# Patient Record
Sex: FEMALE | Race: WHITE | NOT HISPANIC OR LATINO | Employment: OTHER | URBAN - METROPOLITAN AREA
[De-identification: names, ages, dates, MRNs, and addresses within clinical notes are randomized per-mention and may not be internally consistent; named-entity substitution may affect disease eponyms.]

---

## 2021-05-28 ENCOUNTER — IMMUNIZATION (OUTPATIENT)
Dept: VACCINE CLINIC | Facility: HOSPITAL | Age: 66
End: 2021-05-28

## 2021-05-28 PROCEDURE — 0001A: CPT | Performed by: INTERNAL MEDICINE

## 2021-05-28 PROCEDURE — 91300 HC SARSCOV02 VAC 30MCG/0.3ML IM: CPT | Performed by: INTERNAL MEDICINE

## 2021-06-18 ENCOUNTER — IMMUNIZATION (OUTPATIENT)
Dept: VACCINE CLINIC | Facility: HOSPITAL | Age: 66
End: 2021-06-18

## 2021-06-18 PROCEDURE — 91300 HC SARSCOV02 VAC 30MCG/0.3ML IM: CPT | Performed by: INTERNAL MEDICINE

## 2021-06-18 PROCEDURE — 0002A: CPT | Performed by: INTERNAL MEDICINE

## 2021-12-28 ENCOUNTER — IMMUNIZATION (OUTPATIENT)
Dept: VACCINE CLINIC | Facility: HOSPITAL | Age: 66
End: 2021-12-28

## 2021-12-28 PROCEDURE — 91300 HC SARSCOV02 VAC 30MCG/0.3ML IM: CPT | Performed by: INTERNAL MEDICINE

## 2021-12-28 PROCEDURE — 0004A HC ADM SARSCOV2 30MCG/0.3ML BOOSTER: CPT | Performed by: INTERNAL MEDICINE

## 2023-03-22 ENCOUNTER — HOSPITAL ENCOUNTER (OUTPATIENT)
Dept: GENERAL RADIOLOGY | Facility: HOSPITAL | Age: 68
Discharge: HOME | End: 2023-03-22
Payer: MEDICARE

## 2023-03-22 ENCOUNTER — OFFICE VISIT (OUTPATIENT)
Dept: INTERNAL MEDICINE | Facility: CLINIC | Age: 68
End: 2023-03-22
Payer: MEDICARE

## 2023-03-22 ENCOUNTER — LAB (OUTPATIENT)
Dept: LAB | Facility: HOSPITAL | Age: 68
End: 2023-03-22
Payer: MEDICARE

## 2023-03-22 VITALS
TEMPERATURE: 97.2 F | DIASTOLIC BLOOD PRESSURE: 84 MMHG | BODY MASS INDEX: 26.28 KG/M2 | WEIGHT: 173.4 LBS | RESPIRATION RATE: 16 BRPM | HEART RATE: 85 BPM | HEIGHT: 68 IN | OXYGEN SATURATION: 95 % | SYSTOLIC BLOOD PRESSURE: 132 MMHG

## 2023-03-22 DIAGNOSIS — J20.9 ACUTE BRONCHITIS, UNSPECIFIED ORGANISM: Primary | ICD-10-CM

## 2023-03-22 DIAGNOSIS — R06.02 SHORTNESS OF BREATH: ICD-10-CM

## 2023-03-22 LAB
ALBUMIN SERPL-MCNC: 5.2 G/DL (ref 3.5–5.2)
ALBUMIN/GLOB SERPL: 2 G/DL
ALP SERPL-CCNC: 59 U/L (ref 39–117)
ALT SERPL W P-5'-P-CCNC: 14 U/L (ref 1–33)
ANION GAP SERPL CALCULATED.3IONS-SCNC: 7.5 MMOL/L (ref 5–15)
AST SERPL-CCNC: 16 U/L (ref 1–32)
BILIRUB SERPL-MCNC: 0.5 MG/DL (ref 0–1.2)
BUN SERPL-MCNC: 11 MG/DL (ref 8–23)
BUN/CREAT SERPL: 12.2 (ref 7–25)
CALCIUM SPEC-SCNC: 10.1 MG/DL (ref 8.6–10.5)
CHLORIDE SERPL-SCNC: 101 MMOL/L (ref 98–107)
CO2 SERPL-SCNC: 27.5 MMOL/L (ref 22–29)
CREAT SERPL-MCNC: 0.9 MG/DL (ref 0.57–1)
DEPRECATED RDW RBC AUTO: 43.4 FL (ref 37–54)
EGFRCR SERPLBLD CKD-EPI 2021: 70.2 ML/MIN/1.73
ERYTHROCYTE [DISTWIDTH] IN BLOOD BY AUTOMATED COUNT: 12.1 % (ref 12.3–15.4)
GLOBULIN UR ELPH-MCNC: 2.6 GM/DL
GLUCOSE SERPL-MCNC: 115 MG/DL (ref 65–99)
HCT VFR BLD AUTO: 40.8 % (ref 34–46.6)
HGB BLD-MCNC: 14.5 G/DL (ref 12–15.9)
LYMPHOCYTES # BLD MANUAL: 2.9 10*3/MM3 (ref 0.7–3.1)
LYMPHOCYTES NFR BLD MANUAL: 5.2 % (ref 5–12)
MCH RBC QN AUTO: 34.9 PG (ref 26.6–33)
MCHC RBC AUTO-ENTMCNC: 35.5 G/DL (ref 31.5–35.7)
MCV RBC AUTO: 98.3 FL (ref 79–97)
MONOCYTES # BLD: 0.27 10*3/MM3 (ref 0.1–0.9)
NEUTROPHILS # BLD AUTO: 1.95 10*3/MM3 (ref 1.7–7)
NEUTROPHILS NFR BLD MANUAL: 38.1 % (ref 42.7–76)
NRBC BLD AUTO-RTO: 0.2 /100 WBC (ref 0–0.2)
PLAT MORPH BLD: NORMAL
PLATELET # BLD AUTO: 268 10*3/MM3 (ref 140–450)
PMV BLD AUTO: 9.6 FL (ref 6–12)
POTASSIUM SERPL-SCNC: 3.8 MMOL/L (ref 3.5–5.2)
PROT SERPL-MCNC: 7.8 G/DL (ref 6–8.5)
RBC # BLD AUTO: 4.15 10*6/MM3 (ref 3.77–5.28)
RBC MORPH BLD: NORMAL
SODIUM SERPL-SCNC: 136 MMOL/L (ref 136–145)
VARIANT LYMPHS NFR BLD MANUAL: 56.7 % (ref 19.6–45.3)
WBC MORPH BLD: NORMAL
WBC NRBC COR # BLD: 5.12 10*3/MM3 (ref 3.4–10.8)

## 2023-03-22 PROCEDURE — 85025 COMPLETE CBC W/AUTO DIFF WBC: CPT | Performed by: FAMILY MEDICINE

## 2023-03-22 PROCEDURE — 99204 OFFICE O/P NEW MOD 45 MIN: CPT | Performed by: FAMILY MEDICINE

## 2023-03-22 PROCEDURE — 80053 COMPREHEN METABOLIC PANEL: CPT | Performed by: FAMILY MEDICINE

## 2023-03-22 PROCEDURE — 1159F MED LIST DOCD IN RCRD: CPT | Performed by: FAMILY MEDICINE

## 2023-03-22 PROCEDURE — 36415 COLL VENOUS BLD VENIPUNCTURE: CPT | Performed by: FAMILY MEDICINE

## 2023-03-22 PROCEDURE — 1160F RVW MEDS BY RX/DR IN RCRD: CPT | Performed by: FAMILY MEDICINE

## 2023-03-22 PROCEDURE — 85007 BL SMEAR W/DIFF WBC COUNT: CPT | Performed by: FAMILY MEDICINE

## 2023-03-22 PROCEDURE — 71046 X-RAY EXAM CHEST 2 VIEWS: CPT

## 2023-03-22 RX ORDER — AZITHROMYCIN 250 MG/1
TABLET, FILM COATED ORAL
Qty: 6 TABLET | Refills: 0 | Status: SHIPPED | OUTPATIENT
Start: 2023-03-22

## 2023-03-22 RX ORDER — PREDNISONE 10 MG/1
40 TABLET ORAL DAILY
Qty: 20 TABLET | Refills: 0 | Status: SHIPPED | OUTPATIENT
Start: 2023-03-22 | End: 2023-03-27

## 2023-03-22 RX ORDER — ALBUTEROL SULFATE 90 UG/1
2 AEROSOL, METERED RESPIRATORY (INHALATION) EVERY 4 HOURS PRN
Qty: 18 G | Refills: 0 | Status: SHIPPED | OUTPATIENT
Start: 2023-03-22

## 2023-03-22 RX ORDER — FLUTICASONE FUROATE, UMECLIDINIUM BROMIDE AND VILANTEROL TRIFENATATE 100; 62.5; 25 UG/1; UG/1; UG/1
1 POWDER RESPIRATORY (INHALATION)
Qty: 1 EACH | Refills: 6 | Status: SHIPPED | OUTPATIENT
Start: 2023-03-22

## 2023-03-22 NOTE — PROGRESS NOTES
"Chief Complaint  Establish Care (cough)    Subjective        Yoanna Reed presents to [facility] PRIMARY [facility]  History of Present Illness    Patient presents at today's office visit stating that she does have some shortness of breath.  She states that she is cleaning out her mother's home and has been around a lot of dust has been going on, and this is progressively made it worse for her over the last month.  She has a history of smoking as well.  However she quit about 12 years ago.  She has had numerous episodes of bronchitis in the past.    Objective   Vital Signs:  /84   Pulse 85   Temp 97.2 °F (36.2 °C)   Resp 16   Ht 172.7 cm (68\")   Wt 78.7 kg (173 lb 6.4 oz)   SpO2 95%   BMI 26.37 kg/m²   Estimated body mass index is 26.37 kg/m² as calculated from the following:    Height as of this encounter: 172.7 cm (68\").    Weight as of this encounter: 78.7 kg (173 lb 6.4 oz).             Physical Exam  Vitals and nursing note reviewed.   Constitutional:       Appearance: She is well-developed.   HENT:      Head: Normocephalic and atraumatic.      Right Ear: External ear normal.      Left Ear: External ear normal.   Cardiovascular:      Rate and Rhythm: Normal rate and regular rhythm.      Heart sounds: Normal heart sounds.   Pulmonary:      Effort: Pulmonary effort is normal. No respiratory distress.      Breath sounds: Wheezing present.   Musculoskeletal:         General: Normal range of motion.      Cervical back: Normal range of motion and neck supple.   Lymphadenopathy:      Cervical: No cervical adenopathy.   Skin:     General: Skin is warm.   Neurological:      Mental Status: She is alert and oriented to person, place, and time.   Psychiatric:         Behavior: Behavior normal.        Result Review :                   Assessment and Plan   Diagnoses and all orders for this visit:    1. Acute bronchitis, unspecified organism (Primary)  -     predniSONE (DELTASONE) 10 MG " tablet; Take 4 tablets by mouth Daily for 5 days.  Dispense: 20 tablet; Refill: 0  -     azithromycin (Zithromax) 250 MG tablet; Take 2 tablets the first day, then 1 tablet daily for 4 days.  Dispense: 6 tablet; Refill: 0  -     albuterol sulfate  (90 Base) MCG/ACT inhaler; Inhale 2 puffs Every 4 (Four) Hours As Needed for Wheezing.  Dispense: 18 g; Refill: 0    2. Shortness of breath  -     Ambulatory Referral to Pulmonology  -     albuterol sulfate  (90 Base) MCG/ACT inhaler; Inhale 2 puffs Every 4 (Four) Hours As Needed for Wheezing.  Dispense: 18 g; Refill: 0  -     XR Chest 2 View  -     Fluticasone-Umeclidin-Vilant (Trelegy Ellipta) 100-62.5-25 MCG/ACT inhaler; Inhale 1 puff Daily.  Dispense: 1 each; Refill: 6  -     Comprehensive Metabolic Panel  -     CBC & Differential    Today's office visit her O2 sat was initially 92%, however on recheck it is up to 95%.  She seems to be fairly stable and does not appear to be ill looking.  She seems to be fairly comfortable and not in any distress.  I did think that she could benefit from trials of inhalers.  She would also benefit from getting a chest x-ray as well as some labs done at today's visit.  Seeing a pulmonologist would also benefit her.  On exam she does have some wheezing, I do think she would benefit from a steroid as well.  Perhaps even get her started on a Z-Hong.         Follow Up   No follow-ups on file.  Patient was given instructions and counseling regarding her condition or for health maintenance advice. Please see specific information pulled into the AVS if appropriate.

## 2023-03-24 ENCOUNTER — PATIENT ROUNDING (BHMG ONLY) (OUTPATIENT)
Dept: INTERNAL MEDICINE | Facility: CLINIC | Age: 68
End: 2023-03-24
Payer: MEDICARE

## 2023-03-24 NOTE — PROGRESS NOTES
March 24, 2023    Hello, may I speak with Yoanna Reed?    My name is OZ      I am  with [facility]  [address]  [place] [address] [phone].    Before we get started may I verify your date of birth? 1955    I am calling to officially welcome you to our practice and ask about your recent visit. Is this a good time to talk? YES  Tell me about your visit with us. What things went well?  YES       We're always looking for ways to make our patients' experiences even better. Do you have recommendations on ways we may improve?  NO    Overall were you satisfied with your first visit to our practice?  YES     I appreciate you taking the time to speak with me today. Is there anything else I can do for you? NO      Thank you, and have a great day.

## 2023-03-30 DIAGNOSIS — J20.9 ACUTE BRONCHITIS, UNSPECIFIED ORGANISM: ICD-10-CM

## 2023-03-30 DIAGNOSIS — R06.02 SHORTNESS OF BREATH: ICD-10-CM

## 2023-03-30 RX ORDER — ALBUTEROL SULFATE 90 UG/1
AEROSOL, METERED RESPIRATORY (INHALATION)
OUTPATIENT
Start: 2023-03-30

## 2023-04-24 ENCOUNTER — TELEPHONE (OUTPATIENT)
Dept: INTERNAL MEDICINE | Facility: CLINIC | Age: 68
End: 2023-04-24

## 2023-04-24 NOTE — TELEPHONE ENCOUNTER
Caller: Yoanna Reed    Relationship: Self    Best call back number: [phone]    What is the best time to reach you: ANYTIME     PATIENT STATES SHE IS NEEDING A 6 WEEK FOLLOW UP APPOINTMENT FOR HER SHORTNESS OF BREATH AN MEDICATION AND DOES NOT WANT TO WAIT UNTIL THE END OF MAY 2023 TO BE SEEN.     PATIENT STATES SHE IS REQUESTING TO BE WORKED IN DUE TO HER APPOINTMENT THIS Friday, 04/28/2023 BEING CANCELLED.

## 2023-05-12 ENCOUNTER — OFFICE VISIT (OUTPATIENT)
Dept: INTERNAL MEDICINE | Facility: CLINIC | Age: 68
End: 2023-05-12
Payer: MEDICARE

## 2023-05-12 VITALS
RESPIRATION RATE: 16 BRPM | SYSTOLIC BLOOD PRESSURE: 138 MMHG | WEIGHT: 177 LBS | BODY MASS INDEX: 26.83 KG/M2 | OXYGEN SATURATION: 94 % | HEIGHT: 68 IN | DIASTOLIC BLOOD PRESSURE: 80 MMHG

## 2023-05-12 DIAGNOSIS — F32.A DEPRESSION, UNSPECIFIED DEPRESSION TYPE: Primary | ICD-10-CM

## 2023-05-12 DIAGNOSIS — F41.9 ANXIETY: ICD-10-CM

## 2023-05-12 RX ORDER — ESCITALOPRAM OXALATE 10 MG/1
10 TABLET ORAL DAILY
Qty: 90 TABLET | Refills: 2 | Status: SHIPPED | OUTPATIENT
Start: 2023-05-12

## 2023-05-12 NOTE — PROGRESS NOTES
"Chief Complaint  Stress    Subjective        Ainsley Reed presents to [facility]  History of Present Illness     AINSLEY REED is present in the clinic today for concerns of stress. She consents to the use of SHASHI.    She has been under a lot of stress. Her mother passed away 1 year ago. She has 2 sisters who sued her before she was in the ground. She gets lost papers. She has lost some weight. She takes melatonin to help her sleep. She has a friend that is in her life, but he is not a boyfriend. He helped her when her mother would fall. She is going through the roof with stress. She wants to make sure her heart is not going \"bizarre\". She had to clean her parents' house. She had 8 dumpsters in a basement last summer. She is kimber if she eats once a day, and she urinates today. She is not worried about eating. She does eat, but she needs to get something under control.    She went to the pulmonologist yesterday. He said he was going to give her a prescription for asthma. She has not gotten that. She can not afford the Trelegy. Her insurance does not cover it.    She takes Tylenol and vitamins.    Objective   Vital Signs:  /80 (BP Location: Left arm, Patient Position: Sitting, Cuff Size: Adult)   Resp 16   Ht 172.7 cm (68\")   Wt 80.3 kg (177 lb)   SpO2 94%   BMI 26.91 kg/m²   Estimated body mass index is 26.91 kg/m² as calculated from the following:    Height as of this encounter: 172.7 cm (68\").    Weight as of this encounter: 80.3 kg (177 lb).        A review of systems was performed, and the pertinent positives are noted in the HPI.            Physical Exam  Vitals and nursing note reviewed.   Constitutional:       Appearance: She is well-developed.   HENT:      Head: Normocephalic and atraumatic.   Musculoskeletal:      Cervical back: Normal range of motion and neck supple.   Neurological:      Mental Status: She is alert and oriented to person, place, and " time.   Psychiatric:         Behavior: Behavior normal.        Result Review :                   Assessment and Plan   Diagnoses and all orders for this visit:    1. Depression, unspecified depression type (Primary)  -     escitalopram (Lexapro) 10 MG tablet; Take 1 tablet by mouth Daily.  Dispense: 90 tablet; Refill: 2    2. Anxiety  -     escitalopram (Lexapro) 10 MG tablet; Take 1 tablet by mouth Daily.  Dispense: 90 tablet; Refill: 2        1. Anxiety  - I will prescribe Lexapro 10 mg daily.    2. Asthma  - I will prescribe Trelegy.    She will follow up in 4 to 6 weeks.         Follow Up   No follow-ups on file.  Patient was given instructions and counseling regarding her condition or for health maintenance advice. Please see specific information pulled into the AVS if appropriate.             Transcribed from ambient dictation for Tom Muller MD by Ijeoma Parra.  05/12/23   15:39 EDT    Patient or patient representative verbalized consent to the visit recording.  I have personally performed the services described in this document as transcribed by the above individual, and it is both accurate and complete.

## 2023-07-12 ENCOUNTER — OFFICE VISIT (OUTPATIENT)
Dept: INTERNAL MEDICINE | Facility: CLINIC | Age: 68
End: 2023-07-12
Payer: MEDICARE

## 2023-07-12 VITALS
WEIGHT: 177 LBS | HEIGHT: 68 IN | OXYGEN SATURATION: 98 % | HEART RATE: 110 BPM | RESPIRATION RATE: 16 BRPM | DIASTOLIC BLOOD PRESSURE: 74 MMHG | SYSTOLIC BLOOD PRESSURE: 118 MMHG | BODY MASS INDEX: 26.83 KG/M2

## 2023-07-12 DIAGNOSIS — F41.9 ANXIETY: Primary | ICD-10-CM

## 2023-07-12 DIAGNOSIS — F32.A DEPRESSION, UNSPECIFIED DEPRESSION TYPE: ICD-10-CM

## 2023-07-12 PROCEDURE — 1160F RVW MEDS BY RX/DR IN RCRD: CPT | Performed by: FAMILY MEDICINE

## 2023-07-12 PROCEDURE — 99214 OFFICE O/P EST MOD 30 MIN: CPT | Performed by: FAMILY MEDICINE

## 2023-07-12 PROCEDURE — 1159F MED LIST DOCD IN RCRD: CPT | Performed by: FAMILY MEDICINE

## 2023-08-03 ENCOUNTER — OFFICE VISIT (OUTPATIENT)
Dept: INTERNAL MEDICINE | Facility: CLINIC | Age: 68
End: 2023-08-03
Payer: MEDICARE

## 2023-08-03 VITALS
HEIGHT: 68 IN | DIASTOLIC BLOOD PRESSURE: 80 MMHG | WEIGHT: 184 LBS | HEART RATE: 69 BPM | BODY MASS INDEX: 27.89 KG/M2 | OXYGEN SATURATION: 99 % | RESPIRATION RATE: 16 BRPM | SYSTOLIC BLOOD PRESSURE: 144 MMHG

## 2023-08-03 DIAGNOSIS — F32.A DEPRESSION, UNSPECIFIED DEPRESSION TYPE: ICD-10-CM

## 2023-08-03 DIAGNOSIS — Z78.0 POST-MENOPAUSAL: ICD-10-CM

## 2023-08-03 DIAGNOSIS — F41.9 ANXIETY: ICD-10-CM

## 2023-08-03 DIAGNOSIS — Z12.31 ENCOUNTER FOR SCREENING MAMMOGRAM FOR MALIGNANT NEOPLASM OF BREAST: ICD-10-CM

## 2023-08-03 DIAGNOSIS — F51.01 PRIMARY INSOMNIA: Primary | ICD-10-CM

## 2023-08-03 RX ORDER — ZOLPIDEM TARTRATE 10 MG/1
10 TABLET ORAL NIGHTLY PRN
Qty: 90 TABLET | Refills: 0 | Status: SHIPPED | OUTPATIENT
Start: 2023-08-03

## 2023-08-10 ENCOUNTER — HOSPITAL ENCOUNTER (OUTPATIENT)
Dept: BONE DENSITY | Facility: HOSPITAL | Age: 68
Discharge: HOME | End: 2023-08-10
Admitting: FAMILY MEDICINE
Payer: MEDICARE

## 2023-08-10 ENCOUNTER — TRANSCRIBE ORDERS (OUTPATIENT)
Dept: ADMINISTRATIVE | Facility: HOSPITAL | Age: 68
End: 2023-08-10
Payer: MEDICARE

## 2023-08-10 DIAGNOSIS — Z12.31 SCREENING MAMMOGRAM FOR BREAST CANCER: Primary | ICD-10-CM

## 2023-08-10 DIAGNOSIS — Z78.0 POST-MENOPAUSAL: ICD-10-CM

## 2023-08-10 PROCEDURE — 77080 DXA BONE DENSITY AXIAL: CPT

## 2023-08-12 NOTE — PROGRESS NOTES
"Chief Complaint  Anxiety    Subjective        Yoanna Reed presents to [facility]  Anxiety          Patient continues to have some anxiety as well as some depression.  Patient is currently taking Lexapro 20 mg daily.  Patient is also taking Ativan 0.5 mg as needed.  However depression and anxiety continues to get worse.  Patient states that she is under significant amount of stress, she is currently going through a lot of personal things.    Objective   Vital Signs:  /74 (BP Location: Left arm, Patient Position: Sitting, Cuff Size: Adult)   Pulse 110   Resp 16   Ht 172.7 cm (67.99\")   Wt 80.3 kg (177 lb)   SpO2 98%   BMI 26.92 kg/mý   Estimated body mass index is 26.92 kg/mý as calculated from the following:    Height as of this encounter: 172.7 cm (67.99\").    Weight as of this encounter: 80.3 kg (177 lb).             Physical Exam  Vitals and nursing note reviewed.   Constitutional:       Appearance: She is well-developed.   HENT:      Head: Normocephalic and atraumatic.   Musculoskeletal:      Cervical back: Normal range of motion and neck supple.   Neurological:      Mental Status: She is alert and oriented to person, place, and time.   Psychiatric:         Behavior: Behavior normal.      Result Review :                   Assessment and Plan   Diagnoses and all orders for this visit:    1. Anxiety (Primary)  -     Cariprazine HCl (Vraylar) 1.5 MG capsule capsule; Take 1 capsule by mouth Daily.  Dispense: 30 capsule; Refill: 11    2. Depression, unspecified depression type  -     Cariprazine HCl (Vraylar) 1.5 MG capsule capsule; Take 1 capsule by mouth Daily.  Dispense: 30 capsule; Refill: 11    At today's office visit I discussed we will continue taking Lexapro as well as Ativan.  Like to start patient on Vraylar 1.5 mg daily.         Follow Up   No follow-ups on file.  Patient was given instructions and counseling regarding her condition or for health " maintenance advice. Please see specific information pulled into the AVS if appropriate.

## 2023-08-25 ENCOUNTER — OFFICE VISIT (OUTPATIENT)
Dept: INTERNAL MEDICINE | Facility: CLINIC | Age: 68
End: 2023-08-25
Payer: MEDICARE

## 2023-08-25 VITALS
DIASTOLIC BLOOD PRESSURE: 78 MMHG | BODY MASS INDEX: 27.58 KG/M2 | OXYGEN SATURATION: 95 % | HEART RATE: 92 BPM | WEIGHT: 182 LBS | SYSTOLIC BLOOD PRESSURE: 126 MMHG | HEIGHT: 68 IN | RESPIRATION RATE: 16 BRPM | TEMPERATURE: 98.2 F

## 2023-08-25 DIAGNOSIS — N60.01 BENIGN BREAST CYST IN FEMALE, RIGHT: ICD-10-CM

## 2023-08-25 DIAGNOSIS — Z12.11 ENCOUNTER FOR SCREENING COLONOSCOPY: ICD-10-CM

## 2023-08-25 DIAGNOSIS — F51.01 PRIMARY INSOMNIA: ICD-10-CM

## 2023-08-25 DIAGNOSIS — F41.9 ANXIETY: ICD-10-CM

## 2023-08-25 DIAGNOSIS — F32.A DEPRESSION, UNSPECIFIED DEPRESSION TYPE: Primary | ICD-10-CM

## 2023-08-25 DIAGNOSIS — B00.1 COLD SORE: ICD-10-CM

## 2023-08-25 PROCEDURE — 1159F MED LIST DOCD IN RCRD: CPT | Performed by: FAMILY MEDICINE

## 2023-08-25 PROCEDURE — 99214 OFFICE O/P EST MOD 30 MIN: CPT | Performed by: FAMILY MEDICINE

## 2023-08-25 PROCEDURE — 1160F RVW MEDS BY RX/DR IN RCRD: CPT | Performed by: FAMILY MEDICINE

## 2023-08-25 RX ORDER — VALACYCLOVIR HYDROCHLORIDE 500 MG/1
500 TABLET, FILM COATED ORAL 2 TIMES DAILY
Qty: 14 TABLET | Refills: 1 | Status: SHIPPED | OUTPATIENT
Start: 2023-08-25 | End: 2023-09-01

## 2023-08-31 ENCOUNTER — TELEPHONE (OUTPATIENT)
Dept: INTERNAL MEDICINE | Facility: CLINIC | Age: 68
End: 2023-08-31
Payer: MEDICARE

## 2023-08-31 DIAGNOSIS — K12.0 APHTHOUS ULCER OF MOUTH: Primary | ICD-10-CM

## 2023-08-31 NOTE — TELEPHONE ENCOUNTER
Caller: Derek Yoanna M    Relationship: Self    Best call back number: [phone]    What are your current symptoms: SORES IN HER MOUTH     Have you had these symptoms before:    [x] Yes  [] No    Have you been treated for these symptoms before:   [x] Yes  [] No    If a prescription is needed, what is your preferred pharmacy and phone number: [facility]/PHARMACY #[id number] [place], [place] - [address] [address]. AT [facility] [phone] [facility] [phone]      Additional notes: PATIENT STATES THE MEDICATION SHE WAS PRESCRIBE LAST FRIDAY, 08/25/2023 BY DR. CHAMBERLAIN IS NOT HELPING WITH THE SORES IN HER MOUTH .    PATIENT STATES SHE IS WANTING TO KNOW IF SHE CAN BE PRESCRIBED A DIFFERENT MEDICATION.    PATIENT IS REQUESTING A CALL BACK.

## 2023-09-03 NOTE — TELEPHONE ENCOUNTER
Lets send in balwinder magic mouth wash.  1 part lidocaine, 1 part benedryl, 1 part nystatin.  Dispense 180ml.  Swish and spit 5ml tid.

## 2023-09-04 NOTE — PROGRESS NOTES
"Chief Complaint  Follow-up (1 month follow up/) and Mouth Lesions (For about  a week or so)    Subjective        Yoanna Reed presents to [facility]  Mouth Lesions   Associated symptoms include mouth sores.     Patient presents at today's visit with a history having depression, anxiety as well as insomnia.  She states that the medicines that she is currently taking does seem to be helping her.  She states that the Vraylar 1.5 mg daily has started to kick in.  She is seems to be doing okay with the Lexapro 20 mg daily along with Ativan 0.5 mg daily.  She also states that for the insomnia taking Ambien 10 mg nightly has also help.    She states that she was having a benign breast and the right side.  Patient states that she would like to have a diagnostic mammogram and ultrasound done.  I did at today's visit put the referrals in for this.    She is suffering from cold sores.  We did discuss at today's office about perhaps trying Valtrex to see if it would help her with the cold sores.  She may need Nkechi's Magic mouthwash.    Objective   Vital Signs:  /78   Pulse 92   Temp 98.2 °F (36.8 °C) (Temporal)   Resp 16   Ht 172.7 cm (67.99\")   Wt 82.6 kg (182 lb)   SpO2 95%   BMI 27.68 kg/m²   Estimated body mass index is 27.68 kg/m² as calculated from the following:    Height as of this encounter: 172.7 cm (67.99\").    Weight as of this encounter: 82.6 kg (182 lb).             Physical Exam  Vitals and nursing note reviewed.   Constitutional:       Appearance: She is well-developed.   HENT:      Head: Normocephalic and atraumatic.   Musculoskeletal:      Cervical back: Normal range of motion and neck supple.   Neurological:      Mental Status: She is alert and oriented to person, place, and time.   Psychiatric:         Behavior: Behavior normal.      Result Review :                   Assessment and Plan   Diagnoses and all orders for this visit:    1. Depression, " unspecified depression type (Primary)    2. Anxiety    3. Primary insomnia    4. Benign breast cyst in female, right  -     Mammo Diagnostic Bilateral With CAD; Future  -     US breast bilateral complete; Future    5. Encounter for screening colonoscopy  -     Ambulatory Referral For Screening Colonoscopy    6. Cold sore  -     valACYclovir (Valtrex) 500 MG tablet; Take 1 tablet by mouth 2 (Two) Times a Day for 7 days.  Dispense: 14 tablet; Refill: 1    Other orders  -     magic mouthwash oral suspension; Swish and spit 5 mL Every 6 (Six) Hours As Needed for Stomatitis.  Dispense: 150 each; Refill: 0    For the cold sores, will start on Valtrex.  She may benefit from Nkechi's Magic mouthwash.  For the benign breast cyst on the right side, will get ultrasound and diagnostic mammogram.  We will refer her to get a colonoscopy.  For the insomnia, continue Ambien.  For anxiety and depression, continue Vraylar, Lexapro, and Ativan.         Follow Up   No follow-ups on file.  Patient was given instructions and counseling regarding her condition or for health maintenance advice. Please see specific information pulled into the AVS if appropriate.

## 2023-10-04 ENCOUNTER — HOSPITAL ENCOUNTER (OUTPATIENT)
Dept: ULTRASOUND IMAGING | Facility: HOSPITAL | Age: 68
Discharge: HOME | End: 2023-10-04
Payer: MEDICARE

## 2023-10-04 ENCOUNTER — HOSPITAL ENCOUNTER (OUTPATIENT)
Dept: MAMMOGRAPHY | Facility: HOSPITAL | Age: 68
Discharge: HOME | End: 2023-10-04
Payer: MEDICARE

## 2023-10-04 DIAGNOSIS — N60.01 BENIGN BREAST CYST IN FEMALE, RIGHT: ICD-10-CM

## 2023-10-04 PROCEDURE — G0279 TOMOSYNTHESIS, MAMMO: HCPCS

## 2023-10-04 PROCEDURE — 76642 ULTRASOUND BREAST LIMITED: CPT

## 2023-10-04 PROCEDURE — 77066 DX MAMMO INCL CAD BI: CPT

## 2023-10-26 DIAGNOSIS — F51.01 PRIMARY INSOMNIA: ICD-10-CM

## 2023-11-01 DIAGNOSIS — F51.01 PRIMARY INSOMNIA: ICD-10-CM

## 2023-11-01 DIAGNOSIS — F41.9 ANXIETY: ICD-10-CM

## 2023-11-01 DIAGNOSIS — F32.A DEPRESSION, UNSPECIFIED DEPRESSION TYPE: ICD-10-CM

## 2023-11-01 RX ORDER — ESCITALOPRAM OXALATE 20 MG/1
20 TABLET ORAL DAILY
Qty: 90 TABLET | Refills: 1 | Status: SHIPPED | OUTPATIENT
Start: 2023-11-01

## 2023-11-01 RX ORDER — ZOLPIDEM TARTRATE 10 MG/1
10 TABLET ORAL NIGHTLY PRN
Qty: 90 TABLET | Refills: 0 | Status: SHIPPED | OUTPATIENT
Start: 2023-11-01

## 2023-11-01 RX ORDER — LORAZEPAM 0.5 MG/1
0.5 TABLET ORAL 2 TIMES DAILY PRN
Qty: 60 TABLET | Refills: 3 | Status: SHIPPED | OUTPATIENT
Start: 2023-11-01

## 2023-11-01 NOTE — TELEPHONE ENCOUNTER
Caller: Yoanna Reed    Relationship: Self    Requested Prescriptions:   Requested Prescriptions     Pending Prescriptions Disp Refills    zolpidem (AMBIEN) 10 MG tablet 90 tablet 0     Sig: Take 1 tablet by mouth At Night As Needed for Sleep.        Pharmacy where request should be sent: [facility]/PHARMACY #[id number] [place], [place] - [address] AT [facility] [phone] [facility] [phone]      Last office visit with prescribing clinician: 8/25/2023   Last telemedicine visit with prescribing clinician: Visit date not found   Next office visit with prescribing clinician: 11/14/2023     Additional details provided by patient: PATIENT HAS ONE PILL LEFT.      Does the patient have less than a 3 day supply:  [x] Yes  [] No    Would you like a call back once the refill request has been completed: [] Yes [x] No    If the office needs to give you a call back, can they leave a voicemail: [] Yes [x] No    Edgard Flores Rep   11/01/23 08:58 EDT

## 2023-11-01 NOTE — TELEPHONE ENCOUNTER
Rx Refill Note  Requested Prescriptions     Pending Prescriptions Disp Refills    escitalopram (LEXAPRO) 20 MG tablet [Pharmacy Med Name: ESCITALOPRAM 20 MG TABLET] 90 tablet 1     Sig: TAKE 1 TABLET BY MOUTH EVERY DAY    LORazepam (ATIVAN) 0.5 MG tablet [Pharmacy Med Name: LORAZEPAM 0.5 MG TABLET] 60 tablet 3     Sig: TAKE 1 TABLET BY MOUTH 2 TIMES A DAY AS NEEDED FOR ANXIETY.      Last office visit with prescribing clinician: 8/25/2023   Last telemedicine visit with prescribing clinician: Visit date not found   Next office visit with prescribing clinician: 11/1/2023   JONATHAN ON FILE    Donavon Sheppard MA  11/01/23, 10:47 EDT

## 2023-11-02 RX ORDER — ZOLPIDEM TARTRATE 10 MG/1
10 TABLET ORAL NIGHTLY PRN
Qty: 90 TABLET | Refills: 0 | OUTPATIENT
Start: 2023-11-02

## 2023-11-14 ENCOUNTER — OFFICE VISIT (OUTPATIENT)
Dept: INTERNAL MEDICINE | Facility: CLINIC | Age: 68
End: 2023-11-14
Payer: MEDICARE

## 2023-11-14 VITALS
SYSTOLIC BLOOD PRESSURE: 122 MMHG | DIASTOLIC BLOOD PRESSURE: 74 MMHG | WEIGHT: 179.6 LBS | BODY MASS INDEX: 27.22 KG/M2 | HEART RATE: 81 BPM | TEMPERATURE: 98 F | RESPIRATION RATE: 16 BRPM | HEIGHT: 68 IN | OXYGEN SATURATION: 96 %

## 2023-11-14 DIAGNOSIS — F32.A DEPRESSION, UNSPECIFIED DEPRESSION TYPE: ICD-10-CM

## 2023-11-14 DIAGNOSIS — F51.01 PRIMARY INSOMNIA: ICD-10-CM

## 2023-11-14 DIAGNOSIS — F41.9 ANXIETY: Primary | ICD-10-CM

## 2023-11-14 DIAGNOSIS — K58.0 IRRITABLE BOWEL SYNDROME WITH DIARRHEA: ICD-10-CM

## 2023-11-14 DIAGNOSIS — K12.0 APHTHOUS ULCER OF MOUTH: ICD-10-CM

## 2023-11-14 PROCEDURE — 99214 OFFICE O/P EST MOD 30 MIN: CPT | Performed by: FAMILY MEDICINE

## 2023-11-14 PROCEDURE — 1159F MED LIST DOCD IN RCRD: CPT | Performed by: FAMILY MEDICINE

## 2023-11-14 PROCEDURE — 1160F RVW MEDS BY RX/DR IN RCRD: CPT | Performed by: FAMILY MEDICINE

## 2023-11-14 RX ORDER — LORAZEPAM 0.5 MG/1
0.5 TABLET ORAL 2 TIMES DAILY PRN
Qty: 60 TABLET | Refills: 3 | Status: SHIPPED | OUTPATIENT
Start: 2023-11-14 | End: 2023-11-14 | Stop reason: SDUPTHER

## 2023-11-14 RX ORDER — MONTELUKAST SODIUM 4 MG/1
1 TABLET, CHEWABLE ORAL 2 TIMES DAILY
Qty: 60 TABLET | Refills: 3 | Status: SHIPPED | OUTPATIENT
Start: 2023-11-14

## 2023-11-14 RX ORDER — ZOLPIDEM TARTRATE 10 MG/1
10 TABLET ORAL NIGHTLY PRN
Qty: 90 TABLET | Refills: 0 | Status: SHIPPED | OUTPATIENT
Start: 2023-11-14 | End: 2023-11-14 | Stop reason: SDUPTHER

## 2023-11-14 RX ORDER — LORAZEPAM 0.5 MG/1
0.5 TABLET ORAL 2 TIMES DAILY PRN
Qty: 60 TABLET | Refills: 3 | Status: SHIPPED | OUTPATIENT
Start: 2023-11-14

## 2023-11-14 RX ORDER — ZOLPIDEM TARTRATE 10 MG/1
10 TABLET ORAL NIGHTLY PRN
Qty: 90 TABLET | Refills: 0 | Status: SHIPPED | OUTPATIENT
Start: 2023-11-14

## 2023-11-14 NOTE — PROGRESS NOTES
"Chief Complaint  Diarrhea (X 1 month Pt states its stress related ), Follow-up, and Rash (On face x 1 week OTC not working )    Subjective        Yoanna Reed presents to [facility]  History of Present Illness      The patient presents today to follow up on her sleep and anxiety.    The patient reports that she has the same problems. She mentions that she moved out of her house into a condo. Her stress level has increased. Her sister called her last night, but she did not answer them because her  told her not to because they are all wanting to do is \"mannie her.\" She is currently taking Lexapro 20 mg daily. She is also on Vraylar 1.5 mg daily. She believes the Vraylar was helping her, however, she has run out of the samples that were given to her. The medications are too costly. She also takes Ativan 0.5 mg twice a day. The stress that she is currently going through is related to some lawsuits that is going on between her and her family members and that has caused her significant amount of stress, but these medicines do seem to be helping her. She is able to get a little bit of sleep and be able to relax a bit, but the stress is also causing her to have some diarrhea.    She is taking Ambien 10 mg at night to help with sleep.    She has lost weight. She has been experiencing diarrhea for approximately 1 month.      She has received her shingles and influenza vaccines.    Objective   Vital Signs:  /74 (BP Location: Left arm, Patient Position: Sitting)   Pulse 81   Temp 98 °F (36.7 °C)   Resp 16   Ht 172.7 cm (67.99\")   Wt 81.5 kg (179 lb 9.6 oz)   SpO2 96%   BMI 27.31 kg/m²   Estimated body mass index is 27.31 kg/m² as calculated from the following:    Height as of this encounter: 172.7 cm (67.99\").    Weight as of this encounter: 81.5 kg (179 lb 9.6 oz).             Physical Exam  Vitals and nursing note reviewed.   Constitutional:       Appearance: She is " well-developed.   HENT:      Head: Normocephalic and atraumatic.   Musculoskeletal:      Cervical back: Normal range of motion and neck supple.   Neurological:      Mental Status: She is alert and oriented to person, place, and time.   Psychiatric:         Behavior: Behavior normal.        Result Review :                   Assessment and Plan   Diagnoses and all orders for this visit:    1. Anxiety (Primary)  -     Cariprazine HCl (Vraylar) 1.5 MG capsule capsule; Take 1 capsule by mouth Daily.  Dispense: 30 capsule; Refill: 11  -     We will continue patient on Ativan as well as Vraylar.  We will try to get some samples of the Vraylar.  I do believe the Vraylar is helping her.  -     LORazepam (ATIVAN) 0.5 MG tablet; Take 1 tablet by mouth 2 (Two) Times a Day As Needed for Anxiety.  Dispense: 60 tablet; Refill: 3    2. Depression, unspecified depression type  -     Cariprazine HCl (Vraylar) 1.5 MG capsule capsule; Take 1 capsule by mouth Daily.  Dispense: 30 capsule; Refill: 11  -     Continue Lexapro, would like to restart her back on the Vraylar.    3. Irritable bowel syndrome with diarrhea  -     colestipol (COLESTID) 1 g tablet; Take 1 tablet by mouth 2 (Two) Times a Day.  Dispense: 60 tablet; Refill: 3  -     Colestipol to help with diarrhea.    4. Primary insomnia  -     We will refill patient's Ambien 10 mg nightly.  -     zolpidem (AMBIEN) 10 MG tablet; Take 1 tablet by mouth At Night As Needed for Sleep.  Dispense: 90 tablet; Refill: 0    5. Aphthous ulcer of mouth  -     Nystatin-Diphenhydramine-Hydrocortisone-Lidocaine; Swish and spit 5 mL 3 (Three) Times a Day.  Dispense: 180 mL; Refill: 0  -     Started on Nkechi's Magic mouthwash.             Follow Up   No follow-ups on file.  Patient was given instructions and counseling regarding her condition or for health maintenance advice. Please see specific information pulled into the AVS if appropriate.       Transcribed from ambient dictation for Tom ALCALA  MD Yohana by Savi Fulton.  11/14/23   09:58 EST    Patient or patient representative verbalized consent to the visit recording.  I have personally performed the services described in this document as transcribed by the above individual, and it is both accurate and complete.

## 2024-01-16 ENCOUNTER — OFFICE VISIT (OUTPATIENT)
Dept: INTERNAL MEDICINE | Facility: CLINIC | Age: 69
End: 2024-01-16
Payer: MEDICARE

## 2024-01-16 VITALS
BODY MASS INDEX: 27.74 KG/M2 | SYSTOLIC BLOOD PRESSURE: 118 MMHG | OXYGEN SATURATION: 98 % | WEIGHT: 183 LBS | RESPIRATION RATE: 14 BRPM | HEIGHT: 68 IN | HEART RATE: 87 BPM | DIASTOLIC BLOOD PRESSURE: 72 MMHG

## 2024-01-16 DIAGNOSIS — N60.01 BENIGN BREAST CYST IN FEMALE, RIGHT: ICD-10-CM

## 2024-01-16 DIAGNOSIS — E78.5 DYSLIPIDEMIA: ICD-10-CM

## 2024-01-16 DIAGNOSIS — F51.01 PRIMARY INSOMNIA: ICD-10-CM

## 2024-01-16 DIAGNOSIS — R73.03 PREDIABETES: ICD-10-CM

## 2024-01-16 DIAGNOSIS — Z12.11 ENCOUNTER FOR SCREENING COLONOSCOPY: ICD-10-CM

## 2024-01-16 DIAGNOSIS — F32.A DEPRESSION, UNSPECIFIED DEPRESSION TYPE: ICD-10-CM

## 2024-01-16 DIAGNOSIS — Z00.00 WELL WOMAN EXAM (NO GYNECOLOGICAL EXAM): Primary | ICD-10-CM

## 2024-01-16 DIAGNOSIS — F41.9 ANXIETY: ICD-10-CM

## 2024-01-16 DIAGNOSIS — Z00.00 MEDICARE ANNUAL WELLNESS VISIT, INITIAL: ICD-10-CM

## 2024-01-16 DIAGNOSIS — Z00.00 HEALTHCARE MAINTENANCE: ICD-10-CM

## 2024-01-16 RX ORDER — LORAZEPAM 0.5 MG/1
0.5 TABLET ORAL 2 TIMES DAILY PRN
Qty: 60 TABLET | Refills: 3 | Status: SHIPPED | OUTPATIENT
Start: 2024-01-16

## 2024-01-16 RX ORDER — ZOLPIDEM TARTRATE 10 MG/1
10 TABLET ORAL NIGHTLY PRN
Qty: 90 TABLET | Refills: 1 | Status: SHIPPED | OUTPATIENT
Start: 2024-01-16

## 2024-01-16 NOTE — PROGRESS NOTES
The ABCs of the Annual Wellness Visit  Initial Medicare Wellness Visit    Subjective     Yoanna Reed is a 68 y.o. female who presents for an Initial Medicare Wellness Visit.    The following portions of the patient's history were reviewed and   updated as appropriate: allergies, current medications, past family history, past medical history, past social history, past surgical history, and problem list.     Compared to one year ago, the patient feels her physical   health is better.    Compared to one year ago, the patient feels her mental   health is better.    Recent Hospitalizations:  She was not admitted to the hospital during the last year.       Current Medical Providers:  Patient Care Team:  Tom Muller MD as PCP - General (Family Medicine)    Outpatient Medications Prior to Visit   Medication Sig Dispense Refill    albuterol sulfate  (90 Base) MCG/ACT inhaler TAKE 2 PUFFS BY MOUTH EVERY 4 HOURS AS NEEDED FOR WHEEZE 18 g 1    Cariprazine HCl (Vraylar) 1.5 MG capsule capsule Take 1 capsule by mouth Daily. 30 capsule 11    colestipol (COLESTID) 1 g tablet Take 1 tablet by mouth 2 (Two) Times a Day. 60 tablet 3    escitalopram (LEXAPRO) 20 MG tablet TAKE 1 TABLET BY MOUTH EVERY DAY 90 tablet 1    LORazepam (ATIVAN) 0.5 MG tablet Take 1 tablet by mouth 2 (Two) Times a Day As Needed for Anxiety. 60 tablet 3    magic mouthwash oral suspension Swish and spit 5 mL Every 6 (Six) Hours As Needed for Stomatitis. 150 each 0    Nystatin-Diphenhydramine-Hydrocortisone-Lidocaine Swish and spit 5 mL 3 (Three) Times a Day. 180 mL 0    zolpidem (AMBIEN) 10 MG tablet Take 1 tablet by mouth At Night As Needed for Sleep. 90 tablet 0     No facility-administered medications prior to visit.       No opioid medication identified on active medication list. I have reviewed chart for other potential  high risk medication/s and harmful drug interactions in the elderly.        Aspirin is not on active medication  "list.  Aspirin use is not indicated based on review of current medical condition/s. Risk of harm outweighs potential benefits.  .    There is no problem list on file for this patient.    Advance Care Planning   Advance Care Planning     Advance Directive is not on file.  ACP discussion was held with the patient during this visit. Patient does not have an advance directive, information provided.       Objective    Vitals:    24 0806   BP: 118/72   BP Location: Left arm   Patient Position: Sitting   Cuff Size: Adult   Pulse: 87   Resp: 14   SpO2: 98%   Weight: 83 kg (183 lb)   Height: 172.7 cm (67.99\")     Estimated body mass index is 27.83 kg/m² as calculated from the following:    Height as of this encounter: 172.7 cm (67.99\").    Weight as of this encounter: 83 kg (183 lb).    BMI is >= 25 and <30. (Overweight) The following options were offered after discussion;: exercise counseling/recommendations and nutrition counseling/recommendations      Does the patient have evidence of cognitive impairment?   No          HEALTH RISK ASSESSMENT    Smoking Status:  Social History     Tobacco Use   Smoking Status Heavy Smoker   Smokeless Tobacco Never     Alcohol Consumption:  Social History     Substance and Sexual Activity   Alcohol Use No     Fall Risk Screen:    [facility].    Depression Screen:       2023     9:06 AM   PHQ-2/PHQ-9 Depression Screening   Little Interest or Pleasure in Doing Things 0-->not at all   Feeling Down, Depressed or Hopeless 0-->not at all   PHQ-9: Brief Depression Severity Measure Score 0       Health Habits and Functional and Cognitive Screenin/16/2024     8:00 AM   Functional & Cognitive Status   Do you have difficulty preparing food and eating? No   Do you have difficulty bathing yourself, getting dressed or grooming yourself? No   Do you have difficulty using the toilet? No   Do you have difficulty moving around from place to place? " No   Do you have trouble with steps or getting out of a bed or a chair? No   Current Diet Well Balanced Diet   Dental Exam Up to date   Eye Exam Up to date   Exercise (times per week) 0 times per week   Current Exercises Include No Regular Exercise   Do you need help using the phone?  No   Are you deaf or do you have serious difficulty hearing?  No   Do you need help to go to places out of walking distance? No   Do you need help shopping? No   Do you need help preparing meals?  No   Do you need help with housework?  No   Do you need help with laundry? No   Do you need help taking your medications? No   Do you need help managing money? No   Do you ever drive or ride in a car without wearing a seat belt? No   Have you felt unusual stress, anger or loneliness in the last month? Yes   Who do you live with? Alone   If you need help, do you have trouble finding someone available to you? No   Have you been bothered in the last four weeks by sexual problems? No   Do you have difficulty concentrating, remembering or making decisions? No       Age-appropriate Screening Schedule:  Refer to the list below for future screening recommendations based on patient's age, sex and/or medical conditions. Orders for these recommended tests are listed in the plan section. The patient has been provided with a written plan.    Health Maintenance   Topic Date Due    BMI FOLLOWUP  Never done    COLORECTAL CANCER SCREENING  Never done    TDAP/TD VACCINES (1 - Tdap) Never done    Pneumococcal Vaccine 65+ (2 of 2 - PCV) 10/20/1999    HEPATITIS C SCREENING  Never done    ANNUAL WELLNESS VISIT  Never done    COVID-19 Vaccine (6 - 2023-24 season) 09/01/2023    DXA SCAN  08/10/2025    MAMMOGRAM  10/04/2025    INFLUENZA VACCINE  Completed    ZOSTER VACCINE  Completed          CMS Preventative Services Quick Reference  Risk Factors Identified During Encounter    None Identified    The above risks/problems have been discussed with the  patient.  Pertinent information has been shared with the patient in the After Visit Summary.  An After Visit Summary and PPPS were made available to the patient.  Diagnoses and all orders for this visit:    1. Well woman exam (no gynecological exam) (Primary)    2. Medicare annual wellness visit, initial    3. Healthcare maintenance    4. Depression, unspecified depression type    5. Anxiety    6. Prediabetes  -     Hemoglobin A1c  -     Thyroid Panel With TSH    7. Dyslipidemia  -     CBC & Differential  -     Comprehensive Metabolic Panel  -     Lipid Panel With LDL / HDL Ratio      Follow Up:  Next Medicare Wellness visit to be scheduled in 1 year.

## 2024-01-17 LAB
ALBUMIN SERPL-MCNC: 4.7 G/DL (ref 3.5–5.2)
ALBUMIN/GLOB SERPL: 1.6 G/DL
ALP SERPL-CCNC: 48 U/L (ref 39–117)
ALT SERPL-CCNC: 9 U/L (ref 1–33)
AST SERPL-CCNC: 14 U/L (ref 1–32)
BASOPHILS # BLD AUTO: 0.03 10*3/MM3 (ref 0–0.2)
BASOPHILS NFR BLD AUTO: 0.6 % (ref 0–1.5)
BILIRUB SERPL-MCNC: 0.4 MG/DL (ref 0–1.2)
BUN SERPL-MCNC: 10 MG/DL (ref 8–23)
BUN/CREAT SERPL: 11.8 (ref 7–25)
CALCIUM SERPL-MCNC: 9.9 MG/DL (ref 8.6–10.5)
CHLORIDE SERPL-SCNC: 98 MMOL/L (ref 98–107)
CHOLEST SERPL-MCNC: 211 MG/DL (ref 0–200)
CO2 SERPL-SCNC: 25.6 MMOL/L (ref 22–29)
CREAT SERPL-MCNC: 0.85 MG/DL (ref 0.57–1)
EGFRCR SERPLBLD CKD-EPI 2021: 74.7 ML/MIN/1.73
EOSINOPHIL # BLD AUTO: 0.01 10*3/MM3 (ref 0–0.4)
EOSINOPHIL NFR BLD AUTO: 0.2 % (ref 0.3–6.2)
ERYTHROCYTE [DISTWIDTH] IN BLOOD BY AUTOMATED COUNT: 12.3 % (ref 12.3–15.4)
FT4I SERPL CALC-MCNC: 2.1 (ref 1.2–4.9)
GLOBULIN SER CALC-MCNC: 3 GM/DL
GLUCOSE SERPL-MCNC: 85 MG/DL (ref 65–99)
HBA1C MFR BLD: 5.5 % (ref 4.8–5.6)
HCT VFR BLD AUTO: 39.9 % (ref 34–46.6)
HCV IGG SERPL QL IA: NON REACTIVE
HDLC SERPL-MCNC: 58 MG/DL (ref 40–60)
HGB BLD-MCNC: 13.6 G/DL (ref 12–15.9)
IMM GRANULOCYTES # BLD AUTO: 0.01 10*3/MM3 (ref 0–0.05)
IMM GRANULOCYTES NFR BLD AUTO: 0.2 % (ref 0–0.5)
LDLC SERPL CALC-MCNC: 125 MG/DL (ref 0–100)
LDLC/HDLC SERPL: 2.08 {RATIO}
LYMPHOCYTES # BLD AUTO: 1.95 10*3/MM3 (ref 0.7–3.1)
LYMPHOCYTES NFR BLD AUTO: 40.1 % (ref 19.6–45.3)
MCH RBC QN AUTO: 34 PG (ref 26.6–33)
MCHC RBC AUTO-ENTMCNC: 34.1 G/DL (ref 31.5–35.7)
MCV RBC AUTO: 99.8 FL (ref 79–97)
MONOCYTES # BLD AUTO: 0.32 10*3/MM3 (ref 0.1–0.9)
MONOCYTES NFR BLD AUTO: 6.6 % (ref 5–12)
NEUTROPHILS # BLD AUTO: 2.54 10*3/MM3 (ref 1.7–7)
NEUTROPHILS NFR BLD AUTO: 52.3 % (ref 42.7–76)
NRBC BLD AUTO-RTO: 0 /100 WBC (ref 0–0.2)
PLATELET # BLD AUTO: 297 10*3/MM3 (ref 140–450)
POTASSIUM SERPL-SCNC: 4.2 MMOL/L (ref 3.5–5.2)
PROT SERPL-MCNC: 7.7 G/DL (ref 6–8.5)
RBC # BLD AUTO: 4 10*6/MM3 (ref 3.77–5.28)
SODIUM SERPL-SCNC: 138 MMOL/L (ref 136–145)
T3RU NFR SERPL: 28 % (ref 24–39)
T4 SERPL-MCNC: 7.6 UG/DL (ref 4.5–12)
TRIGL SERPL-MCNC: 162 MG/DL (ref 0–150)
TSH SERPL DL<=0.005 MIU/L-ACNC: 0.98 UIU/ML (ref 0.45–4.5)
VLDLC SERPL CALC-MCNC: 28 MG/DL (ref 5–40)
WBC # BLD AUTO: 4.86 10*3/MM3 (ref 3.4–10.8)

## 2024-01-19 RX ORDER — PRAVASTATIN SODIUM 20 MG
20 TABLET ORAL DAILY
Qty: 90 TABLET | Refills: 3 | Status: SHIPPED | OUTPATIENT
Start: 2024-01-19

## 2024-01-19 NOTE — PROGRESS NOTES
Please inform the patient of the following abnormal results. Slightly elevated cholesterol, would benefit from statin like pravastatin.

## 2024-01-22 ENCOUNTER — TELEPHONE (OUTPATIENT)
Dept: INTERNAL MEDICINE | Facility: CLINIC | Age: 69
End: 2024-01-22
Payer: MEDICARE

## 2024-01-22 NOTE — TELEPHONE ENCOUNTER
Called and informed pt of lab results. Pt verbally understood and stated that she had started the medication.

## 2024-01-22 NOTE — TELEPHONE ENCOUNTER
UNABLE TO WARM TRANSFER        Caller: Yoanna Reed    Relationship: Self    Best call back number:     [phone]        Caller requesting test results: BLOOD WORK TEST RESULTS    What test was performed: LABS/BLOOD WORK    When was the test performed: 1/16/2024        Additional notes:     PATIENT IS WANTING A CALL BACK TO DISCUSS HER RESULTS.  SHE STATED SOMEONE CALLED HER EARLIER TODAY AND LEFT A MESSAGE FOR HER TO CALL BACK.

## 2024-01-25 DIAGNOSIS — K58.0 IRRITABLE BOWEL SYNDROME WITH DIARRHEA: ICD-10-CM

## 2024-01-25 RX ORDER — MONTELUKAST SODIUM 4 MG/1
1 TABLET, CHEWABLE ORAL 2 TIMES DAILY
Qty: 120 TABLET | Refills: 1 | Status: SHIPPED | OUTPATIENT
Start: 2024-01-25

## 2024-01-25 NOTE — TELEPHONE ENCOUNTER
Rx Refill Note  Requested Prescriptions     Pending Prescriptions Disp Refills    colestipol (COLESTID) 1 g tablet [Pharmacy Med Name: COLESTIPOL HCL 1 GM TABLET] 120 tablet 1     Sig: TAKE 1 TABLET BY MOUTH TWICE A DAY      Last office visit with prescribing clinician: 1/16/2024   Last telemedicine visit with prescribing clinician: Visit date not found   Next office visit with prescribing clinician: 3/15/2024

## 2024-01-27 NOTE — PROGRESS NOTES
"Chief Complaint  Anxiety and Medicare Wellness-subsequent    Subjective        Yoanna Reed presents to [facility]  Anxiety            Patient presents at today's office visit concerning for about a cyst that she has in her right breast.  We did discuss at today's office about perhaps being referred to breast surgery regarding this.    She has a history having depression as well as anxiety.  She states that it is improved, and she is currently taking Lexapro 20 mg daily and lorazepam 0.5 mg twice daily.  She denies any side effects of these medications.    She has history having prediabetes.  She is monitoring with diet and exercise.    She has a history having dyslipidemia.  She is current taking pravastatin 20 mg daily.  She denies any side effects of the medicine.    She also has a history of primary insomnia.  She is currently on Ambien 10 mg nightly.  She denies any side effects of the medicine.    Objective   Vital Signs:  /72 (BP Location: Left arm, Patient Position: Sitting, Cuff Size: Adult)   Pulse 87   Resp 14   Ht 172.7 cm (67.99\")   Wt 83 kg (183 lb)   SpO2 98%   BMI 27.83 kg/m²   Estimated body mass index is 27.83 kg/m² as calculated from the following:    Height as of this encounter: 172.7 cm (67.99\").    Weight as of this encounter: 83 kg (183 lb).             Physical Exam  Vitals and nursing note reviewed.   Constitutional:       Appearance: She is well-developed.   HENT:      Head: Normocephalic and atraumatic.   Musculoskeletal:      Cervical back: Normal range of motion and neck supple.   Neurological:      Mental Status: She is alert and oriented to person, place, and time.   Psychiatric:         Behavior: Behavior normal.        Result Review :                     Assessment and Plan     Diagnoses and all orders for this visit:        Depression, unspecified depression type    Anxiety  -     LORazepam (ATIVAN) 0.5 MG tablet; Take 1 tablet by " mouth 2 (Two) Times a Day As Needed for Anxiety.  Dispense: 60 tablet; Refill: 3    Prediabetes  -     Hemoglobin A1c  -     Thyroid Panel With TSH    Dyslipidemia  -     CBC & Differential  -     Comprehensive Metabolic Panel  -     Lipid Panel With LDL / HDL Ratio    Benign breast cyst in female, right  -     Ambulatory Referral to Breast Surgery    Primary insomnia  -     zolpidem (AMBIEN) 10 MG tablet; Take 1 tablet by mouth At Night As Needed for Sleep.  Dispense: 90 tablet; Refill: 1        For the primary insomnia, we will continue on Ambien 10 mg nightly.  For the right breast wrist, will refer to breast surgery.  For dyslipidemia, continue pravastatin.  For the anxiety, continue taking Ativan.  And for the depression, continue taking Lexapro.         Follow Up     No follow-ups on file.  Patient was given instructions and counseling regarding her condition or for health maintenance advice. Please see specific information pulled into the AVS if appropriate.

## 2024-01-27 NOTE — PROGRESS NOTES
Subjective   Yoanna Reed is a 68 y.o. female and is here for a comprehensive physical exam. The patient reports no problems.    Pt is UTD with annual gyn exam and mammo           Social History:   Social History     Socioeconomic History   • Marital status: Single   Tobacco Use   • Smoking status: Heavy Smoker   • Smokeless tobacco: Never   Vaping Use   • Vaping Use: Never used   Substance and Sexual Activity   • Alcohol use: No   • Drug use: Defer   • Sexual activity: Defer       Family History: History reviewed. No pertinent family history.    Past Medical History: History reviewed. No pertinent past medical history.    The following portions of the patient's history were reviewed and updated as appropriate: allergies, current medications, past family history, past medical history, past social history, past surgical history and problem list.    Review of Systems    Review of Systems   Constitutional:  Negative for chills and fever.   HENT:  Negative for congestion, rhinorrhea, sinus pain and sore throat.    Eyes:  Negative for photophobia and visual disturbance.   Respiratory:  Negative for cough, chest tightness and shortness of breath.    Cardiovascular:  Negative for chest pain and palpitations.   Gastrointestinal:  Negative for diarrhea, nausea and vomiting.   Genitourinary:  Negative for dysuria, frequency and urgency.   Skin:  Negative for rash and wound.   Neurological:  Negative for dizziness and syncope.   Psychiatric/Behavioral:  Negative for behavioral problems and confusion.      Objective   Physical Exam  Vitals and nursing note reviewed.   Constitutional:       Appearance: She is well-developed.   HENT:      Head: Normocephalic and atraumatic.      Right Ear: External ear normal.      Left Ear: External ear normal.   Cardiovascular:      Rate and Rhythm: Normal rate and regular rhythm.      Heart sounds: Normal heart sounds.   Pulmonary:      Effort: Pulmonary effort is normal. No  respiratory distress.      Breath sounds: Normal breath sounds.   Abdominal:      Palpations: Abdomen is soft.      Tenderness: There is no abdominal tenderness. There is no guarding.   Musculoskeletal:         General: Normal range of motion.      Cervical back: Normal range of motion and neck supple.   Lymphadenopathy:      Cervical: No cervical adenopathy.   Skin:     General: Skin is warm.   Neurological:      Mental Status: She is alert and oriented to person, place, and time.   Psychiatric:         Behavior: Behavior normal.     Vitals:    01/16/24 0806   BP: 118/72   Pulse: 87   Resp: 14   SpO2: 98%     Body mass index is 27.83 kg/m².      Medications:   Current Outpatient Medications:   •  albuterol sulfate  (90 Base) MCG/ACT inhaler, TAKE 2 PUFFS BY MOUTH EVERY 4 HOURS AS NEEDED FOR WHEEZE, Disp: 18 g, Rfl: 1  •  escitalopram (LEXAPRO) 20 MG tablet, TAKE 1 TABLET BY MOUTH EVERY DAY, Disp: 90 tablet, Rfl: 1  •  LORazepam (ATIVAN) 0.5 MG tablet, Take 1 tablet by mouth 2 (Two) Times a Day As Needed for Anxiety., Disp: 60 tablet, Rfl: 3  •  magic mouthwash oral suspension, Swish and spit 5 mL Every 6 (Six) Hours As Needed for Stomatitis., Disp: 150 each, Rfl: 0  •  Nystatin-Diphenhydramine-Hydrocortisone-Lidocaine, Swish and spit 5 mL 3 (Three) Times a Day., Disp: 180 mL, Rfl: 0  •  zolpidem (AMBIEN) 10 MG tablet, Take 1 tablet by mouth At Night As Needed for Sleep., Disp: 90 tablet, Rfl: 1  •  colestipol (COLESTID) 1 g tablet, TAKE 1 TABLET BY MOUTH TWICE A DAY, Disp: 120 tablet, Rfl: 1  •  pravastatin (Pravachol) 20 MG tablet, Take 1 tablet by mouth Daily., Disp: 90 tablet, Rfl: 3       Assessment & Plan   Healthy female exam.      1. Healthcare Maintenance:  2. Patient Counseling:  --Nutrition: Stressed importance of moderation in sodium/caffeine intake, saturated fat and cholesterol, caloric balance, sufficient intake of fresh fruits, vegetables, fiber, calcium and vit D  --Exercise: Recommended 30  minutes of exercise daily.  --Immunizations reviewed.  --Discussed benefits of screening colonoscopy.    Diagnoses and all orders for this visit:    Well woman exam (no gynecological exam)    Medicare annual wellness visit, initial    Healthcare maintenance  -     Hepatitis C Antibody    Depression, unspecified depression type    Anxiety  -     LORazepam (ATIVAN) 0.5 MG tablet; Take 1 tablet by mouth 2 (Two) Times a Day As Needed for Anxiety.    Prediabetes  -     Hemoglobin A1c  -     Thyroid Panel With TSH    Dyslipidemia  -     CBC & Differential  -     Comprehensive Metabolic Panel  -     Lipid Panel With LDL / HDL Ratio    Encounter for screening colonoscopy  -     Ambulatory Referral For Screening Colonoscopy    Benign breast cyst in female, right  -     Ambulatory Referral to Breast Surgery    Primary insomnia  -     zolpidem (AMBIEN) 10 MG tablet; Take 1 tablet by mouth At Night As Needed for Sleep.    Other orders  -     Hepatitis C Antibody        No follow-ups on file.             Dictated utilizing Dragon Voice Recognition Software

## 2024-01-29 ENCOUNTER — OFFICE VISIT (OUTPATIENT)
Dept: SURGERY | Facility: CLINIC | Age: 69
End: 2024-01-29
Payer: MEDICARE

## 2024-01-29 VITALS
BODY MASS INDEX: 28.49 KG/M2 | DIASTOLIC BLOOD PRESSURE: 68 MMHG | SYSTOLIC BLOOD PRESSURE: 120 MMHG | WEIGHT: 188 LBS | HEIGHT: 68 IN

## 2024-01-29 DIAGNOSIS — N60.01 BREAST CYST, RIGHT: Primary | ICD-10-CM

## 2024-01-29 DIAGNOSIS — N64.52 NIPPLE DISCHARGE: ICD-10-CM

## 2024-01-29 DIAGNOSIS — N63.21 MASS OF UPPER OUTER QUADRANT OF LEFT BREAST: ICD-10-CM

## 2024-01-29 PROCEDURE — 88304 TISSUE EXAM BY PATHOLOGIST: CPT

## 2024-01-29 NOTE — PROGRESS NOTES
Assessment/Plan:     68 y.o. female with:    (1) Right breast skin cyst:  - Bilateral Diagnostic Mammogram and Right Breast US 10/2023 BIRADS 2.   - Right breast 3:00 12cmfn 0.9 x 0.5 x 0.8cm oval hypoechoic mass with tract to the skin surface, consistent with sebaceous cyst or epidermal inclusion cyst  - On exam, at 3:00 12cmfn, there is an approximately 1cm mass with a punctum. No surrounding erythema, warmth, or drainage. This is consistent with a cyst.   - Discussed the options of continued observation versus excision. She elected to proceed with excision of right breast skin cyst. Benefits and risks reviewed.   - Right breast skin cyst excision performed today in the office. Will call her with the final pathology results.     (2) Left breast mass:  - On exam, in the left breast from 1:00-2:00 5.5cmfn, there is a palpable abnormality. She states this feels new since her prior mammogram.   - Recommend further evaluation with a left breast diagnostic mammogram. Order placed. I will call her with the results and make further recommendations at that time.     (3) Nipple discharge:  - She complains of intermittent right nipple discharge.   - Will evaluate further with breast MRI. Order for prolactin placed. I will call her with the results and make further recommendations at that time.     - Benjamín Cisneros lifetime breast cancer risk: 7.0%. This patient does not qualify for high risk screening.  - Screening Mammogram due 10/2024.     Chief complaint:  right breast cyst    HPI: Ms. Yoanna Reed is a 58 year old female here today for evaluation of a right breast cyst.  She states she first noticed this several months ago. The area has drained, swelled, and caused discomfort in the past. The area hasn't drained in over 6 months. She denies any current breast skin changes.  She also complains of intermittent right nipple discharge that she describes as white. She is unsure of when this started.   Her work-up is  detailed in the breast history section below.   She has not had regular annual mammograms. She does have a history of abnormal mammograms. She had a right breast needle biopsy over 20 years ago.  She denies any family history of breast cancer. She reports a family history of ovarian cancer in her mother (age of diagnosis in her 50s).     TIMELINE OF WORKUP:  10/4/2023 Bilateral Diagnostic Mammogram, Right Breast US:  FINDINGS:  MAMMOGRAM:  The breasts are heterogeneously dense, which may obscure small masses.  There is a marker overlying the inner central posterior right breast marking the area of concern indicated by the patient. Deep to the marker, there is a superficial 0.9 cm oval mass. This area was further assessed with ultrasound.  There is questioned architectural distortion in the upper posterior left breast on the MLO view, which localizes to the outer breast on tomosynthesis. This area effaces with spot compression and has no correlate on additional views, consistent with superimposition of normal breast parenchyma.  There are no suspicious calcifications in either breast.  ULTRASOUND:  Targeted sonographic evaluation of the right breast was performed in the area of concern indicated by the patient and the region of the mammographic abnormality.  At 3:00, 12 cm from the nipple, there is a 0.9 x 0.5 x 0.8 cm superficial oval hypoechoic mass with no corresponding vascularity. A linear tract is visible to the overlying skin surface on cine images.This is consistent with a benign sebaceous or epidermal inclusion cyst.  IMPRESSION:  1.  Palpable benign-appearing 0.9 cm sebaceous or epidermal inclusion cyst at 3:00 in the right breast. Further management should be based on clinical assessment. Dermatologic or surgery consultation could be considered for excision for therapeutic relief, as clinically directed.  2.  No evidence of malignancy in either breast. Recommend annual screening mammogram in 1  year.  BI-RADS Category 2: Benign    MEDICAL HISTORY:     Gynecologic History:   . P:1 AB:0  Age at first childbirth: 24  Lactation/How long: yes, less than 2 months (had to stop due to mastitis)   Age at menarche: unsure, maybe 14   Age at menopause: hysterectomy age 24?  Total years of oral contraceptive use: briefly, 1 year  Total years of hormone replacement therapy: 0    Past Medical History:   Past Medical History:   Diagnosis Date    Asthma       Past Surgical History:    Past Surgical History:   Procedure Laterality Date    CHOLECYSTECTOMY      HYSTERECTOMY      Partial    NECK SURGERY       Family History:    Family History   Problem Relation Age of Onset    Hypertension Mother     Ovarian cancer Mother      Social History:   Social History     Socioeconomic History    Marital status: Single   Tobacco Use    Smoking status: Some Days     Types: Cigarettes    Smokeless tobacco: Never   Vaping Use    Vaping Use: Never used   Substance and Sexual Activity    Alcohol use: Yes     Comment: social    Drug use: Defer    Sexual activity: Defer     ALLERGIES:   No Known Allergies    MEDICATIONS:    Current Outpatient Medications:     albuterol sulfate  (90 Base) MCG/ACT inhaler, TAKE 2 PUFFS BY MOUTH EVERY 4 HOURS AS NEEDED FOR WHEEZE, Disp: 18 g, Rfl: 1    colestipol (COLESTID) 1 g tablet, TAKE 1 TABLET BY MOUTH TWICE A DAY, Disp: 120 tablet, Rfl: 1    escitalopram (LEXAPRO) 20 MG tablet, TAKE 1 TABLET BY MOUTH EVERY DAY, Disp: 90 tablet, Rfl: 1    LORazepam (ATIVAN) 0.5 MG tablet, Take 1 tablet by mouth 2 (Two) Times a Day As Needed for Anxiety., Disp: 60 tablet, Rfl: 3    pravastatin (Pravachol) 20 MG tablet, Take 1 tablet by mouth Daily., Disp: 90 tablet, Rfl: 3    zolpidem (AMBIEN) 10 MG tablet, Take 1 tablet by mouth At Night As Needed for Sleep., Disp: 90 tablet, Rfl: 1    LABS:    2024 CMP within normal limits.     ROS:   Constitutional: Negative for fevers or chills  HENT: Negative for  "hearing loss or runny nose  Eyes: Negative for vision changes or scleral icterus  Respiratory: Negative for cough or shortness of breath  Cardiovascular: Negative for chest pain or heart palpitations  Gastrointestinal: Negative for abdominal pain, nausea, vomiting, constipation, melena, or hematochezia  Genitourinary: Negative for hematuria or dysuria  Musculoskeletal: Negative for joint swelling or gait instability  Neurologic: Negative for tremors or seizures  Psychiatric: Negative for suicidal ideations or depression  All other systems reviewed and negative    PHYSICAL EXAM:  Constitutional: Well-developed, well-nourished, no acute distress  Ht: 172.7cm (67.99\")  Wt: 85.3kg (188lb)  BMI: 28.59  Eyes: Conjunctiva normal, sclera nonicteric  ENMT: Hearing grossly normal, oral mucosa moist  Neck: Supple, no palpable mass, trachea midline  Respiratory: Clear to auscultation, normal inspiratory effort  Cardiovascular: Regular rate, no murmur, no peripheral edema, no jugular venous distention  Breast:  Right: No nipple abnormalities. At 3:00 12cmfn, there is an approximately 1cm lesion with a punctum, consistent with a skin cyst. No surrounding erythema, warmth, or drainage. No other masses appreciated.   Left: No visible abnormalities on inspection while seated or with arms raised. No skin changes or nipple abnormalities. At 1:00-2:00 there is a palpable abnormality, dense band of tissue.   No clinical chest wall involvement.  Lymphatics (palpable nodes): No cervical, supraclavicular, or axillary lymphadenopathy  Skin: Warm, dry, no rash on visualized skin surfaces  Musculoskeletal: Symmetric strength, normal gait  Psychiatric: Alert and oriented ×3, normal affect     PROCEDURE NOTE 1/29/2024  Procedure: Excision of skin lesion in right breast 3:00 12cmfn  The patient was brought back to the minor procedure room and placed in the chair. The patient's right breast was identified as the correct side. Hibiclens was used " to cleanse the area.  Blue sterile towels were draped. 5 cc of 1% lidocaine was injected. An elliptical incision was made along the lesion. With a scalpel and forceps, the lesion was excised. This measured approximately 2cm x 4cm. Tissue sample was placed in a specimen cup and sent for analysis. Interrupted 3-0 Vicryl following by running 4-0 Monocryl used to close the incision.  Good hemostasis was noted. Glue was placed over the incision. Patient tolerated the procedure well. Wound care instructions given to patient.     Nandini Chirinos PA-C    [facility] - General Surgery   [address], [address]  [place] [address]    [address], [address]  [place] [address]    Office: [phone]  Fax: [phone]

## 2024-01-31 ENCOUNTER — TELEPHONE (OUTPATIENT)
Dept: GASTROENTEROLOGY | Facility: CLINIC | Age: 69
End: 2024-01-31
Payer: MEDICARE

## 2024-01-31 LAB
LAB AP CASE REPORT: NORMAL
PATH REPORT.FINAL DX SPEC: NORMAL
PATH REPORT.GROSS SPEC: NORMAL

## 2024-01-31 NOTE — TELEPHONE ENCOUNTER
NO PERSONAL HX OF POLYPS    NO FAMILY HX OF POLYPS    NO FAMILY HX OF COLON CA    NO ASA OR BLOOD THINNERS        LIST OF  MEDICATIONS      ZOLPIDEM   LORAZEPAM   PRAVASTATIN  ES CITALOPRAM   COLESTIPOL          OA QUESTIONNAIRE SCANNED IN MEDIA

## 2024-02-01 ENCOUNTER — TELEPHONE (OUTPATIENT)
Dept: SURGERY | Facility: CLINIC | Age: 69
End: 2024-02-01
Payer: MEDICARE

## 2024-02-01 NOTE — TELEPHONE ENCOUNTER
----- Message from Nandini Chirinos PA-C sent at 1/31/2024  5:51 PM EST -----  Regarding: results  Hi,     Can you please call her and let her know her final pathology from the cyst excision was consistent with an epidermal inclusion cyst which is benign.     Thanks,  Nandini     I have reviewed the history, physical exam, assessment and management plans.  I concur with or have edited all elements of her note.

## 2024-02-04 DIAGNOSIS — Z12.11 ENCOUNTER FOR SCREENING FOR MALIGNANT NEOPLASM OF COLON: Primary | ICD-10-CM

## 2024-02-04 RX ORDER — SODIUM CHLORIDE, SODIUM LACTATE, POTASSIUM CHLORIDE, CALCIUM CHLORIDE 600; 310; 30; 20 MG/100ML; MG/100ML; MG/100ML; MG/100ML
30 INJECTION, SOLUTION INTRAVENOUS CONTINUOUS
OUTPATIENT
Start: 2024-02-04

## 2024-02-05 ENCOUNTER — TELEPHONE (OUTPATIENT)
Dept: GASTROENTEROLOGY | Facility: CLINIC | Age: 69
End: 2024-02-05
Payer: MEDICARE

## 2024-02-06 ENCOUNTER — TELEPHONE (OUTPATIENT)
Dept: GASTROENTEROLOGY | Facility: CLINIC | Age: 69
End: 2024-02-06
Payer: MEDICARE

## 2024-02-06 PROBLEM — Z12.11 ENCOUNTER FOR SCREENING FOR MALIGNANT NEOPLASM OF COLON: Status: ACTIVE | Noted: 2024-02-04

## 2024-02-06 NOTE — TELEPHONE ENCOUNTER
COLONOSCOPY on 5/23/2024  arrive at 9;00  . Sent prep instructions to pt my mail address ....[email or web address]

## 2024-02-08 DIAGNOSIS — N63.21 MASS OF UPPER OUTER QUADRANT OF LEFT BREAST: Primary | ICD-10-CM

## 2024-02-21 ENCOUNTER — TELEPHONE (OUTPATIENT)
Dept: SURGERY | Facility: CLINIC | Age: 69
End: 2024-02-21
Payer: MEDICARE

## 2024-02-21 DIAGNOSIS — N63.21 MASS OF UPPER OUTER QUADRANT OF LEFT BREAST: ICD-10-CM

## 2024-02-21 DIAGNOSIS — N64.52 NIPPLE DISCHARGE: Primary | ICD-10-CM

## 2024-02-21 NOTE — TELEPHONE ENCOUNTER
----- Message from Nandini Chirinos PA-C sent at 2/21/2024  3:19 PM EST -----  Regarding: RE: P2P  The MRI was for right breast nipple discharge.   I read your other message as well.     I have switched the orders to bilateral diagnostic mammogram and bilateral breast US. We will start with this. We can cancel the MRI and if re-schedule if mammo/US don't show anything.     Can you please let the patient know MRI was denies so we will do diagnostic mammo and US first?     If she has any questions, I can call her.     Thanks.   ----- Message -----  From: Yelena Mccall RegSched Rep  Sent: 2/19/2024  11:31 AM EST  To: Nandini Chirinos PA-C  Subject: P2P                                              I just had Marilia from [facility] call stating that the Mri on 2/25 is needing a P2P. The pending case #[id number] & the number to call is [phone]. I tried getting you out of it by providing any clinical that I could but she said that it's denying because there needs to be additional imaging recommending the Mri. Sounds like to me that the dx mammo & u/s breast needs to be done first.      ThanksYelena

## 2024-02-21 NOTE — TELEPHONE ENCOUNTER
CALLED PT TO INFORM THAT HER INS HAS DENIED THE MRI BREAST AND [facility] HAS PLACED ORDERS FOR A DIAGNOSTIC MAMMO & U/S BREAST TO START WITH AND IF NEEDED WILL ATTEMPT TO RESCHEDULE MRI IF THE OTHER TESTING DOESN'T SHOW ANYTHING. THE REFERRALS FOR THE MAMMO & U/S HAVE BEEN SENT TO SCHEDULING TO CONTACT THE PT FOR AN APPT. I LEFT A MESSAGE ASKING THE PT TO CALL BACK.

## 2024-02-29 DIAGNOSIS — F32.A DEPRESSION, UNSPECIFIED DEPRESSION TYPE: ICD-10-CM

## 2024-02-29 DIAGNOSIS — F41.9 ANXIETY: ICD-10-CM

## 2024-02-29 RX ORDER — ESCITALOPRAM OXALATE 20 MG/1
20 TABLET ORAL DAILY
Qty: 90 TABLET | Refills: 1 | Status: SHIPPED | OUTPATIENT
Start: 2024-02-29

## 2024-03-11 ENCOUNTER — HOSPITAL ENCOUNTER (OUTPATIENT)
Dept: ULTRASOUND IMAGING | Facility: HOSPITAL | Age: 69
Discharge: HOME | End: 2024-03-11
Payer: MEDICARE

## 2024-03-11 ENCOUNTER — HOSPITAL ENCOUNTER (OUTPATIENT)
Dept: MAMMOGRAPHY | Facility: HOSPITAL | Age: 69
Discharge: HOME | End: 2024-03-11
Payer: MEDICARE

## 2024-03-11 DIAGNOSIS — N63.21 MASS OF UPPER OUTER QUADRANT OF LEFT BREAST: ICD-10-CM

## 2024-03-11 DIAGNOSIS — N64.52 NIPPLE DISCHARGE: ICD-10-CM

## 2024-03-11 PROCEDURE — 76642 ULTRASOUND BREAST LIMITED: CPT

## 2024-03-11 PROCEDURE — G0279 TOMOSYNTHESIS, MAMMO: HCPCS

## 2024-03-11 PROCEDURE — 77066 DX MAMMO INCL CAD BI: CPT

## 2024-03-14 ENCOUNTER — TELEPHONE (OUTPATIENT)
Dept: SURGERY | Facility: CLINIC | Age: 69
End: 2024-03-14
Payer: MEDICARE

## 2024-03-14 DIAGNOSIS — N60.01 CYST OF NIPPLE, RIGHT: Primary | ICD-10-CM

## 2024-03-14 DIAGNOSIS — R92.8 ABNORMAL FINDING ON BREAST IMAGING: ICD-10-CM

## 2024-03-14 NOTE — TELEPHONE ENCOUNTER
Called patient to review recent bilateral diagnostic mammogram and bilateral breast ultrasound results.  BI-RADS 4.  No concerning findings in the left breast.  There is a complex cyst in the right nipple.  Recommend further evaluation with a breast MRI.  Order placed.  Discussed ultimately this may need surgical excision.  I will follow-up with her once the MRI has been completed.  She verbalized understanding and had no further questions.    3/11/2024 Bilateral Diagnostic Mammogram and Bilateral Breast US:  IMPRESSION:  1. There is a 0.4 cm complex cyst with a thick rim seen in the right nipple. Surgical excision should be considered. Further evaluation with a breast MRI without and with contrast should also be considered.   2. There are no findings suspicious for malignancy in the left breast. No abnormality in the left breast in the area of clinical concern is appreciated. Clinical follow-up is recommended.  BI-RADS Category 4: Suspicious finding.    Nandini Chirinos PA-C

## 2024-03-15 ENCOUNTER — OFFICE VISIT (OUTPATIENT)
Dept: INTERNAL MEDICINE | Facility: CLINIC | Age: 69
End: 2024-03-15
Payer: MEDICARE

## 2024-03-15 VITALS
SYSTOLIC BLOOD PRESSURE: 114 MMHG | RESPIRATION RATE: 18 BRPM | DIASTOLIC BLOOD PRESSURE: 76 MMHG | HEIGHT: 68 IN | BODY MASS INDEX: 28.49 KG/M2 | HEART RATE: 85 BPM | WEIGHT: 188 LBS | OXYGEN SATURATION: 94 %

## 2024-03-15 DIAGNOSIS — F41.9 ANXIETY: ICD-10-CM

## 2024-03-15 DIAGNOSIS — F51.01 PRIMARY INSOMNIA: ICD-10-CM

## 2024-03-15 DIAGNOSIS — F32.A DEPRESSION, UNSPECIFIED DEPRESSION TYPE: ICD-10-CM

## 2024-03-15 RX ORDER — ZOLPIDEM TARTRATE 10 MG/1
10 TABLET ORAL NIGHTLY PRN
Qty: 90 TABLET | Refills: 1 | Status: SHIPPED | OUTPATIENT
Start: 2024-03-15

## 2024-03-15 RX ORDER — ESCITALOPRAM OXALATE 20 MG/1
20 TABLET ORAL DAILY
Qty: 90 TABLET | Refills: 1 | Status: SHIPPED | OUTPATIENT
Start: 2024-03-15

## 2024-03-15 RX ORDER — LORAZEPAM 0.5 MG/1
0.5 TABLET ORAL 2 TIMES DAILY PRN
Qty: 60 TABLET | Refills: 3 | Status: SHIPPED | OUTPATIENT
Start: 2024-03-15

## 2024-03-17 NOTE — PROGRESS NOTES
"Chief Complaint  Anxiety    Subjective          Yoanna Reed presents to [facility]  History of Present Illness  The patient is a 68-year-old female who presents for evaluation of anxiety.    Her anxiety is better but not real good. She thinks the medications are working. She is tolerating all her medications well. She is on Lexapro 20 mg daily, Ativan, and Ambien 10 mg nightly for sleep.    She had a cyst removed from her right breast. She had a mammogram. They are going to do an MRI because they found a cyst on her nipple. She is waiting on her insurance to approve the MRI.    She notes that her depression has improved significantly.  She is happy with the medicines the Lexapro as well as Ativan.    She does note that the Ambien does help her sleep very well.  Objective   Vital Signs:   /76 (BP Location: Left arm, Patient Position: Sitting, Cuff Size: Adult)   Pulse 85   Resp 18   Ht 172.7 cm (67.99\")   Wt 85.3 kg (188 lb)   SpO2 94%   BMI 28.59 kg/m²     Physical Exam  Vitals and nursing note reviewed.   Constitutional:       Appearance: She is well-developed.   HENT:      Head: Normocephalic and atraumatic.   Musculoskeletal:      Cervical back: Normal range of motion and neck supple.   Neurological:      Mental Status: She is alert and oriented to person, place, and time.   Psychiatric:         Behavior: Behavior normal.         Physical Exam       Result Review :                 Assessment and Plan    Diagnoses and all orders for this visit:    1. Anxiety  -     escitalopram (LEXAPRO) 20 MG tablet; Take 1 tablet by mouth Daily.  Dispense: 90 tablet; Refill: 1  -     LORazepam (ATIVAN) 0.5 MG tablet; Take 1 tablet by mouth 2 (Two) Times a Day As Needed for Anxiety.  Dispense: 60 tablet; Refill: 3    2. Depression, unspecified depression type  -     escitalopram (LEXAPRO) 20 MG tablet; Take 1 tablet by mouth Daily.  Dispense: 90 tablet; Refill: 1    3. Primary " insomnia  -     zolpidem (AMBIEN) 10 MG tablet; Take 1 tablet by mouth At Night As Needed for Sleep.  Dispense: 90 tablet; Refill: 1      Assessment & Plan  1. Anxiety.  She will continue Lexapro 20 mg daily and Ativan.    2 primary insomnia  She will continue Ambien 10 mg nightly for sleep.    3 depression  Continue Lexapro 20 mg daily.    Follow Up   No follow-ups on file.  Patient was given instructions and counseling regarding her condition or for health maintenance advice. Please see specific information pulled into the AVS if appropriate.       Patient or patient representative verbalized consent for the use of Ambient Listening during the visit with  Tom Muller MD for chart documentation. 3/17/2024  09:40 EDT

## 2024-04-28 DIAGNOSIS — K58.0 IRRITABLE BOWEL SYNDROME WITH DIARRHEA: ICD-10-CM

## 2024-04-29 RX ORDER — MONTELUKAST SODIUM 4 MG/1
1 TABLET, CHEWABLE ORAL 2 TIMES DAILY
Qty: 180 TABLET | Refills: 1 | Status: SHIPPED | OUTPATIENT
Start: 2024-04-29

## 2024-05-01 DIAGNOSIS — F51.01 PRIMARY INSOMNIA: ICD-10-CM

## 2024-05-01 NOTE — TELEPHONE ENCOUNTER
Caller: Derek Yoanna MILAN    Relationship: Self    Best call back number: [phone] (Mobile)     Requested Prescriptions:   Requested Prescriptions     Pending Prescriptions Disp Refills    zolpidem (AMBIEN) 10 MG tablet 90 tablet 1     Sig: Take 1 tablet by mouth At Night As Needed for Sleep.        Pharmacy where request should be sent: [id number] [place], [place] - [address] [address]. AT [address] [phone] [facility] [phone]      Last office visit with prescribing clinician: 3/15/2024   Last telemedicine visit with prescribing clinician: Visit date not found   Next office visit with prescribing clinician: 6/14/2024     Additional details provided by patient: PATIENT STATES SHE IS COMPLETELY OUT OF MEDICATION AND HER PHARMACY TOLD HER A WEEK AGO THEY WERE CONTACTING THE OFFICE FOR REFILLS BUT NEVER DID    PLEASE ADVISE PATIENT REGARDING THIS REQUEST ASAP    Does the patient have less than a 3 day supply:  [x] Yes  [] No    Would you like a call back once the refill request has been completed: [x] Yes [] No    If the office needs to give you a call back, can they leave a voicemail: [x] Yes [] No    Edgard Barclay   05/01/24 12:12 EDT

## 2024-05-01 NOTE — TELEPHONE ENCOUNTER
Rx Refill Note  Requested Prescriptions     Pending Prescriptions Disp Refills    zolpidem (AMBIEN) 10 MG tablet 90 tablet 1     Sig: Take 1 tablet by mouth At Night As Needed for Sleep.      Last office visit with prescribing clinician: 3/15/2024   Last telemedicine visit with prescribing clinician: Visit date not found   Next office visit with prescribing clinician: 6/14/2024       Donavon Sheppard MA  05/01/24, 13:28 EDT

## 2024-05-02 RX ORDER — ZOLPIDEM TARTRATE 10 MG/1
10 TABLET ORAL NIGHTLY PRN
Qty: 90 TABLET | Refills: 1 | Status: SHIPPED | OUTPATIENT
Start: 2024-05-02

## 2024-05-16 ENCOUNTER — OFFICE VISIT (OUTPATIENT)
Dept: INTERNAL MEDICINE | Facility: CLINIC | Age: 69
End: 2024-05-16
Payer: MEDICARE

## 2024-05-16 VITALS
DIASTOLIC BLOOD PRESSURE: 74 MMHG | HEIGHT: 68 IN | HEART RATE: 90 BPM | BODY MASS INDEX: 28.95 KG/M2 | RESPIRATION RATE: 14 BRPM | OXYGEN SATURATION: 92 % | WEIGHT: 191 LBS | SYSTOLIC BLOOD PRESSURE: 118 MMHG

## 2024-05-16 DIAGNOSIS — F32.A DEPRESSION, UNSPECIFIED DEPRESSION TYPE: ICD-10-CM

## 2024-05-16 DIAGNOSIS — F51.01 PRIMARY INSOMNIA: ICD-10-CM

## 2024-05-16 DIAGNOSIS — F41.9 ANXIETY: Primary | ICD-10-CM

## 2024-05-16 PROCEDURE — 1160F RVW MEDS BY RX/DR IN RCRD: CPT | Performed by: FAMILY MEDICINE

## 2024-05-16 PROCEDURE — 99214 OFFICE O/P EST MOD 30 MIN: CPT | Performed by: FAMILY MEDICINE

## 2024-05-16 PROCEDURE — 1159F MED LIST DOCD IN RCRD: CPT | Performed by: FAMILY MEDICINE

## 2024-05-16 PROCEDURE — G2211 COMPLEX E/M VISIT ADD ON: HCPCS | Performed by: FAMILY MEDICINE

## 2024-05-16 RX ORDER — ESCITALOPRAM OXALATE 20 MG/1
20 TABLET ORAL DAILY
Qty: 90 TABLET | Refills: 1 | Status: SHIPPED | OUTPATIENT
Start: 2024-05-16

## 2024-05-16 RX ORDER — LORAZEPAM 1 MG/1
1 TABLET ORAL 2 TIMES DAILY PRN
Qty: 180 TABLET | Refills: 1 | Status: SHIPPED | OUTPATIENT
Start: 2024-05-16

## 2024-05-16 RX ORDER — BUPROPION HYDROCHLORIDE 150 MG/1
150 TABLET ORAL EVERY MORNING
Qty: 90 TABLET | Refills: 1 | Status: SHIPPED | OUTPATIENT
Start: 2024-05-16

## 2024-05-21 NOTE — PROGRESS NOTES
"Chief Complaint  Depression    Subjective          Yoanna Reed presents to [facility]  History of Present Illness  The patient is a 68-year-old female who is following up on depression. She has had a long history of depression, but she has done exceptionally well here lately; however, today seems to be the opposite.    The patient's depressive symptoms initiated approximately one month ago. Despite her ongoing legal issues, there has been no significant alteration in her condition. She has been without her Ambien medication for the past four days, despite taking it at 9:00 PM, she remains awake until 1:00 AM, which she attributes to stress. Prior to the depletion of her Ambien, she was already experiencing tremors. Her current medication regimen includes Lexapro, taken in the morning. She expresses a desire to increase the strength of her Ativan dosage. She describes her depressive symptoms as characterized by periods of sitting, staring at the window, and engaging in minimal leisure activities. She is contemplating resuming Vraylar, but is uncertain of its efficacy.    The patient reports experiencing dental soreness, for which she does not use a mouthguard. She has scheduled a dental appointment for further evaluation.    Objective   Vital Signs:   /74 (BP Location: Left arm, Patient Position: Sitting, Cuff Size: Adult)   Pulse 90   Resp 14   Ht 172.7 cm (67.99\")   Wt 86.6 kg (191 lb)   SpO2 92%   BMI 29.05 kg/m²     Physical Exam  Vitals and nursing note reviewed.   Constitutional:       Appearance: She is well-developed.   HENT:      Head: Normocephalic and atraumatic.   Musculoskeletal:      Cervical back: Normal range of motion and neck supple.   Neurological:      Mental Status: She is alert and oriented to person, place, and time.   Psychiatric:         Behavior: Behavior normal.         Physical Exam       Result Review :                 Assessment and " Plan    Diagnoses and all orders for this visit:    1. Anxiety (Primary)  -     escitalopram (LEXAPRO) 20 MG tablet; Take 1 tablet by mouth Daily.  Dispense: 90 tablet; Refill: 1  -     LORazepam (Ativan) 1 MG tablet; Take 1 tablet by mouth 2 (Two) Times a Day As Needed for Anxiety.  Dispense: 180 tablet; Refill: 1    2. Depression, unspecified depression type  -     escitalopram (LEXAPRO) 20 MG tablet; Take 1 tablet by mouth Daily.  Dispense: 90 tablet; Refill: 1  -     buPROPion XL (Wellbutrin XL) 150 MG 24 hr tablet; Take 1 tablet by mouth Every Morning.  Dispense: 90 tablet; Refill: 1    3. Primary insomnia      Assessment & Plan  1. Depression and anxiety  The patient's lorazepam prescription will be refilled, with an escalation in dosage from 0.5 mg to 1 mg. She will maintain her Ambien dosage, but is advised against combining Ativan and Ambien. If necessary, she may take another Ativan dose around 5:00 PM to calm her down, followed by a few hours of Ambien administration. She will maintain her Lexapro regimen. Additionally, a low dose of Wellbutrin will be prescribed, to be taken in the morning.    2. Dental discomfort.  The patient is advised to consult a dentist for a proper mouthguard.    Follow-up  The patient is scheduled for a follow-up visit in 1 month.    Follow Up   No follow-ups on file.  Patient was given instructions and counseling regarding her condition or for health maintenance advice. Please see specific information pulled into the AVS if appropriate.           Patient or patient representative verbalized consent for the use of Ambient Listening during the visit with  Tom Muller MD for chart documentation. 5/21/2024  16:22 EDT

## 2024-05-23 ENCOUNTER — ANESTHESIA (OUTPATIENT)
Dept: GASTROENTEROLOGY | Facility: HOSPITAL | Age: 69
End: 2024-05-23
Payer: MEDICARE

## 2024-05-23 ENCOUNTER — ANESTHESIA EVENT (OUTPATIENT)
Dept: GASTROENTEROLOGY | Facility: HOSPITAL | Age: 69
End: 2024-05-23
Payer: MEDICARE

## 2024-05-23 ENCOUNTER — HOSPITAL ENCOUNTER (OUTPATIENT)
Facility: HOSPITAL | Age: 69
Setting detail: HOSPITAL OUTPATIENT SURGERY
Discharge: HOME | End: 2024-05-23
Attending: INTERNAL MEDICINE | Admitting: INTERNAL MEDICINE
Payer: MEDICARE

## 2024-05-23 VITALS
RESPIRATION RATE: 16 BRPM | HEIGHT: 67 IN | WEIGHT: 186.7 LBS | TEMPERATURE: 97.7 F | HEART RATE: 69 BPM | BODY MASS INDEX: 29.3 KG/M2 | SYSTOLIC BLOOD PRESSURE: 122 MMHG | OXYGEN SATURATION: 98 % | DIASTOLIC BLOOD PRESSURE: 70 MMHG

## 2024-05-23 DIAGNOSIS — Z12.11 ENCOUNTER FOR SCREENING FOR MALIGNANT NEOPLASM OF COLON: ICD-10-CM

## 2024-05-23 PROCEDURE — 45385 COLONOSCOPY W/LESION REMOVAL: CPT | Performed by: INTERNAL MEDICINE

## 2024-05-23 PROCEDURE — 88305 TISSUE EXAM BY PATHOLOGIST: CPT | Performed by: INTERNAL MEDICINE

## 2024-05-23 PROCEDURE — 25010000002 PROPOFOL 10 MG/ML EMULSION: Performed by: NURSE ANESTHETIST, CERTIFIED REGISTERED

## 2024-05-23 PROCEDURE — 25810000003 LACTATED RINGERS PER 1000 ML: Performed by: INTERNAL MEDICINE

## 2024-05-23 PROCEDURE — 45380 COLONOSCOPY AND BIOPSY: CPT | Performed by: INTERNAL MEDICINE

## 2024-05-23 RX ORDER — LIDOCAINE HYDROCHLORIDE 20 MG/ML
INJECTION, SOLUTION INFILTRATION; PERINEURAL AS NEEDED
Status: DISCONTINUED | OUTPATIENT
Start: 2024-05-23 | End: 2024-05-23 | Stop reason: SURG

## 2024-05-23 RX ORDER — PROPOFOL 10 MG/ML
VIAL (ML) INTRAVENOUS AS NEEDED
Status: DISCONTINUED | OUTPATIENT
Start: 2024-05-23 | End: 2024-05-23 | Stop reason: SURG

## 2024-05-23 RX ORDER — SODIUM CHLORIDE, SODIUM LACTATE, POTASSIUM CHLORIDE, CALCIUM CHLORIDE 600; 310; 30; 20 MG/100ML; MG/100ML; MG/100ML; MG/100ML
30 INJECTION, SOLUTION INTRAVENOUS CONTINUOUS
Status: DISCONTINUED | OUTPATIENT
Start: 2024-05-23 | End: 2024-05-23 | Stop reason: HOSPADM

## 2024-05-23 RX ADMIN — PROPOFOL 30 MG: 10 INJECTION, EMULSION INTRAVENOUS at 09:57

## 2024-05-23 RX ADMIN — PROPOFOL 40 MG: 10 INJECTION, EMULSION INTRAVENOUS at 09:48

## 2024-05-23 RX ADMIN — PROPOFOL 80 MG: 10 INJECTION, EMULSION INTRAVENOUS at 09:41

## 2024-05-23 RX ADMIN — PROPOFOL 40 MG: 10 INJECTION, EMULSION INTRAVENOUS at 09:42

## 2024-05-23 RX ADMIN — PROPOFOL 30 MG: 10 INJECTION, EMULSION INTRAVENOUS at 09:54

## 2024-05-23 RX ADMIN — PROPOFOL 40 MG: 10 INJECTION, EMULSION INTRAVENOUS at 09:45

## 2024-05-23 RX ADMIN — SODIUM CHLORIDE, POTASSIUM CHLORIDE, SODIUM LACTATE AND CALCIUM CHLORIDE 30 ML/HR: 600; 310; 30; 20 INJECTION, SOLUTION INTRAVENOUS at 09:23

## 2024-05-23 RX ADMIN — LIDOCAINE HYDROCHLORIDE 60 MG: 20 INJECTION, SOLUTION INFILTRATION; PERINEURAL at 09:41

## 2024-05-23 RX ADMIN — PROPOFOL 30 MG: 10 INJECTION, EMULSION INTRAVENOUS at 09:51

## 2024-05-23 NOTE — DISCHARGE INSTRUCTIONS

## 2024-05-23 NOTE — ANESTHESIA PREPROCEDURE EVALUATION
Anesthesia Evaluation     Patient summary reviewed and Nursing notes reviewed                Airway   Mallampati: II  TM distance: >3 FB  Dental    (+) poor dentition, upper dentures and partials    Pulmonary    (+) a smoker Current, COPD, asthma,  Cardiovascular - negative cardio ROS    Rhythm: regular  Rate: normal        Neuro/Psych- negative ROS  GI/Hepatic/Renal/Endo - negative ROS     Musculoskeletal (-) negative ROS    Abdominal    Substance History - negative use     OB/GYN negative ob/gyn ROS         Other                    Anesthesia Plan    ASA 3     MAC     intravenous induction     Anesthetic plan, risks, benefits, and alternatives have been provided, discussed and informed consent has been obtained with: patient.    CODE STATUS:

## 2024-05-23 NOTE — ANESTHESIA POSTPROCEDURE EVALUATION
Patient: Yoanna Reed    Procedure Summary       Date: 05/23/24 Room / Location: [facility] ENDOSCOPY 10 / [facility] ENDOSCOPY    Anesthesia Start: 0937 Anesthesia Stop: 1003    Procedure: COLONOSCOPY to cecum with cold polypectomies Diagnosis:       Encounter for screening for malignant neoplasm of colon      (Encounter for screening for malignant neoplasm of colon [Z12.11])    Surgeons: Ankur Beebe MD Provider: Fortunato Bullard MD    Anesthesia Type: MAC ASA Status: 3            Anesthesia Type: MAC    Vitals  Vitals Value Taken Time   /70 05/23/24 1019   Temp     Pulse 69 05/23/24 1019   Resp 16 05/23/24 1019   SpO2 98 % 05/23/24 1019           Post Anesthesia Care and Evaluation    Patient location during evaluation: PACU  Patient participation: complete - patient participated  Level of consciousness: awake and alert  Pain management: adequate    Airway patency: patent  Anesthetic complications: No anesthetic complications    Cardiovascular status: acceptable  Respiratory status: acceptable  Hydration status: acceptable    Comments: --------------------            05/23/24               1019     --------------------   BP:       122/70     Pulse:      69       Resp:       16       Temp:                SpO2:      98%      --------------------

## 2024-05-23 NOTE — H&P
"[facility]  Pre Procedure History & Physical    Chief Complaint:   Time for my colonoscopy    Subjective     HPI:   68 y.o. female presenting to endoscopy unit today for screening colonoscopy.    Past Medical History:   Past Medical History:   Diagnosis Date    Asthma        Family History:  Family History   Problem Relation Age of Onset    Hypertension Mother     Ovarian cancer Mother     [place] Hyperthermia Neg Hx        Social History:   reports that she has been smoking cigarettes. She has never used smokeless tobacco. She reports current alcohol use. Drug use questions deferred to the physician.    Medications:   Medications Prior to Admission   Medication Sig Dispense Refill Last Dose    albuterol sulfate  (90 Base) MCG/ACT inhaler TAKE 2 PUFFS BY MOUTH EVERY 4 HOURS AS NEEDED FOR WHEEZE 18 g 1 5/22/2024    buPROPion XL (Wellbutrin XL) 150 MG 24 hr tablet Take 1 tablet by mouth Every Morning. 90 tablet 1 5/22/2024    colestipol (COLESTID) 1 g tablet TAKE 1 TABLET BY MOUTH TWICE A  tablet 1 5/22/2024    escitalopram (LEXAPRO) 20 MG tablet Take 1 tablet by mouth Daily. 90 tablet 1 5/22/2024    LORazepam (Ativan) 1 MG tablet Take 1 tablet by mouth 2 (Two) Times a Day As Needed for Anxiety. 180 tablet 1 5/22/2024    pravastatin (Pravachol) 20 MG tablet Take 1 tablet by mouth Daily. 90 tablet 3 5/22/2024    zolpidem (AMBIEN) 10 MG tablet Take 1 tablet by mouth At Night As Needed for Sleep. 90 tablet 1 5/22/2024       Allergies:  Patient has no known allergies.      Objective     Blood pressure 138/83, pulse 92, temperature 97.7 °F (36.5 °C), temperature source Oral, resp. rate 16, height 170.2 cm (67\"), weight 84.7 kg (186 lb 11.2 oz), SpO2 98%.  Physical Exam:   General: patient awake, alert and cooperative    Assessment & Plan     Diagnosis:  Encounter for screening for colon cancer    Anticipated Surgical Procedure:  Colonoscopy    The risks, benefits, and alternatives of " this procedure have been discussed with the patient or the responsible party- the patient understands and agrees to proceed.

## 2024-05-24 LAB
LAB AP CASE REPORT: NORMAL
PATH REPORT.FINAL DX SPEC: NORMAL
PATH REPORT.GROSS SPEC: NORMAL

## 2024-06-14 ENCOUNTER — OFFICE VISIT (OUTPATIENT)
Dept: INTERNAL MEDICINE | Facility: CLINIC | Age: 69
End: 2024-06-14
Payer: MEDICARE

## 2024-06-14 VITALS
WEIGHT: 191.6 LBS | BODY MASS INDEX: 30.07 KG/M2 | OXYGEN SATURATION: 98 % | DIASTOLIC BLOOD PRESSURE: 84 MMHG | RESPIRATION RATE: 14 BRPM | HEART RATE: 81 BPM | HEIGHT: 67 IN | TEMPERATURE: 98.2 F | SYSTOLIC BLOOD PRESSURE: 118 MMHG

## 2024-06-14 DIAGNOSIS — F32.A DEPRESSION, UNSPECIFIED DEPRESSION TYPE: Primary | ICD-10-CM

## 2024-06-14 DIAGNOSIS — F41.9 ANXIETY: ICD-10-CM

## 2024-06-14 DIAGNOSIS — F51.01 PRIMARY INSOMNIA: ICD-10-CM

## 2024-06-14 DIAGNOSIS — E78.5 DYSLIPIDEMIA: ICD-10-CM

## 2024-06-14 PROCEDURE — G2211 COMPLEX E/M VISIT ADD ON: HCPCS | Performed by: FAMILY MEDICINE

## 2024-06-14 PROCEDURE — 1159F MED LIST DOCD IN RCRD: CPT | Performed by: FAMILY MEDICINE

## 2024-06-14 PROCEDURE — 1160F RVW MEDS BY RX/DR IN RCRD: CPT | Performed by: FAMILY MEDICINE

## 2024-06-14 PROCEDURE — 99214 OFFICE O/P EST MOD 30 MIN: CPT | Performed by: FAMILY MEDICINE

## 2024-06-14 RX ORDER — ZOLPIDEM TARTRATE 10 MG/1
10 TABLET ORAL NIGHTLY PRN
Qty: 90 TABLET | Refills: 1 | Status: SHIPPED | OUTPATIENT
Start: 2024-06-14

## 2024-06-14 RX ORDER — ESCITALOPRAM OXALATE 20 MG/1
20 TABLET ORAL DAILY
Qty: 90 TABLET | Refills: 1 | Status: SHIPPED | OUTPATIENT
Start: 2024-06-14

## 2024-06-14 RX ORDER — BUPROPION HYDROCHLORIDE 150 MG/1
150 TABLET ORAL EVERY MORNING
Qty: 90 TABLET | Refills: 1 | Status: SHIPPED | OUTPATIENT
Start: 2024-06-14

## 2024-06-14 RX ORDER — LORAZEPAM 1 MG/1
1 TABLET ORAL 2 TIMES DAILY PRN
Qty: 180 TABLET | Refills: 1 | Status: SHIPPED | OUTPATIENT
Start: 2024-06-14

## 2024-06-14 NOTE — PROGRESS NOTES
"Chief Complaint  Anxiety    Subjective          Yoanna Reed presents to [facility]  History of Present Illness  The patient is a 68-year-old female who is here for a follow-up.    The patient has been grappling with depression and anxiety, for which she is on a regimen of Wellbutrin 150 mg daily, Lexapro 20 mg daily, and Ativan 1 mg twice daily as needed. She has discontinued the use of Vraylar. Additionally, she is on a nightly regimen of Ambien to aid sleep, which she reports has provided some relief.    The patient has been prescribed pravastatin 20 mg for her cholesterol management.    Objective   Vital Signs:   /84 (BP Location: Left arm, Patient Position: Sitting)   Pulse 81   Temp 98.2 °F (36.8 °C)   Resp 14   Ht 170.2 cm (67.01\")   Wt 86.9 kg (191 lb 9.6 oz)   SpO2 98%   BMI 30.00 kg/m²     Physical Exam  Vitals and nursing note reviewed.   Constitutional:       Appearance: She is well-developed.   HENT:      Head: Normocephalic and atraumatic.   Musculoskeletal:      Cervical back: Normal range of motion and neck supple.   Neurological:      Mental Status: She is alert and oriented to person, place, and time.   Psychiatric:         Behavior: Behavior normal.         Physical Exam       Result Review :                 Assessment and Plan    Diagnoses and all orders for this visit:    1. Depression, unspecified depression type (Primary)  -     buPROPion XL (Wellbutrin XL) 150 MG 24 hr tablet; Take 1 tablet by mouth Every Morning.  Dispense: 90 tablet; Refill: 1  -     escitalopram (LEXAPRO) 20 MG tablet; Take 1 tablet by mouth Daily.  Dispense: 90 tablet; Refill: 1    2. Anxiety  -     escitalopram (LEXAPRO) 20 MG tablet; Take 1 tablet by mouth Daily.  Dispense: 90 tablet; Refill: 1  -     LORazepam (Ativan) 1 MG tablet; Take 1 tablet by mouth 2 (Two) Times a Day As Needed for Anxiety.  Dispense: 180 tablet; Refill: 1    3. Primary insomnia  -     zolpidem " (AMBIEN) 10 MG tablet; Take 1 tablet by mouth At Night As Needed for Sleep.  Dispense: 90 tablet; Refill: 1    4. Dyslipidemia  -     Comprehensive Metabolic Panel  -     Lipid Panel With LDL / HDL Ratio      Assessment & Plan  1. Depression and anxiety.  The patient's psychiatric condition appears to be stable. The current medication regimen will be maintained.    2. Hyperlipidemia.  A reevaluation of the patient's cholesterol levels will be conducted.    3. Insomnia.  The patient's insomnia is well-managed with Ambien.    Follow Up   No follow-ups on file.  Patient was given instructions and counseling regarding her condition or for health maintenance advice. Please see specific information pulled into the AVS if appropriate.           Patient or patient representative verbalized consent for the use of Ambient Listening during the visit with  Tom Muller MD for chart documentation. 6/14/2024  15:35 EDT

## 2024-06-15 LAB
ALBUMIN SERPL-MCNC: 4.4 G/DL (ref 3.5–5.2)
ALBUMIN/GLOB SERPL: 1.8 G/DL
ALP SERPL-CCNC: 57 U/L (ref 39–117)
ALT SERPL-CCNC: 7 U/L (ref 1–33)
AST SERPL-CCNC: 11 U/L (ref 1–32)
BILIRUB SERPL-MCNC: 0.4 MG/DL (ref 0–1.2)
BUN SERPL-MCNC: 9 MG/DL (ref 8–23)
BUN/CREAT SERPL: 11.4 (ref 7–25)
CALCIUM SERPL-MCNC: 9.7 MG/DL (ref 8.6–10.5)
CHLORIDE SERPL-SCNC: 98 MMOL/L (ref 98–107)
CHOLEST SERPL-MCNC: 188 MG/DL (ref 0–200)
CO2 SERPL-SCNC: 24.9 MMOL/L (ref 22–29)
CREAT SERPL-MCNC: 0.79 MG/DL (ref 0.57–1)
EGFRCR SERPLBLD CKD-EPI 2021: 81.6 ML/MIN/1.73
GLOBULIN SER CALC-MCNC: 2.5 GM/DL
GLUCOSE SERPL-MCNC: 80 MG/DL (ref 65–99)
HDLC SERPL-MCNC: 55 MG/DL (ref 40–60)
LDLC SERPL CALC-MCNC: 112 MG/DL (ref 0–100)
LDLC/HDLC SERPL: 1.99 {RATIO}
POTASSIUM SERPL-SCNC: 4.7 MMOL/L (ref 3.5–5.2)
PROT SERPL-MCNC: 6.9 G/DL (ref 6–8.5)
SODIUM SERPL-SCNC: 136 MMOL/L (ref 136–145)
TRIGL SERPL-MCNC: 119 MG/DL (ref 0–150)
VLDLC SERPL CALC-MCNC: 21 MG/DL (ref 5–40)

## 2024-06-21 ENCOUNTER — TELEPHONE (OUTPATIENT)
Dept: GASTROENTEROLOGY | Facility: CLINIC | Age: 69
End: 2024-06-21
Payer: MEDICARE

## 2024-06-21 NOTE — TELEPHONE ENCOUNTER
Call pt. Pt informed of providers result message below. Pt verified understanding.     Hm and recall updated.

## 2024-06-21 NOTE — TELEPHONE ENCOUNTER
----- Message from Ankur Beebe sent at 6/20/2024  1:38 PM EDT -----  Adenoma and benign hyperplastic polyp  Recall 3 yrs

## 2024-07-11 ENCOUNTER — HOSPITAL ENCOUNTER (OUTPATIENT)
Dept: GENERAL RADIOLOGY | Facility: HOSPITAL | Age: 69
Discharge: HOME | End: 2024-07-11
Payer: MEDICARE

## 2024-07-11 ENCOUNTER — OFFICE VISIT (OUTPATIENT)
Dept: INTERNAL MEDICINE | Facility: CLINIC | Age: 69
End: 2024-07-11
Payer: MEDICARE

## 2024-07-11 VITALS
HEART RATE: 82 BPM | BODY MASS INDEX: 29.34 KG/M2 | DIASTOLIC BLOOD PRESSURE: 80 MMHG | HEIGHT: 67 IN | OXYGEN SATURATION: 97 % | WEIGHT: 186.9 LBS | SYSTOLIC BLOOD PRESSURE: 126 MMHG

## 2024-07-11 DIAGNOSIS — M25.511 ACUTE PAIN OF RIGHT SHOULDER: Primary | ICD-10-CM

## 2024-07-11 DIAGNOSIS — M89.8X2 PAIN OF RIGHT HUMERUS: ICD-10-CM

## 2024-07-11 PROCEDURE — 1159F MED LIST DOCD IN RCRD: CPT | Performed by: FAMILY MEDICINE

## 2024-07-11 PROCEDURE — 73060 X-RAY EXAM OF HUMERUS: CPT

## 2024-07-11 PROCEDURE — 1160F RVW MEDS BY RX/DR IN RCRD: CPT | Performed by: FAMILY MEDICINE

## 2024-07-11 PROCEDURE — 99214 OFFICE O/P EST MOD 30 MIN: CPT | Performed by: FAMILY MEDICINE

## 2024-07-11 PROCEDURE — G2211 COMPLEX E/M VISIT ADD ON: HCPCS | Performed by: FAMILY MEDICINE

## 2024-07-11 PROCEDURE — 73030 X-RAY EXAM OF SHOULDER: CPT

## 2024-07-11 NOTE — PROGRESS NOTES
"Chief Complaint  Shoulder Pain (Right; down to elbow; about 3 weeks )    Subjective        Yoanna Reed presents to [facility]  History of Present Illness  Patient appointment to discuss pain in her right shoulder and right arm says she fell a long time ago few months and then had some improvement in recurrence and now is getting worse again she has trouble with abduction and extension as well as internal rotation but she points to tenderness in the mid humerus on the right  Objective   Vital Signs:  /80   Pulse 82   Ht 170.2 cm (67.01\")   Wt 84.8 kg (186 lb 14.4 oz)   SpO2 97%   BMI 29.27 kg/m²   Estimated body mass index is 29.27 kg/m² as calculated from the following:    Height as of this encounter: 170.2 cm (67.01\").    Weight as of this encounter: 84.8 kg (186 lb 14.4 oz).               Physical Exam  Vitals and nursing note reviewed.   Constitutional:       Appearance: Normal appearance.   Cardiovascular:      Rate and Rhythm: Normal rate and regular rhythm.      Pulses: Normal pulses.      Heart sounds: Normal heart sounds.   Musculoskeletal:         General: Swelling present.        Arms:       Comments: Tenderness and swelling  No neurovascular supply is grossly intact   Neurological:      Mental Status: She is alert.        Result Review :      Common labs          1/16/2024    08:48 6/14/2024    09:18   Common Labs   Glucose 85  80    BUN 10  9    Creatinine 0.85  0.79    Sodium 138  136    Potassium 4.2  4.7    Chloride 98  98    Calcium 9.9  9.7    Total Protein 7.7  6.9    Albumin 4.7  4.4    Total Bilirubin 0.4  0.4    Alkaline Phosphatase 48  57    AST (SGOT) 14  11    ALT (SGPT) 9  7    WBC 4.86     Hemoglobin 13.6     Hematocrit 39.9     Platelets 297     Total Cholesterol 211  188    Triglycerides 162  119    HDL Cholesterol 58  55    LDL Cholesterol  125  112    Hemoglobin A1C 5.50                    Assessment and Plan     Diagnoses and all " "orders for this visit:    1. Acute pain of right shoulder (Primary)  -     XR Shoulder 2+ View Right    2. Pain of right humerus  -     XR Humerus Right         This patient has a PCP that is the continuing focal point for all health care services, and the patient sees this physician to be evaluated for shoulder pain. The inherent complexity that this code () captures is not in the clinical condition itself-- shoulder pain --but rather the cognitition of the continued responsibility of being the focal point for all needed services for this patient.\"     Follow Up     Return if symptoms worsen or fail to improve, for Recheck.  Patient was given instructions and counseling regarding her condition or for health maintenance advice. Please see specific information pulled into the AVS if appropriate.         "

## 2024-07-17 ENCOUNTER — OFFICE VISIT (OUTPATIENT)
Dept: INTERNAL MEDICINE | Facility: CLINIC | Age: 69
End: 2024-07-17
Payer: MEDICARE

## 2024-07-17 VITALS
BODY MASS INDEX: 29.19 KG/M2 | OXYGEN SATURATION: 99 % | SYSTOLIC BLOOD PRESSURE: 110 MMHG | TEMPERATURE: 97.8 F | DIASTOLIC BLOOD PRESSURE: 60 MMHG | RESPIRATION RATE: 16 BRPM | WEIGHT: 186 LBS | HEIGHT: 67 IN | HEART RATE: 83 BPM

## 2024-07-17 DIAGNOSIS — N30.01 ACUTE CYSTITIS WITH HEMATURIA: ICD-10-CM

## 2024-07-17 DIAGNOSIS — R35.0 URINARY FREQUENCY: Primary | ICD-10-CM

## 2024-07-17 LAB
BILIRUB BLD-MCNC: NEGATIVE MG/DL
CLARITY, POC: ABNORMAL
COLOR UR: YELLOW
EXPIRATION DATE: ABNORMAL
GLUCOSE UR STRIP-MCNC: NEGATIVE MG/DL
KETONES UR QL: NEGATIVE
LEUKOCYTE EST, POC: ABNORMAL
Lab: ABNORMAL
NITRITE UR-MCNC: NEGATIVE MG/ML
PH UR: 6 [PH] (ref 5–8)
PROT UR STRIP-MCNC: ABNORMAL MG/DL
RBC # UR STRIP: ABNORMAL /UL
SP GR UR: 1.02 (ref 1–1.03)
UROBILINOGEN UR QL: ABNORMAL

## 2024-07-17 PROCEDURE — 1160F RVW MEDS BY RX/DR IN RCRD: CPT | Performed by: NURSE PRACTITIONER

## 2024-07-17 PROCEDURE — 99213 OFFICE O/P EST LOW 20 MIN: CPT | Performed by: NURSE PRACTITIONER

## 2024-07-17 PROCEDURE — 1159F MED LIST DOCD IN RCRD: CPT | Performed by: NURSE PRACTITIONER

## 2024-07-17 PROCEDURE — 81003 URINALYSIS AUTO W/O SCOPE: CPT | Performed by: NURSE PRACTITIONER

## 2024-07-17 RX ORDER — SULFAMETHOXAZOLE AND TRIMETHOPRIM 800; 160 MG/1; MG/1
1 TABLET ORAL 2 TIMES DAILY
Qty: 10 TABLET | Refills: 0 | Status: SHIPPED | OUTPATIENT
Start: 2024-07-17 | End: 2024-07-22

## 2024-07-17 RX ORDER — PHENAZOPYRIDINE HYDROCHLORIDE 200 MG/1
200 TABLET, FILM COATED ORAL 3 TIMES DAILY PRN
Qty: 6 TABLET | Refills: 0 | Status: SHIPPED | OUTPATIENT
Start: 2024-07-17 | End: 2024-07-19

## 2024-07-17 NOTE — PROGRESS NOTES
"Chief Complaint  Abdominal Pain (When urinating 2 days)    Subjective        Yoanna Reed presents to [facility]  History of Present Illness  History of Present Illness  The patient presents for evaluation of urinary tract infection.    She has been experiencing a urinary tract infection for the past few days, despite not having had one in the past 20 to 30 years. She has observed a small amount of blood in her urine. She denies having a fever or chills. She has no known allergies to antibiotics and is able to swallow pills without difficulty. She experiences a burning sensation during urination and daily heartburn.   The patient has no known antibiotic allergies.       Objective   Vital Signs:  /60   Pulse 83   Temp 97.8 °F (36.6 °C)   Resp 16   Ht 170.2 cm (67.01\")   Wt 84.4 kg (186 lb)   SpO2 99%   BMI 29.12 kg/m²   Estimated body mass index is 29.12 kg/m² as calculated from the following:    Height as of this encounter: 170.2 cm (67.01\").    Weight as of this encounter: 84.4 kg (186 lb).               Physical Exam  Vitals and nursing note reviewed.   Constitutional:       Appearance: Normal appearance.   HENT:      Head: Normocephalic.      Nose: Nose normal.      Mouth/Throat:      Mouth: Mucous membranes are moist.   Eyes:      Pupils: Pupils are equal, round, and reactive to light.   Cardiovascular:      Rate and Rhythm: Normal rate and regular rhythm.      Pulses: Normal pulses.      Heart sounds: Normal heart sounds.      Comments: No peripheral edema noted.   Pulmonary:      Effort: Pulmonary effort is normal. No respiratory distress.      Breath sounds: Normal breath sounds. No stridor. No wheezing, rhonchi or rales.      Comments: Denies SOB  Chest:      Chest wall: No tenderness.   Genitourinary:     Comments: Dysuria, hematuria, urinary frequency times a few days.  Musculoskeletal:         General: Normal range of motion.   Skin:     General: Skin is " warm.      Capillary Refill: Capillary refill takes less than 2 seconds.   Neurological:      Mental Status: She is alert and oriented to person, place, and time.   Psychiatric:         Behavior: Behavior normal.        Physical Exam       Result Review :      Common labs          1/16/2024    08:48 6/14/2024    09:18   Common Labs   Glucose 85  80    BUN 10  9    Creatinine 0.85  0.79    Sodium 138  136    Potassium 4.2  4.7    Chloride 98  98    Calcium 9.9  9.7    Total Protein 7.7  6.9    Albumin 4.7  4.4    Total Bilirubin 0.4  0.4    Alkaline Phosphatase 48  57    AST (SGOT) 14  11    ALT (SGPT) 9  7    WBC 4.86     Hemoglobin 13.6     Hematocrit 39.9     Platelets 297     Total Cholesterol 211  188    Triglycerides 162  119    HDL Cholesterol 58  55    LDL Cholesterol  125  112    Hemoglobin A1C 5.50         Results  Laboratory Studies  Urine test shows white blood cells and blood.              Assessment and Plan     Diagnoses and all orders for this visit:    1. Urinary frequency (Primary)  -     POCT urinalysis dipstick, automated  -     Urine Culture - Urine, Urine, Clean Catch    2. Acute cystitis with hematuria    Other orders  -     sulfamethoxazole-trimethoprim (BACTRIM DS,SEPTRA DS) 800-160 MG per tablet; Take 1 tablet by mouth 2 (Two) Times a Day for 5 days.  Dispense: 10 tablet; Refill: 0  -     phenazopyridine (Pyridium) 200 MG tablet; Take 1 tablet by mouth 3 (Three) Times a Day As Needed for Bladder Spasms or Dysuria for up to 2 days.  Dispense: 6 tablet; Refill: 0      Assessment & Plan  1. Urinary tract infection.  Her recent blood work, conducted last month, showed normal kidney function. A broad-spectrum antibiotic, sulfa, was prescribed, with instructions to take 1 pill twice daily for 5 days. Pyridium was prescribed, to be taken up to 3 times daily as needed. She was advised to increase her water intake and consume food prior to medication to prevent stomach upset. A urine dip test was  ordered. She was instructed to inform me if her symptoms worsen, such as fever or pain.         I spent 20 minutes caring for Yoanna on this date of service. This time includes time spent by me in the following activities:preparing for the visit, reviewing tests, obtaining and/or reviewing a separately obtained history, performing a medically appropriate examination and/or evaluation , counseling and educating the patient/family/caregiver, ordering medications, tests, or procedures, referring and communicating with other health care professionals , documenting information in the medical record, independently interpreting results and communicating that information with the patient/family/caregiver, and care coordination  Follow Up     Return if symptoms worsen or fail to improve.  Patient was given instructions and counseling regarding her condition or for health maintenance advice. Please see specific information pulled into the AVS if appropriate.     Patient or patient representative verbalized consent for the use of Ambient Listening during the visit with  OSIEL Torres for chart documentation. 7/17/2024  10:31 EDT

## 2024-07-20 LAB
BACTERIA UR CULT: ABNORMAL
OTHER ANTIBIOTIC SUSC ISLT: ABNORMAL

## 2024-07-23 ENCOUNTER — OFFICE VISIT (OUTPATIENT)
Dept: INTERNAL MEDICINE | Facility: CLINIC | Age: 69
End: 2024-07-23
Payer: MEDICARE

## 2024-07-23 VITALS
DIASTOLIC BLOOD PRESSURE: 86 MMHG | HEART RATE: 88 BPM | HEIGHT: 67 IN | BODY MASS INDEX: 29.19 KG/M2 | WEIGHT: 186 LBS | OXYGEN SATURATION: 98 % | SYSTOLIC BLOOD PRESSURE: 124 MMHG | RESPIRATION RATE: 14 BRPM

## 2024-07-23 DIAGNOSIS — M54.50 CHRONIC RIGHT-SIDED LOW BACK PAIN WITHOUT SCIATICA: ICD-10-CM

## 2024-07-23 DIAGNOSIS — R30.0 DYSURIA: Primary | ICD-10-CM

## 2024-07-23 DIAGNOSIS — G89.29 CHRONIC RIGHT-SIDED LOW BACK PAIN WITHOUT SCIATICA: ICD-10-CM

## 2024-07-23 LAB
BILIRUB BLD-MCNC: NEGATIVE MG/DL
CLARITY, POC: CLEAR
COLOR UR: YELLOW
EXPIRATION DATE: ABNORMAL
GLUCOSE UR STRIP-MCNC: NEGATIVE MG/DL
KETONES UR QL: NEGATIVE
LEUKOCYTE EST, POC: NEGATIVE
Lab: ABNORMAL
NITRITE UR-MCNC: NEGATIVE MG/ML
PH UR: 6 [PH] (ref 5–8)
PROT UR STRIP-MCNC: NEGATIVE MG/DL
RBC # UR STRIP: ABNORMAL /UL
SP GR UR: 1.01 (ref 1–1.03)
UROBILINOGEN UR QL: NORMAL

## 2024-07-23 PROCEDURE — G2211 COMPLEX E/M VISIT ADD ON: HCPCS | Performed by: FAMILY MEDICINE

## 2024-07-23 PROCEDURE — 1159F MED LIST DOCD IN RCRD: CPT | Performed by: FAMILY MEDICINE

## 2024-07-23 PROCEDURE — 1160F RVW MEDS BY RX/DR IN RCRD: CPT | Performed by: FAMILY MEDICINE

## 2024-07-23 PROCEDURE — 99213 OFFICE O/P EST LOW 20 MIN: CPT | Performed by: FAMILY MEDICINE

## 2024-07-23 PROCEDURE — 81003 URINALYSIS AUTO W/O SCOPE: CPT | Performed by: FAMILY MEDICINE

## 2024-07-23 RX ORDER — TRAMADOL HYDROCHLORIDE 50 MG/1
50 TABLET ORAL EVERY 8 HOURS PRN
Qty: 21 TABLET | Refills: 0 | Status: SHIPPED | OUTPATIENT
Start: 2024-07-23

## 2024-07-25 RX ORDER — CIPROFLOXACIN 250 MG/1
250 TABLET, FILM COATED ORAL 2 TIMES DAILY
Qty: 6 TABLET | Refills: 0 | Status: SHIPPED | OUTPATIENT
Start: 2024-07-25

## 2024-08-01 ENCOUNTER — OFFICE VISIT (OUTPATIENT)
Dept: INTERNAL MEDICINE | Facility: CLINIC | Age: 69
End: 2024-08-01
Payer: MEDICARE

## 2024-08-01 VITALS
BODY MASS INDEX: 29.19 KG/M2 | SYSTOLIC BLOOD PRESSURE: 136 MMHG | HEIGHT: 67 IN | RESPIRATION RATE: 14 BRPM | OXYGEN SATURATION: 96 % | WEIGHT: 186 LBS | DIASTOLIC BLOOD PRESSURE: 88 MMHG | HEART RATE: 84 BPM

## 2024-08-01 DIAGNOSIS — R10.9 FLANK PAIN: Primary | ICD-10-CM

## 2024-08-01 DIAGNOSIS — N30.01 ACUTE CYSTITIS WITH HEMATURIA: ICD-10-CM

## 2024-08-01 DIAGNOSIS — M54.50 CHRONIC RIGHT-SIDED LOW BACK PAIN WITHOUT SCIATICA: ICD-10-CM

## 2024-08-01 DIAGNOSIS — G89.29 CHRONIC RIGHT-SIDED LOW BACK PAIN WITHOUT SCIATICA: ICD-10-CM

## 2024-08-01 LAB
BILIRUB BLD-MCNC: NEGATIVE MG/DL
CLARITY, POC: CLEAR
COLOR UR: YELLOW
EXPIRATION DATE: ABNORMAL
GLUCOSE UR STRIP-MCNC: NEGATIVE MG/DL
KETONES UR QL: NEGATIVE
LEUKOCYTE EST, POC: ABNORMAL
Lab: ABNORMAL
NITRITE UR-MCNC: NEGATIVE MG/ML
PH UR: 6 [PH] (ref 5–8)
PROT UR STRIP-MCNC: NEGATIVE MG/DL
RBC # UR STRIP: ABNORMAL /UL
SP GR UR: 1.01 (ref 1–1.03)
UROBILINOGEN UR QL: NORMAL

## 2024-08-01 PROCEDURE — G2211 COMPLEX E/M VISIT ADD ON: HCPCS | Performed by: FAMILY MEDICINE

## 2024-08-01 PROCEDURE — 99213 OFFICE O/P EST LOW 20 MIN: CPT | Performed by: FAMILY MEDICINE

## 2024-08-01 PROCEDURE — 81003 URINALYSIS AUTO W/O SCOPE: CPT | Performed by: FAMILY MEDICINE

## 2024-08-01 RX ORDER — CEFDINIR 300 MG/1
300 CAPSULE ORAL 2 TIMES DAILY
Qty: 14 CAPSULE | Refills: 0 | Status: SHIPPED | OUTPATIENT
Start: 2024-08-01

## 2024-08-01 NOTE — TELEPHONE ENCOUNTER
Caller: Yoanna Reed    Relationship: Self    Requested Prescriptions:   Requested Prescriptions     Pending Prescriptions Disp Refills    traMADol (ULTRAM) 50 MG tablet 21 tablet 0     Sig: Take 1 tablet by mouth Every 8 (Eight) Hours As Needed for Moderate Pain.      Pharmacy where request should be sent: [facility]/PHARMACY #[id number] [place], [place] - [address] [address] AT [facility] [phone] [facility] [phone]      Last office visit with prescribing clinician: 6/14/2024   Last telemedicine visit with prescribing clinician: Visit date not found   Next office visit with prescribing clinician: 9/13/2024     Additional details provided by patient: PATIENT STATES SHE WAS JUST IN THE OFFICE AND DR. PAGE DID NOT CALL IN HER TRAMADOL.          Edgard Flores Rep   08/01/24 14:39 EDT

## 2024-08-01 NOTE — PROGRESS NOTES
"Chief Complaint  Flank Pain    Subjective        Yoanna Reed presents to [facility]  History of Present Illness  Patient follows up for persistent flank pain dysuria frequency  She was treated with Cipro for E. coli infection which showed sensitivity some improvement in recurrence  Fever sweats or chills  Objective   Vital Signs:  /88 (BP Location: Left arm, Patient Position: Sitting, Cuff Size: Adult)   Pulse 84   Resp 14   Ht 170.2 cm (67.01\")   Wt 84.4 kg (186 lb)   SpO2 96%   BMI 29.12 kg/m²   Estimated body mass index is 29.12 kg/m² as calculated from the following:    Height as of this encounter: 170.2 cm (67.01\").    Weight as of this encounter: 84.4 kg (186 lb).               Physical Exam  Vitals and nursing note reviewed.   Constitutional:       Appearance: Normal appearance.   Cardiovascular:      Pulses: Normal pulses.   Pulmonary:      Effort: Pulmonary effort is normal.      Breath sounds: Normal breath sounds.   Abdominal:      Tenderness: There is no abdominal tenderness. There is no right CVA tenderness, left CVA tenderness, guarding or rebound.   Neurological:      Mental Status: She is alert.   Psychiatric:         Mood and Affect: Mood normal.         Behavior: Behavior normal.         Thought Content: Thought content normal.         Judgment: Judgment normal.        Result Review :      Urine Culture          7/17/2024    10:24   Urine Culture   Urine Culture Final report                   Assessment and Plan     Diagnoses and all orders for this visit:    1. Flank pain (Primary)  -     POCT urinalysis dipstick, automated    2. Acute cystitis with hematuria    Other orders  -     cefdinir (OMNICEF) 300 MG capsule; Take 1 capsule by mouth 2 (Two) Times a Day.  Dispense: 14 capsule; Refill: 0    If symptoms persist recommend consultation with urology     This patient has a PCP that is the continuing focal point for all health care services, and " "the patient sees this physician to be evaluated for UTI. The inherent complexity that this code () captures is not in the clinical condition itself-- UTI --but rather the cognitition of the continued responsibility of being the focal point for all needed services for this patient.\"     Follow Up     Return in about 1 month (around 9/1/2024), or if symptoms worsen or fail to improve, for Recheck.  Patient was given instructions and counseling regarding her condition or for health maintenance advice. Please see specific information pulled into the AVS if appropriate.         "

## 2024-08-02 RX ORDER — TRAMADOL HYDROCHLORIDE 50 MG/1
50 TABLET ORAL EVERY 8 HOURS PRN
Qty: 21 TABLET | Refills: 0 | Status: SHIPPED | OUTPATIENT
Start: 2024-08-02

## 2024-08-02 NOTE — TELEPHONE ENCOUNTER
Caller: Yoanna Reed    Relationship: Self    Best call back number: [phone]       Who are you requesting to speak with (clinical staff, provider,  specific staff member): SANTIAGO    What was the call regarding: PATIENT WANTS HER REFILL ON TRAMADOL ASAP  PLEASE CALL WHEN SENT

## 2024-08-15 ENCOUNTER — HOSPITAL ENCOUNTER (OUTPATIENT)
Dept: GENERAL RADIOLOGY | Facility: HOSPITAL | Age: 69
Discharge: HOME | End: 2024-08-15
Admitting: FAMILY MEDICINE
Payer: MEDICARE

## 2024-08-15 ENCOUNTER — OFFICE VISIT (OUTPATIENT)
Dept: INTERNAL MEDICINE | Facility: CLINIC | Age: 69
End: 2024-08-15
Payer: MEDICARE

## 2024-08-15 VITALS
OXYGEN SATURATION: 99 % | HEIGHT: 67 IN | TEMPERATURE: 98.2 F | SYSTOLIC BLOOD PRESSURE: 120 MMHG | HEART RATE: 90 BPM | DIASTOLIC BLOOD PRESSURE: 80 MMHG | WEIGHT: 186 LBS | BODY MASS INDEX: 29.19 KG/M2

## 2024-08-15 DIAGNOSIS — R30.0 DYSURIA: Primary | ICD-10-CM

## 2024-08-15 DIAGNOSIS — G89.29 CHRONIC RIGHT-SIDED LOW BACK PAIN WITHOUT SCIATICA: ICD-10-CM

## 2024-08-15 DIAGNOSIS — N76.0 ACUTE VAGINITIS: ICD-10-CM

## 2024-08-15 DIAGNOSIS — M54.50 CHRONIC RIGHT-SIDED LOW BACK PAIN WITHOUT SCIATICA: ICD-10-CM

## 2024-08-15 LAB
BILIRUB BLD-MCNC: NEGATIVE MG/DL
CLARITY, POC: CLEAR
COLOR UR: YELLOW
EXPIRATION DATE: ABNORMAL
GLUCOSE UR STRIP-MCNC: NEGATIVE MG/DL
KETONES UR QL: NEGATIVE
LEUKOCYTE EST, POC: NEGATIVE
Lab: ABNORMAL
NITRITE UR-MCNC: NEGATIVE MG/ML
PH UR: 6 [PH] (ref 5–8)
PROT UR STRIP-MCNC: ABNORMAL MG/DL
RBC # UR STRIP: NEGATIVE /UL
SP GR UR: 1.01 (ref 1–1.03)
UROBILINOGEN UR QL: ABNORMAL

## 2024-08-15 PROCEDURE — 1160F RVW MEDS BY RX/DR IN RCRD: CPT | Performed by: FAMILY MEDICINE

## 2024-08-15 PROCEDURE — G2211 COMPLEX E/M VISIT ADD ON: HCPCS | Performed by: FAMILY MEDICINE

## 2024-08-15 PROCEDURE — 99214 OFFICE O/P EST MOD 30 MIN: CPT | Performed by: FAMILY MEDICINE

## 2024-08-15 PROCEDURE — 1159F MED LIST DOCD IN RCRD: CPT | Performed by: FAMILY MEDICINE

## 2024-08-15 PROCEDURE — 72100 X-RAY EXAM L-S SPINE 2/3 VWS: CPT

## 2024-08-15 PROCEDURE — 81003 URINALYSIS AUTO W/O SCOPE: CPT | Performed by: FAMILY MEDICINE

## 2024-08-15 RX ORDER — FLUCONAZOLE 150 MG/1
TABLET ORAL
Qty: 2 TABLET | Refills: 0 | Status: SHIPPED | OUTPATIENT
Start: 2024-08-15

## 2024-08-15 RX ORDER — TIZANIDINE 2 MG/1
2 TABLET ORAL EVERY 8 HOURS PRN
Qty: 30 TABLET | Refills: 1 | Status: SHIPPED | OUTPATIENT
Start: 2024-08-15

## 2024-08-15 NOTE — PROGRESS NOTES
"Chief Complaint  follow up on flak pain    Subjective        Yoanna Reed presents to [facility]  History of Present Illness  Patient follows up for dysuria vaginal itching chronic low back pain no radiating quality no fever sweats or chills she has had courses of antibiotics for recent urinary tract infection now subsequently developed more vaginal itching consistent with yeast infection  Chronic degenerative changes l status post cervical spine surgery chronic upper extremity weakness  Objective   Vital Signs:  /80   Pulse 90   Temp 98.2 °F (36.8 °C) (Temporal)   Ht 170.2 cm (67.01\")   Wt 84.4 kg (186 lb)   SpO2 99%   BMI 29.12 kg/m²   Estimated body mass index is 29.12 kg/m² as calculated from the following:    Height as of this encounter: 170.2 cm (67.01\").    Weight as of this encounter: 84.4 kg (186 lb).            Physical Exam  Vitals reviewed.   Constitutional:       Appearance: Normal appearance.   Cardiovascular:      Rate and Rhythm: Normal rate.   Pulmonary:      Effort: Pulmonary effort is normal.   Abdominal:      Tenderness: There is no right CVA tenderness or left CVA tenderness.   Musculoskeletal:         General: Tenderness present.      Comments: Lumbar spinous tenderness right more than left   Neurological:      Mental Status: She is alert. Mental status is at baseline.   Psychiatric:         Mood and Affect: Mood normal.         Behavior: Behavior normal.         Thought Content: Thought content normal.         Judgment: Judgment normal.        Result Review :    Common labs          1/16/2024    08:48 6/14/2024    09:18   Common Labs   Glucose 85  80    BUN 10  9    Creatinine 0.85  0.79    Sodium 138  136    Potassium 4.2  4.7    Chloride 98  98    Calcium 9.9  9.7    Total Protein 7.7  6.9    Albumin 4.7  4.4    Total Bilirubin 0.4  0.4    Alkaline Phosphatase 48  57    AST (SGOT) 14  11    ALT (SGPT) 9  7    WBC 4.86     Hemoglobin 13.6   " "  Hematocrit 39.9     Platelets 297     Total Cholesterol 211  188    Triglycerides 162  119    HDL Cholesterol 58  55    LDL Cholesterol  125  112    Hemoglobin A1C 5.50                 Assessment and Plan   Diagnoses and all orders for this visit:    1. Dysuria (Primary)  -     POCT urinalysis dipstick, automated    2. Chronic right-sided low back pain without sciatica  -     XR Spine Lumbar 2 or 3 View  -     tiZANidine (ZANAFLEX) 2 MG tablet; Take 1 tablet by mouth Every 8 (Eight) Hours As Needed for Muscle Spasms.  Dispense: 30 tablet; Refill: 1    3. Acute vaginitis    Other orders  -     fluconazole (Diflucan) 150 MG tablet; One now and in 1 week  Dispense: 2 tablet; Refill: 0    No evidence of infection or inflammation  Diflucan for vaginal yeast infection  Tizanidine for low back pain since no improvement with tramadol and distant use of muscle actually seem to have given her some relief     This patient has a PCP that is the continuing focal point for all health care services, and the patient sees this physician to be evaluated for vaginitis. The inherent complexity that this code () captures is not in the clinical condition itself-- adenitis --but rather the cognitition of the continued responsibility of being the focal point for all needed services for this patient.\"     Follow Up   Return if symptoms worsen or fail to improve, for Recheck.  Patient was given instructions and counseling regarding her condition or for health maintenance advice. Please see specific information pulled into the AVS if appropriate.             "

## 2024-08-26 ENCOUNTER — TELEPHONE (OUTPATIENT)
Dept: INTERNAL MEDICINE | Facility: CLINIC | Age: 69
End: 2024-08-26
Payer: MEDICARE

## 2024-08-26 DIAGNOSIS — R93.7 ABNORMAL X-RAY OF LUMBAR SPINE: ICD-10-CM

## 2024-08-26 DIAGNOSIS — G89.29 CHRONIC RIGHT-SIDED LOW BACK PAIN WITHOUT SCIATICA: Primary | ICD-10-CM

## 2024-08-26 DIAGNOSIS — M54.50 CHRONIC RIGHT-SIDED LOW BACK PAIN WITHOUT SCIATICA: Primary | ICD-10-CM

## 2024-08-26 NOTE — TELEPHONE ENCOUNTER
Caller: Yoanna Reed    Relationship: Self    Best call back number: [phone]      Caller requesting test results: PATIENT    What test was performed: XRAY OF SPINE    When was the test performed: 8/15/24    Where was the test performed: [facility]

## 2024-08-29 ENCOUNTER — OFFICE VISIT (OUTPATIENT)
Dept: INTERNAL MEDICINE | Facility: CLINIC | Age: 69
End: 2024-08-29
Payer: MEDICARE

## 2024-08-29 ENCOUNTER — TELEPHONE (OUTPATIENT)
Dept: INTERNAL MEDICINE | Facility: CLINIC | Age: 69
End: 2024-08-29

## 2024-08-29 VITALS
WEIGHT: 177.8 LBS | SYSTOLIC BLOOD PRESSURE: 110 MMHG | HEIGHT: 67 IN | RESPIRATION RATE: 17 BRPM | TEMPERATURE: 98 F | BODY MASS INDEX: 27.91 KG/M2 | HEART RATE: 87 BPM | DIASTOLIC BLOOD PRESSURE: 80 MMHG | OXYGEN SATURATION: 98 %

## 2024-08-29 DIAGNOSIS — G89.29 CHRONIC RIGHT-SIDED LOW BACK PAIN WITHOUT SCIATICA: ICD-10-CM

## 2024-08-29 DIAGNOSIS — R30.0 DYSURIA: ICD-10-CM

## 2024-08-29 DIAGNOSIS — M54.50 CHRONIC RIGHT-SIDED LOW BACK PAIN WITHOUT SCIATICA: ICD-10-CM

## 2024-08-29 DIAGNOSIS — R31.9 HEMATURIA, UNSPECIFIED TYPE: ICD-10-CM

## 2024-08-29 DIAGNOSIS — R30.0 DYSURIA: Primary | ICD-10-CM

## 2024-08-29 LAB
BILIRUB BLD-MCNC: NEGATIVE MG/DL
CLARITY, POC: CLEAR
COLOR UR: YELLOW
GLUCOSE UR STRIP-MCNC: NEGATIVE MG/DL
KETONES UR QL: NEGATIVE
LEUKOCYTE EST, POC: NEGATIVE
NITRITE UR-MCNC: NEGATIVE MG/ML
PH UR: 6 [PH] (ref 5–8)
PROT UR STRIP-MCNC: NEGATIVE MG/DL
RBC # UR STRIP: ABNORMAL /UL
SP GR UR: 1.02 (ref 1–1.03)
UROBILINOGEN UR QL: NORMAL

## 2024-08-29 RX ORDER — HYDROCODONE BITARTRATE AND ACETAMINOPHEN 5; 325 MG/1; MG/1
1 TABLET ORAL EVERY 8 HOURS PRN
Qty: 30 TABLET | Refills: 0 | Status: SHIPPED | OUTPATIENT
Start: 2024-08-29 | End: 2024-08-30 | Stop reason: SDUPTHER

## 2024-08-29 NOTE — PROGRESS NOTES
"Chief Complaint  Urinary Tract Infection (Difficulty urinating with low back pain x 1 week )    Subjective        Yoanna Reed presents to [facility]  History of Present Illness  Patient appointment for follow-up of dysuria as well as low back pain she has MRI spine pending she is increasing stress and anxiety is taking bupropion but seems to be situationally related  Her chronic low back pain is flaring and she is not getting much relief with Tylenol or tramadol she has had success with hydrocodone in the past she is asking for refill    Objective   Vital Signs:  /80 (BP Location: Left arm, Patient Position: Sitting, Cuff Size: Adult)   Pulse 87   Temp 98 °F (36.7 °C) (Temporal)   Resp 17   Ht 170.2 cm (67.01\")   Wt 80.6 kg (177 lb 12.8 oz)   SpO2 98%   BMI 27.84 kg/m²   Estimated body mass index is 27.84 kg/m² as calculated from the following:    Height as of this encounter: 170.2 cm (67.01\").    Weight as of this encounter: 80.6 kg (177 lb 12.8 oz).            Physical Exam  Vitals and nursing note reviewed.   HENT:      Head: Normocephalic and atraumatic.   Cardiovascular:      Rate and Rhythm: Normal rate.      Pulses: Normal pulses.   Pulmonary:      Effort: Pulmonary effort is normal.   Abdominal:      Tenderness: There is no abdominal tenderness. There is no right CVA tenderness or left CVA tenderness.   Musculoskeletal:         General: Tenderness present.      Right lower leg: No edema.      Left lower leg: No edema.      Comments: Kyphosis   Neurological:      General: No focal deficit present.      Mental Status: She is alert. Mental status is at baseline.      Motor: No weakness.      Gait: Gait normal.      Deep Tendon Reflexes: Reflexes normal.        Result Review :    Common labs          1/16/2024    08:48 6/14/2024    09:18   Common Labs   Glucose 85  80    BUN 10  9    Creatinine 0.85  0.79    Sodium 138  136    Potassium 4.2  4.7    Chloride 98 " " 98    Calcium 9.9  9.7    Total Protein 7.7  6.9    Albumin 4.7  4.4    Total Bilirubin 0.4  0.4    Alkaline Phosphatase 48  57    AST (SGOT) 14  11    ALT (SGPT) 9  7    WBC 4.86     Hemoglobin 13.6     Hematocrit 39.9     Platelets 297     Total Cholesterol 211  188    Triglycerides 162  119    HDL Cholesterol 58  55    LDL Cholesterol  125  112    Hemoglobin A1C 5.50                 Assessment and Plan   Diagnoses and all orders for this visit:    1. Dysuria (Primary)  -     POCT urinalysis dipstick, manual  -     Ambulatory Referral to Urology  -     Urine Culture - Urine, Urine, Clean Catch; Future    2. Hematuria, unspecified type  -     Ambulatory Referral to Urology  -     Urine Culture - Urine, Urine, Clean Catch; Future    3. Chronic right-sided low back pain without sciatica  -     HYDROcodone-acetaminophen (NORCO) 5-325 MG per tablet; Take 1 tablet by mouth Every 8 (Eight) Hours As Needed for Moderate Pain.  Dispense: 30 tablet; Refill: 0         This patient has a PCP that is the continuing focal point for all health care services, and the patient sees this physician to be evaluated for dysuria. The inherent complexity that this code () captures is not in the clinical condition itself-- dysuria --but rather the cognitition of the continued responsibility of being the focal point for all needed services for this patient.\"     Follow Up   Return if symptoms worsen or fail to improve, for Next scheduled follow up.  Patient was given instructions and counseling regarding her condition or for health maintenance advice. Please see specific information pulled into the AVS if appropriate.             Answers submitted by the patient for this visit:  Other (Submitted on 8/27/2024)  Please describe your symptoms.: Kidney going to the bathroom  Have you had these symptoms before?: Yes  How long have you been having these symptoms?: 1-4 days  Primary Reason for Visit (Submitted on 8/27/2024)  What is the " primary reason for your visit?: Other

## 2024-08-30 DIAGNOSIS — G89.29 CHRONIC RIGHT-SIDED LOW BACK PAIN WITHOUT SCIATICA: ICD-10-CM

## 2024-08-30 DIAGNOSIS — M54.50 CHRONIC RIGHT-SIDED LOW BACK PAIN WITHOUT SCIATICA: ICD-10-CM

## 2024-08-30 RX ORDER — HYDROCODONE BITARTRATE AND ACETAMINOPHEN 5; 325 MG/1; MG/1
1 TABLET ORAL EVERY 8 HOURS PRN
Qty: 30 TABLET | Refills: 0 | Status: CANCELLED | OUTPATIENT
Start: 2024-08-30

## 2024-08-30 RX ORDER — HYDROCODONE BITARTRATE AND ACETAMINOPHEN 5; 325 MG/1; MG/1
1 TABLET ORAL EVERY 8 HOURS PRN
Qty: 30 TABLET | Refills: 0 | Status: SHIPPED | OUTPATIENT
Start: 2024-08-30

## 2024-08-30 NOTE — TELEPHONE ENCOUNTER
PATIENT STATES SHE IS CURRENLTY AT [facility] AND THEY ARE ON BACKORDER ON THE HYDROCODONE.  PATIENT WOULD LIKE A CALLBACK TO ADVISE HER WHAT SHE IS TO DO AS SHE STATES ALL THE PHARMACIES IN [place] THAT SHE HAS CHECKED WITH ARE ON BACKORDER.  PLEASE ADVISE PATIENT  [phone]

## 2024-08-30 NOTE — TELEPHONE ENCOUNTER
Pt has been notified that her medicine will be sent to [facility] ( [place] [address] )

## 2024-08-31 LAB
BACTERIA UR CULT: NORMAL
BACTERIA UR CULT: NORMAL

## 2024-09-13 ENCOUNTER — OFFICE VISIT (OUTPATIENT)
Dept: INTERNAL MEDICINE | Facility: CLINIC | Age: 69
End: 2024-09-13
Payer: MEDICARE

## 2024-09-13 VITALS
WEIGHT: 177.1 LBS | RESPIRATION RATE: 16 BRPM | HEART RATE: 80 BPM | HEIGHT: 67 IN | DIASTOLIC BLOOD PRESSURE: 84 MMHG | SYSTOLIC BLOOD PRESSURE: 116 MMHG | TEMPERATURE: 98.1 F | BODY MASS INDEX: 27.8 KG/M2

## 2024-09-13 DIAGNOSIS — F51.01 PRIMARY INSOMNIA: ICD-10-CM

## 2024-09-13 DIAGNOSIS — E78.5 HYPERLIPIDEMIA, UNSPECIFIED HYPERLIPIDEMIA TYPE: ICD-10-CM

## 2024-09-13 DIAGNOSIS — G89.29 CHRONIC RIGHT-SIDED LOW BACK PAIN WITHOUT SCIATICA: ICD-10-CM

## 2024-09-13 DIAGNOSIS — L50.9 URTICARIA: ICD-10-CM

## 2024-09-13 DIAGNOSIS — M25.511 ACUTE PAIN OF RIGHT SHOULDER: ICD-10-CM

## 2024-09-13 DIAGNOSIS — R30.0 DYSURIA: Primary | ICD-10-CM

## 2024-09-13 DIAGNOSIS — F41.9 ANXIETY: ICD-10-CM

## 2024-09-13 DIAGNOSIS — M54.50 CHRONIC RIGHT-SIDED LOW BACK PAIN WITHOUT SCIATICA: ICD-10-CM

## 2024-09-13 DIAGNOSIS — R31.9 HEMATURIA, UNSPECIFIED TYPE: ICD-10-CM

## 2024-09-13 PROCEDURE — 1159F MED LIST DOCD IN RCRD: CPT | Performed by: FAMILY MEDICINE

## 2024-09-13 PROCEDURE — 1125F AMNT PAIN NOTED PAIN PRSNT: CPT | Performed by: FAMILY MEDICINE

## 2024-09-13 PROCEDURE — 99214 OFFICE O/P EST MOD 30 MIN: CPT | Performed by: FAMILY MEDICINE

## 2024-09-13 PROCEDURE — G2211 COMPLEX E/M VISIT ADD ON: HCPCS | Performed by: FAMILY MEDICINE

## 2024-09-13 PROCEDURE — 1160F RVW MEDS BY RX/DR IN RCRD: CPT | Performed by: FAMILY MEDICINE

## 2024-09-13 RX ORDER — PRAVASTATIN SODIUM 20 MG
20 TABLET ORAL DAILY
Qty: 90 TABLET | Refills: 3 | Status: SHIPPED | OUTPATIENT
Start: 2024-09-13

## 2024-09-13 RX ORDER — HYDROCODONE BITARTRATE AND ACETAMINOPHEN 5; 325 MG/1; MG/1
1 TABLET ORAL EVERY 8 HOURS PRN
Qty: 30 TABLET | Refills: 0 | Status: SHIPPED | OUTPATIENT
Start: 2024-09-13

## 2024-09-13 RX ORDER — ZOLPIDEM TARTRATE 10 MG/1
10 TABLET ORAL NIGHTLY PRN
Qty: 90 TABLET | Refills: 1 | Status: SHIPPED | OUTPATIENT
Start: 2024-09-13

## 2024-09-13 RX ORDER — LORAZEPAM 1 MG/1
1 TABLET ORAL EVERY 6 HOURS PRN
Qty: 240 TABLET | Refills: 1 | Status: SHIPPED | OUTPATIENT
Start: 2024-09-13

## 2024-09-14 LAB
ALBUMIN SERPL-MCNC: 4.7 G/DL (ref 3.5–5.2)
ALBUMIN/GLOB SERPL: 1.7 G/DL
ALP SERPL-CCNC: 75 U/L (ref 39–117)
ALT SERPL-CCNC: 8 U/L (ref 1–33)
APPEARANCE UR: ABNORMAL
AST SERPL-CCNC: 13 U/L (ref 1–32)
BACTERIA #/AREA URNS HPF: ABNORMAL /HPF
BILIRUB SERPL-MCNC: 0.5 MG/DL (ref 0–1.2)
BILIRUB UR QL STRIP: NEGATIVE
BUN SERPL-MCNC: 7 MG/DL (ref 8–23)
BUN/CREAT SERPL: 7.3 (ref 7–25)
CALCIUM SERPL-MCNC: 10.2 MG/DL (ref 8.6–10.5)
CASTS URNS MICRO: ABNORMAL
CHLORIDE SERPL-SCNC: 100 MMOL/L (ref 98–107)
CHOLEST SERPL-MCNC: 172 MG/DL (ref 0–200)
CO2 SERPL-SCNC: 23 MMOL/L (ref 22–29)
COLOR UR: YELLOW
CREAT SERPL-MCNC: 0.96 MG/DL (ref 0.57–1)
CRYSTALS URNS MICRO: ABNORMAL
EGFRCR SERPLBLD CKD-EPI 2021: 64.6 ML/MIN/1.73
EPI CELLS #/AREA URNS HPF: ABNORMAL /HPF
GLOBULIN SER CALC-MCNC: 2.8 GM/DL
GLUCOSE SERPL-MCNC: 101 MG/DL (ref 65–99)
GLUCOSE UR QL STRIP: NEGATIVE
HDLC SERPL-MCNC: 53 MG/DL (ref 40–60)
HGB UR QL STRIP: NEGATIVE
KETONES UR QL STRIP: NEGATIVE
LDLC SERPL CALC-MCNC: 99 MG/DL (ref 0–100)
LDLC/HDLC SERPL: 1.82 {RATIO}
LEUKOCYTE ESTERASE UR QL STRIP: ABNORMAL
MUCOUS THREADS URNS QL MICRO: ABNORMAL /HPF
NITRITE UR QL STRIP: NEGATIVE
PH UR STRIP: 6 [PH] (ref 5–8)
POTASSIUM SERPL-SCNC: 4.2 MMOL/L (ref 3.5–5.2)
PROT SERPL-MCNC: 7.5 G/DL (ref 6–8.5)
PROT UR QL STRIP: ABNORMAL
RBC #/AREA URNS HPF: ABNORMAL /HPF
SODIUM SERPL-SCNC: 136 MMOL/L (ref 136–145)
SP GR UR STRIP: 1.02 (ref 1–1.03)
TRIGL SERPL-MCNC: 112 MG/DL (ref 0–150)
UROBILINOGEN UR STRIP-MCNC: ABNORMAL MG/DL
VLDLC SERPL CALC-MCNC: 20 MG/DL (ref 5–40)
WBC #/AREA URNS HPF: ABNORMAL /HPF

## 2024-09-15 ENCOUNTER — HOSPITAL ENCOUNTER (OUTPATIENT)
Dept: MRI IMAGING | Facility: HOSPITAL | Age: 69
Discharge: HOME | End: 2024-09-15
Admitting: FAMILY MEDICINE
Payer: MEDICARE

## 2024-09-15 DIAGNOSIS — M54.50 CHRONIC RIGHT-SIDED LOW BACK PAIN WITHOUT SCIATICA: ICD-10-CM

## 2024-09-15 DIAGNOSIS — G89.29 CHRONIC RIGHT-SIDED LOW BACK PAIN WITHOUT SCIATICA: ICD-10-CM

## 2024-09-15 DIAGNOSIS — R93.7 ABNORMAL X-RAY OF LUMBAR SPINE: ICD-10-CM

## 2024-09-15 PROCEDURE — 72148 MRI LUMBAR SPINE W/O DYE: CPT

## 2024-09-15 NOTE — PROGRESS NOTES
"Chief Complaint  Follow-up (Want to to labs and check kidneys. Has MRI scheduled on Sunday. Has multiple problems and is vexed up. ///( Pulse ox unable to detect O2 due to long nails and nail polish))    Subjective          Yoanna Reed presents to [facility]  History of Present Illness  The patient is a 68-year-old female who presents for evaluation of multiple medical concerns.    She is seeking a kidney check-up before her upcoming urologist appointment. An MRI of her lower back, ordered by Dr. Cline, is scheduled for Sunday. She was prescribed muscle relaxers to aid arm movement, but they have proven ineffective. She is requesting an increase in dosage from 2 mg to 3 mg. She has also been given pain medication, which she does not need at this time, and will inform the office when a refill is required. She requires approval for her sleeping medication with each refill. She continues to take nerve pills, but feels that the current dosage of 1 mg is insufficient. She is currently taking lorazepam three times a day and occasionally takes an additional dose due to stress. She has experienced issues with her pharmacy not carrying or refilling her medications.    She reports experiencing red, itchy, and peeling skin on her eyes, forehead, and hands. She uses Allegra-D to manage a runny nose. On some days, her eye condition worsens to the point where she cannot open them, and she tries to avoid scratching them.    She has been attending court daily for the past month and has another court date scheduled for 10/15/2024. She has hired a second  and needs to pay them $3000 today.    Objective   Vital Signs:   /84 (BP Location: Right arm, Patient Position: Sitting, Cuff Size: Adult)   Pulse 80   Temp 98.1 °F (36.7 °C) (Oral)   Resp 16   Ht 170.2 cm (67.01\")   Wt 80.3 kg (177 lb 1.6 oz)   BMI 27.73 kg/m²     Physical Exam  Vitals and nursing note reviewed. "   Constitutional:       Appearance: She is well-developed.   HENT:      Head: Normocephalic and atraumatic.   Musculoskeletal:      Cervical back: Normal range of motion and neck supple.   Neurological:      Mental Status: She is alert and oriented to person, place, and time.   Psychiatric:         Behavior: Behavior normal.         Physical Exam       Result Review :                 Assessment and Plan    Diagnoses and all orders for this visit:    1. Dysuria (Primary)  -     Urinalysis With Microscopic - Urine, Clean Catch  -     Comprehensive Metabolic Panel    2. Hematuria, unspecified type  -     Urinalysis With Microscopic - Urine, Clean Catch  -     Comprehensive Metabolic Panel    3. Acute pain of right shoulder  -     tiZANidine (Zanaflex) 4 MG tablet; Take 1 tablet by mouth Every 8 (Eight) Hours As Needed for Muscle Spasms.  Dispense: 42 tablet; Refill: 1    4. Primary insomnia  -     zolpidem (AMBIEN) 10 MG tablet; Take 1 tablet by mouth At Night As Needed for Sleep.  Dispense: 90 tablet; Refill: 1    5. Chronic right-sided low back pain without sciatica  -     HYDROcodone-acetaminophen (NORCO) 5-325 MG per tablet; Take 1 tablet by mouth Every 8 (Eight) Hours As Needed for Moderate Pain.  Dispense: 30 tablet; Refill: 0    6. Hyperlipidemia, unspecified hyperlipidemia type  -     pravastatin (Pravachol) 20 MG tablet; Take 1 tablet by mouth Daily.  Dispense: 90 tablet; Refill: 3  -     Lipid Panel With LDL / HDL Ratio    7. Anxiety  -     LORazepam (Ativan) 1 MG tablet; Take 1 tablet by mouth Every 6 (Six) Hours As Needed for Anxiety.  Dispense: 240 tablet; Refill: 1    8. Urticaria  -     Ambulatory Referral to Dermatology    Other orders  -     Microscopic Examination -      Assessment & Plan  1. Medication Management.  A urinalysis and blood work were ordered to assess kidney function. The dosage of the muscle relaxer was increased to 4 mg. Prescriptions for the sleep medication and hydrocodone were sent  to [facility].     2. Psoriasis.  The condition appears to be stress-related. A referral to a dermatologist was made. Allegra-D was recommended for use to manage symptoms.    3.  Generalized anxiety  Increase Ativan to every 6 hours for now.  She is under significant amount of stress.  Continue the Wellbutrin.  As well as continue the Lexapro.    Follow-up  Monthly follow-ups are scheduled for the remainder of the year.    Follow Up   No follow-ups on file.  Patient was given instructions and counseling regarding her condition or for health maintenance advice. Please see specific information pulled into the AVS if appropriate.           Patient or patient representative verbalized consent for the use of Ambient Listening during the visit with  Tom Muller MD for chart documentation. 9/15/2024  09:17 EDT

## 2024-09-25 ENCOUNTER — TELEPHONE (OUTPATIENT)
Dept: SURGERY | Facility: CLINIC | Age: 69
End: 2024-09-25
Payer: MEDICARE

## 2024-09-25 DIAGNOSIS — N64.52 NIPPLE DISCHARGE: ICD-10-CM

## 2024-09-25 DIAGNOSIS — R92.8 ABNORMAL FINDING ON BREAST IMAGING: Primary | ICD-10-CM

## 2024-10-01 DIAGNOSIS — M54.50 CHRONIC RIGHT-SIDED LOW BACK PAIN WITHOUT SCIATICA: ICD-10-CM

## 2024-10-01 DIAGNOSIS — G89.29 CHRONIC RIGHT-SIDED LOW BACK PAIN WITHOUT SCIATICA: ICD-10-CM

## 2024-10-01 NOTE — TELEPHONE ENCOUNTER
Caller: Derek Yoanna MILAN    Relationship: Self    Best call back number: [phone]     Requested Prescriptions:   Requested Prescriptions     Pending Prescriptions Disp Refills    HYDROcodone-acetaminophen (NORCO) 5-325 MG per tablet 30 tablet 0     Sig: Take 1 tablet by mouth Every 8 (Eight) Hours As Needed for Moderate Pain.        Pharmacy where request should be sent: [id number] [place], [place] - [id number] [place] [address] AT [facility] [phone] [facility] [phone]      Last office visit with prescribing clinician: 9/13/2024   Last telemedicine visit with prescribing clinician: Visit date not found   Next office visit with prescribing clinician: 10/14/2024     Additional details provided by patient: WILL NEED NEW PRESCRIPTION    Does the patient have less than a 3 day supply:  [x] Yes  [] No    Would you like a call back once the refill request has been completed: [] Yes [] No    If the office needs to give you a call back, can they leave a voicemail: [] Yes [] No    Edgard Flowers Rep   10/01/24 09:47 EDT

## 2024-10-04 NOTE — TELEPHONE ENCOUNTER
Caller: Yoanna Reed    Relationship to patient: Self    Best call back number: [phone]     Patient is needing: PATIENT WOULD LIKE TO SEE IF DR. PAGE CAN FILL THIS SINCE DR. CHAMBERLAIN IS OUT OF OFFICE. PATIENT WILL NOT HAVE ENOUGH TO GET HER THROUGH THE WEEKEND

## 2024-10-06 RX ORDER — HYDROCODONE BITARTRATE AND ACETAMINOPHEN 5; 325 MG/1; MG/1
1 TABLET ORAL EVERY 8 HOURS PRN
Qty: 30 TABLET | Refills: 0 | Status: SHIPPED | OUTPATIENT
Start: 2024-10-06

## 2024-10-14 ENCOUNTER — OFFICE VISIT (OUTPATIENT)
Dept: INTERNAL MEDICINE | Facility: CLINIC | Age: 69
End: 2024-10-14
Payer: MEDICARE

## 2024-10-14 VITALS
RESPIRATION RATE: 16 BRPM | HEIGHT: 67 IN | WEIGHT: 180.7 LBS | BODY MASS INDEX: 28.36 KG/M2 | OXYGEN SATURATION: 98 % | HEART RATE: 61 BPM | DIASTOLIC BLOOD PRESSURE: 84 MMHG | TEMPERATURE: 99 F | SYSTOLIC BLOOD PRESSURE: 124 MMHG

## 2024-10-14 DIAGNOSIS — M25.511 ACUTE PAIN OF RIGHT SHOULDER: Primary | ICD-10-CM

## 2024-10-14 DIAGNOSIS — F51.01 PRIMARY INSOMNIA: ICD-10-CM

## 2024-10-14 DIAGNOSIS — F32.A DEPRESSION, UNSPECIFIED DEPRESSION TYPE: ICD-10-CM

## 2024-10-14 DIAGNOSIS — F41.9 ANXIETY: ICD-10-CM

## 2024-10-14 DIAGNOSIS — G89.29 OTHER CHRONIC PAIN: ICD-10-CM

## 2024-10-14 DIAGNOSIS — M79.672 LEFT FOOT PAIN: ICD-10-CM

## 2024-10-14 PROCEDURE — 1125F AMNT PAIN NOTED PAIN PRSNT: CPT | Performed by: FAMILY MEDICINE

## 2024-10-14 PROCEDURE — G2211 COMPLEX E/M VISIT ADD ON: HCPCS | Performed by: FAMILY MEDICINE

## 2024-10-14 PROCEDURE — 99214 OFFICE O/P EST MOD 30 MIN: CPT | Performed by: FAMILY MEDICINE

## 2024-10-14 RX ORDER — ZOLPIDEM TARTRATE 10 MG/1
10 TABLET ORAL NIGHTLY PRN
Qty: 90 TABLET | Refills: 1 | Status: SHIPPED | OUTPATIENT
Start: 2024-10-14

## 2024-10-14 RX ORDER — LORAZEPAM 1 MG/1
1 TABLET ORAL EVERY 6 HOURS PRN
Qty: 240 TABLET | Refills: 1 | Status: SHIPPED | OUTPATIENT
Start: 2024-10-14

## 2024-10-14 RX ORDER — BUPROPION HYDROCHLORIDE 150 MG/1
150 TABLET ORAL EVERY MORNING
Qty: 90 TABLET | Refills: 1 | Status: SHIPPED | OUTPATIENT
Start: 2024-10-14

## 2024-10-14 RX ORDER — HYDROCODONE BITARTRATE AND ACETAMINOPHEN 7.5; 325 MG/1; MG/1
1 TABLET ORAL EVERY 6 HOURS PRN
Qty: 90 TABLET | Refills: 0 | Status: SHIPPED | OUTPATIENT
Start: 2024-10-14

## 2024-10-18 NOTE — PROGRESS NOTES
"Chief Complaint  Labs Only (Pt wants to get MRI done for back & neck. ), Cancer (Pt had a cancer diagnosis last week and want to discuss reg it. ), and Numbness (C/o numbness Rt hand and left leg )    Subjective          Yoanna Reed presents to [facility]  History of Present Illness  The patient is a 68-year-old female who presents for evaluation of multiple medical concerns.    She reports severe back pain, which is not localized to the lower back. Additionally, she experiences numbness in her arm, limiting her ability to lift objects, and has lost sensation in two toes on her left foot. Despite taking hydrocodone 5 mg for pain management, she continues to experience discomfort. She also mentions a persistent bruise that does not seem to heal. An x-ray of her right shoulder was taken in August 2024, but no MRI has been conducted. She has not consulted an orthopedist for her right shoulder pain.    She is currently taking lorazepam for anxiety and bupropion for depression. However, she has discontinued the use of Lexapro.    She has cancer on the right side of her face around her cheekbone and the right side of the cheek. She has been informed that she has a cancerous lesion, though the specific type of cancer is unclear. Further surgeries are planned, with the next surgery scheduled for 12/12/2024. Additionally, she has an MRI of her right nipple scheduled for 10/29/2024.    Objective   Vital Signs:   /84 (BP Location: Left arm, Patient Position: Sitting, Cuff Size: Adult)   Pulse 61   Temp 99 °F (37.2 °C) (Oral)   Resp 16   Ht 170.2 cm (67.01\")   Wt 82 kg (180 lb 11.2 oz)   SpO2 98%   BMI 28.29 kg/m²     Physical Exam  Vitals and nursing note reviewed.   Constitutional:       Appearance: She is well-developed.   HENT:      Head: Normocephalic and atraumatic.   Musculoskeletal:      Cervical back: Normal range of motion and neck supple.   Neurological:      " Mental Status: She is alert and oriented to person, place, and time.   Psychiatric:         Behavior: Behavior normal.         Physical Exam       Result Review :                 Assessment and Plan    Diagnoses and all orders for this visit:    1. Acute pain of right shoulder (Primary)  -     Ambulatory Referral to Orthopedic Surgery  -     MRI Shoulder Right Without Contrast; Future  -     tiZANidine (Zanaflex) 4 MG tablet; Take 1 tablet by mouth Every 8 (Eight) Hours As Needed for Muscle Spasms.  Dispense: 42 tablet; Refill: 1    2. Left foot pain  -     Ambulatory Referral to Podiatry    3. Other chronic pain  -     HYDROcodone-acetaminophen (Norco) 7.5-325 MG per tablet; Take 1 tablet by mouth Every 6 (Six) Hours As Needed for Moderate Pain.  Dispense: 90 tablet; Refill: 0    4. Primary insomnia  -     zolpidem (AMBIEN) 10 MG tablet; Take 1 tablet by mouth At Night As Needed for Sleep.  Dispense: 90 tablet; Refill: 1    5. Anxiety  -     LORazepam (Ativan) 1 MG tablet; Take 1 tablet by mouth Every 6 (Six) Hours As Needed for Anxiety.  Dispense: 240 tablet; Refill: 1    6. Depression, unspecified depression type  -     buPROPion XL (Wellbutrin XL) 150 MG 24 hr tablet; Take 1 tablet by mouth Every Morning.  Dispense: 90 tablet; Refill: 1      Assessment & Plan  1. Chronic pain.  A referral to an orthopedist has been made for her right shoulder pain. An MRI of the right shoulder has been ordered. The dosage of hydrocodone has been increased from 5 mg to 7.5 mg. A prescription for Zanaflex has been provided. The prescription has been sent to [facility] [address].    2. Anxiety.  Refills for Wellbutrin and lorazepam have been provided.    3. Insomnia.  A refill for Ambien 10 mg has been provided.    4. Depression.  Continuation of Wellbutrin has been advised.    5. Left foot pain.  A referral to a podiatrist has been made.    6. Right cheek lesion.  The patient has been informed about the cancerous lesion on the right  cheek. Surgery is scheduled for December 12, 2024.    7. Right nipple lesion.  An MRI of the right nipple is scheduled for October 29, 2024. Surgery will be planned based on MRI results.    Follow-up  Patient is scheduled for a follow-up visit on November 8, 2024.    Follow Up   No follow-ups on file.  Patient was given instructions and counseling regarding her condition or for health maintenance advice. Please see specific information pulled into the AVS if appropriate.           Patient or patient representative verbalized consent for the use of Ambient Listening during the visit with  Tom Muller MD for chart documentation. 10/18/2024  13:25 EDT

## 2024-10-21 ENCOUNTER — TELEPHONE (OUTPATIENT)
Dept: INTERNAL MEDICINE | Facility: CLINIC | Age: 69
End: 2024-10-21

## 2024-10-21 DIAGNOSIS — E78.5 HYPERLIPIDEMIA, UNSPECIFIED HYPERLIPIDEMIA TYPE: ICD-10-CM

## 2024-10-21 RX ORDER — PRAVASTATIN SODIUM 20 MG
20 TABLET ORAL DAILY
Qty: 90 TABLET | Refills: 3 | Status: SHIPPED | OUTPATIENT
Start: 2024-10-21

## 2024-10-21 NOTE — TELEPHONE ENCOUNTER
Caller: Yoanna Reed    Relationship: Self    Best call back number: [phone]    What form or medical record are you requesting: MRI COPY     Who is requesting this form or medical record from you: [facility] ON [address] - PATIENT HAS APPOINTMENT WITH THEM ON 10/24 AND THEY ARE REQUESTING COPY OF MRI    How would you like to receive the form or medical records (pick-up, mail, fax): PHONE NUMBER FOR [facility] (PATIENT DOES NOT HAVE FAX): [phone]    Timeframe paperwork needed: ASAP    Additional notes: PLEASE ADVISE PATIENT ASAP.

## 2024-10-22 ENCOUNTER — TELEPHONE (OUTPATIENT)
Dept: INTERNAL MEDICINE | Facility: CLINIC | Age: 69
End: 2024-10-22

## 2024-10-22 NOTE — TELEPHONE ENCOUNTER
Caller: Yoanna Reed    Relationship: Self    Best call back number: [phone]     What medication are you requesting: PRAVASTATIN 40 MG      If a prescription is needed, what is your preferred pharmacy and phone number: [facility]ARMACY #[id number] - [place], [place] - [address] [address]. AT [facility] [phone] [facility] [phone] FX     Additional notes: PATIENT HAD THE REGULAR PRESCRIPTION SENT IN YESTERDAY FOR THE 20 MG BUT HER INSURANCE WILL ONLY COVER IT NOW AT 40 MG. PLEASE ADVISE. SHE IS OUT OF MEDICATION

## 2024-10-29 ENCOUNTER — HOSPITAL ENCOUNTER (OUTPATIENT)
Dept: MRI IMAGING | Facility: HOSPITAL | Age: 69
Discharge: HOME | End: 2024-10-29
Admitting: PHYSICIAN ASSISTANT
Payer: MEDICARE

## 2024-10-29 DIAGNOSIS — E78.5 HYPERLIPIDEMIA, UNSPECIFIED HYPERLIPIDEMIA TYPE: Primary | ICD-10-CM

## 2024-10-29 PROCEDURE — C8908 MRI W/O FOL W/CONT, BREAST,: HCPCS

## 2024-10-29 PROCEDURE — C8937 CAD BREAST MRI: HCPCS

## 2024-10-29 PROCEDURE — 0 GADOBENATE DIMEGLUMINE 529 MG/ML SOLUTION: Performed by: PHYSICIAN ASSISTANT

## 2024-10-29 PROCEDURE — A9577 INJ MULTIHANCE: HCPCS | Performed by: PHYSICIAN ASSISTANT

## 2024-10-29 RX ORDER — PRAVASTATIN SODIUM 40 MG
40 TABLET ORAL DAILY
Qty: 90 TABLET | Refills: 3 | Status: SHIPPED | OUTPATIENT
Start: 2024-10-29

## 2024-10-29 RX ORDER — PRAVASTATIN SODIUM 40 MG
40 TABLET ORAL DAILY
Qty: 90 TABLET | Refills: 3 | Status: SHIPPED | OUTPATIENT
Start: 2024-10-29 | End: 2024-10-29

## 2024-10-29 RX ADMIN — GADOBENATE DIMEGLUMINE 17 ML: 529 INJECTION, SOLUTION INTRAVENOUS at 13:17

## 2024-10-30 DIAGNOSIS — F51.01 PRIMARY INSOMNIA: ICD-10-CM

## 2024-10-30 RX ORDER — ZOLPIDEM TARTRATE 10 MG/1
10 TABLET ORAL NIGHTLY PRN
Qty: 90 TABLET | Refills: 1 | OUTPATIENT
Start: 2024-10-30

## 2024-10-30 NOTE — TELEPHONE ENCOUNTER
PATIENT CALLED FOR MEDICATION REFILL OF     zolpidem (AMBIEN) 10 MG tablet   WILL BE OUT ON 11/2/24    [facility]/pharmacy #[id number] - [place], [place] - [address] AT [facility] - [phone]  - [phone]  [phone]     CALL BACK NUMBER [phone]

## 2024-11-04 ENCOUNTER — PREP FOR SURGERY (OUTPATIENT)
Dept: OTHER | Facility: HOSPITAL | Age: 69
End: 2024-11-04
Payer: MEDICARE

## 2024-11-04 DIAGNOSIS — N63.0 MASS OF NIPPLE: Primary | ICD-10-CM

## 2024-11-05 ENCOUNTER — TELEPHONE (OUTPATIENT)
Dept: INTERNAL MEDICINE | Facility: CLINIC | Age: 69
End: 2024-11-05

## 2024-11-05 DIAGNOSIS — G89.29 OTHER CHRONIC PAIN: ICD-10-CM

## 2024-11-05 NOTE — TELEPHONE ENCOUNTER
Caller: [place], Yoanna M    Relationship: Self    Best call back number: [phone]     Requested Prescriptions:   Requested Prescriptions     Pending Prescriptions Disp Refills    HYDROcodone-acetaminophen (Norco) 7.5-325 MG per tablet 90 tablet 0     Sig: Take 1 tablet by mouth Every 6 (Six) Hours As Needed for Moderate Pain.        Pharmacy where request should be sent: [facility]/PHARMACY #[id number] [place], [place] - [address] [facility]. AT [facility] [phone] [facility] [phone]      Last office visit with prescribing clinician: 10/14/2024   Last telemedicine visit with prescribing clinician: Visit date not found   Next office visit with prescribing clinician: 11/18/2024     Additional details provided by patient: NA    Does the patient have less than a 3 day supply:  [] Yes  [x] No    Would you like a call back once the refill request has been completed: [] Yes [x] No    If the office needs to give you a call back, can they leave a voicemail: [] Yes [x] No    Edgard Lewis Rep   11/05/24 13:50 EST

## 2024-11-05 NOTE — TELEPHONE ENCOUNTER
Caller: Yoanna Reed    Relationship: Self       [phone]       What was the call regarding: PATIENT WENT IN FOR HER REGULAR MAMMOGRAM (AND MRI) AND THEY FOUND SOMETHING IN HER RT BREAST. SURGERY IS SCHEDULED 11/22/24. SHE WANTED DR. CHAMBERLAIN TO BE AWARE.

## 2024-11-09 RX ORDER — HYDROCODONE BITARTRATE AND ACETAMINOPHEN 7.5; 325 MG/1; MG/1
1 TABLET ORAL EVERY 6 HOURS PRN
Qty: 90 TABLET | Refills: 0 | Status: SHIPPED | OUTPATIENT
Start: 2024-11-09

## 2024-11-12 ENCOUNTER — TELEPHONE (OUTPATIENT)
Dept: INTERNAL MEDICINE | Facility: CLINIC | Age: 69
End: 2024-11-12

## 2024-11-12 ENCOUNTER — PRE-ADMISSION TESTING (OUTPATIENT)
Dept: PREADMISSION TESTING | Facility: HOSPITAL | Age: 69
End: 2024-11-12
Payer: MEDICARE

## 2024-11-12 VITALS
OXYGEN SATURATION: 99 % | SYSTOLIC BLOOD PRESSURE: 110 MMHG | WEIGHT: 175 LBS | DIASTOLIC BLOOD PRESSURE: 74 MMHG | RESPIRATION RATE: 18 BRPM | HEIGHT: 67 IN | BODY MASS INDEX: 27.47 KG/M2 | TEMPERATURE: 97.8 F | HEART RATE: 87 BPM

## 2024-11-12 LAB
ANION GAP SERPL CALCULATED.3IONS-SCNC: 11 MMOL/L (ref 5–15)
BUN SERPL-MCNC: 17 MG/DL (ref 8–23)
BUN/CREAT SERPL: 16 (ref 7–25)
CALCIUM SPEC-SCNC: 9.4 MG/DL (ref 8.6–10.5)
CHLORIDE SERPL-SCNC: 103 MMOL/L (ref 98–107)
CO2 SERPL-SCNC: 23 MMOL/L (ref 22–29)
CREAT SERPL-MCNC: 1.06 MG/DL (ref 0.57–1)
DEPRECATED RDW RBC AUTO: 48 FL (ref 37–54)
EGFRCR SERPLBLD CKD-EPI 2021: 57 ML/MIN/1.73
ERYTHROCYTE [DISTWIDTH] IN BLOOD BY AUTOMATED COUNT: 13.1 % (ref 12.3–15.4)
GLUCOSE SERPL-MCNC: 87 MG/DL (ref 65–99)
HCT VFR BLD AUTO: 41.7 % (ref 34–46.6)
HGB BLD-MCNC: 13.4 G/DL (ref 12–15.9)
MCH RBC QN AUTO: 32 PG (ref 26.6–33)
MCHC RBC AUTO-ENTMCNC: 32.1 G/DL (ref 31.5–35.7)
MCV RBC AUTO: 99.5 FL (ref 79–97)
PLATELET # BLD AUTO: 317 10*3/MM3 (ref 140–450)
PMV BLD AUTO: 9.4 FL (ref 6–12)
POTASSIUM SERPL-SCNC: 4.2 MMOL/L (ref 3.5–5.2)
QT INTERVAL: 393 MS
QTC INTERVAL: 421 MS
RBC # BLD AUTO: 4.19 10*6/MM3 (ref 3.77–5.28)
SODIUM SERPL-SCNC: 137 MMOL/L (ref 136–145)
WBC NRBC COR # BLD AUTO: 4.34 10*3/MM3 (ref 3.4–10.8)

## 2024-11-12 PROCEDURE — 93005 ELECTROCARDIOGRAM TRACING: CPT

## 2024-11-12 PROCEDURE — 80048 BASIC METABOLIC PNL TOTAL CA: CPT

## 2024-11-12 PROCEDURE — 36415 COLL VENOUS BLD VENIPUNCTURE: CPT

## 2024-11-12 PROCEDURE — 85027 COMPLETE CBC AUTOMATED: CPT

## 2024-11-12 NOTE — DISCHARGE INSTRUCTIONS
Take the following medications the morning of surgery:    LORAZEPAM AND BUPROPION      If you are on prescription narcotic pain medication to control your pain you may also take that medication the morning of surgery.      General Instructions:     Do not eat solid food after midnight the night before surgery.  Clear liquids day of surgery are allowed but must be stopped at least two hours before your hospital arrival time.       Allowed clear liquids      Water, sodas, and tea or coffee with no cream or milk added.       12 to 20 ounces of a clear liquid that contains carbohydrates is recommended.  If non-diabetic, have Gatorade or Powerade.  If diabetic, have G2 or Powerade Zero.     Do not have liquids red in color.  Do not consume chicken, beef, pork or vegetable broth or bouillon cubes of any variety as they are not considered clear liquids and are not allowed.      Infants may have breast milk up to four hours before surgery.  Infants drinking formula may drink formula up to six hours before surgery.   Patients who avoid smoking, chewing tobacco and alcohol for 4 weeks prior to surgery have a reduced risk of post-operative complications.  Quit smoking as many days before surgery as you can.  Do not smoke, use chewing tobacco or drink alcohol the day of surgery.   If applicable bring your C-PAP/ BI-PAP machine in with you to preop day of surgery.  Bring any papers given to you in the doctor’s office.  Wear clean comfortable clothes.  Do not wear contact lenses, false eyelashes or make-up.  Bring a case for your glasses.   Bring crutches or walker if applicable.  Remove all piercings.  Leave jewelry and any other valuables at home.  Hair extensions with metal clips must be removed prior to surgery.  The Pre-Admission Testing nurse will instruct you to bring medications if unable to obtain an accurate list in Pre-Admission Testing.            Preventing a Surgical Site Infection:  For 2 to 3 days before surgery,  avoid shaving with a razor because the razor can irritate skin and make it easier to develop an infection.    Any areas of open skin can increase the risk of a post-operative wound infection by allowing bacteria to enter and travel throughout the body.  Notify your surgeon if you have any skin wounds / rashes even if it is not near the expected surgical site.  The area will need assessed to determine if surgery should be delayed until it is healed.  The night prior to surgery shower using a fresh bar of anti-bacterial soap (such as Dial) and clean washcloth.  Sleep in a clean bed with clean clothing.  Do not allow pets to sleep with you.  Shower on the morning of surgery using a fresh bar of anti-bacterial soap (such as Dial) and clean washcloth.  Dry with a clean towel and dress in clean clothing.  Ask your surgeon if you will be receiving antibiotics prior to surgery.  Make sure you, your family, and all healthcare providers clean their hands with soap and water or an alcohol based hand  before caring for you or your wound.    Day of surgery:  Your arrival time is approximately two hours before your scheduled surgery time.  Please note if you have an early arrival time the surgery doors do not open before 5:00 AM.  Upon arrival, a Pre-op nurse and Anesthesiologist will review your health history, obtain vital signs, and answer questions you may have.  The only belongings needed at this time will be a list of your home medications and if applicable your C-PAP/BI-PAP machine.  A Pre-op nurse will start an IV and you may receive medication in preparation for surgery, including something to help you relax.     Please be aware that surgery does come with discomfort.  We want to make every effort to control your discomfort so please discuss any uncontrolled symptoms with your nurse.   Your doctor will most likely have prescribed pain medications.      If you are going home after surgery you will receive  individualized written care instructions before being discharged.  A responsible adult must drive you to and from the hospital on the day of your surgery and ideally stay with you through the night.   .  Discharge prescriptions can be filled by the hospital pharmacy during regular pharmacy hours.  If you are having surgery late in the day/evening your prescription may be e-prescribed to your pharmacy.  Please verify your pharmacy hours or chose a 24 hour pharmacy to avoid not having access to your prescription because your pharmacy has closed for the day.    If you are staying overnight following surgery, you will be transported to your hospital room following the recovery period.  [facility] has all private rooms.    If you have any questions please call Pre-Admission Testing at [phone].  Deductibles and co-payments are collected on the day of service. Please be prepared to pay the required co-pay, deductible or deposit on the day of service as defined by your plan.    Call your surgeon immediately if you experience any of the following symptoms:  Sore Throat  Shortness of Breath or difficulty breathing  Cough  Chills  Body soreness or muscle pain  Headache  Fever  New loss of taste or smell  Do not arrive for your surgery ill.  Your procedure will need to be rescheduled to another time.  You will need to call your physician before the day of surgery to avoid any unnecessary exposure to hospital staff as well as other patients.

## 2024-11-12 NOTE — TELEPHONE ENCOUNTER
Caller: [employer]    Relationship:     Best call back number:    [phone]     What was the call regarding:     RIGHT SHOULDER MRI- SCHEDULED FOR 17TH INSURANCE REQUIRING A PEER TO PEER     CALL [phone]   ORDER ID [id number]    Is it okay if the provider responds through MyChart: CALL IF NEEDED

## 2024-11-18 ENCOUNTER — OFFICE VISIT (OUTPATIENT)
Dept: INTERNAL MEDICINE | Facility: CLINIC | Age: 69
End: 2024-11-18
Payer: MEDICARE

## 2024-11-18 VITALS
WEIGHT: 174 LBS | HEIGHT: 67 IN | OXYGEN SATURATION: 93 % | DIASTOLIC BLOOD PRESSURE: 84 MMHG | SYSTOLIC BLOOD PRESSURE: 142 MMHG | HEART RATE: 81 BPM | TEMPERATURE: 98.3 F | BODY MASS INDEX: 27.31 KG/M2

## 2024-11-18 DIAGNOSIS — F41.9 ANXIETY: ICD-10-CM

## 2024-11-18 DIAGNOSIS — F32.A DEPRESSION, UNSPECIFIED DEPRESSION TYPE: ICD-10-CM

## 2024-11-18 DIAGNOSIS — F51.01 PRIMARY INSOMNIA: ICD-10-CM

## 2024-11-18 RX ORDER — BUPROPION HYDROCHLORIDE 150 MG/1
150 TABLET ORAL EVERY MORNING
Qty: 90 TABLET | Refills: 1 | Status: SHIPPED | OUTPATIENT
Start: 2024-11-18

## 2024-11-18 RX ORDER — LORAZEPAM 1 MG/1
1 TABLET ORAL EVERY 6 HOURS PRN
Qty: 240 TABLET | Refills: 1 | Status: SHIPPED | OUTPATIENT
Start: 2024-11-18

## 2024-11-18 RX ORDER — ZOLPIDEM TARTRATE 10 MG/1
10 TABLET ORAL NIGHTLY
Qty: 90 TABLET | Refills: 3 | Status: SHIPPED | OUTPATIENT
Start: 2024-11-18

## 2024-11-19 ENCOUNTER — TELEPHONE (OUTPATIENT)
Dept: INTERNAL MEDICINE | Facility: CLINIC | Age: 69
End: 2024-11-19

## 2024-11-19 DIAGNOSIS — F41.9 ANXIETY: ICD-10-CM

## 2024-11-19 NOTE — TELEPHONE ENCOUNTER
Did PA on cover my meds and per insurance medication is available without a PA. Pharmacy informed.     [id number]  Need Help? Call us at [phone]  Outcome  Additional Information Required  Available without authorization. The member is able to fill the requested drug at the pharmacy. If coverage is still needed or requesting prior to the expiration of a current authorization, a request can be made by sending a fax or calling the number on the back of the member's ID card.

## 2024-11-19 NOTE — TELEPHONE ENCOUNTER
Per Pharmacist plan limitation is for 3 times daily, will not cover for 4 times daily. Calling [phone] to initiate PA.

## 2024-11-19 NOTE — TELEPHONE ENCOUNTER
Caller: Yoanna Reed    Relationship: Self    Best call back number: [phone]         Who are you requesting to speak with (clinical staff, provider,  specific staff member):         What was the call regarding: PATIENTS INSURANCE IS REQUIRING A PRIOR AUTHORIZATION ON LORazepam (Ativan) 1 MG tablet BECAUSE OF HOW MANY TIMES A DAY HAS CHANGED. NOW THEY ARE NEEDING A PRIOR AUTHORIZATION AND PATIENT IS OUT OF THIS MEDICATION. SO IF THIS CAN BE DONE URGENTLY THAT WOULD BE GREATLY APPRECIATED.       [facility]/pharmacy #[id number] - [place], [place] - [address] AT [facility] - [phone]  - [phone]  [phone]

## 2024-11-22 ENCOUNTER — ANESTHESIA (OUTPATIENT)
Dept: PERIOP | Facility: HOSPITAL | Age: 69
End: 2024-11-22
Payer: MEDICARE

## 2024-11-22 ENCOUNTER — ANESTHESIA EVENT (OUTPATIENT)
Dept: PERIOP | Facility: HOSPITAL | Age: 69
End: 2024-11-22
Payer: MEDICARE

## 2024-11-22 ENCOUNTER — HOSPITAL ENCOUNTER (OUTPATIENT)
Facility: HOSPITAL | Age: 69
Setting detail: HOSPITAL OUTPATIENT SURGERY
Discharge: HOME | End: 2024-11-22
Attending: STUDENT IN AN ORGANIZED HEALTH CARE EDUCATION/TRAINING PROGRAM | Admitting: STUDENT IN AN ORGANIZED HEALTH CARE EDUCATION/TRAINING PROGRAM
Payer: MEDICARE

## 2024-11-22 VITALS
SYSTOLIC BLOOD PRESSURE: 144 MMHG | WEIGHT: 173.94 LBS | DIASTOLIC BLOOD PRESSURE: 76 MMHG | TEMPERATURE: 97.5 F | BODY MASS INDEX: 27.24 KG/M2 | HEART RATE: 55 BPM | OXYGEN SATURATION: 100 % | RESPIRATION RATE: 16 BRPM

## 2024-11-22 DIAGNOSIS — N63.0 MASS OF NIPPLE: Primary | ICD-10-CM

## 2024-11-22 PROCEDURE — 25810000003 LACTATED RINGERS PER 1000 ML: Performed by: STUDENT IN AN ORGANIZED HEALTH CARE EDUCATION/TRAINING PROGRAM

## 2024-11-22 PROCEDURE — 88305 TISSUE EXAM BY PATHOLOGIST: CPT | Performed by: STUDENT IN AN ORGANIZED HEALTH CARE EDUCATION/TRAINING PROGRAM

## 2024-11-22 PROCEDURE — 25010000002 FENTANYL CITRATE (PF) 50 MCG/ML SOLUTION: Performed by: NURSE ANESTHETIST, CERTIFIED REGISTERED

## 2024-11-22 PROCEDURE — 25010000002 PROPOFOL 10 MG/ML EMULSION: Performed by: NURSE ANESTHETIST, CERTIFIED REGISTERED

## 2024-11-22 PROCEDURE — 25010000002 CEFAZOLIN PER 500 MG: Performed by: STUDENT IN AN ORGANIZED HEALTH CARE EDUCATION/TRAINING PROGRAM

## 2024-11-22 PROCEDURE — 19120 REMOVAL OF BREAST LESION: CPT | Performed by: STUDENT IN AN ORGANIZED HEALTH CARE EDUCATION/TRAINING PROGRAM

## 2024-11-22 PROCEDURE — 25010000002 ONDANSETRON PER 1 MG: Performed by: NURSE ANESTHETIST, CERTIFIED REGISTERED

## 2024-11-22 PROCEDURE — 25010000002 MIDAZOLAM PER 1 MG: Performed by: STUDENT IN AN ORGANIZED HEALTH CARE EDUCATION/TRAINING PROGRAM

## 2024-11-22 PROCEDURE — 19120 REMOVAL OF BREAST LESION: CPT

## 2024-11-22 PROCEDURE — 25010000002 PROPOFOL 500 MG/50ML EMULSION: Performed by: NURSE ANESTHETIST, CERTIFIED REGISTERED

## 2024-11-22 RX ORDER — PROMETHAZINE HYDROCHLORIDE 25 MG/1
25 SUPPOSITORY RECTAL ONCE AS NEEDED
Status: DISCONTINUED | OUTPATIENT
Start: 2024-11-22 | End: 2024-11-22 | Stop reason: HOSPADM

## 2024-11-22 RX ORDER — BUPIVACAINE HYDROCHLORIDE AND EPINEPHRINE 5; 5 MG/ML; UG/ML
INJECTION, SOLUTION EPIDURAL; INTRACAUDAL; PERINEURAL AS NEEDED
Status: DISCONTINUED | OUTPATIENT
Start: 2024-11-22 | End: 2024-11-22 | Stop reason: HOSPADM

## 2024-11-22 RX ORDER — FENTANYL CITRATE 50 UG/ML
25 INJECTION, SOLUTION INTRAMUSCULAR; INTRAVENOUS
Status: DISCONTINUED | OUTPATIENT
Start: 2024-11-22 | End: 2024-11-22 | Stop reason: HOSPADM

## 2024-11-22 RX ORDER — HYDROCODONE BITARTRATE AND ACETAMINOPHEN 5; 325 MG/1; MG/1
1 TABLET ORAL ONCE AS NEEDED
Status: DISCONTINUED | OUTPATIENT
Start: 2024-11-22 | End: 2024-11-22 | Stop reason: HOSPADM

## 2024-11-22 RX ORDER — DIPHENHYDRAMINE HYDROCHLORIDE 50 MG/ML
12.5 INJECTION INTRAMUSCULAR; INTRAVENOUS
Status: DISCONTINUED | OUTPATIENT
Start: 2024-11-22 | End: 2024-11-22 | Stop reason: HOSPADM

## 2024-11-22 RX ORDER — MAGNESIUM HYDROXIDE 1200 MG/15ML
LIQUID ORAL AS NEEDED
Status: DISCONTINUED | OUTPATIENT
Start: 2024-11-22 | End: 2024-11-22 | Stop reason: HOSPADM

## 2024-11-22 RX ORDER — SODIUM CHLORIDE 0.9 % (FLUSH) 0.9 %
3 SYRINGE (ML) INJECTION EVERY 12 HOURS SCHEDULED
Status: DISCONTINUED | OUTPATIENT
Start: 2024-11-22 | End: 2024-11-22 | Stop reason: HOSPADM

## 2024-11-22 RX ORDER — HYDROCORTISONE 5 MG/1
7.5 TABLET ORAL 2 TIMES DAILY
Status: ON HOLD | COMMUNITY
End: 2024-11-22

## 2024-11-22 RX ORDER — HYDRALAZINE HYDROCHLORIDE 20 MG/ML
5 INJECTION INTRAMUSCULAR; INTRAVENOUS
Status: DISCONTINUED | OUTPATIENT
Start: 2024-11-22 | End: 2024-11-22 | Stop reason: HOSPADM

## 2024-11-22 RX ORDER — NALOXONE HCL 0.4 MG/ML
0.2 VIAL (ML) INJECTION AS NEEDED
Status: DISCONTINUED | OUTPATIENT
Start: 2024-11-22 | End: 2024-11-22 | Stop reason: HOSPADM

## 2024-11-22 RX ORDER — TRAMADOL HYDROCHLORIDE 50 MG/1
50 TABLET ORAL EVERY 6 HOURS PRN
Qty: 8 TABLET | Refills: 0 | Status: SHIPPED | OUTPATIENT
Start: 2024-11-22 | End: 2025-11-22

## 2024-11-22 RX ORDER — FLUMAZENIL 0.1 MG/ML
0.2 INJECTION INTRAVENOUS AS NEEDED
Status: DISCONTINUED | OUTPATIENT
Start: 2024-11-22 | End: 2024-11-22 | Stop reason: HOSPADM

## 2024-11-22 RX ORDER — MIDAZOLAM HYDROCHLORIDE 1 MG/ML
1 INJECTION, SOLUTION INTRAMUSCULAR; INTRAVENOUS
Status: DISCONTINUED | OUTPATIENT
Start: 2024-11-22 | End: 2024-11-22 | Stop reason: HOSPADM

## 2024-11-22 RX ORDER — DROPERIDOL 2.5 MG/ML
0.62 INJECTION, SOLUTION INTRAMUSCULAR; INTRAVENOUS
Status: DISCONTINUED | OUTPATIENT
Start: 2024-11-22 | End: 2024-11-22 | Stop reason: HOSPADM

## 2024-11-22 RX ORDER — EPHEDRINE SULFATE 50 MG/ML
5 INJECTION, SOLUTION INTRAVENOUS ONCE AS NEEDED
Status: DISCONTINUED | OUTPATIENT
Start: 2024-11-22 | End: 2024-11-22 | Stop reason: HOSPADM

## 2024-11-22 RX ORDER — ONDANSETRON 2 MG/ML
INJECTION INTRAMUSCULAR; INTRAVENOUS AS NEEDED
Status: DISCONTINUED | OUTPATIENT
Start: 2024-11-22 | End: 2024-11-22 | Stop reason: SURG

## 2024-11-22 RX ORDER — ONDANSETRON 2 MG/ML
4 INJECTION INTRAMUSCULAR; INTRAVENOUS ONCE AS NEEDED
Status: DISCONTINUED | OUTPATIENT
Start: 2024-11-22 | End: 2024-11-22 | Stop reason: HOSPADM

## 2024-11-22 RX ORDER — PROMETHAZINE HYDROCHLORIDE 25 MG/1
25 TABLET ORAL ONCE AS NEEDED
Status: DISCONTINUED | OUTPATIENT
Start: 2024-11-22 | End: 2024-11-22 | Stop reason: HOSPADM

## 2024-11-22 RX ORDER — IPRATROPIUM BROMIDE AND ALBUTEROL SULFATE 2.5; .5 MG/3ML; MG/3ML
3 SOLUTION RESPIRATORY (INHALATION) ONCE AS NEEDED
Status: DISCONTINUED | OUTPATIENT
Start: 2024-11-22 | End: 2024-11-22 | Stop reason: HOSPADM

## 2024-11-22 RX ORDER — LORAZEPAM 1 MG/1
1 TABLET ORAL EVERY 8 HOURS PRN
COMMUNITY

## 2024-11-22 RX ORDER — LIDOCAINE HYDROCHLORIDE 10 MG/ML
0.5 INJECTION, SOLUTION INFILTRATION; PERINEURAL ONCE AS NEEDED
Status: DISCONTINUED | OUTPATIENT
Start: 2024-11-22 | End: 2024-11-22 | Stop reason: HOSPADM

## 2024-11-22 RX ORDER — PROPOFOL 10 MG/ML
INJECTION, EMULSION INTRAVENOUS AS NEEDED
Status: DISCONTINUED | OUTPATIENT
Start: 2024-11-22 | End: 2024-11-22 | Stop reason: SURG

## 2024-11-22 RX ORDER — HYDROCODONE BITARTRATE AND ACETAMINOPHEN 7.5; 325 MG/1; MG/1
1 TABLET ORAL EVERY 4 HOURS PRN
Status: DISCONTINUED | OUTPATIENT
Start: 2024-11-22 | End: 2024-11-22 | Stop reason: HOSPADM

## 2024-11-22 RX ORDER — FENTANYL CITRATE 50 UG/ML
50 INJECTION, SOLUTION INTRAMUSCULAR; INTRAVENOUS ONCE AS NEEDED
Status: DISCONTINUED | OUTPATIENT
Start: 2024-11-22 | End: 2024-11-22 | Stop reason: HOSPADM

## 2024-11-22 RX ORDER — SODIUM CHLORIDE 0.9 % (FLUSH) 0.9 %
3-10 SYRINGE (ML) INJECTION AS NEEDED
Status: DISCONTINUED | OUTPATIENT
Start: 2024-11-22 | End: 2024-11-22 | Stop reason: HOSPADM

## 2024-11-22 RX ORDER — LABETALOL HYDROCHLORIDE 5 MG/ML
5 INJECTION, SOLUTION INTRAVENOUS
Status: DISCONTINUED | OUTPATIENT
Start: 2024-11-22 | End: 2024-11-22 | Stop reason: HOSPADM

## 2024-11-22 RX ORDER — FENTANYL CITRATE 50 UG/ML
INJECTION, SOLUTION INTRAMUSCULAR; INTRAVENOUS AS NEEDED
Status: DISCONTINUED | OUTPATIENT
Start: 2024-11-22 | End: 2024-11-22 | Stop reason: SURG

## 2024-11-22 RX ORDER — SODIUM CHLORIDE, SODIUM LACTATE, POTASSIUM CHLORIDE, CALCIUM CHLORIDE 600; 310; 30; 20 MG/100ML; MG/100ML; MG/100ML; MG/100ML
9 INJECTION, SOLUTION INTRAVENOUS CONTINUOUS
Status: DISCONTINUED | OUTPATIENT
Start: 2024-11-22 | End: 2024-11-22 | Stop reason: HOSPADM

## 2024-11-22 RX ORDER — HYDROMORPHONE HYDROCHLORIDE 1 MG/ML
0.25 INJECTION, SOLUTION INTRAMUSCULAR; INTRAVENOUS; SUBCUTANEOUS
Status: DISCONTINUED | OUTPATIENT
Start: 2024-11-22 | End: 2024-11-22 | Stop reason: HOSPADM

## 2024-11-22 RX ADMIN — HYDROCODONE BITARTRATE AND ACETAMINOPHEN 1 TABLET: 7.5; 325 TABLET ORAL at 09:47

## 2024-11-22 RX ADMIN — MIDAZOLAM 1 MG: 1 INJECTION INTRAMUSCULAR; INTRAVENOUS at 08:10

## 2024-11-22 RX ADMIN — SODIUM CHLORIDE 2000 MG: 900 INJECTION INTRAVENOUS at 08:51

## 2024-11-22 RX ADMIN — SODIUM CHLORIDE, SODIUM LACTATE, POTASSIUM CHLORIDE, CALCIUM CHLORIDE 9 ML/HR: 20; 30; 600; 310 INJECTION, SOLUTION INTRAVENOUS at 08:11

## 2024-11-22 RX ADMIN — PROPOFOL 50 MG: 10 INJECTION, EMULSION INTRAVENOUS at 09:00

## 2024-11-22 RX ADMIN — FENTANYL CITRATE 25 MCG: 50 INJECTION, SOLUTION INTRAMUSCULAR; INTRAVENOUS at 09:07

## 2024-11-22 RX ADMIN — PROPOFOL 100 MCG/KG/MIN: 10 INJECTION, EMULSION INTRAVENOUS at 09:00

## 2024-11-22 RX ADMIN — FENTANYL CITRATE 25 MCG: 50 INJECTION, SOLUTION INTRAMUSCULAR; INTRAVENOUS at 10:15

## 2024-11-22 RX ADMIN — FENTANYL CITRATE 25 MCG: 50 INJECTION, SOLUTION INTRAMUSCULAR; INTRAVENOUS at 09:00

## 2024-11-22 RX ADMIN — FENTANYL CITRATE 25 MCG: 50 INJECTION, SOLUTION INTRAMUSCULAR; INTRAVENOUS at 10:07

## 2024-11-22 RX ADMIN — ONDANSETRON 4 MG: 2 INJECTION INTRAMUSCULAR; INTRAVENOUS at 09:05

## 2024-11-22 RX ADMIN — PROPOFOL 50 MG: 10 INJECTION, EMULSION INTRAVENOUS at 09:10

## 2024-11-22 NOTE — H&P
Assessment/Plan:      68 y.o. female with:     (1) Right breast skin cyst:  - s/p excision of right breast skin cyst in office 2/2024.    (3) Nipple discharge:  - She complains of intermittent right nipple discharge.   - Right nipple lesion seen on imaging. Plan for right nipple mass excision today.     - Benjamín Cisneros lifetime breast cancer risk: 7.0%. This patient does not qualify for high risk screening.    Chief complaint:  right breast cyst     HPI: Ms. Yoanna Reed is a 58 year old female here today for evaluation of a right breast cyst.  She states she first noticed this several months ago. The area has drained, swelled, and caused discomfort in the past. The area hasn't drained in over 6 months. She denies any current breast skin changes.  She also complains of intermittent right nipple discharge that she describes as white. She is unsure of when this started.   Her work-up is detailed in the breast history section below.   She has not had regular annual mammograms. She does have a history of abnormal mammograms. She had a right breast needle biopsy over 20 years ago.  She denies any family history of breast cancer. She reports a family history of ovarian cancer in her mother (age of diagnosis in her 50s).      TIMELINE OF WORKUP:  10/4/2023 Bilateral Diagnostic Mammogram, Right Breast US:  FINDINGS:  MAMMOGRAM:  The breasts are heterogeneously dense, which may obscure small masses.  There is a marker overlying the inner central posterior right breast marking the area of concern indicated by the patient. Deep to the marker, there is a superficial 0.9 cm oval mass. This area was further assessed with ultrasound.  There is questioned architectural distortion in the upper posterior left breast on the MLO view, which localizes to the outer breast on tomosynthesis. This area effaces with spot compression and has no correlate on additional views, consistent with superimposition of normal breast  parenchyma.  There are no suspicious calcifications in either breast.  ULTRASOUND:  Targeted sonographic evaluation of the right breast was performed in the area of concern indicated by the patient and the region of the mammographic abnormality.  At 3:00, 12 cm from the nipple, there is a 0.9 x 0.5 x 0.8 cm superficial oval hypoechoic mass with no corresponding vascularity. A linear tract is visible to the overlying skin surface on cine images.This is consistent with a benign sebaceous or epidermal inclusion cyst.  IMPRESSION:  1.  Palpable benign-appearing 0.9 cm sebaceous or epidermal inclusion cyst at 3:00 in the right breast. Further management should be based on clinical assessment. Dermatologic or surgery consultation could be considered for excision for therapeutic relief, as clinically directed.  2.  No evidence of malignancy in either breast. Recommend annual screening mammogram in 1 year.  BI-RADS Category 2: Benign     3/11/2024 Bilateral Breast Diagnostic Mammo with US   FINDINGS: Bilateral digital CC and MLO mammographic and digital Tomosynthesis images were obtained. Comparison is made to prior studies dated 10/04/2023. Scattered fibroglandular densities are seen throughout both breasts in a pattern which is unchanged. I see no new or dominant masses, areas of architectural distortion or skin thickening. There is no evidence for axillary lymphadenopathy or nipple retraction.   ULTRASOUND: Targeted sonographic evaluation of both breasts was performed. The right breast was imaged through the retroareolar region and the left breast was imaged through the area of clinical concern which corresponds to the 1-o'clock and 2-o'clock positions. Within the right nipple there is a 0.4 cm cyst with a thick rim. The rim measures on the order of 0.9 cm in thickness. No detectable peripheral  vascularity is appreciated. No abnormality in the retroareolar region of the right breast is appreciated.  In the left breast  at the 1-o'clock and 2-o'clock positions corresponding to the area of clinical concern, no sonographic abnormality is appreciated.  IMPRESSION:  1. There is a 0.4 cm complex cyst with a thick rim seen in the right nipple. Surgical excision should be considered. Further evaluation with a breast MRI without and with contrast should also be considered.  2. There are no findings suspicious for malignancy in the left breast. No abnormality in the left breast in the area of clinical concern is appreciated. Clinical follow-up is recommended.  BI-RADS Category 4: Suspicious finding.    10/29/2024 Bilateral Breast MRI   IMPRESSION AND RECOMMENDATION:  1.  Suspicious 1 cm right nipple lesion with a 0.7 cm cystic component and 0.8 cm enhancing nodular component, overall increased in size from prior ultrasound from March when accounting for differences in modality. Recommend surgical sampling.  2.  No MRI evidence of malignancy in the left breast.   BI-RADS Category 4: Suspicious    MEDICAL HISTORY:   Gynecologic History:   . P:1 AB:0  Age at first childbirth: 24  Lactation/How long: yes, less than 2 months (had to stop due to mastitis)   Age at menarche: unsure, maybe 14   Age at menopause: hysterectomy age 24?  Total years of oral contraceptive use: briefly, 1 year  Total years of hormone replacement therapy: 0     Past Medical History:   Medical History        Past Medical History:   Diagnosis Date    Asthma           Past Surgical History:    Surgical History         Past Surgical History:   Procedure Laterality Date    CHOLECYSTECTOMY        HYSTERECTOMY         Partial    NECK SURGERY             Family History:          Family History   Problem Relation Age of Onset    Hypertension Mother      Ovarian cancer Mother        Social History:   Social History   Social History            Socioeconomic History    Marital status: Single   Tobacco Use    Smoking status: Some Days       Types: Cigarettes    Smokeless tobacco:  Never   Vaping Use    Vaping Use: Never used   Substance and Sexual Activity    Alcohol use: Yes       Comment: social    Drug use: Defer    Sexual activity: Defer         ALLERGIES:   Allergies   No Known Allergies        MEDICATIONS:    Current Medications      Current Outpatient Medications:     albuterol sulfate  (90 Base) MCG/ACT inhaler, TAKE 2 PUFFS BY MOUTH EVERY 4 HOURS AS NEEDED FOR WHEEZE, Disp: 18 g, Rfl: 1    colestipol (COLESTID) 1 g tablet, TAKE 1 TABLET BY MOUTH TWICE A DAY, Disp: 120 tablet, Rfl: 1    escitalopram (LEXAPRO) 20 MG tablet, TAKE 1 TABLET BY MOUTH EVERY DAY, Disp: 90 tablet, Rfl: 1    LORazepam (ATIVAN) 0.5 MG tablet, Take 1 tablet by mouth 2 (Two) Times a Day As Needed for Anxiety., Disp: 60 tablet, Rfl: 3    pravastatin (Pravachol) 20 MG tablet, Take 1 tablet by mouth Daily., Disp: 90 tablet, Rfl: 3    zolpidem (AMBIEN) 10 MG tablet, Take 1 tablet by mouth At Night As Needed for Sleep., Disp: 90 tablet, Rfl: 1        LABS:    11/12/2024 labs reviewed by me      ROS:   Constitutional: Negative for fevers or chills  HENT: Negative for hearing loss or runny nose  Eyes: Negative for vision changes or scleral icterus  Respiratory: Negative for cough or shortness of breath  Cardiovascular: Negative for chest pain or heart palpitations  Gastrointestinal: Negative for abdominal pain, nausea, vomiting, constipation, melena, or hematochezia  Genitourinary: Negative for hematuria or dysuria  Musculoskeletal: Negative for joint swelling or gait instability  Neurologic: Negative for tremors or seizures  Psychiatric: Negative for suicidal ideations or depression  All other systems reviewed and negative     PHYSICAL EXAM:  Constitutional: Well-developed, well-nourished, no acute distress  Eyes: Conjunctiva normal, sclera nonicteric  ENMT: Hearing grossly normal, oral mucosa moist  Neck: Supple, no palpable mass, trachea midline  Respiratory: Clear to auscultation, normal inspiratory  effort  Cardiovascular: Regular rate, no murmur, no peripheral edema, no jugular venous distention  Breast:  Right: No nipple abnormalities. No surrounding erythema, warmth, or drainage. No other masses appreciated.   Left: No visible abnormalities on inspection while seated or with arms raised. No skin changes or nipple abnormalities.   No clinical chest wall involvement.  Lymphatics (palpable nodes): No cervical, supraclavicular, or axillary lymphadenopathy  Skin: Warm, dry, no rash on visualized skin surfaces  Musculoskeletal: Symmetric strength, normal gait  Psychiatric: Alert and oriented ×3, normal affect      Raven Aviles MD   [facility] - General Surgery   [address], [address]  [place] [address]     [address], [address]  [place] [address]     Office: [phone]  Fax: [phone]

## 2024-11-22 NOTE — PERIOPERATIVE NURSING NOTE
Per the pharmacist in the [facility], the patient can continue her chronic pain regimen of 1 norco/day in the morning.  She can take a tramadol 6 hours after the norco for breakthru surgical pain.  I explained this to the patient and to her friend, Allison, who state that they understand.

## 2024-11-22 NOTE — ANESTHESIA POSTPROCEDURE EVALUATION
Patient: Yoanna Reed    Procedure Summary       Date: 11/22/24 Room / Location:  [facility]  [facility]    Anesthesia Start: 0854 Anesthesia Stop: 0934    Procedure: RIGHT NIPPLE LESION EXCISION (Right: Breast) Diagnosis:       Mass of nipple      (Mass of nipple [N63.0])    Surgeons: Raven Aviles MD Provider: Kolton Frances MD    Anesthesia Type: MAC ASA Status: 2            Anesthesia Type: MAC    Vitals  Vitals Value Taken Time   /76 11/22/24 1000   Temp 36.4 °C (97.5 °F) 11/22/24 0933   Pulse 61 11/22/24 1024   Resp 16 11/22/24 1000   SpO2 100 % 11/22/24 1024   Vitals shown include unfiled device data.        Post Anesthesia Care and Evaluation    Level of consciousness: awake and alert  Pain management: adequate    Airway patency: patent  Anesthetic complications: No anesthetic complications  PONV Status: controlled  Cardiovascular status: blood pressure returned to baseline and acceptable  Respiratory status: acceptable  Hydration status: acceptable

## 2024-11-22 NOTE — ANESTHESIA PREPROCEDURE EVALUATION
Anesthesia Evaluation     Patient summary reviewed and Nursing notes reviewed   no history of anesthetic complications:   NPO Solid Status: > 8 hours  NPO Liquid Status: > 2 hours           Airway   Mallampati: II  TM distance: >3 FB  Neck ROM: full  Dental      Pulmonary    (+) asthma,  Cardiovascular     ECG reviewed      ROS comment: EKG SR w/ LAFB    Neuro/Psych  GI/Hepatic/Renal/Endo      Musculoskeletal     Abdominal    Substance History      OB/GYN          Other                          Anesthesia Plan    ASA 2     MAC     intravenous induction     Anesthetic plan, risks, benefits, and alternatives have been provided, discussed and informed consent has been obtained with: patient.        CODE STATUS:

## 2024-11-22 NOTE — OP NOTE
OPERATIVE REPORT    DATE: 11/22/2024    SURGEON: Raven Aviles MD     ASSISTANT: Ijeoma Sheridan PA-C, who was present for necessary suctioning, retracting, suturing throughout the procedure     OPERATION PERFORMED: Excision of right nipple lesion    PREOPERATIVE DIAGNOSIS: Right nipple lesion    POSTOPERATIVE DIAGNOSIS: Same    ANESTHESIA: MAC    SPECIMEN: Right nipple lesion    DRAINS: None    BLOOD LOSS: Minimal    INDICATION FOR OPERATION: Mrs. Yoanna Reed is a 69 y.o. lady who was recently seen in the office for right nipple discharge.  She had imaging and an MRI which showed a small cystic structure at the base of the right nipple.  She presents today for excision. All risks (including bleeding, infection, damage to surrounding structures, currents), benefits and alternatives were explained to the patient who agreed and wished to proceed. Informed consent was signed.     OPERATIVE COURSE: The patient was taken to the operating room, transferred onto the operating room table, and underwent anesthesia without incident. The patient was prepped and draped in sterile fashion. A time out was performed and preoperative antibiotics were given.  Half percent Marcaine with epinephrine was injected into the skin and subcutaneous tissues.  An incision was made inferior to the nipple edge and the tissue was dissected.  A cystic structure was seen at the 3 o'clock position and was dissected free of the surrounding tissue.  It was removed and passed off as specimen.  The remaining tissue was examined and no other abnormalities were noted.  The incision was closed with a running 4-0 Monocryl suture and skin glue.  The patient tolerated the procedure well. All needle and lap counts were correct at the end of the case. The patient was then awoken from anesthesia and taken to recovery for further monitoring.          Raven Aviles MD   General and Endoscopic Surgery  [facility]    [address],  [address]  [place], [place], [address]  P: [phone]  F: [phone]

## 2024-11-23 DIAGNOSIS — R06.02 SHORTNESS OF BREATH: ICD-10-CM

## 2024-11-23 DIAGNOSIS — J20.9 ACUTE BRONCHITIS, UNSPECIFIED ORGANISM: ICD-10-CM

## 2024-11-23 NOTE — PROGRESS NOTES
"Chief Complaint  Shoulder Pain    Subjective          Yoanna Reed presents to [facility]  History of Present Illness  The patient is a 69-year-old female who presents for evaluation of anxiety.    She is currently managing her anxiety with lorazepam, taken twice daily. However, she feels that the dosage could be increased as she is still experiencing symptoms. She is also on Wellbutrin and takes Ambien 10 mg at night to aid with sleep.    Supplemental Information  She had surgery on Friday to remove the tumors that are growing. She has 2 more surgeries scheduled.    Objective   Vital Signs:   /84   Pulse 81   Temp 98.3 °F (36.8 °C) (Temporal)   Ht 170.2 cm (67.01\")   Wt 78.9 kg (174 lb)   SpO2 93%   BMI 27.25 kg/m²     Physical Exam  Vitals and nursing note reviewed.   Constitutional:       Appearance: She is well-developed.   HENT:      Head: Normocephalic and atraumatic.   Musculoskeletal:      Cervical back: Normal range of motion and neck supple.   Neurological:      Mental Status: She is alert and oriented to person, place, and time.   Psychiatric:         Behavior: Behavior normal.         Physical Exam       Result Review :                 Assessment and Plan    Diagnoses and all orders for this visit:    1. Anxiety  -     LORazepam (Ativan) 1 MG tablet; Take 1 tablet by mouth Every 6 (Six) Hours As Needed for Anxiety.  Dispense: 240 tablet; Refill: 1    2. Primary insomnia  -     zolpidem (AMBIEN) 10 MG tablet; Take 1 tablet by mouth Every Night.  Dispense: 90 tablet; Refill: 3    3. Depression, unspecified depression type  -     buPROPion XL (Wellbutrin XL) 150 MG 24 hr tablet; Take 1 tablet by mouth Every Morning.  Dispense: 90 tablet; Refill: 1      Assessment & Plan  1. Anxiety.  The prescription for lorazepam was adjusted to allow for up to four doses per day if necessary. She will continue taking Ambien 10 mg at night to help with sleep. The potential " side effects of increased lorazepam dosage, such as drowsiness and dependence, were discussed.    2. Cancer.  She has been diagnosed with cancer, confirmed by her recent surgery. Further treatment and management will depend on the outcomes of her upcoming surgery scheduled for the 12th of next month.    Follow-up  Return in 1 month for follow up.    Follow Up   No follow-ups on file.  Patient was given instructions and counseling regarding her condition or for health maintenance advice. Please see specific information pulled into the AVS if appropriate.           Patient or patient representative verbalized consent for the use of Ambient Listening during the visit with  Tom Muller MD for chart documentation. 11/23/2024  09:40 EST

## 2024-11-25 DIAGNOSIS — M25.511 ACUTE PAIN OF RIGHT SHOULDER: ICD-10-CM

## 2024-11-25 RX ORDER — ALBUTEROL SULFATE 90 UG/1
2 INHALANT RESPIRATORY (INHALATION) EVERY 4 HOURS PRN
Qty: 18 G | Refills: 1 | Status: SHIPPED | OUTPATIENT
Start: 2024-11-25

## 2024-11-25 NOTE — TELEPHONE ENCOUNTER
Caller: Yoanna Reed    Relationship: Self    Best call back number: [phone]     Requested Prescriptions:   Requested Prescriptions     Pending Prescriptions Disp Refills    tiZANidine (Zanaflex) 4 MG tablet 42 tablet 1     Sig: Take 1 tablet by mouth Every 8 (Eight) Hours As Needed for Muscle Spasms.        Pharmacy where request should be sent: [facility]/PHARMACY #[id number] [place], [place] - [address]. AT [facility] [phone] [facility] [phone]      Last office visit with prescribing clinician: 11/18/2024   Last telemedicine visit with prescribing clinician: Visit date not found   Next office visit with prescribing clinician: 12/16/2024     Additional details provided by patient: PATIENT STATED SHE HAS ENOUGH MEDICATION UNTIL 11-, HOWEVER SHE WOULD LIKE TO GET THIS REFILLED EARLY DUE TO THE HOLIDAYS.    Does the patient have less than a 3 day supply:  [] Yes  [x] No    Would you like a call back once the refill request has been completed: [] Yes [x] No    If the office needs to give you a call back, can they leave a voicemail: [] Yes [x] No    [employer] Rep   11/25/24 09:31 EST      [facility] Pregnancy And Lactation Text: This medication is considered safe during pregnancy and breastfeeding.

## 2024-11-26 LAB
CYTO UR: NORMAL
LAB AP CASE REPORT: NORMAL
LAB AP CLINICAL INFORMATION: NORMAL
LAB AP DIAGNOSIS COMMENT: NORMAL
PATH REPORT.FINAL DX SPEC: NORMAL
PATH REPORT.GROSS SPEC: NORMAL

## 2024-11-27 ENCOUNTER — TELEPHONE (OUTPATIENT)
Dept: SURGERY | Facility: CLINIC | Age: 69
End: 2024-11-27
Payer: MEDICARE

## 2024-11-27 NOTE — TELEPHONE ENCOUNTER
----- Message from Raevn Aviles sent at 11/26/2024  5:19 PM EST -----  Regarding: pathology results  Can you let her know her pathology from surgery returned as benign? The abnormal area behind the nipple was completely removed and there were no other atypical cells, precancer, or cancer. She should follow up in 2 weeks.    Final Diagnosis  1.  Right nipple, unoriented excision:               -Benign hyalinized periductal fibrosis with focus of clustered apocrine cysts.               -No atypical hyperplasia, in situ nor invasive carcinoma identified.        Thanks,   Raven Aviles

## 2024-11-27 NOTE — TELEPHONE ENCOUNTER
Spoke with patient and informed her of results and recommended follow-up.  Patient voiced understanding.

## 2024-12-09 DIAGNOSIS — G89.29 OTHER CHRONIC PAIN: ICD-10-CM

## 2024-12-09 RX ORDER — HYDROCODONE BITARTRATE AND ACETAMINOPHEN 7.5; 325 MG/1; MG/1
1 TABLET ORAL EVERY 6 HOURS PRN
Qty: 90 TABLET | Refills: 0 | Status: SHIPPED | OUTPATIENT
Start: 2024-12-09

## 2024-12-10 RX ORDER — HYDROCODONE BITARTRATE AND ACETAMINOPHEN 7.5; 325 MG/1; MG/1
1 TABLET ORAL EVERY 6 HOURS PRN
Qty: 90 TABLET | Refills: 0 | OUTPATIENT
Start: 2024-12-10

## 2024-12-13 ENCOUNTER — TELEPHONE (OUTPATIENT)
Dept: INTERNAL MEDICINE | Facility: CLINIC | Age: 69
End: 2024-12-13
Payer: MEDICARE

## 2024-12-13 NOTE — TELEPHONE ENCOUNTER
11/23/2024 office note states nothing about shoulder pain, MRI that is scheduled for this Sunday will be cancelled because pre cert was denied due to lack of documentation. Can you please added her shoulder pain issues to that note so pre cert can get the MRI approved.

## 2024-12-16 ENCOUNTER — OFFICE VISIT (OUTPATIENT)
Dept: INTERNAL MEDICINE | Facility: CLINIC | Age: 69
End: 2024-12-16
Payer: MEDICARE

## 2024-12-16 ENCOUNTER — OFFICE VISIT (OUTPATIENT)
Dept: SURGERY | Facility: CLINIC | Age: 69
End: 2024-12-16
Payer: MEDICARE

## 2024-12-16 VITALS
BODY MASS INDEX: 27.44 KG/M2 | SYSTOLIC BLOOD PRESSURE: 118 MMHG | HEIGHT: 67 IN | WEIGHT: 174.8 LBS | OXYGEN SATURATION: 100 % | HEART RATE: 101 BPM | DIASTOLIC BLOOD PRESSURE: 72 MMHG

## 2024-12-16 VITALS
BODY MASS INDEX: 27.31 KG/M2 | HEART RATE: 100 BPM | WEIGHT: 174 LBS | DIASTOLIC BLOOD PRESSURE: 72 MMHG | TEMPERATURE: 98.1 F | HEIGHT: 67 IN | SYSTOLIC BLOOD PRESSURE: 118 MMHG | RESPIRATION RATE: 16 BRPM

## 2024-12-16 DIAGNOSIS — R11.0 NAUSEA: Primary | ICD-10-CM

## 2024-12-16 DIAGNOSIS — N63.0 MASS OF NIPPLE: Primary | ICD-10-CM

## 2024-12-16 DIAGNOSIS — M19.91 PRIMARY OSTEOARTHRITIS, UNSPECIFIED SITE: ICD-10-CM

## 2024-12-16 DIAGNOSIS — M79.672 LEFT FOOT PAIN: ICD-10-CM

## 2024-12-16 PROCEDURE — 1125F AMNT PAIN NOTED PAIN PRSNT: CPT | Performed by: FAMILY MEDICINE

## 2024-12-16 PROCEDURE — 1159F MED LIST DOCD IN RCRD: CPT | Performed by: STUDENT IN AN ORGANIZED HEALTH CARE EDUCATION/TRAINING PROGRAM

## 2024-12-16 PROCEDURE — 99214 OFFICE O/P EST MOD 30 MIN: CPT | Performed by: FAMILY MEDICINE

## 2024-12-16 PROCEDURE — G2211 COMPLEX E/M VISIT ADD ON: HCPCS | Performed by: FAMILY MEDICINE

## 2024-12-16 PROCEDURE — 1159F MED LIST DOCD IN RCRD: CPT | Performed by: FAMILY MEDICINE

## 2024-12-16 PROCEDURE — 1160F RVW MEDS BY RX/DR IN RCRD: CPT | Performed by: FAMILY MEDICINE

## 2024-12-16 PROCEDURE — 1160F RVW MEDS BY RX/DR IN RCRD: CPT | Performed by: STUDENT IN AN ORGANIZED HEALTH CARE EDUCATION/TRAINING PROGRAM

## 2024-12-16 PROCEDURE — 99024 POSTOP FOLLOW-UP VISIT: CPT | Performed by: STUDENT IN AN ORGANIZED HEALTH CARE EDUCATION/TRAINING PROGRAM

## 2024-12-16 RX ORDER — PIMECROLIMUS 10 MG/G
CREAM TOPICAL
COMMUNITY

## 2024-12-16 RX ORDER — KETOCONAZOLE 20 MG/ML
SHAMPOO, SUSPENSION TOPICAL
COMMUNITY
Start: 2024-11-26

## 2024-12-16 RX ORDER — ONDANSETRON 4 MG/1
4 TABLET, ORALLY DISINTEGRATING ORAL EVERY 8 HOURS PRN
Qty: 30 TABLET | Refills: 1 | Status: SHIPPED | OUTPATIENT
Start: 2024-12-16

## 2024-12-16 RX ORDER — MELOXICAM 15 MG/1
15 TABLET ORAL DAILY
Qty: 60 TABLET | Refills: 3 | Status: SHIPPED | OUTPATIENT
Start: 2024-12-16

## 2024-12-16 NOTE — PROGRESS NOTES
Assessment/Plan:      68 y.o. female with:  (1) Right breast skin cyst:  - s/p excision of right breast skin cyst in office 2/2024.     (2) Nipple discharge:  - She complains of intermittent right nipple discharge. Right nipple lesion seen on imaging.  -s/p right nipple mass excision 11/2024, final pathology benign.  -Annual mammogram due 6/2025.  -Follow up with me 7/2025.      (3) High Risk Screening:   - Benjamín Cisneros lifetime breast cancer risk: 7.0%. This patient does not qualify for high risk screening.     Chief complaint:  right breast cyst     HPI: Ms. Yoanna Reed is a 58 year old female here today for evaluation of a right breast cyst.  She states she first noticed this several months ago. The area has drained, swelled, and caused discomfort in the past. The area hasn't drained in over 6 months. She denies any current breast skin changes.  She also complains of intermittent right nipple discharge that she describes as white. She is unsure of when this started.   Her work-up is detailed in the breast history section below.   She has not had regular annual mammograms. She does have a history of abnormal mammograms. She had a right breast needle biopsy over 20 years ago.  She denies any family history of breast cancer. She reports a family history of ovarian cancer in her mother (age of diagnosis in her 50s).     12/16/2024 She presents today for follow up. Her pain is controlled and she is getting back to her daily activities. She has no concerns or complaints.      TIMELINE OF WORKUP:  10/4/2023 Bilateral Diagnostic Mammogram, Right Breast US:  FINDINGS:  MAMMOGRAM:  The breasts are heterogeneously dense, which may obscure small masses.  There is a marker overlying the inner central posterior right breast marking the area of concern indicated by the patient. Deep to the marker, there is a superficial 0.9 cm oval mass. This area was further assessed with ultrasound.  There is questioned architectural  distortion in the upper posterior left breast on the MLO view, which localizes to the outer breast on tomosynthesis. This area effaces with spot compression and has no correlate on additional views, consistent with superimposition of normal breast parenchyma.  There are no suspicious calcifications in either breast.  ULTRASOUND:  Targeted sonographic evaluation of the right breast was performed in the area of concern indicated by the patient and the region of the mammographic abnormality.  At 3:00, 12 cm from the nipple, there is a 0.9 x 0.5 x 0.8 cm superficial oval hypoechoic mass with no corresponding vascularity. A linear tract is visible to the overlying skin surface on cine images.This is consistent with a benign sebaceous or epidermal inclusion cyst.  IMPRESSION:  1.  Palpable benign-appearing 0.9 cm sebaceous or epidermal inclusion cyst at 3:00 in the right breast. Further management should be based on clinical assessment. Dermatologic or surgery consultation could be considered for excision for therapeutic relief, as clinically directed.  2.  No evidence of malignancy in either breast. Recommend annual screening mammogram in 1 year.  BI-RADS Category 2: Benign     3/11/2024 Bilateral Breast Diagnostic Mammo with US   FINDINGS: Bilateral digital CC and MLO mammographic and digital Tomosynthesis images were obtained. Comparison is made to prior studies dated 10/04/2023. Scattered fibroglandular densities are seen throughout both breasts in a pattern which is unchanged. I see no new or dominant masses, areas of architectural distortion or skin thickening. There is no evidence for axillary lymphadenopathy or nipple retraction.   ULTRASOUND: Targeted sonographic evaluation of both breasts was performed. The right breast was imaged through the retroareolar region and the left breast was imaged through the area of clinical concern which corresponds to the 1-o'clock and 2-o'clock positions. Within the right nipple  there is a 0.4 cm cyst with a thick rim. The rim measures on the order of 0.9 cm in thickness. No detectable peripheral  vascularity is appreciated. No abnormality in the retroareolar region of the right breast is appreciated.  In the left breast at the 1-o'clock and 2-o'clock positions corresponding to the area of clinical concern, no sonographic abnormality is appreciated.  IMPRESSION:  1. There is a 0.4 cm complex cyst with a thick rim seen in the right nipple. Surgical excision should be considered. Further evaluation with a breast MRI without and with contrast should also be considered.  2. There are no findings suspicious for malignancy in the left breast. No abnormality in the left breast in the area of clinical concern is appreciated. Clinical follow-up is recommended.  BI-RADS Category 4: Suspicious finding.     10/29/2024 Bilateral Breast MRI   IMPRESSION AND RECOMMENDATION:  1.  Suspicious 1 cm right nipple lesion with a 0.7 cm cystic component and 0.8 cm enhancing nodular component, overall increased in size from prior ultrasound from March when accounting for differences in modality. Recommend surgical sampling.  2.  No MRI evidence of malignancy in the left breast.   BI-RADS Category 4: Suspicious    2024 Excision of right nipple lesion   Final Diagnosis   1.  Right nipple, unoriented excision:               -Benign hyalinized periductal fibrosis with focus of clustered apocrine cysts.               -No atypical hyperplasia, in situ nor invasive carcinoma identified.        MEDICAL HISTORY:   Gynecologic History:   . P:1 AB:0  Age at first childbirth: 24  Lactation/How long: yes, less than 2 months (had to stop due to mastitis)   Age at menarche: unsure, maybe 14   Age at menopause: hysterectomy age 24?  Total years of oral contraceptive use: briefly, 1 year  Total years of hormone replacement therapy: 0     Past Medical History:   Medical History           Past Medical History:   Diagnosis  Date    Asthma           Past Surgical History:    Surgical History             Past Surgical History:   Procedure Laterality Date    CHOLECYSTECTOMY        HYSTERECTOMY         Partial    NECK SURGERY             Family History:              Family History   Problem Relation Age of Onset    Hypertension Mother      Ovarian cancer Mother        Social History:   Social History   Social History                Socioeconomic History    Marital status: Single   Tobacco Use    Smoking status: Some Days       Types: Cigarettes    Smokeless tobacco: Never   Vaping Use    Vaping Use: Never used   Substance and Sexual Activity    Alcohol use: Yes       Comment: social    Drug use: Defer    Sexual activity: Defer         ALLERGIES:   Allergies   No Known Allergies         MEDICATIONS:     Current Medications      Current Outpatient Medications:     albuterol sulfate  (90 Base) MCG/ACT inhaler, TAKE 2 PUFFS BY MOUTH EVERY 4 HOURS AS NEEDED FOR WHEEZE, Disp: 18 g, Rfl: 1    colestipol (COLESTID) 1 g tablet, TAKE 1 TABLET BY MOUTH TWICE A DAY, Disp: 120 tablet, Rfl: 1    escitalopram (LEXAPRO) 20 MG tablet, TAKE 1 TABLET BY MOUTH EVERY DAY, Disp: 90 tablet, Rfl: 1    LORazepam (ATIVAN) 0.5 MG tablet, Take 1 tablet by mouth 2 (Two) Times a Day As Needed for Anxiety., Disp: 60 tablet, Rfl: 3    pravastatin (Pravachol) 20 MG tablet, Take 1 tablet by mouth Daily., Disp: 90 tablet, Rfl: 3    zolpidem (AMBIEN) 10 MG tablet, Take 1 tablet by mouth At Night As Needed for Sleep., Disp: 90 tablet, Rfl: 1      LABS:    11/12/2024 labs reviewed by me      ROS:   Constitutional: Negative for fevers or chills  HENT: Negative for hearing loss or runny nose  Eyes: Negative for vision changes or scleral icterus  Respiratory: Negative for cough or shortness of breath  Cardiovascular: Negative for chest pain or heart palpitations  Gastrointestinal: Negative for abdominal pain, nausea, vomiting, constipation, melena, or  hematochezia  Genitourinary: Negative for hematuria or dysuria  Musculoskeletal: Negative for joint swelling or gait instability  Neurologic: Negative for tremors or seizures  Psychiatric: Negative for suicidal ideations or depression  All other systems reviewed and negative     PHYSICAL EXAM:  Constitutional: Well-developed, well-nourished, no acute distress  Eyes: Conjunctiva normal, sclera nonicteric  ENMT: Hearing grossly normal, oral mucosa moist  Neck: Supple, no palpable mass, trachea midline  Respiratory: Clear to auscultation, normal inspiratory effort  Cardiovascular: Regular rate, no murmur, no peripheral edema, no jugular venous distention  Breast:  Right: No nipple abnormalities. No surrounding erythema, warmth, or drainage. No other masses appreciated.   Left: No visible abnormalities on inspection while seated or with arms raised. No skin changes or nipple abnormalities.   No clinical chest wall involvement.  Lymphatics (palpable nodes): No cervical, supraclavicular, or axillary lymphadenopathy  Skin: Warm, dry, no rash on visualized skin surfaces  Musculoskeletal: Symmetric strength, normal gait  Psychiatric: Alert and oriented ×3, normal affect      Raven Aviles MD   [facility] - General Surgery   [address], [address]  [place] [address]     [address], [address]  [place] [address]     Office: [phone]  Fax: [phone]

## 2024-12-24 NOTE — PROGRESS NOTES
"Chief Complaint  Anxiety and Hyperlipidemia    Subjective          Yoanna Reed presents to [facility]  History of Present Illness  The patient is a 69-year-old female who presents for evaluation of nausea, left foot numbness, arthritis, and anxiety.    She reports experiencing nausea and a general feeling of malaise, which she attributes to having undergone two surgical procedures within a span of three weeks. She also mentions a lack of appetite.    She has been advised by her orthopedic surgeon to seek a prescription for an anti-inflammatory medication for her arthritis from this provider. She has previously tried various anti-inflammatory medications but does not recall their names due to the significant time lapse since their use.    She expresses concern about her left foot, which she describes as being numb. She has not yet consulted with her brother, who is a healthcare professional, regarding this issue.    Her anxiety levels remain unchanged. She plans to contact her healthcare provider in January 2025 but does not believe she can tolerate any additional treatment at this time.    Supplemental Information  She had a surgery on her face on Thursday and is now cancer free.    Objective   Vital Signs:   /72 (BP Location: Left arm, Patient Position: Sitting)   Pulse 100   Temp 98.1 °F (36.7 °C) (Oral)   Resp 16   Ht 170.2 cm (67.01\")   Wt 78.9 kg (174 lb)   BMI 27.25 kg/m²     Physical Exam  Vitals and nursing note reviewed.   Constitutional:       Appearance: She is well-developed.   HENT:      Head: Normocephalic and atraumatic.   Musculoskeletal:      Cervical back: Normal range of motion and neck supple.   Neurological:      Mental Status: She is alert and oriented to person, place, and time.   Psychiatric:         Behavior: Behavior normal.         Physical Exam       Result Review :                 Assessment and Plan    Diagnoses and all orders for this " visit:    1. Nausea (Primary)  -     ondansetron ODT (ZOFRAN-ODT) 4 MG disintegrating tablet; Place 1 tablet on the tongue Every 8 (Eight) Hours As Needed for Nausea or Vomiting.  Dispense: 30 tablet; Refill: 1    2. Left foot pain  -     Ambulatory Referral to Podiatry    3. Primary osteoarthritis, unspecified site  -     meloxicam (MOBIC) 15 MG tablet; Take 1 tablet by mouth Daily.  Dispense: 60 tablet; Refill: 3      Assessment & Plan  1. Nausea.  She reports experiencing nausea and a general feeling of malaise, which she attributes to having undergone two surgical procedures within a span of three weeks. A prescription for Zofran 4 mg will be issued to manage her nausea.    2. Left foot numbness.  She reports numbness in her left foot. A referral to her brother's office will be arranged for further evaluation.    3. Arthritis.  She has been advised by her orthopedic surgeon to seek a prescription for an anti-inflammatory medication for her arthritis from this provider. A prescription for meloxicam 15 mg will be provided to manage her arthritis symptoms.    4. Anxiety.  Her anxiety levels remain unchanged. She plans to contact her healthcare provider in January 2025 but does not believe she can tolerate any additional treatment at this time.    Follow-up  The patient will follow up in 1 month.    Follow Up   No follow-ups on file.  Patient was given instructions and counseling regarding her condition or for health maintenance advice. Please see specific information pulled into the AVS if appropriate.           Patient or patient representative verbalized consent for the use of Ambient Listening during the visit with  Tom Muller MD for chart documentation. 12/24/2024  10:21 EST

## 2024-12-25 DIAGNOSIS — M25.511 ACUTE PAIN OF RIGHT SHOULDER: ICD-10-CM

## 2024-12-31 DIAGNOSIS — R06.02 SHORTNESS OF BREATH: ICD-10-CM

## 2024-12-31 DIAGNOSIS — J20.9 ACUTE BRONCHITIS, UNSPECIFIED ORGANISM: ICD-10-CM

## 2024-12-31 RX ORDER — LORAZEPAM 1 MG/1
1 TABLET ORAL EVERY 8 HOURS PRN
Qty: 90 TABLET | Refills: 4 | Status: SHIPPED | OUTPATIENT
Start: 2024-12-31

## 2024-12-31 RX ORDER — ALBUTEROL SULFATE 90 UG/1
2 INHALANT RESPIRATORY (INHALATION) EVERY 4 HOURS PRN
Qty: 18 G | Refills: 3 | Status: SHIPPED | OUTPATIENT
Start: 2024-12-31

## 2025-01-08 DIAGNOSIS — G89.29 OTHER CHRONIC PAIN: ICD-10-CM

## 2025-01-09 DIAGNOSIS — G89.29 OTHER CHRONIC PAIN: ICD-10-CM

## 2025-01-09 RX ORDER — HYDROCODONE BITARTRATE AND ACETAMINOPHEN 7.5; 325 MG/1; MG/1
1 TABLET ORAL EVERY 6 HOURS PRN
Qty: 90 TABLET | Refills: 0 | Status: SHIPPED | OUTPATIENT
Start: 2025-01-09 | End: 2025-01-09 | Stop reason: SDUPTHER

## 2025-01-10 RX ORDER — HYDROCODONE BITARTRATE AND ACETAMINOPHEN 7.5; 325 MG/1; MG/1
1 TABLET ORAL EVERY 6 HOURS PRN
Qty: 90 TABLET | Refills: 0 | Status: SHIPPED | OUTPATIENT
Start: 2025-01-10

## 2025-01-11 DIAGNOSIS — G89.29 OTHER CHRONIC PAIN: ICD-10-CM

## 2025-01-13 RX ORDER — HYDROCODONE BITARTRATE AND ACETAMINOPHEN 7.5; 325 MG/1; MG/1
1 TABLET ORAL EVERY 6 HOURS PRN
Qty: 90 TABLET | Refills: 0 | OUTPATIENT
Start: 2025-01-13

## 2025-01-16 ENCOUNTER — OFFICE VISIT (OUTPATIENT)
Dept: INTERNAL MEDICINE | Facility: CLINIC | Age: 70
End: 2025-01-16
Payer: MEDICARE

## 2025-01-16 VITALS
TEMPERATURE: 98.3 F | OXYGEN SATURATION: 100 % | HEART RATE: 68 BPM | RESPIRATION RATE: 16 BRPM | HEIGHT: 67 IN | DIASTOLIC BLOOD PRESSURE: 74 MMHG | BODY MASS INDEX: 27.62 KG/M2 | SYSTOLIC BLOOD PRESSURE: 126 MMHG | WEIGHT: 176 LBS

## 2025-01-16 DIAGNOSIS — F32.A DEPRESSION, UNSPECIFIED DEPRESSION TYPE: Primary | ICD-10-CM

## 2025-01-16 RX ORDER — FLUOXETINE 40 MG/1
40 CAPSULE ORAL DAILY
Qty: 90 CAPSULE | Refills: 1 | Status: SHIPPED | OUTPATIENT
Start: 2025-01-16

## 2025-01-18 NOTE — PROGRESS NOTES
"Chief Complaint  Anxiety    Subjective          Yoanna Reed presents to [facility]  History of Present Illness  The patient is a 69-year-old female who presents for evaluation of depression.    She reports experiencing depressive symptoms, which she attributes to the prolonged duration of her current situation, ongoing for several years. She expresses a desire to avoid transitioning to Xanax at this time. Her current medication regimen includes Wellbutrin  mg daily and Ativan 1 mg every 6 hours. However, she perceives the Ativan as ineffective in managing her symptoms. She recalls a positive response to Prozac approximately 20 years ago.    She is recovering from 2 surgeries. She does not wear anything on it at home but puts a bandage on it when she goes out to prevent dirt from getting in. She lets it air out at home to give it more healing time. It has been 4 weeks since one surgery and 5 weeks since the other. They removed the cancer. She can not have her mammogram done in March and has to wait until April before she can schedule that because of the scar tissue.    She is interested in getting the RSV vaccine.    MEDICATIONS  Current: Wellbutrin XL, Ativan  Past: Prozac    Objective   Vital Signs:   /74 (BP Location: Left arm, Patient Position: Sitting)   Pulse 68   Temp 98.3 °F (36.8 °C) (Oral)   Resp 16   Ht 170.2 cm (67.01\")   Wt 79.8 kg (176 lb)   SpO2 100%   BMI 27.56 kg/m²     Physical Exam  Vitals and nursing note reviewed.   Constitutional:       Appearance: She is well-developed.   HENT:      Head: Normocephalic and atraumatic.   Musculoskeletal:      Cervical back: Normal range of motion and neck supple.   Neurological:      Mental Status: She is alert and oriented to person, place, and time.   Psychiatric:         Behavior: Behavior normal.         Physical Exam       Result Review :                 Assessment and Plan    Diagnoses and all " orders for this visit:    1. Depression, unspecified depression type (Primary)  -     FLUoxetine (PROzac) 40 MG capsule; Take 1 capsule by mouth Daily.  Dispense: 90 capsule; Refill: 1      Assessment & Plan  1. Depression.  She will continue her current regimen of Wellbutrin  mg daily and Ativan 1 mg every 6 hours. Additionally, she will start taking Prozac 40 mg daily. The potential benefits and side effects of Prozac were discussed. She will be seen next month for a wellness visit.    2. Post-surgical recovery.  She is recovering from 2 recent surgeries for cancer removal. The surgical sites appear to be healing well. She is advised to continue keeping the areas clean and covered when outside to prevent infection and to allow them to air out at home for better healing.    3. Health maintenance.  She is advised to get the RSV vaccine, which she plans to receive at [facility] tomorrow. She is also informed that her mammogram will be postponed until April due to scar tissue from her recent surgery.    Follow-up  The patient will follow up next month for a wellness visit.    PROCEDURE  The patient is recovering from 2 recent surgeries for cancer removal.    Follow Up   No follow-ups on file.  Patient was given instructions and counseling regarding her condition or for health maintenance advice. Please see specific information pulled into the AVS if appropriate.           Patient or patient representative verbalized consent for the use of Ambient Listening during the visit with  Tom Muller MD for chart documentation. 1/18/2025  16:43 EST

## 2025-01-29 DIAGNOSIS — F51.01 PRIMARY INSOMNIA: ICD-10-CM

## 2025-01-29 DIAGNOSIS — F41.9 ANXIETY: ICD-10-CM

## 2025-01-29 DIAGNOSIS — M25.511 ACUTE PAIN OF RIGHT SHOULDER: ICD-10-CM

## 2025-01-30 RX ORDER — LORAZEPAM 1 MG/1
1 TABLET ORAL EVERY 6 HOURS PRN
Qty: 240 TABLET | Refills: 1 | Status: SHIPPED | OUTPATIENT
Start: 2025-01-30

## 2025-01-31 NOTE — TELEPHONE ENCOUNTER
Rx Refill Note  Requested Prescriptions     Pending Prescriptions Disp Refills    tiZANidine (Zanaflex) 4 MG tablet 42 tablet 1     Sig: Take 1 tablet by mouth Every 8 (Eight) Hours As Needed for Muscle Spasms.      Last office visit with prescribing clinician: 1/16/2025   Last telemedicine visit with prescribing clinician: Visit date not found   Next office visit with prescribing clinician: 1/29/2025       Donavon Sheppard MA  01/31/25, 11:28 EST

## 2025-01-31 NOTE — TELEPHONE ENCOUNTER
Rx Refill Note  Requested Prescriptions     Pending Prescriptions Disp Refills    zolpidem (AMBIEN) 10 MG tablet 90 tablet 3     Sig: Take 1 tablet by mouth Every Night.      Last office visit with prescribing clinician: 1/16/2025   Last telemedicine visit with prescribing clinician: Visit date not found   Next office visit with prescribing clinician: 2/17/2025   {    Donavon Sheppard MA  01/31/25, 11:28 EST

## 2025-02-06 RX ORDER — ZOLPIDEM TARTRATE 10 MG/1
10 TABLET ORAL NIGHTLY
Qty: 90 TABLET | Refills: 1 | Status: SHIPPED | OUTPATIENT
Start: 2025-02-06

## 2025-02-14 DIAGNOSIS — M25.511 ACUTE PAIN OF RIGHT SHOULDER: ICD-10-CM

## 2025-02-17 ENCOUNTER — OFFICE VISIT (OUTPATIENT)
Dept: INTERNAL MEDICINE | Facility: CLINIC | Age: 70
End: 2025-02-17
Payer: MEDICARE

## 2025-02-17 VITALS
HEIGHT: 67 IN | DIASTOLIC BLOOD PRESSURE: 90 MMHG | HEART RATE: 98 BPM | WEIGHT: 175 LBS | OXYGEN SATURATION: 97 % | BODY MASS INDEX: 27.47 KG/M2 | TEMPERATURE: 98.1 F | SYSTOLIC BLOOD PRESSURE: 110 MMHG

## 2025-02-17 DIAGNOSIS — E78.5 HYPERLIPIDEMIA, UNSPECIFIED HYPERLIPIDEMIA TYPE: ICD-10-CM

## 2025-02-17 DIAGNOSIS — F51.01 PRIMARY INSOMNIA: ICD-10-CM

## 2025-02-17 DIAGNOSIS — F41.9 ANXIETY: ICD-10-CM

## 2025-02-17 DIAGNOSIS — M25.511 ACUTE PAIN OF RIGHT SHOULDER: ICD-10-CM

## 2025-02-17 DIAGNOSIS — Z00.00 HEALTHCARE MAINTENANCE: ICD-10-CM

## 2025-02-17 DIAGNOSIS — Z00.00 WELL WOMAN EXAM (NO GYNECOLOGICAL EXAM): Primary | ICD-10-CM

## 2025-02-17 DIAGNOSIS — R73.03 PREDIABETES: ICD-10-CM

## 2025-02-17 DIAGNOSIS — F32.A DEPRESSION, UNSPECIFIED DEPRESSION TYPE: ICD-10-CM

## 2025-02-17 DIAGNOSIS — Z00.00 MEDICARE ANNUAL WELLNESS VISIT, SUBSEQUENT: ICD-10-CM

## 2025-02-17 RX ORDER — ZOLPIDEM TARTRATE 10 MG/1
10 TABLET ORAL NIGHTLY
Qty: 90 TABLET | Refills: 1 | Status: SHIPPED | OUTPATIENT
Start: 2025-02-17

## 2025-02-17 RX ORDER — LORAZEPAM 1 MG/1
1 TABLET ORAL EVERY 6 HOURS PRN
Qty: 240 TABLET | Refills: 1 | Status: SHIPPED | OUTPATIENT
Start: 2025-02-17

## 2025-02-17 RX ORDER — BUPROPION HYDROCHLORIDE 150 MG/1
150 TABLET ORAL EVERY MORNING
Qty: 90 TABLET | Refills: 3 | Status: SHIPPED | OUTPATIENT
Start: 2025-02-17

## 2025-02-17 RX ORDER — FLUOXETINE 40 MG/1
80 CAPSULE ORAL DAILY
Qty: 180 CAPSULE | Refills: 3 | Status: SHIPPED | OUTPATIENT
Start: 2025-02-17

## 2025-02-17 RX ORDER — PRAVASTATIN SODIUM 40 MG
40 TABLET ORAL DAILY
Qty: 90 TABLET | Refills: 3 | Status: SHIPPED | OUTPATIENT
Start: 2025-02-17

## 2025-02-17 NOTE — PROGRESS NOTES
Subjective   The ABCs of the Annual Wellness Visit  Medicare Wellness Visit      Yoanna Reed is a 69 y.o. patient who presents for a Medicare Wellness Visit.    The following portions of the patient's history were reviewed and   updated as appropriate: allergies, current medications, past family history, past medical history, past social history, past surgical history, and problem list.    Compared to one year ago, the patient's physical   health is the same.  Compared to one year ago, the patient's mental   health is the same.    Recent Hospitalizations:  She was not admitted to the hospital during the last year.     Current Medical Providers:  Patient Care Team:  Tom Muller MD as PCP - General (Family Medicine)    Outpatient Medications Prior to Visit   Medication Sig Dispense Refill    albuterol sulfate  (90 Base) MCG/ACT inhaler TAKE 2 PUFFS BY MOUTH EVERY 4 HOURS AS NEEDED FOR WHEEZE 18 g 3    HYDROcodone-acetaminophen (Norco) 7.5-325 MG per tablet Take 1 tablet by mouth Every 6 (Six) Hours As Needed for Moderate Pain. 90 tablet 0    ketoconazole (NIZORAL) 2 % shampoo SHAMPOO TWICE WEEKLY TO RASH. LATHER AND LEAVE FOR A FEW MINUTES, THEN RINSE.      meloxicam (MOBIC) 15 MG tablet Take 1 tablet by mouth Daily. 60 tablet 3    ondansetron ODT (ZOFRAN-ODT) 4 MG disintegrating tablet Place 1 tablet on the tongue Every 8 (Eight) Hours As Needed for Nausea or Vomiting. 30 tablet 1    pimecrolimus (ELIDEL) 1 % cream APPLY SMALL AMOUNT TO THE AFFECTED AREA AROUND THE MOUTH AND FACE TWICE DAILY      buPROPion XL (Wellbutrin XL) 150 MG 24 hr tablet Take 1 tablet by mouth Every Morning. 90 tablet 1    FLUoxetine (PROzac) 40 MG capsule Take 1 capsule by mouth Daily. 90 capsule 1    LORazepam (Ativan) 1 MG tablet Take 1 tablet by mouth Every 6 (Six) Hours As Needed for Anxiety. 240 tablet 1    pravastatin (Pravachol) 40 MG tablet Take 1 tablet by mouth Daily. 90 tablet 3    tiZANidine (Zanaflex) 4 MG  "tablet Take 1 tablet by mouth Every 8 (Eight) Hours As Needed for Muscle Spasms. 42 tablet 1    zolpidem (AMBIEN) 10 MG tablet Take 1 tablet by mouth Every Night. 90 tablet 1     No facility-administered medications prior to visit.     Opioid medication/s are on active medication list.  and I have evaluated her active treatment plan and pain score trends (see table).  Vitals:    02/17/25 1017   PainSc: 0-No pain     I have reviewed the chart for potential of high risk medication and harmful drug interactions in the elderly.        Aspirin is not on active medication list.  Aspirin use is not indicated based on review of current medical condition/s. Risk of harm outweighs potential benefits.  .    Patient Active Problem List   Diagnosis    Encounter for screening for malignant neoplasm of colon    Acute pain of right shoulder    Pain of right humerus    Chronic right-sided low back pain without sciatica    Flank pain    Acute cystitis with hematuria    Acute vaginitis    Dysuria    Hematuria    Mass of nipple     Advance Care Planning Advance Directive is not on file.  ACP discussion was held with the patient during this visit. Patient does not have an advance directive, declines further assistance.            Objective   Vitals:    02/17/25 1017   BP: 110/90   BP Location: Left arm   Patient Position: Sitting   Cuff Size: Adult   Pulse: 98   Temp: 98.1 °F (36.7 °C)   TempSrc: Oral   SpO2: 97%   Weight: 79.4 kg (175 lb)   Height: 170.2 cm (67.01\")   PainSc: 0-No pain       Estimated body mass index is 27.4 kg/m² as calculated from the following:    Height as of this encounter: 170.2 cm (67.01\").    Weight as of this encounter: 79.4 kg (175 lb).    BMI is >= 25 and <30. (Overweight) The following options were offered after discussion;: exercise counseling/recommendations and nutrition counseling/recommendations           Does the patient have evidence of cognitive impairment? No                                         "                                                       Health  Risk Assessment    Smoking Status:  Social History     Tobacco Use   Smoking Status Some Days    Current packs/day: 0.25    Average packs/day: 0.3 packs/day for 47.1 years (11.8 ttl pk-yrs)    Types: Cigarettes    Start date:     Passive exposure: Never   Smokeless Tobacco Never     Alcohol Consumption:  Social History     Substance and Sexual Activity   Alcohol Use Yes    Comment: social       Fall Risk Screen  YENNIADI Fall Risk Assessment was completed, and patient is at HIGH risk for falls. Assessment completed on:2025    Depression Screening   Little interest or pleasure in doing things? Almost all   Feeling down, depressed, or hopeless? Almost all   PHQ-2 Total Score 6   Trouble falling or staying asleep, or sleeping too much? Almost all (falling sleep)   Feeling tired or having little energy? Almost all   Poor appetite or overeating? Almost all (poor appetite)   Feeling bad about yourself - or that you are a failure or have let yourself or your family down? Almost all   Trouble concentrating on things, such as reading the newspaper or watching television? Almost all   Moving or speaking so slowly that other people could have noticed? Or the opposite - being so fidgety or restless that you have been moving around a lot more than usual? Not at all   Thoughts that you would be better off dead, or of hurting yourself in some way? Not at all   PHQ-9 Total Score 21   If you checked off any problems, how difficult have these problems made it for you to do your work, take care of things at home, or get along with other people? Not difficult at all      Health Habits and Functional and Cognitive Screenin/17/2025    10:21 AM   Functional & Cognitive Status   Do you have difficulty preparing food and eating? No   Do you have difficulty bathing yourself, getting dressed or grooming yourself? No   Do you have difficulty using the toilet? No   Do  you have difficulty moving around from place to place? No   Do you have trouble with steps or getting out of a bed or a chair? No   Current Diet Other        Current Diet Comment low appetite   Dental Exam Not up to date   Eye Exam Not up to date   Exercise (times per week) 2 times per week   Current Exercises Include Walking   Do you need help using the phone?  No   Are you deaf or do you have serious difficulty hearing?  No   Do you need help to go to places out of walking distance? No   Do you need help shopping? No   Do you need help preparing meals?  No   Do you need help with housework?  No   Do you need help with laundry? No   Do you need help taking your medications? No   Do you need help managing money? No   Do you ever drive or ride in a car without wearing a seat belt? No   Have you felt unusual stress, anger or loneliness in the last month? Yes   Who do you live with? Alone   If you need help, do you have trouble finding someone available to you? No   Have you been bothered in the last four weeks by sexual problems? No   Do you have difficulty concentrating, remembering or making decisions? No           Age-appropriate Screening Schedule:  Refer to the list below for future screening recommendations based on patient's age, sex and/or medical conditions. Orders for these recommended tests are listed in the plan section. The patient has been provided with a written plan.    Health Maintenance List  Health Maintenance   Topic Date Due    TDAP/TD VACCINES (1 - Tdap) Never done    Pneumococcal Vaccine 50+ (2 of 2 - PCV) 10/20/1999    ANNUAL WELLNESS VISIT  01/16/2025    BMI FOLLOWUP  01/16/2025    DXA SCAN  08/10/2025    LIPID PANEL  09/13/2025    MAMMOGRAM  03/11/2026    COLORECTAL CANCER SCREENING  05/23/2027    HEPATITIS C SCREENING  Completed    COVID-19 Vaccine  Completed    INFLUENZA VACCINE  Completed    ZOSTER VACCINE  Completed                                                                                                                                                 CMS Preventative Services Quick Reference  Risk Factors Identified During Encounter  None Identified  Depression/Dysphoria: Current medication adjusted.  Follow up visit planned.    The above risks/problems have been discussed with the patient.  Pertinent information has been shared with the patient in the After Visit Summary.  An After Visit Summary and PPPS were made available to the patient.    Follow Up:   Next Medicare Wellness visit to be scheduled in 1 year.     Assessment & Plan  Well woman exam (no gynecological exam)         Medicare annual wellness visit, subsequent         Healthcare maintenance    Orders:    CBC & Differential    Comprehensive Metabolic Panel    Anxiety    Orders:    LORazepam (Ativan) 1 MG tablet; Take 1 tablet by mouth Every 6 (Six) Hours As Needed for Anxiety.    Depression, unspecified depression type      Orders:    buPROPion XL (Wellbutrin XL) 150 MG 24 hr tablet; Take 1 tablet by mouth Every Morning.    FLUoxetine (PROzac) 40 MG capsule; Take 2 capsules by mouth Daily.    Primary insomnia    Orders:    zolpidem (AMBIEN) 10 MG tablet; Take 1 tablet by mouth Every Night.    Hyperlipidemia, unspecified hyperlipidemia type       Orders:    pravastatin (Pravachol) 40 MG tablet; Take 1 tablet by mouth Daily.    Comprehensive Metabolic Panel    Lipid Panel With LDL / HDL Ratio    Acute pain of right shoulder    Orders:    tiZANidine (Zanaflex) 4 MG tablet; Take 1 tablet by mouth Every 8 (Eight) Hours As Needed for Muscle Spasms.    Prediabetes    Orders:    Hemoglobin A1c    Thyroid Panel With TSH         Follow Up:   No follow-ups on file.

## 2025-02-17 NOTE — ASSESSMENT & PLAN NOTE
Orders:    tiZANidine (Zanaflex) 4 MG tablet; Take 1 tablet by mouth Every 8 (Eight) Hours As Needed for Muscle Spasms.

## 2025-02-18 LAB
ALBUMIN SERPL-MCNC: 4.4 G/DL (ref 3.5–5.2)
ALBUMIN/GLOB SERPL: 1.7 G/DL
ALP SERPL-CCNC: 77 U/L (ref 39–117)
ALT SERPL-CCNC: 12 U/L (ref 1–33)
AST SERPL-CCNC: 14 U/L (ref 1–32)
BASOPHILS # BLD AUTO: 0.04 10*3/MM3 (ref 0–0.2)
BASOPHILS NFR BLD AUTO: 1 % (ref 0–1.5)
BILIRUB SERPL-MCNC: 0.5 MG/DL (ref 0–1.2)
BUN SERPL-MCNC: 14 MG/DL (ref 8–23)
BUN/CREAT SERPL: 15.1 (ref 7–25)
CALCIUM SERPL-MCNC: 9.4 MG/DL (ref 8.6–10.5)
CHLORIDE SERPL-SCNC: 97 MMOL/L (ref 98–107)
CHOLEST SERPL-MCNC: 160 MG/DL (ref 0–200)
CO2 SERPL-SCNC: 23.5 MMOL/L (ref 22–29)
CREAT SERPL-MCNC: 0.93 MG/DL (ref 0.57–1)
EGFRCR SERPLBLD CKD-EPI 2021: 66.7 ML/MIN/1.73
EOSINOPHIL # BLD AUTO: 0.03 10*3/MM3 (ref 0–0.4)
EOSINOPHIL NFR BLD AUTO: 0.8 % (ref 0.3–6.2)
ERYTHROCYTE [DISTWIDTH] IN BLOOD BY AUTOMATED COUNT: 12.9 % (ref 12.3–15.4)
FT4I SERPL CALC-MCNC: 1.9 (ref 1.2–4.9)
GLOBULIN SER CALC-MCNC: 2.6 GM/DL
GLUCOSE SERPL-MCNC: 102 MG/DL (ref 65–99)
HBA1C MFR BLD: 5.4 % (ref 4.8–5.6)
HCT VFR BLD AUTO: 40.3 % (ref 34–46.6)
HDLC SERPL-MCNC: 51 MG/DL (ref 40–60)
HGB BLD-MCNC: 14.2 G/DL (ref 12–15.9)
IMM GRANULOCYTES # BLD AUTO: 0.01 10*3/MM3 (ref 0–0.05)
IMM GRANULOCYTES NFR BLD AUTO: 0.3 % (ref 0–0.5)
LDLC SERPL CALC-MCNC: 92 MG/DL (ref 0–100)
LDLC/HDLC SERPL: 1.78 {RATIO}
LYMPHOCYTES # BLD AUTO: 1.39 10*3/MM3 (ref 0.7–3.1)
LYMPHOCYTES NFR BLD AUTO: 34.9 % (ref 19.6–45.3)
MCH RBC QN AUTO: 33.6 PG (ref 26.6–33)
MCHC RBC AUTO-ENTMCNC: 35.2 G/DL (ref 31.5–35.7)
MCV RBC AUTO: 95.5 FL (ref 79–97)
MONOCYTES # BLD AUTO: 0.32 10*3/MM3 (ref 0.1–0.9)
MONOCYTES NFR BLD AUTO: 8 % (ref 5–12)
NEUTROPHILS # BLD AUTO: 2.19 10*3/MM3 (ref 1.7–7)
NEUTROPHILS NFR BLD AUTO: 55 % (ref 42.7–76)
NRBC BLD AUTO-RTO: 0 /100 WBC (ref 0–0.2)
PLATELET # BLD AUTO: 312 10*3/MM3 (ref 140–450)
POTASSIUM SERPL-SCNC: 4 MMOL/L (ref 3.5–5.2)
PROT SERPL-MCNC: 7 G/DL (ref 6–8.5)
RBC # BLD AUTO: 4.22 10*6/MM3 (ref 3.77–5.28)
SODIUM SERPL-SCNC: 135 MMOL/L (ref 136–145)
T3RU NFR SERPL: 27 % (ref 24–39)
T4 SERPL-MCNC: 7.2 UG/DL (ref 4.5–12)
TRIGL SERPL-MCNC: 91 MG/DL (ref 0–150)
TSH SERPL DL<=0.005 MIU/L-ACNC: 0.55 UIU/ML (ref 0.45–4.5)
VLDLC SERPL CALC-MCNC: 17 MG/DL (ref 5–40)
WBC # BLD AUTO: 3.98 10*3/MM3 (ref 3.4–10.8)

## 2025-02-19 NOTE — PROGRESS NOTES
Subjective   Yoanna Reed is a 69 y.o. female and is here for a comprehensive physical exam. The patient reports no problems.    Pt is UTD with annual gyn exam and mammo           Social History:   Social History     Socioeconomic History    Marital status: Single   Tobacco Use    Smoking status: Some Days     Current packs/day: 0.25     Average packs/day: 0.3 packs/day for 47.1 years (11.8 ttl pk-yrs)     Types: Cigarettes     Start date: 1978     Passive exposure: Never    Smokeless tobacco: Never   Vaping Use    Vaping status: Never Used   Substance and Sexual Activity    Alcohol use: Yes     Comment: social    Drug use: Defer    Sexual activity: Defer       Family History:   Family History   Problem Relation Age of Onset    Hypertension Mother     Ovarian cancer Mother     Malig Hyperthermia Neg Hx        Past Medical History:   Past Medical History:   Diagnosis Date    Anxiety and depression     Asthma     Limited joint range of motion     NECK    Liver failure 2002    DUE TO MEDICATION    Mass of nipple     RIGHT    Skin cancer     RIGHT CHEEK       The following portions of the patient's history were reviewed and updated as appropriate: allergies, current medications, past family history, past medical history, past social history, past surgical history and problem list.    Review of Systems    Review of Systems   Constitutional:  Negative for chills and fever.   HENT:  Negative for congestion, rhinorrhea, sinus pain and sore throat.    Eyes:  Negative for photophobia and visual disturbance.   Respiratory:  Negative for cough, chest tightness and shortness of breath.    Cardiovascular:  Negative for chest pain and palpitations.   Gastrointestinal:  Negative for diarrhea, nausea and vomiting.   Genitourinary:  Negative for dysuria, frequency and urgency.   Skin:  Negative for rash and wound.   Neurological:  Negative for dizziness and syncope.   Psychiatric/Behavioral:  Negative for behavioral problems  and confusion.        Objective   Physical Exam  Vitals and nursing note reviewed.   Constitutional:       Appearance: She is well-developed.   HENT:      Head: Normocephalic and atraumatic.      Right Ear: External ear normal.      Left Ear: External ear normal.   Cardiovascular:      Rate and Rhythm: Normal rate and regular rhythm.      Heart sounds: Normal heart sounds.   Pulmonary:      Effort: Pulmonary effort is normal. No respiratory distress.      Breath sounds: Normal breath sounds.   Abdominal:      Palpations: Abdomen is soft.      Tenderness: There is no abdominal tenderness. There is no guarding.   Musculoskeletal:         General: Normal range of motion.      Cervical back: Normal range of motion and neck supple.   Lymphadenopathy:      Cervical: No cervical adenopathy.   Skin:     General: Skin is warm.   Neurological:      Mental Status: She is alert and oriented to person, place, and time.   Psychiatric:         Behavior: Behavior normal.         Vitals:    02/17/25 1017   BP: 110/90   Pulse: 98   Temp: 98.1 °F (36.7 °C)   SpO2: 97%     Body mass index is 27.4 kg/m².      Medications:   Current Outpatient Medications:     albuterol sulfate  (90 Base) MCG/ACT inhaler, TAKE 2 PUFFS BY MOUTH EVERY 4 HOURS AS NEEDED FOR WHEEZE, Disp: 18 g, Rfl: 3    buPROPion XL (Wellbutrin XL) 150 MG 24 hr tablet, Take 1 tablet by mouth Every Morning., Disp: 90 tablet, Rfl: 3    FLUoxetine (PROzac) 40 MG capsule, Take 2 capsules by mouth Daily., Disp: 180 capsule, Rfl: 3    HYDROcodone-acetaminophen (Norco) 7.5-325 MG per tablet, Take 1 tablet by mouth Every 6 (Six) Hours As Needed for Moderate Pain., Disp: 90 tablet, Rfl: 0    ketoconazole (NIZORAL) 2 % shampoo, SHAMPOO TWICE WEEKLY TO RASH. LATHER AND LEAVE FOR A FEW MINUTES, THEN RINSE., Disp: , Rfl:     LORazepam (Ativan) 1 MG tablet, Take 1 tablet by mouth Every 6 (Six) Hours As Needed for Anxiety., Disp: 240 tablet, Rfl: 1    meloxicam (MOBIC) 15 MG  tablet, Take 1 tablet by mouth Daily., Disp: 60 tablet, Rfl: 3    ondansetron ODT (ZOFRAN-ODT) 4 MG disintegrating tablet, Place 1 tablet on the tongue Every 8 (Eight) Hours As Needed for Nausea or Vomiting., Disp: 30 tablet, Rfl: 1    pimecrolimus (ELIDEL) 1 % cream, APPLY SMALL AMOUNT TO THE AFFECTED AREA AROUND THE MOUTH AND FACE TWICE DAILY, Disp: , Rfl:     pravastatin (Pravachol) 40 MG tablet, Take 1 tablet by mouth Daily., Disp: 90 tablet, Rfl: 3    tiZANidine (Zanaflex) 4 MG tablet, Take 1 tablet by mouth Every 8 (Eight) Hours As Needed for Muscle Spasms., Disp: 42 tablet, Rfl: 1    zolpidem (AMBIEN) 10 MG tablet, Take 1 tablet by mouth Every Night., Disp: 90 tablet, Rfl: 1       Assessment & Plan   Healthy female exam.      1. Healthcare Maintenance:  2. Patient Counseling:  --Nutrition: Stressed importance of moderation in sodium/caffeine intake, saturated fat and cholesterol, caloric balance, sufficient intake of fresh fruits, vegetables, fiber, calcium and vit D  --Exercise: Recommended 30 minutes of exercise daily.  --Immunizations reviewed.  --Discussed benefits of screening colonoscopy.    Diagnoses and all orders for this visit:    Well woman exam (no gynecological exam)    Medicare annual wellness visit, subsequent    Healthcare maintenance  -     CBC & Differential  -     Comprehensive Metabolic Panel    Anxiety  -     LORazepam (Ativan) 1 MG tablet; Take 1 tablet by mouth Every 6 (Six) Hours As Needed for Anxiety.    Depression, unspecified depression type  -     buPROPion XL (Wellbutrin XL) 150 MG 24 hr tablet; Take 1 tablet by mouth Every Morning.  -     FLUoxetine (PROzac) 40 MG capsule; Take 2 capsules by mouth Daily.    Primary insomnia  -     zolpidem (AMBIEN) 10 MG tablet; Take 1 tablet by mouth Every Night.    Hyperlipidemia, unspecified hyperlipidemia type  -     pravastatin (Pravachol) 40 MG tablet; Take 1 tablet by mouth Daily.  -     Comprehensive Metabolic Panel  -     Lipid Panel  With LDL / HDL Ratio    Acute pain of right shoulder  -     tiZANidine (Zanaflex) 4 MG tablet; Take 1 tablet by mouth Every 8 (Eight) Hours As Needed for Muscle Spasms.    Prediabetes  -     Hemoglobin A1c  -     Thyroid Panel With TSH        No follow-ups on file.             Dictated utilizing Dragon Voice Recognition Software

## 2025-02-19 NOTE — PROGRESS NOTES
"Chief Complaint  [facility]ual Ex[facility]    Subjective          Yoanna Reed presents to [facility]  History of Present Illness  The patient is a 69-year-old female who presents for a wellness visit.    She has been experiencing anxiety and depression, which appear to be exacerbated by ongoing legal disputes with her family. These issues have persisted for 4 years, leading to increased frustration and agitation. She reports a decrease in social activities and outdoor engagement, possibly due to the weather conditions. She is uncertain about the effectiveness of her antidepressants but acknowledges that her anxiolytics provide significant relief. Her current medication regimen includes lorazepam 1 mg 3 times daily for anxiety, Prozac 40 mg daily, and Wellbutrin 150 mg daily for depression.    She has hyperlipidemia. She is taking pravastatin 40 mg daily without any issues.    She has right shoulder pain. She is taking Zanaflex, which helps to some degree.    She has primary insomnia. She is taking Ambien 10 mg nightly, which seems to work for her.    MEDICATIONS  Ambien, lorazepam, Prozac, Wellbutrin, pravastatin, Zanaflex.    Objective   Vital Signs:   /90 (BP Location: Left arm, Patient Position: Sitting, Cuff Size: Adult)   Pulse 98   Temp 98.1 °F (36.7 °C) (Oral)   Ht 170.2 cm (67.01\")   Wt 79.4 kg (175 lb)   SpO2 97%   BMI 27.40 kg/m²     Physical Exam  Vitals and nursing note reviewed.   Constitutional:       Appearance: She is well-developed.   HENT:      Head: Normocephalic and atraumatic.   Musculoskeletal:      Cervical back: Normal range of motion and neck supple.   Neurological:      Mental Status: She is alert and oriented to person, place, and time.   Psychiatric:         Behavior: Behavior normal.         Physical Exam       Result Review :                 Assessment and Plan    Diagnoses and all orders for this visit:    1. " "Well woman exam (no gynecological exam) (Primary)    2. Medicare annual wellness visit, subsequent    3. Healthcare maintenance  -     CBC & Differential  -     Comprehensive Metabolic Panel    4. Anxiety  -     LORazepam (Ativan) 1 MG tablet; Take 1 tablet by mouth Every 6 (Six) Hours As Needed for Anxiety.  Dispense: 240 tablet; Refill: 1    5. Depression, unspecified depression type  -     buPROPion XL (Wellbutrin XL) 150 MG 24 hr tablet; Take 1 tablet by mouth Every Morning.  Dispense: 90 tablet; Refill: 3  -     FLUoxetine (PROzac) 40 MG capsule; Take 2 capsules by mouth Daily.  Dispense: 180 capsule; Refill: 3    6. Primary insomnia  -     zolpidem (AMBIEN) 10 MG tablet; Take 1 tablet by mouth Every Night.  Dispense: 90 tablet; Refill: 1    7. Hyperlipidemia, unspecified hyperlipidemia type  -     pravastatin (Pravachol) 40 MG tablet; Take 1 tablet by mouth Daily.  Dispense: 90 tablet; Refill: 3  -     Comprehensive Metabolic Panel  -     Lipid Panel With LDL / HDL Ratio    8. Acute pain of right shoulder  Assessment & Plan:    Orders:    tiZANidine (Zanaflex) 4 MG tablet; Take 1 tablet by mouth Every 8 (Eight) Hours As Needed for Muscle Spasms.      Orders:  -     tiZANidine (Zanaflex) 4 MG tablet; Take 1 tablet by mouth Every 8 (Eight) Hours As Needed for Muscle Spasms.  Dispense: 42 tablet; Refill: 1    9. Prediabetes  -     Hemoglobin A1c  -     Thyroid Panel With TSH      Assessment & Plan  1. Anxiety.  She is currently on lorazepam 1 mg 3 times a day and reports doing well with this regimen. The current dosage of lorazepam will be maintained.    2. Depression.  She is on Prozac 40 mg daily and Wellbutrin 150 mg daily. She reports uncertainty about the effectiveness of the antidepressants but acknowledges that the \"nerve pills\" help calm her nerves. The Prozac dosage will be increased to 80 mg daily, with instructions to take 2 tablets of 40 mg each. The Wellbutrin dosage will remain the same.    3. " Primary insomnia.  She is taking Ambien 10 mg nightly, which she reports is effective. The current dosage of Ambien will be continued.    4. Hyperlipidemia.  She is taking pravastatin 40 mg daily with no reported issues. The current dosage of pravastatin will be maintained.    5. Right shoulder pain.  She uses Zanaflex for right shoulder pain and reports it helps to a degree. A prescription for Zanaflex will be sent to [facility].    Follow Up   No follow-ups on file.  Patient was given instructions and counseling regarding her condition or for health maintenance advice. Please see specific information pulled into the AVS if appropriate.           Patient or patient representative verbalized consent for the use of Ambient Listening during the visit with  Tom Muller MD for chart documentation. 2/19/2025  13:37 EST

## 2025-03-05 DIAGNOSIS — G89.29 OTHER CHRONIC PAIN: ICD-10-CM

## 2025-03-05 DIAGNOSIS — M25.511 ACUTE PAIN OF RIGHT SHOULDER: ICD-10-CM

## 2025-03-07 RX ORDER — HYDROCODONE BITARTRATE AND ACETAMINOPHEN 7.5; 325 MG/1; MG/1
1 TABLET ORAL EVERY 6 HOURS PRN
Qty: 90 TABLET | Refills: 0 | Status: SHIPPED | OUTPATIENT
Start: 2025-03-07

## 2025-03-08 DIAGNOSIS — G89.29 OTHER CHRONIC PAIN: ICD-10-CM

## 2025-03-10 RX ORDER — HYDROCODONE BITARTRATE AND ACETAMINOPHEN 7.5; 325 MG/1; MG/1
1 TABLET ORAL EVERY 6 HOURS PRN
Qty: 90 TABLET | Refills: 0 | OUTPATIENT
Start: 2025-03-10

## 2025-03-19 ENCOUNTER — OFFICE VISIT (OUTPATIENT)
Dept: INTERNAL MEDICINE | Facility: CLINIC | Age: 70
End: 2025-03-19
Payer: MEDICARE

## 2025-03-19 VITALS
HEIGHT: 67 IN | BODY MASS INDEX: 26.53 KG/M2 | DIASTOLIC BLOOD PRESSURE: 72 MMHG | SYSTOLIC BLOOD PRESSURE: 120 MMHG | RESPIRATION RATE: 16 BRPM | WEIGHT: 169 LBS | HEART RATE: 104 BPM | TEMPERATURE: 98 F | OXYGEN SATURATION: 97 %

## 2025-03-19 DIAGNOSIS — H61.23 BILATERAL IMPACTED CERUMEN: ICD-10-CM

## 2025-03-19 DIAGNOSIS — F41.9 ANXIETY: ICD-10-CM

## 2025-03-19 DIAGNOSIS — F32.A DEPRESSION, UNSPECIFIED DEPRESSION TYPE: Primary | ICD-10-CM

## 2025-03-19 RX ORDER — LORAZEPAM 1 MG/1
1 TABLET ORAL EVERY 6 HOURS PRN
Qty: 240 TABLET | Refills: 1 | Status: SHIPPED | OUTPATIENT
Start: 2025-03-19

## 2025-03-19 RX ORDER — FLUOXETINE HYDROCHLORIDE 40 MG/1
80 CAPSULE ORAL DAILY
Qty: 180 CAPSULE | Refills: 3 | Status: SHIPPED | OUTPATIENT
Start: 2025-03-19

## 2025-03-19 RX ORDER — BUPROPION HYDROCHLORIDE 150 MG/1
150 TABLET ORAL EVERY MORNING
Qty: 90 TABLET | Refills: 3 | Status: SHIPPED | OUTPATIENT
Start: 2025-03-19

## 2025-03-19 NOTE — PROGRESS NOTES
"Chief Complaint  Balance Issues, Anxiety, and Depression    Subjective          Yoanna Reed presents to [facility] PRIMARY CARE  History of Present Illness  The patient is a 69-year-old female who came in today because she's been having trouble with her balance and is concerned it might be due to earwax buildup. She mentioned that she's been losing her balance a lot and is worried about falling. She wants to make sure her ears are checked to see if earwax is causing the problem.    She also has a history of depression and anxiety. She is currently taking Prozac 80 mg daily, Wellbutrin  mg daily in the morning, and lorazepam as needed, up to 1 tablet 3 times a day. She mentioned that she needs refills for these medications.    For pain management, she continues to take hydrocodone, which she says is helping with her pain.    Additionally, she has lost weight and is now down to 169 pounds.    MEDICATIONS  Prozac, Wellbutrin XL, lorazepam, hydrocodone    Objective   Vital Signs:   /72 (BP Location: Left arm, Patient Position: Sitting)   Pulse 104   Temp 98 °F (36.7 °C) (Oral)   Resp 16   Ht 170.2 cm (67.01\")   Wt 76.7 kg (169 lb)   SpO2 97%   BMI 26.46 kg/m²     Physical Exam  Vitals and nursing note reviewed.   Constitutional:       Appearance: She is well-developed.   HENT:      Head: Normocephalic and atraumatic.      Right Ear: There is impacted cerumen.      Left Ear: There is impacted cerumen.   Musculoskeletal:      Cervical back: Normal range of motion and neck supple.   Neurological:      Mental Status: She is alert and oriented to person, place, and time.   Psychiatric:         Behavior: Behavior normal.         Physical Exam  There is impaction in both ears with the left being worse.    Vital Signs  Weight is 169 pounds.     Result Review :                 Assessment and Plan    Diagnoses and all orders for this visit:    1. Depression, unspecified depression " type (Primary)  -     FLUoxetine (PROzac) 40 MG capsule; Take 2 capsules by mouth Daily.  Dispense: 180 capsule; Refill: 3  -     buPROPion XL (Wellbutrin XL) 150 MG 24 hr tablet; Take 1 tablet by mouth Every Morning.  Dispense: 90 tablet; Refill: 3    2. Anxiety  -     LORazepam (Ativan) 1 MG tablet; Take 1 tablet by mouth Every 6 (Six) Hours As Needed for Anxiety.  Dispense: 240 tablet; Refill: 1    3. Bilateral impacted cerumen  -     Ambulatory Referral to ENT (Otolaryngology)      Assessment & Plan  1. Cerumen impaction.  She reports frequent loss of balance, which may be related to earwax buildup. Examination revealed significant wax in both ears, with the left ear being worse. A referral to an ENT specialist will be made for further evaluation and management.    2. Depression and anxiety.  She is currently on Prozac 80 mg daily, Wellbutrin  mg daily, and lorazepam as needed (1 tablet up to 3 times a day). Prescriptions for these medications will be sent to her pharmacy, [facility].    3. Pain management.  She continues to take hydrocodone for pain management, which she reports as being effective.    Follow Up   No follow-ups on file.  Patient was given instructions and counseling regarding her condition or for health maintenance advice. Please see specific information pulled into the AVS if appropriate.           Patient or patient representative verbalized consent for the use of Ambient Listening during the visit with  Tom Muller MD for chart documentation. 3/19/2025  10:41 EDT

## 2025-04-06 DIAGNOSIS — G89.29 OTHER CHRONIC PAIN: ICD-10-CM

## 2025-04-06 DIAGNOSIS — M25.511 ACUTE PAIN OF RIGHT SHOULDER: ICD-10-CM

## 2025-04-08 RX ORDER — HYDROCODONE BITARTRATE AND ACETAMINOPHEN 7.5; 325 MG/1; MG/1
1 TABLET ORAL EVERY 6 HOURS PRN
Qty: 90 TABLET | Refills: 0 | Status: SHIPPED | OUTPATIENT
Start: 2025-04-08

## 2025-04-16 ENCOUNTER — OFFICE VISIT (OUTPATIENT)
Dept: INTERNAL MEDICINE | Facility: CLINIC | Age: 70
End: 2025-04-16
Payer: MEDICARE

## 2025-04-16 VITALS
TEMPERATURE: 98.9 F | RESPIRATION RATE: 14 BRPM | HEART RATE: 98 BPM | DIASTOLIC BLOOD PRESSURE: 84 MMHG | HEIGHT: 67 IN | SYSTOLIC BLOOD PRESSURE: 126 MMHG | BODY MASS INDEX: 26.71 KG/M2 | OXYGEN SATURATION: 98 % | WEIGHT: 170.2 LBS

## 2025-04-16 DIAGNOSIS — F41.9 ANXIETY: ICD-10-CM

## 2025-04-16 DIAGNOSIS — R92.8 OTHER ABNORMAL AND INCONCLUSIVE FINDINGS ON DIAGNOSTIC IMAGING OF BREAST: ICD-10-CM

## 2025-04-16 DIAGNOSIS — Z12.39 ENCOUNTER FOR BREAST CANCER SCREENING USING NON-MAMMOGRAM MODALITY: ICD-10-CM

## 2025-04-16 DIAGNOSIS — F32.A DEPRESSION, UNSPECIFIED DEPRESSION TYPE: Primary | ICD-10-CM

## 2025-04-16 DIAGNOSIS — N63.0 MASS OF BREAST, UNSPECIFIED LATERALITY: ICD-10-CM

## 2025-04-16 DIAGNOSIS — F51.01 PRIMARY INSOMNIA: ICD-10-CM

## 2025-04-16 RX ORDER — FLUOXETINE HYDROCHLORIDE 40 MG/1
40 CAPSULE ORAL DAILY
Qty: 90 CAPSULE | Refills: 3 | Status: SHIPPED | OUTPATIENT
Start: 2025-04-16

## 2025-04-16 RX ORDER — BUPROPION HYDROCHLORIDE 150 MG/1
150 TABLET ORAL EVERY MORNING
Qty: 90 TABLET | Refills: 3 | Status: SHIPPED | OUTPATIENT
Start: 2025-04-16

## 2025-04-16 RX ORDER — ZOLPIDEM TARTRATE 10 MG/1
10 TABLET ORAL NIGHTLY
Qty: 90 TABLET | Refills: 1 | Status: SHIPPED | OUTPATIENT
Start: 2025-04-16

## 2025-04-17 NOTE — PROGRESS NOTES
"Chief Complaint  Depression    Subjective          Yoanna Reed presents to [facility] PRIMARY CARE  History of Present Illness  The patient is a female who came in to discuss her breast health and manage her medications.    She wants to schedule her annual breast MRI because of her previous surgeries. Last year, she had a mammogram and then an MRI before her surgery. She mentioned that she has had two masses, with the right side being the main issue. She has also had ultrasounds done at [facility].    The patient reports experiencing heartburn as a side effect of Prozac, which she has managed by reducing the dosage to one tablet. This adjustment seems to be working well for her.    Objective   Vital Signs:   /84 (BP Location: Left arm, Patient Position: Sitting)   Pulse 98   Temp 98.9 °F (37.2 °C) (Oral)   Resp 14   Ht 170.2 cm (67.01\")   Wt 77.2 kg (170 lb 3.2 oz)   SpO2 98%   BMI 26.65 kg/m²     Physical Exam  Vitals and nursing note reviewed.   Constitutional:       Appearance: She is well-developed.   HENT:      Head: Normocephalic and atraumatic.   Musculoskeletal:      Cervical back: Normal range of motion and neck supple.   Neurological:      Mental Status: She is alert and oriented to person, place, and time.   Psychiatric:         Behavior: Behavior normal.         Physical Exam       Result Review :                 Assessment and Plan    Diagnoses and all orders for this visit:    1. Depression, unspecified depression type (Primary)  -     FLUoxetine (PROzac) 40 MG capsule; Take 1 capsule by mouth Daily.  Dispense: 90 capsule; Refill: 3  -     buPROPion XL (Wellbutrin XL) 150 MG 24 hr tablet; Take 1 tablet by mouth Every Morning.  Dispense: 90 tablet; Refill: 3    2. Anxiety    3. Encounter for breast cancer screening using non-mammogram modality    4. Mass of breast, unspecified laterality  -     MRI Breast Right With & Without Contrast; Future  -     MRI " Breast Left With & Without Contrast; Future  -     US breast bilateral complete; Future    5. Other abnormal and inconclusive findings on diagnostic imaging of breast  -     MRI Breast Right With & Without Contrast; Future  -     MRI Breast Left With & Without Contrast; Future  -     US breast bilateral complete; Future    6. Primary insomnia  -     zolpidem (AMBIEN) 10 MG tablet; Take 1 tablet by mouth Every Night.  Dispense: 90 tablet; Refill: 1      Assessment & Plan  1. Breast mass.  - Screening breast MRI and ultrasound ordered to monitor for any recurrence of the mass.  - Mammogram held off for now.  - Discussion regarding the history of breast masses and previous surgeries.  - Imaging to be conducted at [facility].    2. Medication management.  - Ambien prescription refilled.  - Prozac dosage reduced to 40 mg daily due to heartburn at the higher dose.  - Continued use of Wellbutrin  mg.  - Follow-up scheduled for next month to assess medication effectiveness and overall health.    Follow Up   No follow-ups on file.  Patient was given instructions and counseling regarding her condition or for health maintenance advice. Please see specific information pulled into the AVS if appropriate.           Patient or patient representative verbalized consent for the use of Ambient Listening during the visit with  Tom Muller MD for chart documentation. 4/17/2025  11:23 EDT

## 2025-05-01 DIAGNOSIS — G89.29 OTHER CHRONIC PAIN: ICD-10-CM

## 2025-05-01 DIAGNOSIS — M25.511 ACUTE PAIN OF RIGHT SHOULDER: ICD-10-CM

## 2025-05-01 NOTE — TELEPHONE ENCOUNTER
Rx Refill Note  Requested Prescriptions     Pending Prescriptions Disp Refills    tiZANidine (Zanaflex) 4 MG tablet 42 tablet 1     Sig: Take 1 tablet by mouth Every 8 (Eight) Hours As Needed for Muscle Spasms.      Last office visit with prescribing clinician: 4/16/2025   Last telemedicine visit with prescribing clinician: Visit date not found   Next office visit with prescribing clinician: 5/1/2025       Chacha Nugent  05/01/25, 08:56 EDT

## 2025-05-05 DIAGNOSIS — F51.01 PRIMARY INSOMNIA: ICD-10-CM

## 2025-05-05 RX ORDER — HYDROCODONE BITARTRATE AND ACETAMINOPHEN 7.5; 325 MG/1; MG/1
1 TABLET ORAL EVERY 6 HOURS PRN
Qty: 90 TABLET | Refills: 0 | Status: SHIPPED | OUTPATIENT
Start: 2025-05-05

## 2025-05-05 NOTE — TELEPHONE ENCOUNTER
Rx Refill Note  Requested Prescriptions     Pending Prescriptions Disp Refills    zolpidem (AMBIEN) 10 MG tablet 90 tablet 1     Sig: Take 1 tablet by mouth Every Night.      Last office visit with prescribing clinician: 4/16/2025   Last telemedicine visit with prescribing clinician: Visit date not found   Next office visit with prescribing clinician: 5/19/2025       Chacha Nugent  05/05/25, 11:06 EDT

## 2025-05-06 RX ORDER — ZOLPIDEM TARTRATE 10 MG/1
10 TABLET ORAL NIGHTLY
Qty: 90 TABLET | Refills: 0 | Status: SHIPPED | OUTPATIENT
Start: 2025-05-06

## 2025-05-19 ENCOUNTER — OFFICE VISIT (OUTPATIENT)
Dept: INTERNAL MEDICINE | Facility: CLINIC | Age: 70
End: 2025-05-19
Payer: MEDICARE

## 2025-05-19 VITALS
OXYGEN SATURATION: 95 % | TEMPERATURE: 98.4 F | BODY MASS INDEX: 27 KG/M2 | HEART RATE: 83 BPM | WEIGHT: 172 LBS | DIASTOLIC BLOOD PRESSURE: 78 MMHG | HEIGHT: 67 IN | RESPIRATION RATE: 16 BRPM | SYSTOLIC BLOOD PRESSURE: 122 MMHG

## 2025-05-19 DIAGNOSIS — G43.801 OTHER MIGRAINE WITH STATUS MIGRAINOSUS, NOT INTRACTABLE: Primary | ICD-10-CM

## 2025-05-19 DIAGNOSIS — E78.5 HYPERLIPIDEMIA, UNSPECIFIED HYPERLIPIDEMIA TYPE: ICD-10-CM

## 2025-05-19 DIAGNOSIS — F51.01 PRIMARY INSOMNIA: ICD-10-CM

## 2025-05-19 DIAGNOSIS — M25.511 ACUTE PAIN OF RIGHT SHOULDER: ICD-10-CM

## 2025-05-19 DIAGNOSIS — F41.9 ANXIETY: ICD-10-CM

## 2025-05-19 DIAGNOSIS — G89.29 OTHER CHRONIC PAIN: ICD-10-CM

## 2025-05-19 DIAGNOSIS — F32.A DEPRESSION, UNSPECIFIED DEPRESSION TYPE: ICD-10-CM

## 2025-05-19 DIAGNOSIS — R73.03 PREDIABETES: ICD-10-CM

## 2025-05-19 RX ORDER — HYDROCODONE BITARTRATE AND ACETAMINOPHEN 7.5; 325 MG/1; MG/1
1 TABLET ORAL EVERY 6 HOURS PRN
Qty: 90 TABLET | Refills: 0 | Status: SHIPPED | OUTPATIENT
Start: 2025-05-19

## 2025-05-19 RX ORDER — LORAZEPAM 1 MG/1
1 TABLET ORAL EVERY 6 HOURS PRN
Qty: 240 TABLET | Refills: 1 | Status: SHIPPED | OUTPATIENT
Start: 2025-05-19

## 2025-05-19 RX ORDER — BUPROPION HYDROCHLORIDE 150 MG/1
150 TABLET ORAL EVERY MORNING
Qty: 90 TABLET | Refills: 3 | Status: SHIPPED | OUTPATIENT
Start: 2025-05-19

## 2025-05-19 RX ORDER — PRAVASTATIN SODIUM 20 MG
20 TABLET ORAL DAILY
Qty: 90 TABLET | Refills: 3 | Status: SHIPPED | OUTPATIENT
Start: 2025-05-19

## 2025-05-19 RX ORDER — ZOLPIDEM TARTRATE 10 MG/1
10 TABLET ORAL NIGHTLY
Qty: 90 TABLET | Refills: 1 | Status: SHIPPED | OUTPATIENT
Start: 2025-05-19

## 2025-05-19 RX ORDER — FLUOXETINE HYDROCHLORIDE 40 MG/1
40 CAPSULE ORAL DAILY
Qty: 90 CAPSULE | Refills: 3 | Status: SHIPPED | OUTPATIENT
Start: 2025-05-19

## 2025-05-20 LAB
ALBUMIN SERPL-MCNC: 4.6 G/DL (ref 3.9–4.9)
ALP SERPL-CCNC: 63 IU/L (ref 44–121)
ALT SERPL-CCNC: 15 IU/L (ref 0–32)
AST SERPL-CCNC: 14 IU/L (ref 0–40)
BASOPHILS # BLD AUTO: 0 X10E3/UL (ref 0–0.2)
BASOPHILS NFR BLD AUTO: 1 %
BILIRUB SERPL-MCNC: 0.4 MG/DL (ref 0–1.2)
BUN SERPL-MCNC: 12 MG/DL (ref 8–27)
BUN/CREAT SERPL: 14 (ref 12–28)
CALCIUM SERPL-MCNC: 9.3 MG/DL (ref 8.7–10.3)
CHLORIDE SERPL-SCNC: 95 MMOL/L (ref 96–106)
CHOLEST SERPL-MCNC: 206 MG/DL (ref 100–199)
CO2 SERPL-SCNC: 22 MMOL/L (ref 20–29)
CREAT SERPL-MCNC: 0.87 MG/DL (ref 0.57–1)
EGFRCR SERPLBLD CKD-EPI 2021: 72 ML/MIN/1.73
EOSINOPHIL # BLD AUTO: 0 X10E3/UL (ref 0–0.4)
EOSINOPHIL NFR BLD AUTO: 1 %
ERYTHROCYTE [DISTWIDTH] IN BLOOD BY AUTOMATED COUNT: 12.7 % (ref 11.7–15.4)
FT4I SERPL CALC-MCNC: 2 (ref 1.2–4.9)
GLOBULIN SER CALC-MCNC: 2.5 G/DL (ref 1.5–4.5)
GLUCOSE SERPL-MCNC: 67 MG/DL (ref 70–99)
HBA1C MFR BLD: 5.5 % (ref 4.8–5.6)
HCT VFR BLD AUTO: 41.2 % (ref 34–46.6)
HDLC SERPL-MCNC: 62 MG/DL
HGB BLD-MCNC: 13.1 G/DL (ref 11.1–15.9)
IMM GRANULOCYTES # BLD AUTO: 0 X10E3/UL (ref 0–0.1)
IMM GRANULOCYTES NFR BLD AUTO: 0 %
LDLC SERPL CALC-MCNC: 122 MG/DL (ref 0–99)
LDLC/HDLC SERPL: 2 RATIO (ref 0–3.2)
LYMPHOCYTES # BLD AUTO: 1.2 X10E3/UL (ref 0.7–3.1)
LYMPHOCYTES NFR BLD AUTO: 26 %
MCH RBC QN AUTO: 32.3 PG (ref 26.6–33)
MCHC RBC AUTO-ENTMCNC: 31.8 G/DL (ref 31.5–35.7)
MCV RBC AUTO: 102 FL (ref 79–97)
MONOCYTES # BLD AUTO: 0.4 X10E3/UL (ref 0.1–0.9)
MONOCYTES NFR BLD AUTO: 9 %
NEUTROPHILS # BLD AUTO: 2.9 X10E3/UL (ref 1.4–7)
NEUTROPHILS NFR BLD AUTO: 63 %
PLATELET # BLD AUTO: 296 X10E3/UL (ref 150–450)
POTASSIUM SERPL-SCNC: 4.3 MMOL/L (ref 3.5–5.2)
PROT SERPL-MCNC: 7.1 G/DL (ref 6–8.5)
RBC # BLD AUTO: 4.05 X10E6/UL (ref 3.77–5.28)
SODIUM SERPL-SCNC: 132 MMOL/L (ref 134–144)
T3RU NFR SERPL: 27 % (ref 24–39)
T4 SERPL-MCNC: 7.3 UG/DL (ref 4.5–12)
TRIGL SERPL-MCNC: 124 MG/DL (ref 0–149)
TSH SERPL DL<=0.005 MIU/L-ACNC: 0.58 UIU/ML (ref 0.45–4.5)
VLDLC SERPL CALC-MCNC: 22 MG/DL (ref 5–40)
WBC # BLD AUTO: 4.6 X10E3/UL (ref 3.4–10.8)

## 2025-05-22 NOTE — PROGRESS NOTES
"Chief Complaint  Depression    Subjective          Yoanna Reed presents to [facility] PRIMARY CARE  History of Present Illness  The patient came in for evaluation of her migraine, muscle pain, anxiety, depression, and cholesterol management.    She had a really bad migraine over the weekend, which has now eased. She hasn't been taking any medication for migraines and has been trying to cope without treatment.    She asked for a refill of her tizanidine 4 mg prescription, which she finds helpful for her shoulder pain. Hydrocodone also helps with her shoulder and neck discomfort.    She has been prescribed pravastatin at two different dosages, 20 mg and 40 mg, but has only been taking the 20 mg dose. Her insurance initially refused to cover the 20 mg dose and instead paid for the 40 mg dose. She was told to take the 40 mg dose at night, but she has been taking the 20 mg dose in the morning. She has three bottles of the medication at home and will check the dosage when she gets back.    She continues to take lorazepam for anxiety, Wellbutrin  mg daily, and Prozac 40 mg daily, which are helping with her depression and anxiety. She recently refilled her Ambien 10 mg prescription, which she takes every night.    Objective   Vital Signs:   /78 (BP Location: Left arm, Patient Position: Sitting)   Pulse 83   Temp 98.4 °F (36.9 °C) (Oral)   Resp 16   Ht 170.2 cm (67.01\")   Wt 78 kg (172 lb)   SpO2 95%   BMI 26.93 kg/m²     Physical Exam  Vitals and nursing note reviewed.   Constitutional:       Appearance: She is well-developed.   HENT:      Head: Normocephalic and atraumatic.   Musculoskeletal:      Cervical back: Normal range of motion and neck supple.   Neurological:      Mental Status: She is alert and oriented to person, place, and time.   Psychiatric:         Behavior: Behavior normal.         Physical Exam       Result Review :                 Assessment and Plan  "   Diagnoses and all orders for this visit:    1. Other migraine with status migrainosus, not intractable (Primary)  -     ubrogepant (Ubrelvy) 100 MG tablet; Take 1 tablet by mouth 1 (One) Time As Needed (migraine).  Dispense: 16 tablet; Refill: 11    2. Acute pain of right shoulder  -     tiZANidine (Zanaflex) 4 MG tablet; Take 1 tablet by mouth Every 8 (Eight) Hours As Needed for Muscle Spasms.  Dispense: 42 tablet; Refill: 1    3. Other chronic pain  -     HYDROcodone-acetaminophen (Norco) 7.5-325 MG per tablet; Take 1 tablet by mouth Every 6 (Six) Hours As Needed for Moderate Pain.  Dispense: 90 tablet; Refill: 0    4. Anxiety  -     LORazepam (Ativan) 1 MG tablet; Take 1 tablet by mouth Every 6 (Six) Hours As Needed for Anxiety.  Dispense: 240 tablet; Refill: 1    5. Hyperlipidemia, unspecified hyperlipidemia type  -     pravastatin (PRAVACHOL) 20 MG tablet; Take 1 tablet by mouth Daily.  Dispense: 90 tablet; Refill: 3  -     Comprehensive Metabolic Panel  -     Hemoglobin A1c  -     Lipid Panel With LDL / HDL Ratio    6. Depression, unspecified depression type  -     buPROPion XL (Wellbutrin XL) 150 MG 24 hr tablet; Take 1 tablet by mouth Every Morning.  Dispense: 90 tablet; Refill: 3  -     FLUoxetine (PROzac) 40 MG capsule; Take 1 capsule by mouth Daily.  Dispense: 90 capsule; Refill: 3    7. Primary insomnia  -     zolpidem (AMBIEN) 10 MG tablet; Take 1 tablet by mouth Every Night.  Dispense: 90 tablet; Refill: 1    8. Prediabetes  -     CBC & Differential  -     Comprehensive Metabolic Panel  -     Hemoglobin A1c  -     Thyroid Panel With TSH      Assessment & Plan  1. Migraine.  - The patient reported experiencing a severe migraine over the weekend, which has since eased.  - No current migraine symptoms were noted during the visit.  - A prescription for Ubrelvy has been issued, with instructions to administer it at the onset of a migraine episode. The medication should alleviate symptoms within a 2-hour  timeframe.  - If the insurance does not cover it, samples will be provided.    2. Muscle pain.  - The patient reported that tizanidine and hydrocodone are effective in managing shoulder and neck pain.  - Physical exam findings were not discussed.  - The effectiveness of the current medications was confirmed by the patient.  - Refills for tizanidine 4 mg and hydrocodone have been provided to manage shoulder and neck pain.    3. Cholesterol management.  - The patient has been taking pravastatin 20 mg daily, despite a previous prescription for 40 mg.  - The last cholesterol check in 02/2025 showed good results while on 20 mg.  - The dosage of pravastatin has been officially adjusted from 40 mg to 20 mg.  - The prescription for pravastatin 20 mg has been sent to the pharmacy.    4. Anxiety.  - The patient continues to take lorazepam for anxiety management.  - No new physical exam findings or test results were discussed.  - The patient confirmed that the current regimen is effective.  - The prescription for lorazepam has been refilled to manage anxiety.    5. Depression.  - The patient is currently taking Wellbutrin  mg daily and Prozac 40 mg daily.  - No new physical exam findings or test results were discussed.  - The patient reported improvement in depression and anxiety symptoms.  - The current medication regimen will be continued.    6. Insomnia.  - The patient is taking Ambien 10 mg nightly for insomnia.  - No new physical exam findings or test results were discussed.  - The patient confirmed the effectiveness of the medication.  - The prescription for Ambien 10 mg nightly has been refilled.    Follow-up  - The patient will follow up next month.  - Blood work will be conducted during the next visit.    Follow Up   No follow-ups on file.  Patient was given instructions and counseling regarding her condition or for health maintenance advice. Please see specific information pulled into the AVS if appropriate.            Patient or patient representative verbalized consent for the use of Ambient Listening during the visit with  Tom Muller MD for chart documentation. 5/22/2025  18:19 EDT

## 2025-05-27 ENCOUNTER — TELEPHONE (OUTPATIENT)
Dept: INTERNAL MEDICINE | Facility: CLINIC | Age: 70
End: 2025-05-27
Payer: MEDICARE

## 2025-05-27 DIAGNOSIS — R92.8 OTHER ABNORMAL AND INCONCLUSIVE FINDINGS ON DIAGNOSTIC IMAGING OF BREAST: ICD-10-CM

## 2025-05-27 DIAGNOSIS — R92.8 ABNORMAL MAMMOGRAM: ICD-10-CM

## 2025-05-27 DIAGNOSIS — N63.10 MASS OF RIGHT BREAST, UNSPECIFIED QUADRANT: Primary | ICD-10-CM

## 2025-05-27 NOTE — TELEPHONE ENCOUNTER
Denied on May 23 by [employer] 2017  [place] Case: [id number], Status: Denied. Notification: Completed.

## 2025-06-03 DIAGNOSIS — N63.10 MASS OF RIGHT BREAST, UNSPECIFIED QUADRANT: Primary | ICD-10-CM

## 2025-06-03 DIAGNOSIS — R92.2 INCONCLUSIVE MAMMOGRAM: ICD-10-CM

## 2025-06-05 DIAGNOSIS — K58.0 IRRITABLE BOWEL SYNDROME WITH DIARRHEA: ICD-10-CM

## 2025-06-09 DIAGNOSIS — M25.511 ACUTE PAIN OF RIGHT SHOULDER: ICD-10-CM

## 2025-06-09 NOTE — TELEPHONE ENCOUNTER
Caller: Yoanna Reed    Relationship: Self    Best call back number: [phone]     Requested Prescriptions:   Requested Prescriptions     Pending Prescriptions Disp Refills    tiZANidine (Zanaflex) 4 MG tablet 42 tablet 1     Sig: Take 1 tablet by mouth Every 8 (Eight) Hours As Needed for Muscle Spasms.        Pharmacy where request should be sent: [id number] [place], [place] - [address] [facility]. AT [facility] [phone] [facility] [phone]      Last office visit with prescribing clinician: 5/19/2025   Last telemedicine visit with prescribing clinician: Visit date not found   Next office visit with prescribing clinician: 6/25/2025     Additional details provided by patient: PATIENT IS NEEDING REFILLS ON THIS MEDICATION.     Does the patient have less than a 3 day supply:  [x] Yes  [] No    Would you like a call back once the refill request has been completed: [] Yes [x] No    If the office needs to give you a call back, can they leave a voicemail: [] Yes [x] No    Edgard Omer   06/09/25 12:34 EDT

## 2025-06-10 RX ORDER — COLESTIPOL HYDROCHLORIDE 1 G/1
1 TABLET ORAL 2 TIMES DAILY
Qty: 180 TABLET | Refills: 1 | OUTPATIENT
Start: 2025-06-10

## 2025-06-10 NOTE — TELEPHONE ENCOUNTER
Rx Refill Note  Requested Prescriptions     Pending Prescriptions Disp Refills    tiZANidine (Zanaflex) 4 MG tablet 42 tablet 1     Sig: Take 1 tablet by mouth Every 8 (Eight) Hours As Needed for Muscle Spasms.      Last office visit with prescribing clinician: 5/19/2025   Last telemedicine visit with prescribing clinician: Visit date not found   Next office visit with prescribing clinician: 6/25/2025       Chacha Nugent  06/10/25, 10:07 EDT

## 2025-06-18 DIAGNOSIS — M19.91 PRIMARY OSTEOARTHRITIS, UNSPECIFIED SITE: ICD-10-CM

## 2025-06-18 DIAGNOSIS — R06.02 SHORTNESS OF BREATH: ICD-10-CM

## 2025-06-18 DIAGNOSIS — F32.A DEPRESSION, UNSPECIFIED DEPRESSION TYPE: ICD-10-CM

## 2025-06-18 DIAGNOSIS — J20.9 ACUTE BRONCHITIS, UNSPECIFIED ORGANISM: ICD-10-CM

## 2025-06-18 DIAGNOSIS — F41.9 ANXIETY: ICD-10-CM

## 2025-06-19 RX ORDER — ESCITALOPRAM OXALATE 20 MG/1
20 TABLET ORAL DAILY
Qty: 90 TABLET | Refills: 1 | Status: SHIPPED | OUTPATIENT
Start: 2025-06-19

## 2025-06-19 RX ORDER — ALBUTEROL SULFATE 90 UG/1
2 INHALANT RESPIRATORY (INHALATION) EVERY 4 HOURS PRN
Qty: 18 G | Refills: 3 | Status: SHIPPED | OUTPATIENT
Start: 2025-06-19

## 2025-06-19 RX ORDER — MELOXICAM 15 MG/1
15 TABLET ORAL DAILY
Qty: 60 TABLET | Refills: 3 | Status: SHIPPED | OUTPATIENT
Start: 2025-06-19

## 2025-06-25 ENCOUNTER — OFFICE VISIT (OUTPATIENT)
Dept: INTERNAL MEDICINE | Facility: CLINIC | Age: 70
End: 2025-06-25
Payer: MEDICARE

## 2025-06-25 VITALS
DIASTOLIC BLOOD PRESSURE: 80 MMHG | SYSTOLIC BLOOD PRESSURE: 132 MMHG | BODY MASS INDEX: 27.15 KG/M2 | TEMPERATURE: 98.1 F | WEIGHT: 173 LBS | HEART RATE: 86 BPM | RESPIRATION RATE: 20 BRPM | OXYGEN SATURATION: 95 % | HEIGHT: 67 IN

## 2025-06-25 DIAGNOSIS — E87.1 HYPONATREMIA: ICD-10-CM

## 2025-06-25 DIAGNOSIS — G89.29 OTHER CHRONIC PAIN: ICD-10-CM

## 2025-06-25 DIAGNOSIS — G43.801 OTHER MIGRAINE WITH STATUS MIGRAINOSUS, NOT INTRACTABLE: ICD-10-CM

## 2025-06-25 DIAGNOSIS — M25.511 ACUTE PAIN OF RIGHT SHOULDER: ICD-10-CM

## 2025-06-25 DIAGNOSIS — R42 DIZZINESS: ICD-10-CM
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
## 2025-06-25 RX ORDER — HYDROCODONE BITARTRATE AND ACETAMINOPHEN 7.5; 325 MG/1; MG/1
1 TABLET ORAL EVERY 6 HOURS PRN
Qty: 90 TABLET | Refills: 0 | Status: ON HOLD | OUTPATIENT
Start: 2025-06-25

## 2025-06-26 LAB
ALBUMIN SERPL-MCNC: 4.4 G/DL (ref 3.9–4.9)
ALP SERPL-CCNC: 84 IU/L (ref 44–121)
ALT SERPL-CCNC: 8 IU/L (ref 0–32)
AST SERPL-CCNC: 13 IU/L (ref 0–40)
BILIRUB SERPL-MCNC: 0.5 MG/DL (ref 0–1.2)
BUN SERPL-MCNC: 11 MG/DL (ref 8–27)
BUN/CREAT SERPL: 13 (ref 12–28)
CALCIUM SERPL-MCNC: 9.6 MG/DL (ref 8.7–10.3)
CHLORIDE SERPL-SCNC: 98 MMOL/L (ref 96–106)
CO2 SERPL-SCNC: 22 MMOL/L (ref 20–29)
CREAT SERPL-MCNC: 0.82 MG/DL (ref 0.57–1)
EGFRCR SERPLBLD CKD-EPI 2021: 77 ML/MIN/1.73
GLOBULIN SER CALC-MCNC: 3.2 G/DL (ref 1.5–4.5)
GLUCOSE SERPL-MCNC: 94 MG/DL (ref 70–99)
POTASSIUM SERPL-SCNC: 4.1 MMOL/L (ref 3.5–5.2)
PROT SERPL-MCNC: 7.6 G/DL (ref 6–8.5)
SODIUM SERPL-SCNC: 137 MMOL/L (ref 134–144)

## 2025-06-29 NOTE — PROGRESS NOTES
"Chief Complaint  Depression, Dizziness (\"Balance is off\"), and Fall    Subjective          Yoanna Reed presents to [facility] PR[facility]  History of Present Illness  The patient came in today because she's been feeling dizzy and having migraines and pain. She mentioned that when she gets dizzy, she usually grabs onto something nearby to steady herself. Recently, she fell in her garage and went down the steps on her butt. She doesn't feel like the room is spinning, but her head feels dizzy, especially in a specific spot. She has been seeing a homeopathic practitioner for her back, which has improved, and this practitioner referred her to another specialist who did a laser procedure on her head to check for issues with cranial crystals. This procedure was done on Monday and is scheduled again for Friday.    She has had two migraines and was given a sample of medication with four tablets, of which she has taken two, and they helped. She still has two tablets left. She continues to have pain after her fall and has been taking hydrocodone and Zanaflex for relief. Her sodium levels were found to be very low during her last blood work.    She takes her cholesterol medicine at 20 mg every day, even though she has 40 mg tablets because the pharmacy keeps filling them. She saves the 40 mg tablets in case she needs them later.    Objective   Vital Signs:   /80 (BP Location: Left arm, Patient Position: Sitting)   Pulse 86   Temp 98.1 °F (36.7 °C) (Oral)   Resp 20   Ht 170.2 cm (67.01\")   Wt 78.5 kg (173 lb)   SpO2 95%   BMI 27.09 kg/m²     Physical Exam  Vitals and nursing note reviewed.   Constitutional:       Appearance: She is well-developed.   HENT:      Head: Normocephalic and atraumatic.   Musculoskeletal:      Cervical back: Normal range of motion and neck supple.   Neurological:      Mental Status: She is alert and oriented to person, place, and time.   Psychiatric:         " Behavior: Behavior normal.         Physical Exam  Neurological: No dizziness or vertigo noted during examination. Negative Romberg test.     Result Review :                 Assessment and Plan    Diagnoses and all orders for this visit:    1. Fall, initial encounter (Primary)    2. Dizziness    3. Other migraine with status migrainosus, not intractable  -     ubrogepant (Ubrelvy) 100 MG tablet; Take 1 tablet by mouth 1 (One) Time As Needed (migraine).  Dispense: 16 tablet; Refill: 11    4. Other chronic pain  -     HYDROcodone-acetaminophen (Norco) 7.5-325 MG per tablet; Take 1 tablet by mouth Every 6 (Six) Hours As Needed for Moderate Pain.  Dispense: 90 tablet; Refill: 0    5. Acute pain of right shoulder  -     tiZANidine (Zanaflex) 4 MG tablet; Take 1 tablet by mouth Every 8 (Eight) Hours As Needed for Muscle Spasms.  Dispense: 42 tablet; Refill: 1    6. Hyponatremia  -     Comprehensive Metabolic Panel      Assessment & Plan  1. Dizziness.  - The etiology of the dizziness remains uncertain.  - No sensation of the room spinning reported.  - Advised to utilize a cane for mobility in the interim.  - A comprehensive metabolic panel (CMP) will be ordered to further investigate the cause of her symptoms.    2. Migraines.  - Experienced two migraines.  - Medication provided by the office has helped.  - Prescription for Ubrelvy will be provided to manage her migraine headaches.  - Patient still has two pills left from the previous prescription.    3. Pain management.  - Reports pain since her fall.  - Refills for hydrocodone and Zanaflex will be provided to manage her pain.  - Muscle relaxer and pain pill still available.    4. Low sodium level.  - Sodium levels were found to be significantly low during her last blood work.  - A comprehensive metabolic panel (CMP) will be ordered to further investigate the cause of her symptoms.  - Ensure someone walks her to her car after the lab visit.    5. Cholesterol  management.  - Takes cholesterol medicine 20 mg every day despite having 40 mg available.  - Puts the 40 mg back in case needed.  - No need to call in new prescription yet.    Follow Up   No follow-ups on file.  Patient was given instructions and counseling regarding her condition or for health maintenance advice. Please see specific information pulled into the AVS if appropriate.           Patient or patient representative verbalized consent for the use of Ambient Listening during the visit with  Tom Muller MD for chart documentation. 6/29/2025  10:18 EDT

## 2025-06-30 ENCOUNTER — HOSPITAL ENCOUNTER (INPATIENT)
Facility: HOSPITAL | Age: 70
LOS: 13 days | Discharge: LEFT BEFORE DISCHARGE | DRG: 871 | End: 2025-07-13
Attending: STUDENT IN AN ORGANIZED HEALTH CARE EDUCATION/TRAINING PROGRAM | Admitting: INTERNAL MEDICINE
Payer: MEDICARE

## 2025-06-30 ENCOUNTER — APPOINTMENT (OUTPATIENT)
Dept: CT IMAGING | Facility: HOSPITAL | Age: 70
DRG: 871 | End: 2025-06-30
Payer: MEDICARE

## 2025-06-30 DIAGNOSIS — R29.6 RECURRENT FALLS: ICD-10-CM

## 2025-06-30 DIAGNOSIS — A41.9 SEVERE SEPSIS: Primary | ICD-10-CM

## 2025-06-30 DIAGNOSIS — I21.4 NSTEMI (NON-ST ELEVATED MYOCARDIAL INFARCTION): ICD-10-CM

## 2025-06-30 DIAGNOSIS — J90 PLEURAL EFFUSION: ICD-10-CM

## 2025-06-30 DIAGNOSIS — M54.50 ACUTE MIDLINE LOW BACK PAIN WITHOUT SCIATICA: ICD-10-CM

## 2025-06-30 DIAGNOSIS — R65.20 SEVERE SEPSIS: Primary | ICD-10-CM

## 2025-06-30 DIAGNOSIS — R55 NEAR SYNCOPE: ICD-10-CM

## 2025-06-30 PROBLEM — R79.89 ELEVATED TROPONIN: Status: ACTIVE | Noted: 2025-06-30

## 2025-06-30 PROBLEM — N17.9 AKI (ACUTE KIDNEY INJURY): Status: ACTIVE | Noted: 2025-06-30

## 2025-06-30 LAB
ALBUMIN SERPL-MCNC: 3.4 G/DL (ref 3.5–5.2)
ALBUMIN/GLOB SERPL: 0.9 G/DL
ALP SERPL-CCNC: 88 U/L (ref 39–117)
ALT SERPL W P-5'-P-CCNC: 14 U/L (ref 1–33)
ANION GAP SERPL CALCULATED.3IONS-SCNC: 17.9 MMOL/L (ref 5–15)
APTT PPP: 28.9 SECONDS (ref 22.7–35.4)
AST SERPL-CCNC: 25 U/L (ref 1–32)
BACTERIA UR QL AUTO: ABNORMAL /HPF
BASOPHILS # BLD AUTO: 0.04 10*3/MM3 (ref 0–0.2)
BASOPHILS NFR BLD AUTO: 0.2 % (ref 0–1.5)
BILIRUB SERPL-MCNC: 0.8 MG/DL (ref 0–1.2)
BILIRUB UR QL STRIP: NEGATIVE
BUN SERPL-MCNC: 29 MG/DL (ref 8–23)
BUN/CREAT SERPL: 19.9 (ref 7–25)
CALCIUM SPEC-SCNC: 9.4 MG/DL (ref 8.6–10.5)
CHLORIDE SERPL-SCNC: 95 MMOL/L (ref 98–107)
CK SERPL-CCNC: 833 U/L (ref 20–180)
CLARITY UR: CLEAR
CO2 SERPL-SCNC: 18.1 MMOL/L (ref 22–29)
COLOR UR: YELLOW
CREAT SERPL-MCNC: 1.46 MG/DL (ref 0.57–1)
D DIMER PPP FEU-MCNC: 4.44 MCGFEU/ML (ref 0–0.69)
D-LACTATE SERPL-SCNC: 1.6 MMOL/L (ref 0.5–2)
DEPRECATED RDW RBC AUTO: 44.7 FL (ref 37–54)
EGFRCR SERPLBLD CKD-EPI 2021: 38.8 ML/MIN/1.73
EOSINOPHIL # BLD AUTO: 0.03 10*3/MM3 (ref 0–0.4)
EOSINOPHIL NFR BLD AUTO: 0.2 % (ref 0.3–6.2)
ERYTHROCYTE [DISTWIDTH] IN BLOOD BY AUTOMATED COUNT: 12.5 % (ref 12.3–15.4)
GEN 5 1HR TROPONIN T REFLEX: 61 NG/L
GLOBULIN UR ELPH-MCNC: 3.9 GM/DL
GLUCOSE SERPL-MCNC: 124 MG/DL (ref 65–99)
GLUCOSE UR STRIP-MCNC: NEGATIVE MG/DL
GRAN CASTS URNS QL MICRO: ABNORMAL /LPF
HCT VFR BLD AUTO: 37.8 % (ref 34–46.6)
HGB BLD-MCNC: 12.8 G/DL (ref 12–15.9)
HGB UR QL STRIP.AUTO: ABNORMAL
HOLD SPECIMEN: NORMAL
HOLD SPECIMEN: NORMAL
HYALINE CASTS UR QL AUTO: ABNORMAL /LPF
IMM GRANULOCYTES # BLD AUTO: 0.24 10*3/MM3 (ref 0–0.05)
IMM GRANULOCYTES NFR BLD AUTO: 1.3 % (ref 0–0.5)
INR PPP: 1.39 (ref 0.9–1.1)
KETONES UR QL STRIP: ABNORMAL
L PNEUMO1 AG UR QL IA: NEGATIVE
LEUKOCYTE ESTERASE UR QL STRIP.AUTO: ABNORMAL
LYMPHOCYTES # BLD AUTO: 1.02 10*3/MM3 (ref 0.7–3.1)
LYMPHOCYTES NFR BLD AUTO: 5.6 % (ref 19.6–45.3)
MCH RBC QN AUTO: 33.2 PG (ref 26.6–33)
MCHC RBC AUTO-ENTMCNC: 33.9 G/DL (ref 31.5–35.7)
MCV RBC AUTO: 98.2 FL (ref 79–97)
MONOCYTES # BLD AUTO: 1.05 10*3/MM3 (ref 0.1–0.9)
MONOCYTES NFR BLD AUTO: 5.8 % (ref 5–12)
NEUTROPHILS NFR BLD AUTO: 15.82 10*3/MM3 (ref 1.7–7)
NEUTROPHILS NFR BLD AUTO: 86.9 % (ref 42.7–76)
NITRITE UR QL STRIP: NEGATIVE
NRBC BLD AUTO-RTO: 0 /100 WBC (ref 0–0.2)
NT-PROBNP SERPL-MCNC: 2460 PG/ML (ref 0–900)
PH UR STRIP.AUTO: 6 [PH] (ref 5–8)
PLATELET # BLD AUTO: 597 10*3/MM3 (ref 140–450)
PMV BLD AUTO: 8.7 FL (ref 6–12)
POTASSIUM SERPL-SCNC: 3.3 MMOL/L (ref 3.5–5.2)
PROCALCITONIN SERPL-MCNC: 2.18 NG/ML (ref 0–0.25)
PROT SERPL-MCNC: 7.3 G/DL (ref 6–8.5)
PROT UR QL STRIP: ABNORMAL
PROTHROMBIN TIME: 17 SECONDS (ref 11.7–14.2)
QT INTERVAL: 374 MS
QTC INTERVAL: 434 MS
RBC # BLD AUTO: 3.85 10*6/MM3 (ref 3.77–5.28)
RBC # UR STRIP: ABNORMAL /HPF
REF LAB TEST METHOD: ABNORMAL
S PNEUM AG SPEC QL LA: NEGATIVE
SODIUM SERPL-SCNC: 131 MMOL/L (ref 136–145)
SP GR UR STRIP: >1.03 (ref 1–1.03)
SQUAMOUS #/AREA URNS HPF: ABNORMAL /HPF
TROPONIN T % DELTA: -16
TROPONIN T NUMERIC DELTA: -12 NG/L
TROPONIN T SERPL HS-MCNC: 73 NG/L
UROBILINOGEN UR QL STRIP: ABNORMAL
WBC # UR STRIP: ABNORMAL /HPF
WBC NRBC COR # BLD AUTO: 18.2 10*3/MM3 (ref 3.4–10.8)
WHOLE BLOOD HOLD COAG: NORMAL
WHOLE BLOOD HOLD SPECIMEN: NORMAL

## 2025-06-30 PROCEDURE — 87449 NOS EACH ORGANISM AG IA: CPT | Performed by: HOSPITALIST

## 2025-06-30 PROCEDURE — 83605 ASSAY OF LACTIC ACID: CPT | Performed by: STUDENT IN AN ORGANIZED HEALTH CARE EDUCATION/TRAINING PROGRAM

## 2025-06-30 PROCEDURE — 84484 ASSAY OF TROPONIN QUANT: CPT | Performed by: STUDENT IN AN ORGANIZED HEALTH CARE EDUCATION/TRAINING PROGRAM

## 2025-06-30 PROCEDURE — 25010000002 CEFTRIAXONE PER 250 MG: Performed by: STUDENT IN AN ORGANIZED HEALTH CARE EDUCATION/TRAINING PROGRAM

## 2025-06-30 PROCEDURE — 70450 CT HEAD/BRAIN W/O DYE: CPT

## 2025-06-30 PROCEDURE — 94799 UNLISTED PULMONARY SVC/PX: CPT

## 2025-06-30 PROCEDURE — 72131 CT LUMBAR SPINE W/O DYE: CPT

## 2025-06-30 PROCEDURE — 71275 CT ANGIOGRAPHY CHEST: CPT

## 2025-06-30 PROCEDURE — 85379 FIBRIN DEGRADATION QUANT: CPT | Performed by: STUDENT IN AN ORGANIZED HEALTH CARE EDUCATION/TRAINING PROGRAM

## 2025-06-30 PROCEDURE — 25510000001 IOPAMIDOL PER 1 ML: Performed by: STUDENT IN AN ORGANIZED HEALTH CARE EDUCATION/TRAINING PROGRAM

## 2025-06-30 PROCEDURE — 36415 COLL VENOUS BLD VENIPUNCTURE: CPT

## 2025-06-30 PROCEDURE — 93005 ELECTROCARDIOGRAM TRACING: CPT | Performed by: STUDENT IN AN ORGANIZED HEALTH CARE EDUCATION/TRAINING PROGRAM

## 2025-06-30 PROCEDURE — 85730 THROMBOPLASTIN TIME PARTIAL: CPT | Performed by: STUDENT IN AN ORGANIZED HEALTH CARE EDUCATION/TRAINING PROGRAM

## 2025-06-30 PROCEDURE — 25810000003 SODIUM CHLORIDE 0.9 % SOLUTION: Performed by: INTERNAL MEDICINE

## 2025-06-30 PROCEDURE — 93010 ELECTROCARDIOGRAM REPORT: CPT | Performed by: INTERNAL MEDICINE

## 2025-06-30 PROCEDURE — 87899 AGENT NOS ASSAY W/OPTIC: CPT | Performed by: HOSPITALIST

## 2025-06-30 PROCEDURE — 87040 BLOOD CULTURE FOR BACTERIA: CPT | Performed by: STUDENT IN AN ORGANIZED HEALTH CARE EDUCATION/TRAINING PROGRAM

## 2025-06-30 PROCEDURE — 82550 ASSAY OF CK (CPK): CPT | Performed by: STUDENT IN AN ORGANIZED HEALTH CARE EDUCATION/TRAINING PROGRAM

## 2025-06-30 PROCEDURE — 25010000002 HYDROMORPHONE PER 4 MG: Performed by: INTERNAL MEDICINE

## 2025-06-30 PROCEDURE — 72125 CT NECK SPINE W/O DYE: CPT

## 2025-06-30 PROCEDURE — 99291 CRITICAL CARE FIRST HOUR: CPT

## 2025-06-30 PROCEDURE — 85610 PROTHROMBIN TIME: CPT | Performed by: STUDENT IN AN ORGANIZED HEALTH CARE EDUCATION/TRAINING PROGRAM

## 2025-06-30 PROCEDURE — 25810000003 LACTATED RINGERS SOLUTION: Performed by: STUDENT IN AN ORGANIZED HEALTH CARE EDUCATION/TRAINING PROGRAM

## 2025-06-30 PROCEDURE — 25010000002 MORPHINE PER 10 MG: Performed by: STUDENT IN AN ORGANIZED HEALTH CARE EDUCATION/TRAINING PROGRAM

## 2025-06-30 PROCEDURE — 85025 COMPLETE CBC W/AUTO DIFF WBC: CPT | Performed by: STUDENT IN AN ORGANIZED HEALTH CARE EDUCATION/TRAINING PROGRAM

## 2025-06-30 PROCEDURE — 80053 COMPREHEN METABOLIC PANEL: CPT | Performed by: STUDENT IN AN ORGANIZED HEALTH CARE EDUCATION/TRAINING PROGRAM

## 2025-06-30 PROCEDURE — 81001 URINALYSIS AUTO W/SCOPE: CPT | Performed by: STUDENT IN AN ORGANIZED HEALTH CARE EDUCATION/TRAINING PROGRAM

## 2025-06-30 PROCEDURE — 83880 ASSAY OF NATRIURETIC PEPTIDE: CPT | Performed by: STUDENT IN AN ORGANIZED HEALTH CARE EDUCATION/TRAINING PROGRAM

## 2025-06-30 PROCEDURE — 84145 PROCALCITONIN (PCT): CPT | Performed by: STUDENT IN AN ORGANIZED HEALTH CARE EDUCATION/TRAINING PROGRAM

## 2025-06-30 RX ORDER — ZOLPIDEM TARTRATE 5 MG/1
5 TABLET ORAL NIGHTLY
Status: DISCONTINUED | OUTPATIENT
Start: 2025-06-30 | End: 2025-07-13 | Stop reason: HOSPADM

## 2025-06-30 RX ORDER — POTASSIUM CHLORIDE 1500 MG/1
40 TABLET, EXTENDED RELEASE ORAL EVERY 4 HOURS
Status: COMPLETED | OUTPATIENT
Start: 2025-06-30 | End: 2025-06-30

## 2025-06-30 RX ORDER — ALBUTEROL SULFATE 0.83 MG/ML
2.5 SOLUTION RESPIRATORY (INHALATION) EVERY 6 HOURS PRN
Status: DISCONTINUED | OUTPATIENT
Start: 2025-06-30 | End: 2025-07-13 | Stop reason: HOSPADM

## 2025-06-30 RX ORDER — AMOXICILLIN 250 MG
2 CAPSULE ORAL 2 TIMES DAILY PRN
Status: DISCONTINUED | OUTPATIENT
Start: 2025-06-30 | End: 2025-07-13 | Stop reason: HOSPADM

## 2025-06-30 RX ORDER — PRAVASTATIN SODIUM 20 MG
20 TABLET ORAL DAILY
Status: DISCONTINUED | OUTPATIENT
Start: 2025-06-30 | End: 2025-07-13 | Stop reason: HOSPADM

## 2025-06-30 RX ORDER — ACETAMINOPHEN 325 MG/1
650 TABLET ORAL EVERY 4 HOURS PRN
Status: DISCONTINUED | OUTPATIENT
Start: 2025-06-30 | End: 2025-07-13 | Stop reason: HOSPADM

## 2025-06-30 RX ORDER — BUPROPION HYDROCHLORIDE 150 MG/1
150 TABLET ORAL EVERY MORNING
Status: DISCONTINUED | OUTPATIENT
Start: 2025-07-01 | End: 2025-07-12

## 2025-06-30 RX ORDER — ONDANSETRON 4 MG/1
4 TABLET, ORALLY DISINTEGRATING ORAL EVERY 6 HOURS PRN
Status: DISCONTINUED | OUTPATIENT
Start: 2025-06-30 | End: 2025-07-10

## 2025-06-30 RX ORDER — OXYCODONE AND ACETAMINOPHEN 5; 325 MG/1; MG/1
1 TABLET ORAL EVERY 4 HOURS PRN
Refills: 0 | Status: DISCONTINUED | OUTPATIENT
Start: 2025-06-30 | End: 2025-07-03

## 2025-06-30 RX ORDER — BISACODYL 10 MG
10 SUPPOSITORY, RECTAL RECTAL DAILY PRN
Status: DISCONTINUED | OUTPATIENT
Start: 2025-06-30 | End: 2025-07-13 | Stop reason: HOSPADM

## 2025-06-30 RX ORDER — ONDANSETRON 4 MG/1
4 TABLET, ORALLY DISINTEGRATING ORAL EVERY 8 HOURS PRN
Status: DISCONTINUED | OUTPATIENT
Start: 2025-06-30 | End: 2025-07-10

## 2025-06-30 RX ORDER — POLYETHYLENE GLYCOL 3350 17 G/17G
17 POWDER, FOR SOLUTION ORAL DAILY PRN
Status: DISCONTINUED | OUTPATIENT
Start: 2025-06-30 | End: 2025-07-13 | Stop reason: HOSPADM

## 2025-06-30 RX ORDER — MORPHINE SULFATE 2 MG/ML
4 INJECTION, SOLUTION INTRAMUSCULAR; INTRAVENOUS ONCE
Status: COMPLETED | OUTPATIENT
Start: 2025-06-30 | End: 2025-06-30

## 2025-06-30 RX ORDER — SODIUM CHLORIDE 9 MG/ML
75 INJECTION, SOLUTION INTRAVENOUS CONTINUOUS
Status: ACTIVE | OUTPATIENT
Start: 2025-06-30 | End: 2025-07-02

## 2025-06-30 RX ORDER — BISACODYL 5 MG/1
5 TABLET, DELAYED RELEASE ORAL DAILY PRN
Status: DISCONTINUED | OUTPATIENT
Start: 2025-06-30 | End: 2025-07-13 | Stop reason: HOSPADM

## 2025-06-30 RX ORDER — HYDROMORPHONE HYDROCHLORIDE 1 MG/ML
0.5 INJECTION, SOLUTION INTRAMUSCULAR; INTRAVENOUS; SUBCUTANEOUS
Status: DISCONTINUED | OUTPATIENT
Start: 2025-06-30 | End: 2025-07-01

## 2025-06-30 RX ORDER — ONDANSETRON 2 MG/ML
4 INJECTION INTRAMUSCULAR; INTRAVENOUS EVERY 6 HOURS PRN
Status: DISCONTINUED | OUTPATIENT
Start: 2025-06-30 | End: 2025-07-10

## 2025-06-30 RX ORDER — HYDROCODONE BITARTRATE AND ACETAMINOPHEN 7.5; 325 MG/1; MG/1
1 TABLET ORAL ONCE
Refills: 0 | Status: COMPLETED | OUTPATIENT
Start: 2025-06-30 | End: 2025-06-30

## 2025-06-30 RX ORDER — IOPAMIDOL 755 MG/ML
100 INJECTION, SOLUTION INTRAVASCULAR
Status: COMPLETED | OUTPATIENT
Start: 2025-06-30 | End: 2025-06-30

## 2025-06-30 RX ORDER — IPRATROPIUM BROMIDE AND ALBUTEROL SULFATE 2.5; .5 MG/3ML; MG/3ML
3 SOLUTION RESPIRATORY (INHALATION)
Status: DISCONTINUED | OUTPATIENT
Start: 2025-06-30 | End: 2025-07-13 | Stop reason: HOSPADM

## 2025-06-30 RX ORDER — LORAZEPAM 1 MG/1
1 TABLET ORAL EVERY 6 HOURS PRN
Status: DISCONTINUED | OUTPATIENT
Start: 2025-06-30 | End: 2025-07-13 | Stop reason: HOSPADM

## 2025-06-30 RX ORDER — ESCITALOPRAM OXALATE 20 MG/1
20 TABLET ORAL DAILY
Status: DISCONTINUED | OUTPATIENT
Start: 2025-06-30 | End: 2025-07-12

## 2025-06-30 RX ORDER — NITROGLYCERIN 0.4 MG/1
0.4 TABLET SUBLINGUAL
Status: DISCONTINUED | OUTPATIENT
Start: 2025-06-30 | End: 2025-07-13 | Stop reason: HOSPADM

## 2025-06-30 RX ADMIN — SODIUM CHLORIDE, POTASSIUM CHLORIDE, SODIUM LACTATE AND CALCIUM CHLORIDE 1000 ML: 600; 310; 30; 20 INJECTION, SOLUTION INTRAVENOUS at 10:52

## 2025-06-30 RX ADMIN — PRAVASTATIN SODIUM 20 MG: 20 TABLET ORAL at 20:17

## 2025-06-30 RX ADMIN — HYDROMORPHONE HYDROCHLORIDE 0.5 MG: 1 INJECTION, SOLUTION INTRAMUSCULAR; INTRAVENOUS; SUBCUTANEOUS at 22:24

## 2025-06-30 RX ADMIN — SODIUM CHLORIDE 75 ML/HR: 9 INJECTION, SOLUTION INTRAVENOUS at 18:30

## 2025-06-30 RX ADMIN — FLUOXETINE 40 MG: 20 CAPSULE ORAL at 20:16

## 2025-06-30 RX ADMIN — POTASSIUM CHLORIDE 40 MEQ: 1500 TABLET, EXTENDED RELEASE ORAL at 17:04

## 2025-06-30 RX ADMIN — IOPAMIDOL 95 ML: 755 INJECTION, SOLUTION INTRAVENOUS at 11:02

## 2025-06-30 RX ADMIN — IPRATROPIUM BROMIDE AND ALBUTEROL SULFATE 3 ML: .5; 3 SOLUTION RESPIRATORY (INHALATION) at 21:00

## 2025-06-30 RX ADMIN — MORPHINE SULFATE 4 MG: 2 INJECTION, SOLUTION INTRAMUSCULAR; INTRAVENOUS at 10:50

## 2025-06-30 RX ADMIN — HYDROCODONE BITARTRATE AND ACETAMINOPHEN 1 TABLET: 7.5; 325 TABLET ORAL at 10:51

## 2025-06-30 RX ADMIN — POTASSIUM CHLORIDE 40 MEQ: 1500 TABLET, EXTENDED RELEASE ORAL at 22:21

## 2025-06-30 RX ADMIN — LORAZEPAM 1 MG: 1 TABLET ORAL at 20:16

## 2025-06-30 RX ADMIN — OXYCODONE AND ACETAMINOPHEN 1 TABLET: 5; 325 TABLET ORAL at 23:25

## 2025-06-30 RX ADMIN — CEFTRIAXONE SODIUM 2000 MG: 2 INJECTION, POWDER, FOR SOLUTION INTRAMUSCULAR; INTRAVENOUS at 12:22

## 2025-06-30 RX ADMIN — ZOLPIDEM TARTRATE 5 MG: 5 TABLET, FILM COATED ORAL at 20:17

## 2025-06-30 RX ADMIN — MORPHINE SULFATE 4 MG: 2 INJECTION, SOLUTION INTRAMUSCULAR; INTRAVENOUS at 12:36

## 2025-06-30 RX ADMIN — ESCITALOPRAM 20 MG: 10 TABLET, FILM COATED ORAL at 20:17

## 2025-06-30 RX ADMIN — HYDROMORPHONE HYDROCHLORIDE 0.5 MG: 1 INJECTION, SOLUTION INTRAMUSCULAR; INTRAVENOUS; SUBCUTANEOUS at 20:17

## 2025-06-30 RX ADMIN — OXYCODONE AND ACETAMINOPHEN 1 TABLET: 5; 325 TABLET ORAL at 17:04

## 2025-06-30 NOTE — PROGRESS NOTES
[facility] Clinical Pharmacy Services: Rocephin Consult    Pt Name: Yoanna Reed   : 1955  Weight: 76.7 kg (169 lb)  Antibiotic: ceftriaxone  Indication: Pneumonia    Relevant clinical data and objective history reviewed:    Past Medical History:   Diagnosis Date    Anxiety and depression     Asthma     Limited joint range of motion     NECK    Liver failure     DUE TO MEDICATION    Mass of nipple     RIGHT    Skin cancer     RIGHT CHEEK     Creatinine   Date Value Ref Range Status   2025 1.46 (H) 0.57 - 1.00 mg/dL Final   2025 0.82 0.57 - 1.00 mg/dL Final   2025 0.87 0.57 - 1.00 mg/dL Final   2025 0.93 0.57 - 1.00 mg/dL Final   2024 1.06 (H) 0.57 - 1.00 mg/dL Final   2023 0.90 0.57 - 1.00 mg/dL Final     BUN   Date Value Ref Range Status   2025 29.0 (H) 8.0 - 23.0 mg/dL Final     Estimated Creatinine Clearance: 39.6 mL/min (A) (by C-G formula based on SCr of 1.46 mg/dL (H)).    Lab Results   Component Value Date    WBC 18.20 (H) 2025     Temp Readings from Last 3 Encounters:   25 98.1 °F (36.7 °C) (Oral)   25 98.1 °F (36.7 °C) (Oral)   25 98.4 °F (36.9 °C) (Oral)      Assessment/Plan    Ordered ceftriaxone 2 g IV q24h for a total of 5 days. 1 g typically sufficient for pneumonia but will give higher dose since sepsis criteria met on arrival. Will monitor and adjust if culture data or pertinent lab values indicate this is best for the patient.     Thank you for this consult and please contact pharmacy with any questions or concerns.     Karla Glover [facility]  Clinical Pharmacist

## 2025-06-30 NOTE — CONSULTS
Patient Identification:  Yoanna Reed  69 y.o.  female  1955  [id number]          LOS 0    Requesting physician:   Chinedu Blandon MD    Reason for Consultation:    Large right pleural effusion    History of Present Illness:   69-year-old female with a history of asthma, hyponatremia, migraines, chronic pain who presented to the emergency department for recurrent falls and near syncope.    On presentation to the emergency department, patient was afebrile, hemodynamically stable with mild tachycardia to the low 100s.  BNP was elevated to 2400, troponin down trended with a max of 73, mild hyponatremia to 131, TUSHAR to 1.46.  Procalcitonin elevated to 2.  Leukocytosis to 18.  Not requiring any supplemental oxygen.  CT angiogram of the chest was performed which did not demonstrate any pulmonary emboli however large right pleural effusion with lung atelectasis.    On evaluation patient, states that she has been having pain in her thoracic back which radiates to the front.  This has been ongoing for the past several days.  Also more short of breath that has been worsening over the past week.  Mild cough without any sputum production.  Also feels hot however no documented fevers.  Denies any chills.  No sick contacts.  Previous history of asthma, on bronchodilator with albuterol as needed, rarely having to use.  No underlying cardiac disease.  Does have a history of hepatitis C in the past for which she was treated.  No underlying renal disease that she knows of.    Past Medical History:  Past Medical History:   Diagnosis Date    Anxiety and depression     Asthma     Limited joint range of motion     NECK    Liver failure 2002    DUE TO MEDICATION    Mass of nipple     RIGHT    Skin cancer     RIGHT CHEEK       Past Surgical History:  Past Surgical History:   Procedure Laterality Date    ANTERIOR CERVICAL FUSION      ARTERY SURGERY Left     KNEE AGE 15 DUE TO CUT TO KNEE    BREAST BIOPSY Right  11/22/2024    Procedure: RIGHT NIPPLE LESION EXCISION;  Surgeon: Raven Aviles MD;  Location:  [facility] [place];  Service: General;  Laterality: Right;    CHOLECYSTECTOMY      COLONOSCOPY W/ POLYPECTOMY N/A 05/23/2024    Procedure: COLONOSCOPY to cecum with cold polypectomies;  Surgeon: Ankur Beebe MD;  [facility];  Service: Gastroenterology;  Laterality: N/A;  PRE - screening  POST - polyps    HYSTERECTOMY      Partial    POSTERIOR CERVICAL FUSION          Home Meds:  Medications Prior to Admission   Medication Sig Dispense Refill Last Dose/Taking    albuterol sulfate  (90 Base) MCG/ACT inhaler INHALE 2 PUFFS BY MOUTH EVERY 4 HOURS AS NEEDED FOR WHEEZE 18 g 3 Taking As Needed    buPROPion XL (Wellbutrin XL) 150 MG 24 hr tablet Take 1 tablet by mouth Every Morning. 90 tablet 3 Taking    escitalopram (LEXAPRO) 20 MG tablet TAKE 1 TABLET BY MOUTH EVERY DAY 90 tablet 1 Taking    FLUoxetine (PROzac) 40 MG capsule Take 1 capsule by mouth Daily. 90 capsule 3 Taking    HYDROcodone-acetaminophen (Norco) 7.5-325 MG per tablet Take 1 tablet by mouth Every 6 (Six) Hours As Needed for Moderate Pain. 90 tablet 0 Taking As Needed    ketoconazole (NIZORAL) 2 % shampoo Apply 1 Application topically to the appropriate area as directed 2 (Two) Times a Week.   Taking    LORazepam (Ativan) 1 MG tablet Take 1 tablet by mouth Every 6 (Six) Hours As Needed for Anxiety. 240 tablet 1 Taking As Needed    meloxicam (MOBIC) 15 MG tablet TAKE 1 TABLET BY MOUTH EVERY DAY 60 tablet 3 Taking    ondansetron ODT (ZOFRAN-ODT) 4 MG disintegrating tablet Place 1 tablet on the tongue Every 8 (Eight) Hours As Needed for Nausea or Vomiting. 30 tablet 1 Taking As Needed    pimecrolimus (ELIDEL) 1 % cream Apply 1 Application topically to the appropriate area as directed 2 (Two) Times a Day.   Taking    pravastatin (PRAVACHOL) 20 MG tablet Take 1 tablet by mouth Daily. 90 tablet 3 Taking    tiZANidine (Zanaflex) 4 MG tablet  "Take 1 tablet by mouth Every 8 (Eight) Hours As Needed for Muscle Spasms. 42 tablet 1 Taking As Needed    ubrogepant (Ubrelvy) 100 MG tablet Take 1 tablet by mouth 1 (One) Time As Needed (migraine). 16 tablet 11 Taking As Needed    zolpidem (AMBIEN) 10 MG tablet Take 1 tablet by mouth Every Night. 90 tablet 1 Taking         Allergies:  No Known Allergies    Social History:   Social History     Socioeconomic History    Marital status: Single   Tobacco Use    Smoking status: Some Days     Current packs/day: 0.25     Average packs/day: 0.2 packs/day for 47.5 years (11.9 ttl pk-yrs)     Types: Cigarettes     Start date: 1978     Passive exposure: Never    Smokeless tobacco: Never   Vaping Use    Vaping status: Never Used   Substance and Sexual Activity    Alcohol use: Yes     Comment: social    Drug use: Defer    Sexual activity: Defer       Family History:  Family History   Problem Relation Age of Onset    Hypertension Mother     Ovarian cancer Mother     Malig Hyperthermia Neg Hx        Review of Systems:  12 point review of systems performed and all else negative except as per HPI above.    Objective:    PHYSICAL EXAM:    /88 (BP Location: Right arm, Patient Position: Sitting) Comment: Simultaneous filing. User may be unaware of other data. Comment (BP Location): Simultaneous filing. User may be unaware of other data. Comment (Patient Position): Simultaneous filing. User may be unaware of other data.  Pulse 89   Temp 98.1 °F (36.7 °C) (Oral) Comment: Simultaneous filing. User may be unaware of other data. Comment (Src): Simultaneous filing. User may be unaware of other data.  Resp 20 Comment: Simultaneous filing. User may be unaware of other data.  Ht 172.7 cm (68\")   Wt 76.7 kg (169 lb)   SpO2 95%   BMI 25.70 kg/m²  Body mass index is 25.7 kg/m². 95% 76.7 kg (169 lb)    General: Alert, nontoxic, NAD  HEENT: NC/AT, EOMI, MMM  Neck: Supple, trachea midline  Cardiac: RRR, no murmur, gallops, " rubs  Pulmonary: Diminished in right lung field  GI: Soft, non-tender, non-distended, normal bowel sounds  Extremities: Warm, well perfused, no LE edema  Skin: no visible rash  Neuro: CN II - XII grossly intact  Psychiatry: Normal mood and affect    Lab Review:   Results from last 7 days   Lab Units 06/30/25  1002   WBC 10*3/mm3 18.20*   HEMOGLOBIN g/dL 12.8   PLATELETS 10*3/mm3 597*     Results from last 7 days   Lab Units 06/30/25  1002 06/25/25  1335   SODIUM mmol/L 131* 137   POTASSIUM mmol/L 3.3* 4.1   CHLORIDE mmol/L 95* 98   CO2 mmol/L 18.1* 22   BUN mg/dL 29.0* 11   CREATININE mg/dL 1.46* 0.82   GLUCOSE mg/dL 124* 94   CALCIUM mg/dL 9.4 9.6   Estimated Creatinine Clearance: 39.6 mL/min (A) (by C-G formula based on SCr of 1.46 mg/dL (H)).    Results from last 7 days   Lab Units 06/30/25  1140 06/30/25  1002 06/25/25  1335   AST (SGOT) U/L  --  25 13   ALT (SGPT) U/L  --  14 8   PROCALCITONIN ng/mL  --  2.18*  --    LACTATE mmol/L 1.6  --   --    D DIMER QUANT MCGFEU/mL  --  4.44*  --    PLATELETS 10*3/mm3  --  597*  --               Result Review:  I have personally reviewed the results from the time of this admission to 6/30/2025 16:55 EDT and agree with these findings:  [x]  Laboratory list / accordion  [x]  Microbiology  [x]  Radiology  []  EKG/Telemetry   [x]  Cardiology/Vascular   []  Pathology  [x]  Old records  []  Other:    Assessment / Recommendations:    Large right loculated pleural effusion  Right upper lobe pulmonary nodule  Suspect sepsis secondary to pneumonia  Asthma  Elevated BNP  Near syncope  Frequent falls  Chronic pain  Migraines  Hyponatremia  Acute kidney injury   Leukocytosis  Dextroscoliosis of lumbar spine  History of tobacco abuse    - Patient presented today with shortness of breath, back pain, and near syncope.  - CT chest was performed which demonstrated large loculated right pleural effusion with right lung atelectasis and possible underlying pulmonary nodule/mass.  -  Ultrasound-guided thoracentesis of the right effusion ordered with labs.  - Concern is for parapneumonic effusion/empyema versus fluid overload (less likely)  - Empiric antibiotics with ceftriaxone at this time, procalcitonin elevated, new leukocytosis, subjective fevers  - BNP elevated, troponin elevated, echocardiogram pending.  - Weaned to room air, saturating well  - New TUSHAR, likely from infection, monitor.    Thank you for allowing me to participate in the care of this patient.  I will continue to follow along with you.    Александр Marion MD  [facility], [facility]  Pulmonary and Critical Care Medicine, Interventional Pulmonology    6/30/2025  16:54 EDT    Parts of this note may be an electronic transcription/translation of spoken language to printed text using the Dragon dictation system.

## 2025-06-30 NOTE — H&P
Patient Name:  Yoanna Reed  YOB: 1955  MRN:  [id number]  Admit Date:  6/30/2025  Patient Care Team:  Tom Muller MD as PCP - General (Family Medicine)      Subjective   History Present Illness     Chief Complaint   Patient presents with    Dizziness     Patient is a 69-year-old female with known history of asthma, chronic pain, migraines, hepatitis C presented to the emergency room with complaints of progressively increasing shortness of breath along with near syncopal episodes and falls at home.  Has also been complaining of pain in her thoracic spine area radiating across to the front.  Denies any major cough or sputum production.  Denies any fevers or chills.  Denies any numbness, tingling, one-sided weakness.  In the emergency room she was found to have BNP of 2400, creatinine was 1.46 and baseline is normal, procalcitonin elevated at 2 however lactic acid level was normal.  WBC was 18,000 and underwent CT chest which was suggestive of large right-sided pleural effusion with atelectasis.  Given the above patient is being hospitalized.  No reports of nausea, vomiting, palpitations, dizziness, numbness, tingling, chest pain.      Review of Systems   A 12 system review has been performed and they are negative other than mentioned in the H&P    Personal History     Past Medical History:   Diagnosis Date    Anxiety and depression     Asthma     Limited joint range of motion     NECK    Liver failure 2002    DUE TO MEDICATION    Mass of nipple     RIGHT    Skin cancer     RIGHT CHEEK     Past Surgical History:   Procedure Laterality Date    ANTERIOR CERVICAL FUSION      ARTERY SURGERY Left     KNEE AGE 15 DUE TO CUT TO KNEE    BREAST BIOPSY Right 11/22/2024    Procedure: RIGHT NIPPLE LESION EXCISION;  Surgeon: Raven Aviles MD;  [place] [facility] [place] [facility] [place]  Service: General;  Laterality: Right;    CHOLECYSTECTOMY      COLONOSCOPY W/ POLYPECTOMY N/A 05/23/2024    Procedure:  COLONOSCOPY to cecum with cold polypectomies;  Surgeon: Ankur Beebe MD;  Location: [facility] ENDOSCOPY;  Service: Gastroenterology;  Laterality: N/A;  PRE - screening  POST - polyps    HYSTERECTOMY      Partial    POSTERIOR CERVICAL FUSION       Family History   Problem Relation Age of Onset    Hypertension Mother     Ovarian cancer Mother     Malig Hyperthermia Neg Hx      Social History     Tobacco Use    Smoking status: Some Days     Current packs/day: 0.25     Average packs/day: 0.2 packs/day for 47.5 years (11.9 ttl pk-yrs)     Types: Cigarettes     Start date: 1978     Passive exposure: Never    Smokeless tobacco: Never   Vaping Use    Vaping status: Never Used   Substance Use Topics    Alcohol use: Yes     Comment: social    Drug use: Defer     No current facility-administered medications on file prior to encounter.     Current Outpatient Medications on File Prior to Encounter   Medication Sig Dispense Refill    albuterol sulfate  (90 Base) MCG/ACT inhaler INHALE 2 PUFFS BY MOUTH EVERY 4 HOURS AS NEEDED FOR WHEEZE 18 g 3    buPROPion XL (Wellbutrin XL) 150 MG 24 hr tablet Take 1 tablet by mouth Every Morning. 90 tablet 3    escitalopram (LEXAPRO) 20 MG tablet TAKE 1 TABLET BY MOUTH EVERY DAY 90 tablet 1    FLUoxetine (PROzac) 40 MG capsule Take 1 capsule by mouth Daily. 90 capsule 3    HYDROcodone-acetaminophen (Norco) 7.5-325 MG per tablet Take 1 tablet by mouth Every 6 (Six) Hours As Needed for Moderate Pain. 90 tablet 0    ketoconazole (NIZORAL) 2 % shampoo Apply 1 Application topically to the appropriate area as directed 2 (Two) Times a Week.      LORazepam (Ativan) 1 MG tablet Take 1 tablet by mouth Every 6 (Six) Hours As Needed for Anxiety. 240 tablet 1    meloxicam (MOBIC) 15 MG tablet TAKE 1 TABLET BY MOUTH EVERY DAY 60 tablet 3    ondansetron ODT (ZOFRAN-ODT) 4 MG disintegrating tablet Place 1 tablet on the tongue Every 8 (Eight) Hours As Needed for Nausea or Vomiting. 30 tablet 1     pimecrolimus (ELIDEL) 1 % cream Apply 1 Application topically to the appropriate area as directed 2 (Two) Times a Day.      pravastatin (PRAVACHOL) 20 MG tablet Take 1 tablet by mouth Daily. 90 tablet 3    tiZANidine (Zanaflex) 4 MG tablet Take 1 tablet by mouth Every 8 (Eight) Hours As Needed for Muscle Spasms. 42 tablet 1    ubrogepant (Ubrelvy) 100 MG tablet Take 1 tablet by mouth 1 (One) Time As Needed (migraine). 16 tablet 11    zolpidem (AMBIEN) 10 MG tablet Take 1 tablet by mouth Every Night. 90 tablet 1     No Known Allergies    Objective    Objective     Vital Signs  Temp:  [98.1 °F (36.7 °C)-98.8 °F (37.1 °C)] 98.1 °F (36.7 °C)  Heart Rate:  [] 89  Resp:  [16-20] 20  BP: (111-164)/(62-88) 164/88  SpO2:  [94 %-98 %] 95 %  on  Flow (L/min) (Oxygen Therapy):  [1] 1;   Device (Oxygen Therapy): room air  Body mass index is 25.7 kg/m².    Physical Exam  HEENT: PERRLA, extraocular movements intact, Scleras no icterus  Neck: Supple, no JVD  Cardiovascular: Regular rate and rhythm with normal S1 and S2  Respiratory: Diminished breath sounds on the right and left is fairly clear.  GI: Soft, nontender, bowel sounds present  Extremities: No edema, palpable pedal pulses  Neurologic: Grossly nonfocal, no facial asymmetry    Results Review:  I reviewed the patient's new clinical results.  I reviewed the patient's new imaging results and agree with the interpretation.  I reviewed the patient's other test results and agree with the interpretation  I personally viewed and interpreted the patient's EKG/Telemetry data  Discussed with ED provider.    Lab Results (last 24 hours)       Procedure Component Value Units Date/Time    Comprehensive Metabolic Panel [[id number]]  (Abnormal) Collected: 06/30/25 1002    Specimen: Blood Updated: 06/30/25 1033     Glucose 124 mg/dL      BUN 29.0 mg/dL      Creatinine 1.46 mg/dL      Sodium 131 mmol/L      Potassium 3.3 mmol/L      Chloride 95 mmol/L      CO2 18.1 mmol/L      Calcium  9.4 mg/dL      Total Protein 7.3 g/dL      Albumin 3.4 g/dL      ALT (SGPT) 14 U/L      AST (SGOT) 25 U/L      Alkaline Phosphatase 88 U/L      Total Bilirubin 0.8 mg/dL      Globulin 3.9 gm/dL      A/G Ratio 0.9 g/dL      BUN/Creatinine Ratio 19.9     Anion Gap 17.9 mmol/L      eGFR 38.8 mL/min/1.73     Narrative:      GFR Categories in Chronic Kidney Disease (CKD)              GFR Category          GFR (mL/min/1.73)    Interpretation  G1                    90 or greater        Normal or high (1)  G2                    60-89                Mild decrease (1)  G3a                   45-59                Mild to moderate decrease  G3b                   30-44                Moderate to severe decrease  G4                    15-29                Severe decrease  G5                    14 or less           Kidney failure    (1)In the absence of evidence of kidney disease, neither GFR category G1 or G2 fulfill the criteria for CKD.    eGFR calculation 2021 CKD-EPI creatinine equation, which does not include race as a factor    CBC & Differential [[id number]]  (Abnormal) Collected: 06/30/25 1002    Specimen: Blood Updated: 06/30/25 1133    Narrative:      The following orders were created for panel order CBC & Differential.  Procedure                               Abnormality         Status                     ---------                               -----------         ------                     CBC Auto Differential[[id number]]        Abnormal            Final result                 Please view results for these tests on the individual orders.    Protime-INR [[id number]]  (Abnormal) Collected: 06/30/25 1002    Specimen: Blood from Hand, Right Updated: 06/30/25 1033     Protime 17.0 Seconds      INR 1.39    aPTT [[id number]]  (Normal) Collected: 06/30/25 1002    Specimen: Blood from Hand, Right Updated: 06/30/25 1034     PTT 28.9 seconds     BNP [[id number]]  (Abnormal) Collected: 06/30/25 1002    Specimen: Blood Updated:  06/30/25 1033     proBNP 2,460.0 pg/mL     Narrative:      This assay is used as an aid in the diagnosis of individuals suspected of having heart failure. It can be used as an aid in the diagnosis of acute decompensated heart failure (ADHF) in patients presenting with signs and symptoms of ADHF to the emergency department (ED). In addition, NT-proBNP of <300 pg/mL indicates ADHF is not likely.    Age Range Result Interpretation  NT-proBNP Concentration (pg/mL:      <50             Positive            >450                   Gray                 300-450                    Negative             <300    50-75           Positive            >900                  Gray                300-900                  Negative            <300      >75             Positive            >1800                  Gray                300-1800                  Negative            <300    High Sensitivity Troponin T [[id number]]  (Abnormal) Collected: 06/30/25 1002    Specimen: Blood Updated: 06/30/25 1035     HS Troponin T 73 ng/L     Narrative:      High Sensitive Troponin T Reference Range:  <14.0 ng/L- Negative Female for AMI  <22.0 ng/L- Negative Male for AMI  >=14 - Abnormal Female indicating possible myocardial injury.  >=22 - Abnormal Male indicating possible myocardial injury.   Clinicians would have to utilize clinical acumen, EKG, Troponin, and serial changes to determine if it is an Acute Myocardial Infarction or myocardial injury due to an underlying chronic condition.         D-dimer, Quantitative [[id number]]  (Abnormal) Collected: 06/30/25 1002    Specimen: Blood from Hand, Right Updated: 06/30/25 1039     D-Dimer, Quantitative 4.44 MCGFEU/mL     Narrative:      According to the assay 's published package insert, a normal (<0.50 MCGFEU/mL) D-dimer result in conjunction with a non-high clinical probability assessment, excludes deep vein thrombosis (DVT) and pulmonary embolism (PE) with high sensitivity.    D-dimer values  "increase with age and this can make VTE exclusion of an older population difficult. To address this, the [employer], based on best available evidence and recent guidelines, recommends that clinicians use age-adjusted D-dimer thresholds in patients greater than 50 years of age with: a) a low probability of PE who do not meet all Pulmonary Embolism Rule Out Criteria, or b) in those with intermediate probability of PE.   The formula for an age-adjusted D-dimer cut-off is \"age/100\".  For example, a 60 year old patient would have an age-adjusted cut-off of 0.60 MCGFEU/mL and an 80 year old 0.80 MCGFEU/mL.    CK [[id number]]  (Abnormal) Collected: 06/30/25 1002    Specimen: Blood Updated: 06/30/25 1103     Creatine Kinase 833 U/L     Procalcitonin [[id number]]  (Abnormal) Collected: 06/30/25 1002    Specimen: Blood Updated: 06/30/25 1114     Procalcitonin 2.18 ng/mL     Narrative:      As a Marker for Sepsis (Non-Neonates):    1. <0.5 ng/mL represents a low risk of severe sepsis and/or septic shock.  2. >2 ng/mL represents a high risk of severe sepsis and/or septic shock.    As a Marker for Lower Respiratory Tract Infections that require antibiotic therapy:    PCT on Admission    Antibiotic Therapy       6-12 Hrs later    >0.5                Strongly Recommended  >0.25 - <0.5        Recommended   0.1 - 0.25          Discouraged              Remeasure/reassess PCT  <0.1                Strongly Discouraged     Remeasure/reassess PCT    As 28 day mortality risk marker: \"Change in Procalcitonin Result\" (>80% or <=80%) if Day 0 (or Day 1) and Day 4 values are available. Refer to http://www.[facility]-pct-calculator.com    Change in PCT <=80%  A decrease of PCT levels below or equal to 80% defines a positive change in PCT test result representing a higher risk for 28-day all-cause mortality of patients diagnosed with severe sepsis for septic shock.    Change in PCT >80%  A decrease of PCT levels of more than 80% " defines a negative change in PCT result representing a lower risk for 28-day all-cause mortality of patients diagnosed with severe sepsis or septic shock.       CBC Auto Differential [[id number]]  (Abnormal) Collected: 06/30/25 1002    Specimen: Blood Updated: 06/30/25 1133     WBC 18.20 10*3/mm3      RBC 3.85 10*6/mm3      Hemoglobin 12.8 g/dL      Hematocrit 37.8 %      MCV 98.2 fL      MCH 33.2 pg      MCHC 33.9 g/dL      RDW 12.5 %      RDW-SD 44.7 fl      MPV 8.7 fL      Platelets 597 10*3/mm3      Neutrophil % 86.9 %      Lymphocyte % 5.6 %      Monocyte % 5.8 %      Eosinophil % 0.2 %      Basophil % 0.2 %      Immature Grans % 1.3 %      Neutrophils, Absolute 15.82 10*3/mm3      Lymphocytes, Absolute 1.02 10*3/mm3      Monocytes, Absolute 1.05 10*3/mm3      Eosinophils, Absolute 0.03 10*3/mm3      Basophils, Absolute 0.04 10*3/mm3      Immature Grans, Absolute 0.24 10*3/mm3      nRBC 0.0 /100 WBC     High Sensitivity Troponin T 1Hr [[id number]]  (Abnormal) Collected: 06/30/25 1140    Specimen: Blood from Arm, Right Updated: 06/30/25 1213     HS Troponin T 61 ng/L      Troponin T Numeric Delta -12 ng/L      Troponin T % Delta -16    Narrative:      High Sensitive Troponin T Reference Range:  <14.0 ng/L- Negative Female for AMI  <22.0 ng/L- Negative Male for AMI  >=14 - Abnormal Female indicating possible myocardial injury.  >=22 - Abnormal Male indicating possible myocardial injury.   Clinicians would have to utilize clinical acumen, EKG, Troponin, and serial changes to determine if it is an Acute Myocardial Infarction or myocardial injury due to an underlying chronic condition.         Lactic Acid, Plasma [[id number]]  (Normal) Collected: 06/30/25 1140    Specimen: Blood from Arm, Right Updated: 06/30/25 1208     Lactate 1.6 mmol/L     Blood Culture - Blood, Arm, Left [[id number]] Collected: 06/30/25 1200    Specimen: Blood from Arm, Left Updated: 06/30/25 1215    Blood Culture - Blood, Arm, Right [[id number]]  Collected: 06/30/25 1210    Specimen: Blood from Arm, Right Updated: 06/30/25 1215    Urinalysis With Microscopic If Indicated (No Culture) - Urine, Clean Catch [[id number]]  (Abnormal) Collected: 06/30/25 1448    Specimen: Urine, Clean Catch Updated: 06/30/25 1530     Color, UA Yellow     Appearance, UA Clear     pH, UA 6.0     Specific Gravity, UA >1.030     Glucose, UA Negative     Ketones, UA 15 mg/dL (1+)     Bilirubin, UA Negative     Blood, UA Small (1+)     Protein, UA 30 mg/dL (1+)     Leuk Esterase, UA Trace     Nitrite, UA Negative     Urobilinogen, UA 1.0 E.U./dL    Urinalysis, Microscopic Only - Urine, Clean Catch [[id number]]  (Abnormal) Collected: 06/30/25 1448    Specimen: Urine, Clean Catch Updated: 06/30/25 1532     RBC, UA 3-5 /HPF      WBC, UA 6-10 /HPF      Bacteria, UA None Seen /HPF      Squamous Epithelial Cells, UA 7-12 /HPF      Hyaline Casts, UA None Seen /LPF      Granular Casts, UA 0-2 /LPF      Methodology Manual Light Microscopy    Legionella Antigen, Urine - Urine, Urine, Clean Catch [[id number]] Collected: 06/30/25 1448    Specimen: Urine, Clean Catch Updated: 06/30/25 1728    S. Pneumo Ag Urine or CSF - Urine, Urine, Clean Catch [[id number]] Collected: 06/30/25 1448    Specimen: Urine, Clean Catch Updated: 06/30/25 1728            Imaging Results (Last 24 Hours)       Procedure Component Value Units Date/Time    CT Angiogram Chest Pulmonary Embolism [[id number]] Collected: 06/30/25 1150     Updated: 06/30/25 1206    Narrative:      CT ANGIOGRAM CHEST PULMONARY EMBOLISM-     DATE OF EXAM: 6/30/2025 10:59 AM     INDICATION: Near syncope, elevated cardiac enzymes, positive D-dimer,  rule out PE.     COMPARISON: Breast MRI 10/29/2024. Right shoulder and humerus  radiographs 7/11/2024. Chest radiographs 3/2/2023.     TECHNIQUE: Multiple contiguous axial images were acquired through the  chest following the intravenous administration of 95 mL of Isovue-370.  Reformatted coronal, sagittal, and  3D reconstruction images were also  reviewed. Radiation dose reduction techniques were utilized, including  automated exposure control and exposure modulation based on body size.     FINDINGS:  Imaging was performed with the arms at the side of the patient's body,  limiting evaluation.     No evidence of a pulmonary arterial filling defect to suggest pulmonary  embolism. Mild margin of the main pulmonary arteries could reflect  pulmonary arterial hypertension. The heart is normal size. Moderate to  severe calcified coronary artery disease. Trace pericardial fluid.  Normal thoracic aortic arch anatomy without evidence of aneurysm. No  pathologically enlarged intrathoracic lymph nodes are identified. Tiny  hiatal hernia.     Large right pleural effusion with associated compressive atelectasis of  the right lung. Partially obscured suspected 2 cm x 1.8 cm partially  cavitary subpleural nodule in the posterior right upper lobe (axial  series 6 image 35). Consolidation in the base of the right lower lobe  and right middle lobe could reflect superimposed pneumonia. Trace left  pleural fluid. Dependent and basilar groundglass opacities in the left  lung could represent atelectasis or atypical pneumonia. Patchy  subpleural groundglass opacity in the anterior left upper lobe, which  could also represent atelectasis or could be infectious/inflammatory.  The central airways are widely patent. No pneumothorax.     Limited imaging of the upper abdomen partially includes postoperative  changes from cholecystectomy. Partially imaged incompletely evaluated  low-density lesions in the liver, statistically representing benign  cysts and/or hemangiomas.     Diffuse osteopenia. Postoperative changes from ACDF from C4-C7 and  posterior spinal fusion at C4-C5 without evidence of hardware  complications. Solid bony bridging across the disc spaces and facets.  Mild multilevel thoracic spondylosis and exaggerated thoracic  kyphosis.  Irregular 2 cm sclerotic bone lesion in the proximal right humerus,  probable benign enchondroma. No acute osseous abnormality or concerning  osseous lesion.       Impression:         1. The study is negative for pulmonary embolism.  2. Large right pleural effusion with associated compressive atelectasis  of the right lung.  3. Suspected partially obscured partially cavitary subpleural nodule in  the posterior right upper lobe measuring 1.9 cm in average diameter.  4. Trace left pleural fluid with multifocal groundglass opacities in the  left lung that could represent atelectasis or could be  infectious/inflammatory.  5. Mild enlargement of the main pulmonary arteries could reflect  pulmonary arterial hypertension.     This report was finalized on 6/30/2025 12:03 PM by Ridge Ragland MD on  Workstation: [id number]       CT Head Without Contrast [[id number]] Collected: 06/30/25 1131     Updated: 06/30/25 1150    Narrative:      CT HEAD WO CONTRAST-, CT CERVICAL SPINE WO CONTRAST-, CT LUMBAR SPINE WO  CONTRAST-     HISTORY:  falls, near syncope     COMPARISON: MRI lumbar spine 9/15/2024     CT HEAD WITHOUT CONTRAST: The brain ventricles are symmetrical. There is  no acute hemorrhage, hydrocephalus or of abnormal extra-axial fluid. No  focal area of decreased attenuation to suggest acute infarction is  identified. Bone windows show no evidence of a calvarial fracture.     CT CERVICAL SPINE WITHOUT CONTRAST: The study is significantly degraded  by patient motion. The patient is undergone anterior cervical fusion  from C4-C5 with anterior plate, screws and interbody graft material.  Interbody graft material is also present at C5-6 and C6-7. The patient  is undergone posterior fusion from C4-C5. The alignment of the cervical  spine is within normal limits. No gross or obvious fracture is  appreciated. A large pleural fluid collection is noted on the right,  only partially visualized. Further evaluation could be  performed with a  MRI examination of the cervical spine as indicated.     CT LUMBAR SPINE WITHOUT CONTRAST: The study is degraded by patient  motion. Dextroscoliosis of the lumbar spine is appreciated with apex at  the level of L3. There is grade 1 retrolisthesis of L5 on S1. There is  no evidence of disc herniation. A mild central disc bulge is present at  L4-L5 and L5-S1 resulting in mild flattening of the ventral surface of  the thecal sac. Further evaluation could be performed with MRI  examination of the lumbar spine as indicated.           Radiation dose reduction techniques were utilized, including automated  exposure modulation based on body size.     This report was finalized on 6/30/2025 11:47 AM by Dr. Jerson Viera M.D on Workstation: [id number]       CT Cervical Spine Without Contrast [[id number]] Collected: 06/30/25 1131     Updated: 06/30/25 1150    Narrative:      CT HEAD WO CONTRAST-, CT CERVICAL SPINE WO CONTRAST-, CT LUMBAR SPINE WO  CONTRAST-     HISTORY:  falls, near syncope     COMPARISON: MRI lumbar spine 9/15/2024     CT HEAD WITHOUT CONTRAST: The brain ventricles are symmetrical. There is  no acute hemorrhage, hydrocephalus or of abnormal extra-axial fluid. No  focal area of decreased attenuation to suggest acute infarction is  identified. Bone windows show no evidence of a calvarial fracture.     CT CERVICAL SPINE WITHOUT CONTRAST: The study is significantly degraded  by patient motion. The patient is undergone anterior cervical fusion  from C4-C5 with anterior plate, screws and interbody graft material.  Interbody graft material is also present at C5-6 and C6-7. The patient  is undergone posterior fusion from C4-C5. The alignment of the cervical  spine is within normal limits. No gross or obvious fracture is  appreciated. A large pleural fluid collection is noted on the right,  only partially visualized. Further evaluation could be performed with a  MRI examination of the cervical  spine as indicated.     CT LUMBAR SPINE WITHOUT CONTRAST: The study is degraded by patient  motion. Dextroscoliosis of the lumbar spine is appreciated with apex at  the level of L3. There is grade 1 retrolisthesis of L5 on S1. There is  no evidence of disc herniation. A mild central disc bulge is present at  L4-L5 and L5-S1 resulting in mild flattening of the ventral surface of  the thecal sac. Further evaluation could be performed with MRI  examination of the lumbar spine as indicated.           Radiation dose reduction techniques were utilized, including automated  exposure modulation based on body size.     This report was finalized on 6/30/2025 11:47 AM by Dr. Jerson Viera M.D on Workstation: [id number]       CT Lumbar Spine Without Contrast [[id number]] Collected: 06/30/25 1131     Updated: 06/30/25 1150    Narrative:      CT HEAD WO CONTRAST-, CT CERVICAL SPINE WO CONTRAST-, CT LUMBAR SPINE WO  CONTRAST-     HISTORY:  falls, near syncope     COMPARISON: MRI lumbar spine 9/15/2024     CT HEAD WITHOUT CONTRAST: The brain ventricles are symmetrical. There is  no acute hemorrhage, hydrocephalus or of abnormal extra-axial fluid. No  focal area of decreased attenuation to suggest acute infarction is  identified. Bone windows show no evidence of a calvarial fracture.     CT CERVICAL SPINE WITHOUT CONTRAST: The study is significantly degraded  by patient motion. The patient is undergone anterior cervical fusion  from C4-C5 with anterior plate, screws and interbody graft material.  Interbody graft material is also present at C5-6 and C6-7. The patient  is undergone posterior fusion from C4-C5. The alignment of the cervical  spine is within normal limits. No gross or obvious fracture is  appreciated. A large pleural fluid collection is noted on the right,  only partially visualized. Further evaluation could be performed with a  MRI examination of the cervical spine as indicated.     CT LUMBAR SPINE WITHOUT  CONTRAST: The study is degraded by patient  motion. Dextroscoliosis of the lumbar spine is appreciated with apex at  the level of L3. There is grade 1 retrolisthesis of L5 on S1. There is  no evidence of disc herniation. A mild central disc bulge is present at  L4-L5 and L5-S1 resulting in mild flattening of the ventral surface of  the thecal sac. Further evaluation could be performed with MRI  examination of the lumbar spine as indicated.           Radiation dose reduction techniques were utilized, including automated  exposure modulation based on body size.     This report was finalized on 6/30/2025 11:47 AM by Dr. Jerson Viera M.D on Workstation: [id number]                   Telemetry Scan   Final Result      Telemetry Scan   Final Result      ECG 12 Lead Other; near syncope   Preliminary Result   HEART RATE=81  bpm   RR Npwvyffm=323  ms   ID Fdnselib=383  ms   P Horizontal Axis=13  deg   P Front Axis=47  deg   QRSD Psjewwpf=920  ms   QT Chzbuisq=006  ms   IEnG=211  ms   QRS Axis=-59  deg   T Wave Axis=89  deg   - ABNORMAL ECG -   Sinus rhythm   Left anterior fascicular block   LVH with secondary repolarization abnormality   Date and Time of Study:2025-06-30 10:47:48           Assessment/Plan     Active Hospital Problems    Diagnosis  POA    Pleural effusion [J90]  Unknown    Near syncope [R55]  Unknown    TUSHAR (acute kidney injury) [N17.9]  Unknown    Elevated troponin [R79.89]  Unknown    Chronic pain [G89.29]  Yes      Resolved Hospital Problems   No resolved problems to display.       1. Dyspnea/pleural effusion/leukocytosis, underlying pneumonia cannot be completely ruled out and therefore will be on Rocephin.  Pulmonary did evaluate and plan is for ultrasound-guided thoracentesis.  2.  Elevated troponin, unlikely secondary to MI however await 2D echo report and cardiology consultation will be obtained.  3.  Chronic pain syndrome, continue with analgesics.  4.  History of asthma, on albuterol nebs as  needed and currently not in any exacerbation.  5.  Acute kidney injury, will gently volume resuscitate and anticipate renal function to improve.  Rising creatinine likely due to poor oral intake.  6.  History of hep C, treated according to patient.  7.  Right upper lobe lung nodule, management per pulmonary.  8.  On SCDs for DVT prophylaxis.  9.  CODE STATUS is full code.       Chinedu Blandon MD  [facility]  06/30/25  18:11 EDT

## 2025-06-30 NOTE — Clinical Note
Supply Documentation (free text) INTERVAL HPI/OVERNIGHT EVENTS:  Patient seen,no cute issues for overnight  VITAL SIGNS:  T(F): 98.8 (06-20-22 @ 12:20)  HR: 83 (06-20-22 @ 12:20)  BP: 142/66 (06-20-22 @ 12:20)  RR: 16 (06-20-22 @ 12:20)  SpO2: 98% (06-20-22 @ 12:20)  Wt(kg): --    PHYSICAL EXAM:  awake  Constitutional:  Eyes:  ENMT:perrla  Neck:  Respiratory:clear  Cardiovascular:s1s2,m-none  Gastrointestinal:soft,bs pos  Extremities:r foot with dressing  Vascular:  Neurological:no focal deficit  Musculoskeletal:    MEDICATIONS  (STANDING):  ampicillin/sulbactam  IVPB      ampicillin/sulbactam  IVPB 3 Gram(s) IV Intermittent every 6 hours  dextrose 50% Injectable 25 Gram(s) IV Push once  dextrose 50% Injectable 12.5 Gram(s) IV Push once  dextrose 50% Injectable 25 Gram(s) IV Push once  enoxaparin Injectable 40 milliGRAM(s) SubCutaneous every 24 hours  insulin glargine Injectable (LANTUS) 30 Unit(s) SubCutaneous at bedtime  insulin lispro (ADMELOG) corrective regimen sliding scale   SubCutaneous at bedtime  insulin lispro (ADMELOG) corrective regimen sliding scale   SubCutaneous three times a day before meals  levETIRAcetam 1250 milliGRAM(s) Oral two times a day  melatonin 5 milliGRAM(s) Oral at bedtime  pantoprazole    Tablet 40 milliGRAM(s) Oral before breakfast  polyethylene glycol 3350 17 Gram(s) Oral daily  senna 2 Tablet(s) Oral at bedtime    MEDICATIONS  (PRN):  acetaminophen     Tablet .. 650 milliGRAM(s) Oral every 6 hours PRN Temp greater or equal to 38C (100.4F), Mild Pain (1 - 3)  dextrose Oral Gel 15 Gram(s) Oral once PRN Blood Glucose LESS THAN 70 milliGRAM(s)/deciliter  oxycodone    5 mG/acetaminophen 325 mG 1 Tablet(s) Oral every 4 hours PRN Moderate Pain (4 - 6)      Allergies    No Known Allergies    Intolerances        LABS:                        9.6    11.54 )-----------( 202      ( 20 Jun 2022 09:49 )             29.8     06-20    135  |  100  |  18  ----------------------------<  351<H>  3.9   |  28  |  0.75    Ca    8.7      20 Jun 2022 09:49  Phos  2.2     06-20  Mg     1.6     06-20            RADIOLOGY & ADDITIONAL TESTS:      Assessment and Plan:   · Assessment	  Sepsis/R side Diabetic foot ulcer  - f/b Podiatry / ID  -  s/p OR debridement 6/16  - con't Cefepime doses to 1 g q 8 hours and Linezolid 600 mg q 12hours per ID (Dr Cole)  - f/u specimen bx results    COVID-19   Asymptomatic  - covid + 6/15/22  - Sat 95% room air  - supportive care.    ENEDELIA  - Scr 2.02 on adm in the setting of sepsis  - creat has downtrended since admission  - monitor in the setting of zyvox     hx Syncope w/dizziness / seizures  - none since admission  - seen by Dr Bhatia pre procedure  - no active cardiac issues  - seen by  (neuro) Keppra increased 6/15/22.  ** pt on amitriptiline 200mg daily (confirmed with his outpt pharmacy, [facility]) - currently not ordered - will need to f/u with neuro if ok to resume    Poorly controlled Diabetes mellitus.  - A1c- 7.9%  - fingersticks has been 400's in low dose ISS  - will increase ISS to moderate  - con't lantus /premeals insulin  - if fingerstick remains elevated pt likely will need increase in prandial insulin  ** on ozempic 0.25mg weekly, basaglar 15mg  and metformin 1000mg bid at home    HTN  - had hypotension of admission  - BP has been borderline low but has increased today  - con't to hold home dose ACE-I but con't assess in am if need to reintroduce  - con't vitals per protocol    Dispo  full code  PT consulted _ rec rehab  ongoing treatment with zyvox: likely when cleared by ID/podiatry team (pt is still acute)

## 2025-06-30 NOTE — ED TRIAGE NOTES
Pt to ED from home due to dizziness. Pt has had multiple falls due to dizziness x Thursday. Pt stated she had a pretty bad fall Thursdays. Pt has complaints all over, especially lower back that radiates around the abd.     Pt has multiple complaints, the main one being dizziness. Pt is scheduled to see her PCP this week.      PT did receive a full dose of ASA with EMS and a breathing treatment.

## 2025-06-30 NOTE — ED PROVIDER NOTES
EMERGENCY DEPARTMENT ENCOUNTER  Room Number:  20/20  PCP: Tom Muller MD  Independent Historians: Historian: Patient      HPI:  Chief Complaint: had concerns including Dizziness.     A complete HPI/ROS/PMH/PSH/SH/FH are unobtainable due to: EM_Unobtainable History : None    Chronic or social conditions impacting patient care (Social Determinants of Health): None      Context: Yoanna Reed is a 69 y.o. female with a medical history of chronic right-sided low back pain, migraines, hyponatremia, on Norco 7.5 for chronic pain, who presents to the ED c/o acute recurrent falls due to near syncope, and severe low back pain from her falls.  Patient states when she has her symptoms where she is about to pass out she has trouble breathing.  She is here today because her back pain is unbearable.  States she was recently seen by her doctor did blood work and said her sodium was low.  No chest pain, abdominal pain or headache.  Mild neck pain.  No other complaints at this time.      Review of prior external notes (non-ED) -and- Review of prior external test results outside of this encounter: I reviewed family medicine progress note from 5 days ago.  Presents for dizziness, migraines and pain.  Recently fell in garage and went down the steps on her butt.  She started seeing a homeopathic practitioner for her back which has improved.  This practitioner referred her to another specialist who did a laser procedure on her head to check for issues with cranial crystals.     Patient's sodium on 5/19/2025 was 132, it was normal at 137 on labs 5 days ago.        Prescription drug monitoring program review:         PAST MEDICAL HISTORY  Active Ambulatory Problems     Diagnosis Date Noted    Encounter for screening for malignant neoplasm of colon 02/04/2024    Acute pain of right shoulder 07/11/2024    Pain of right humerus 07/11/2024    Chronic right-sided low back pain without sciatica 07/23/2024    Flank pain 08/01/2024     Acute cystitis with hematuria 08/01/2024    Acute vaginitis 08/15/2024    Dysuria 08/15/2024    Hematuria 08/29/2024    Mass of nipple 11/04/2024     Resolved Ambulatory Problems     Diagnosis Date Noted    No Resolved Ambulatory Problems     Past Medical History:   Diagnosis Date    Anxiety and depression     Asthma     Limited joint range of motion     Liver failure 2002    Skin cancer          PAST SURGICAL HISTORY  Past Surgical History:   Procedure Laterality Date    ANTERIOR CERVICAL FUSION      ARTERY SURGERY Left     KNEE AGE 15 DUE TO CUT TO KNEE    BREAST BIOPSY Right 11/22/2024    Procedure: RIGHT NIPPLE LESION EXCISION;  Surgeon: Raven Aviles MD;  Location:  [facility] [place];  Service: General;  Laterality: Right;    CHOLECYSTECTOMY      COLONOSCOPY W/ POLYPECTOMY N/A 05/23/2024    Procedure: COLONOSCOPY to cecum with cold polypectomies;  Surgeon: Ankur Beebe MD;  Location: [facility] ENDOSCOPY;  Service: Gastroenterology;  Laterality: N/A;  PRE - screening  POST - polyps    HYSTERECTOMY      Partial    POSTERIOR CERVICAL FUSION           FAMILY HISTORY  Family History   Problem Relation Age of Onset    Hypertension Mother     Ovarian cancer Mother     Malig Hyperthermia Neg Hx          SOCIAL HISTORY  Social History     Socioeconomic History    Marital status: Single   Tobacco Use    Smoking status: Some Days     Current packs/day: 0.25     Average packs/day: 0.2 packs/day for 47.5 years (11.9 ttl pk-yrs)     Types: Cigarettes     Start date: 1978     Passive exposure: Never    Smokeless tobacco: Never   Vaping Use    Vaping status: Never Used   Substance and Sexual Activity    Alcohol use: Yes     Comment: social    Drug use: Defer    Sexual activity: Defer       ALLERGIES  Patient has no known allergies.      PHYSICAL EXAM    I have reviewed the triage vital signs and nursing notes.    ED Triage Vitals   Temp Heart Rate Resp BP SpO2   03/02/25 1448 03/02/25 1448 03/02/25 1448 03/02/25 1450  03/02/25 1448   97.4 °F (36.3 °C) 95 16 141/84 97 %      Temp src Heart Rate Source Patient Position BP Location FiO2 (%)   -- -- -- -- --              Physical Exam  GENERAL: alert, no acute distress  SKIN: Warm, dry  HENT: Normocephalic, atraumatic, mild midline cervical tenderness to palpation  EYES: no scleral icterus  CV: regular rhythm, regular rate  RESPIRATORY: normal effort, lungs clear  ABDOMEN: soft, nondistended, nontender  MUSCULOSKELETAL: no deformity, significant lumbar midline bony and moderate paraspinal muscle tenderness to palpation bilaterally  NEURO: alert, moves all extremities, follows commands        LAB RESULTS  Recent Results (from the past 24 hours)   Green Top (Gel)    Collection Time: 06/30/25 10:02 AM   Result Value Ref Range    Extra Tube Hold for add-ons.    Lavender Top    Collection Time: 06/30/25 10:02 AM   Result Value Ref Range    Extra Tube hold for add-on    Gold Top - SST    Collection Time: 06/30/25 10:02 AM   Result Value Ref Range    Extra Tube Hold for add-ons.    Light Blue Top    Collection Time: 06/30/25 10:02 AM   Result Value Ref Range    Extra Tube Hold for add-ons.    Comprehensive Metabolic Panel    Collection Time: 06/30/25 10:02 AM    Specimen: Blood   Result Value Ref Range    Glucose 124 (H) 65 - 99 mg/dL    BUN 29.0 (H) 8.0 - 23.0 mg/dL    Creatinine 1.46 (H) 0.57 - 1.00 mg/dL    Sodium 131 (L) 136 - 145 mmol/L    Potassium 3.3 (L) 3.5 - 5.2 mmol/L    Chloride 95 (L) 98 - 107 mmol/L    CO2 18.1 (L) 22.0 - 29.0 mmol/L    Calcium 9.4 8.6 - 10.5 mg/dL    Total Protein 7.3 6.0 - 8.5 g/dL    Albumin 3.4 (L) 3.5 - 5.2 g/dL    ALT (SGPT) 14 1 - 33 U/L    AST (SGOT) 25 1 - 32 U/L    Alkaline Phosphatase 88 39 - 117 U/L    Total Bilirubin 0.8 0.0 - 1.2 mg/dL    Globulin 3.9 gm/dL    A/G Ratio 0.9 g/dL    BUN/Creatinine Ratio 19.9 7.0 - 25.0    Anion Gap 17.9 (H) 5.0 - 15.0 mmol/L    eGFR 38.8 (L) >60.0 mL/min/1.73   Protime-INR    Collection Time: 06/30/25 10:02 AM     Specimen: Hand, Right; Blood   Result Value Ref Range    Protime 17.0 (H) 11.7 - 14.2 Seconds    INR 1.39 (H) 0.90 - 1.10   aPTT    Collection Time: 06/30/25 10:02 AM    Specimen: Hand, Right; Blood   Result Value Ref Range    PTT 28.9 22.7 - 35.4 seconds   BNP    Collection Time: 06/30/25 10:02 AM    Specimen: Blood   Result Value Ref Range    proBNP 2,460.0 (H) 0.0 - 900.0 pg/mL   High Sensitivity Troponin T    Collection Time: 06/30/25 10:02 AM    Specimen: Blood   Result Value Ref Range    HS Troponin T 73 (C) <14 ng/L   D-dimer, Quantitative    Collection Time: 06/30/25 10:02 AM    Specimen: Hand, Right; Blood   Result Value Ref Range    D-Dimer, Quantitative 4.44 (H) 0.00 - 0.69 MCGFEU/mL   CK    Collection Time: 06/30/25 10:02 AM    Specimen: Blood   Result Value Ref Range    Creatine Kinase 833 (H) 20 - 180 U/L   Procalcitonin    Collection Time: 06/30/25 10:02 AM    Specimen: Blood   Result Value Ref Range    Procalcitonin 2.18 (H) 0.00 - 0.25 ng/mL   CBC Auto Differential    Collection Time: 06/30/25 10:02 AM    Specimen: Blood   Result Value Ref Range    WBC 18.20 (H) 3.40 - 10.80 10*3/mm3    RBC 3.85 3.77 - 5.28 10*6/mm3    Hemoglobin 12.8 12.0 - 15.9 g/dL    Hematocrit 37.8 34.0 - 46.6 %    MCV 98.2 (H) 79.0 - 97.0 fL    MCH 33.2 (H) 26.6 - 33.0 pg    MCHC 33.9 31.5 - 35.7 g/dL    RDW 12.5 12.3 - 15.4 %    RDW-SD 44.7 37.0 - 54.0 fl    MPV 8.7 6.0 - 12.0 fL    Platelets 597 (H) 140 - 450 10*3/mm3    Neutrophil % 86.9 (H) 42.7 - 76.0 %    Lymphocyte % 5.6 (L) 19.6 - 45.3 %    Monocyte % 5.8 5.0 - 12.0 %    Eosinophil % 0.2 (L) 0.3 - 6.2 %    Basophil % 0.2 0.0 - 1.5 %    Immature Grans % 1.3 (H) 0.0 - 0.5 %    Neutrophils, Absolute 15.82 (H) 1.70 - 7.00 10*3/mm3    Lymphocytes, Absolute 1.02 0.70 - 3.10 10*3/mm3    Monocytes, Absolute 1.05 (H) 0.10 - 0.90 10*3/mm3    Eosinophils, Absolute 0.03 0.00 - 0.40 10*3/mm3    Basophils, Absolute 0.04 0.00 - 0.20 10*3/mm3    Immature Grans, Absolute 0.24 (H) 0.00  - 0.05 10*3/mm3    nRBC 0.0 0.0 - 0.2 /100 WBC   ECG 12 Lead Other; near syncope    Collection Time: 06/30/25 10:47 AM   Result Value Ref Range    QT Interval 374 ms    QTC Interval 434 ms   High Sensitivity Troponin T 1Hr    Collection Time: 06/30/25 11:40 AM    Specimen: Arm, Right; Blood   Result Value Ref Range    HS Troponin T 61 (C) <14 ng/L    Troponin T Numeric Delta -12 ng/L    Troponin T % Delta -16 Abnormal if >/= 20%   Lactic Acid, Plasma    Collection Time: 06/30/25 11:40 AM    Specimen: Arm, Right; Blood   Result Value Ref Range    Lactate 1.6 0.5 - 2.0 mmol/L       RADIOLOGY  CT Angiogram Chest Pulmonary Embolism  Result Date: 6/30/2025  CT ANGIOGRAM CHEST PULMONARY EMBOLISM-  DATE OF EXAM: 6/30/2025 10:59 AM  INDICATION: Near syncope, elevated cardiac enzymes, positive D-dimer, rule out PE.  COMPARISON: Breast MRI 10/29/2024. Right shoulder and humerus radiographs 7/11/2024. Chest radiographs 3/2/2023.  TECHNIQUE: Multiple contiguous axial images were acquired through the chest following the intravenous administration of 95 mL of Isovue-370. Reformatted coronal, sagittal, and 3D reconstruction images were also reviewed. Radiation dose reduction techniques were utilized, including automated exposure control and exposure modulation based on body size.  FINDINGS: Imaging was performed with the arms at the side of the patient's body, limiting evaluation.  No evidence of a pulmonary arterial filling defect to suggest pulmonary embolism. Mild margin of the main pulmonary arteries could reflect pulmonary arterial hypertension. The heart is normal size. Moderate to severe calcified coronary artery disease. Trace pericardial fluid. Normal thoracic aortic arch anatomy without evidence of aneurysm. No pathologically enlarged intrathoracic lymph nodes are identified. Tiny hiatal hernia.  Large right pleural effusion with associated compressive atelectasis of the right lung. Partially obscured suspected 2 cm x  1.8 cm partially cavitary subpleural nodule in the posterior right upper lobe (axial series 6 image 35). Consolidation in the base of the right lower lobe and right middle lobe could reflect superimposed pneumonia. Trace left pleural fluid. Dependent and basilar groundglass opacities in the left lung could represent atelectasis or atypical pneumonia. Patchy subpleural groundglass opacity in the anterior left upper lobe, which could also represent atelectasis or could be infectious/inflammatory. The central airways are widely patent. No pneumothorax.  Limited imaging of the upper abdomen partially includes postoperative changes from cholecystectomy. Partially imaged incompletely evaluated low-density lesions in the liver, statistically representing benign cysts and/or hemangiomas.  Diffuse osteopenia. Postoperative changes from ACDF from C4-C7 and posterior spinal fusion at C4-C5 without evidence of hardware complications. Solid bony bridging across the disc spaces and facets. Mild multilevel thoracic spondylosis and exaggerated thoracic kyphosis. Irregular 2 cm sclerotic bone lesion in the proximal right humerus, probable benign enchondroma. No acute osseous abnormality or concerning osseous lesion.       1. The study is negative for pulmonary embolism. 2. Large right pleural effusion with associated compressive atelectasis of the right lung. 3. Suspected partially obscured partially cavitary subpleural nodule in the posterior right upper lobe measuring 1.9 cm in average diameter. 4. Trace left pleural fluid with multifocal groundglass opacities in the left lung that could represent atelectasis or could be infectious/inflammatory. 5. Mild enlargement of the main pulmonary arteries could reflect pulmonary arterial hypertension.  This report was finalized on 6/30/2025 12:03 PM by Ridge Ragland MD on Workstation: [id number]      CT Head Without Contrast  CT Head Without Contrast, CT Cervical Spine Without  Contrast  CT Head Without Contrast, CT Cervical Spine Without Contrast, CT Lumbar Spine Without Contrast  Result Date: 6/30/2025  CT HEAD WO CONTRAST-, CT CERVICAL SPINE WO CONTRAST-, CT LUMBAR SPINE WO CONTRAST-  HISTORY:  falls, near syncope  COMPARISON: MRI lumbar spine 9/15/2024  CT HEAD WITHOUT CONTRAST: The brain ventricles are symmetrical. There is no acute hemorrhage, hydrocephalus or of abnormal extra-axial fluid. No focal area of decreased attenuation to suggest acute infarction is identified. Bone windows show no evidence of a calvarial fracture.  CT CERVICAL SPINE WITHOUT CONTRAST: The study is significantly degraded by patient motion. The patient is undergone anterior cervical fusion from C4-C5 with anterior plate, screws and interbody graft material. Interbody graft material is also present at C5-6 and C6-7. The patient is undergone posterior fusion from C4-C5. The alignment of the cervical spine is within normal limits. No gross or obvious fracture is appreciated. A large pleural fluid collection is noted on the right, only partially visualized. Further evaluation could be performed with a MRI examination of the cervical spine as indicated.  CT LUMBAR SPINE WITHOUT CONTRAST: The study is degraded by patient motion. Dextroscoliosis of the lumbar spine is appreciated with apex at the level of L3. There is grade 1 retrolisthesis of L5 on S1. There is no evidence of disc herniation. A mild central disc bulge is present at L4-L5 and L5-S1 resulting in mild flattening of the ventral surface of the thecal sac. Further evaluation could be performed with MRI examination of the lumbar spine as indicated.    Radiation dose reduction techniques were utilized, including automated exposure modulation based on body size.  This report was finalized on 6/30/2025 11:47 AM by Dr. Jerson Viera M.D on Workstation: [id number]          MEDICATIONS GIVEN IN ER  Medications   HYDROcodone-acetaminophen (NORCO) 7.5-325 MG  per tablet 1 tablet (1 tablet Oral Given 6/30/25 1051)   lactated ringers bolus 1,000 mL (0 mL Intravenous Stopped 6/30/25 1220)   morphine injection 4 mg (4 mg Intravenous Given 6/30/25 1050)   iopamidol (ISOVUE-370) 76 % injection 100 mL (95 mL Intravenous Given by Other 6/30/25 1102)   cefTRIAXone (ROCEPHIN) 2,000 mg in sodium chloride 0.9 % 100 mL MBP (2,000 mg Intravenous New Bag 6/30/25 1222)   morphine injection 4 mg (4 mg Intravenous Given 6/30/25 1236)         ORDERS PLACED DURING THIS VISIT:  Orders Placed This Encounter   Procedures    Blood Culture - Blood,    Blood Culture - Blood,    CT Head Without Contrast    CT Cervical Spine Without Contrast    CT Lumbar Spine Without Contrast    CT Angiogram Chest Pulmonary Embolism    [facility] Draw    Comprehensive Metabolic Panel    Urinalysis With Microscopic If Indicated (No Culture) - Urine, Clean Catch    Protime-INR    aPTT    BNP    High Sensitivity Troponin T    D-dimer, Quantitative    High Sensitivity Troponin T 1Hr    CK    Lactic Acid, Plasma    Procalcitonin    CBC Auto Differential    LHA (on-call MD unless specified) Details    ECG 12 Lead Other; near syncope    Telemetry Scan    Inpatient Admission    Green Top (Gel)    Lavender Top    Gold Top - SST    Light Blue Top    CBC & Differential         OUTPATIENT MEDICATION MANAGEMENT:  No current Epic-ordered facility-administered medications on file.     Current Outpatient Medications Ordered in Epic   Medication Sig Dispense Refill    albuterol sulfate  (90 Base) MCG/ACT inhaler INHALE 2 PUFFS BY MOUTH EVERY 4 HOURS AS NEEDED FOR WHEEZE 18 g 3    buPROPion XL (Wellbutrin XL) 150 MG 24 hr tablet Take 1 tablet by mouth Every Morning. 90 tablet 3    escitalopram (LEXAPRO) 20 MG tablet TAKE 1 TABLET BY MOUTH EVERY DAY 90 tablet 1    FLUoxetine (PROzac) 40 MG capsule Take 1 capsule by mouth Daily. 90 capsule 3    HYDROcodone-acetaminophen (Norco) 7.5-325 MG per tablet Take 1 tablet by mouth Every 6  (Six) Hours As Needed for Moderate Pain. 90 tablet 0    ketoconazole (NIZORAL) 2 % shampoo Apply 1 Application topically to the appropriate area as directed 2 (Two) Times a Week.      LORazepam (Ativan) 1 MG tablet Take 1 tablet by mouth Every 6 (Six) Hours As Needed for Anxiety. 240 tablet 1    meloxicam (MOBIC) 15 MG tablet TAKE 1 TABLET BY MOUTH EVERY DAY 60 tablet 3    ondansetron ODT (ZOFRAN-ODT) 4 MG disintegrating tablet Place 1 tablet on the tongue Every 8 (Eight) Hours As Needed for Nausea or Vomiting. 30 tablet 1    pimecrolimus (ELIDEL) 1 % cream Apply 1 Application topically to the appropriate area as directed 2 (Two) Times a Day.      pravastatin (PRAVACHOL) 20 MG tablet Take 1 tablet by mouth Daily. 90 tablet 3    tiZANidine (Zanaflex) 4 MG tablet Take 1 tablet by mouth Every 8 (Eight) Hours As Needed for Muscle Spasms. 42 tablet 1    ubrogepant (Ubrelvy) 100 MG tablet Take 1 tablet by mouth 1 (One) Time As Needed (migraine). 16 tablet 11    zolpidem (AMBIEN) 10 MG tablet Take 1 tablet by mouth Every Night. 90 tablet 1         PROCEDURES  Procedures      EM_CC: Critical care provider statement:    Critical care time (minutes): 37.   Critical care time was exclusive of:  Separately billable procedures and treating other patients   Critical care was necessary to treat or prevent imminent or life-threatening deterioration of the following conditions:  Sepsis   Critical care was time spent personally by me on the following activities:  Development of treatment plan with patient or surrogate, discussions with consultants, evaluation of patient's response to treatment, examination of patient, obtaining history from patient or surrogate, ordering and performing treatments and interventions, ordering and review of laboratory studies, ordering and review of radiographic studies, pulse oximetry, re-evaluation of patient's condition and review of old charts. Critical Care indicators: Sepsis / septicemia          PROGRESS, DATA ANALYSIS, CONSULTS, AND MEDICAL DECISION MAKING  All labs have been independently interpreted by me.  All radiology studies have been reviewed by me. All EKG's have been independently viewed and interpreted by me.  Discussion below represents my analysis of pertinent findings related to patient's condition, differential diagnosis, treatment plan and final disposition.    Differential diagnosis includes but is not limited to near syncope, stroke, dysrhythmia, STEMI, NSTEMI, PE, UTI, electrolyte or metabolic derangement, fracture, contusion, acute on chronic pain.    Clinical Scores:                                       ED Course as of 06/30/25 1258   Mon Jun 30, 2025   1001 Patient presents ED for evaluation of near syncope, recurrent falls, now with severe uncontrolled low back pain.  She states she seen her doctor who did blood work for her symptoms.  She has some difficulty breathing before she has the feeling of about to pass out.  No chest pain.    On exam patient has mild cervical tenderness and severe lumbar midline bony tenderness to palpation.      Will obtain basic labs, cardiac enzymes, urine, D-dimer, CT head cervical spine and lumbar spine.  Patient is agreeable with plan [DN]   1001 Heart Rate: 105  Wells low risk, cannot PERC, D-dimer pending for recurrent near syncope, tachycardia.  [DN]   1001 BP: 111/73 [DN]   1025 Patient refusing CT scan at this time until she receives pain medication.  Pain medication had already been ordered, administration pending [DN]   1038 HS Troponin T(!!): 73 [DN]   1039 proBNP(!): 2,460.0 [DN]   1039 Creatinine(!): 1.46  TUSHAR, up from 0.82 on labs 5 days ago [DN]   1043 D-Dimer, Quant(!): 4.44  CT PE ordered [DN]   1058 ECG 12 Lead Other; near syncope  Sinus rhythm, rate 81, LAD with LAFB, normal intervals, no significant T ST changes, no STEMI    I compared EKG to prior on 11/12/2024.  Patient with low voltage T waves diffusely at that time.   Otherwise no significant change.  EKGs interpreted by self [DN]   1107 Creatine Kinase(!): 833 [DN]   1118 Procalcitonin(!): 2.18 [DN]   1119 CT Angiogram Chest Pulmonary Embolism  I reviewed patient's CT image(s), no obvious PE, large right pleural effusion, possible underlying pneumonia, interpreted by self  I reviewed the radiologist's interpretation of above image(s)   [DN]   1134 WBC(!): 18.20  Empiric ceftriaxone ordered [DN]   1224 Troponin T Numeric Delta: -12 [DN]   1257 I discussed patient ANA Chase, agreeable plan to admit   [DN]      ED Course User Index  [DN] Waqar Stearns MD             AS OF 12:58 EDT VITALS:    BP - 127/62  HR - 73  TEMP - 98.8 °F (37.1 °C) (Oral)  O2 SATS - 96%    COMPLEXITY OF CARE  The patient requires admission.      DIAGNOSIS  Final diagnoses:   Severe sepsis   Near syncope   NSTEMI (non-ST elevated myocardial infarction)   Pleural effusion   Acute midline low back pain without sciatica   Recurrent falls         DISPOSITION  ED Disposition       ED Disposition   Decision to Admit    Condition   --    Comment   Level of Care: Telemetry [5]   Diagnosis: Severe sepsis [[id number]]   Admitting Physician: PREET ROBLES [[id number]]   Attending Physician: PREET ROBLES [[id number]]   Certification: I Certify That Inpatient Hospital Services Are Medically Necessary For Greater Than 2 Midnights                 New Medications Ordered This Visit   Medications    HYDROcodone-acetaminophen (NORCO) 7.5-325 MG per tablet 1 tablet     Refill:  0    lactated ringers bolus 1,000 mL    morphine injection 4 mg    iopamidol (ISOVUE-370) 76 % injection 100 mL    cefTRIAXone (ROCEPHIN) 2,000 mg in sodium chloride 0.9 % 100 mL MBP    morphine injection 4 mg       Please note that portions of this document were completed with a voice recognition program.    Note Disclaimer: At [facility], we believe that sharing information builds trust and better relationships. You are receiving this  note because you recently visited [facility]. It is possible you will see health information before a provider has talked with you about it. This kind of information can be easy to misunderstand. To help you fully understand what it means for your health, we urge you to discuss this note with your provider.         Waqar Stearns MD  06/30/25 1014       Waqar Stearns MD  06/30/25 0988     Island Pedicle Flap-Requiring Vessel Identification Text: The defect edges were debeveled with a #15 scalpel blade.  Given the location of the defect, shape of the defect and the proximity to free margins an island pedicle advancement flap was deemed most appropriate.  Using a sterile surgical marker, an appropriate advancement flap was drawn, based on the axial vessel mentioned above, incorporating the defect, outlining the appropriate donor tissue and placing the expected incisions within the relaxed skin tension lines where possible.    The area thus outlined was incised deep to adipose tissue with a #15 scalpel blade.  The skin margins were undermined to an appropriate distance in all directions around the primary defect and laterally outward around the island pedicle utilizing iris scissors.  There was minimal undermining beneath the pedicle flap.

## 2025-06-30 NOTE — PROGRESS NOTES
Clinical Pharmacy Services: Medication History    Yoanna Reed is a 69 y.o. female presenting to [facility] for   Chief Complaint   Patient presents with    Dizziness       She  has a past medical history of Anxiety and depression, Asthma, Limited joint range of motion, Liver failure (2002), Mass of nipple, and Skin cancer.    Allergies as of 06/30/2025    (No Known Allergies)       Medication information was obtained from: Pharmacy  Pharmacy and Phone Number:   [facility]/pharmacy #[id number] - [place], [place] - [address] [address]. AT [facility] - [phone]  - [phone]  [address] [address].  [facility] [address]  Phone: [phone] Fax: [phone]    [facility] #[id number] - [place], [place] - [address] [address] AT [facility] [address] - [phone]  - [phone]  [address] [address] [facility] [address] [facility] [address]  Phone: [phone] Fax: [phone]    [facility]  [address] [facility] [address]  Phone: [phone] Fax: [phone]        Prior to Admission Medications       Prescriptions Last Dose Informant Patient Reported? Taking?    albuterol sulfate  (90 Base) MCG/ACT inhaler  Pharmacy No Yes    INHALE 2 PUFFS BY MOUTH EVERY 4 HOURS AS NEEDED FOR WHEEZE    buPROPion XL (Wellbutrin XL) 150 MG 24 hr tablet  Pharmacy No Yes    Take 1 tablet by mouth Every Morning.    escitalopram (LEXAPRO) 20 MG tablet  Pharmacy No Yes    TAKE 1 TABLET BY MOUTH EVERY DAY    FLUoxetine (PROzac) 40 MG capsule   No Yes    Take 1 capsule by mouth Daily.    HYDROcodone-acetaminophen (Norco) 7.5-325 MG per tablet  Pharmacy No Yes    Take 1 tablet by mouth Every 6 (Six) Hours As Needed for Moderate Pain.    ketoconazole (NIZORAL) 2 % shampoo  Pharmacy Yes Yes    Apply 1 Application topically to the appropriate area as directed 2 (Two) Times a Week.    LORazepam (Ativan) 1 MG tablet  Pharmacy No Yes    Take 1 tablet by mouth Every 6 (Six)  Hours As Needed for Anxiety.    meloxicam (MOBIC) 15 MG tablet  Pharmacy No Yes    TAKE 1 TABLET BY MOUTH EVERY DAY    ondansetron ODT (ZOFRAN-ODT) 4 MG disintegrating tablet  Self No Yes    Place 1 tablet on the tongue Every 8 (Eight) Hours As Needed for Nausea or Vomiting.    pimecrolimus (ELIDEL) 1 % cream  Self Yes Yes    Apply 1 Application topically to the appropriate area as directed 2 (Two) Times a Day.    pravastatin (PRAVACHOL) 20 MG tablet  Pharmacy No Yes    Take 1 tablet by mouth Daily.    tiZANidine (Zanaflex) 4 MG tablet  Pharmacy No Yes    Take 1 tablet by mouth Every 8 (Eight) Hours As Needed for Muscle Spasms.    ubrogepant (Ubrelvy) 100 MG tablet  Self No Yes    Take 1 tablet by mouth 1 (One) Time As Needed (migraine).    zolpidem (AMBIEN) 10 MG tablet  Pharmacy No Yes    Take 1 tablet by mouth Every Night.              Medication notes:     This medication list is complete to the best of my knowledge as of 6/30/2025    Please call if questions.    Kings Maya  Medication History Technician  [phone]    6/30/2025 12:01 EDT

## 2025-07-01 ENCOUNTER — TELEPHONE (OUTPATIENT)
Dept: INTERNAL MEDICINE | Facility: CLINIC | Age: 70
End: 2025-07-01
Payer: MEDICARE

## 2025-07-01 ENCOUNTER — APPOINTMENT (OUTPATIENT)
Dept: CARDIOLOGY | Facility: HOSPITAL | Age: 70
DRG: 871 | End: 2025-07-01
Payer: MEDICARE

## 2025-07-01 ENCOUNTER — APPOINTMENT (OUTPATIENT)
Dept: ULTRASOUND IMAGING | Facility: HOSPITAL | Age: 70
DRG: 871 | End: 2025-07-01
Payer: MEDICARE

## 2025-07-01 LAB
ALBUMIN FLD-MCNC: 2.8 G/DL
AMYLASE FLD-CCNC: 16 U/L
ANION GAP SERPL CALCULATED.3IONS-SCNC: 16.7 MMOL/L (ref 5–15)
AORTIC ARCH: 2.9 CM
AORTIC DIMENSIONLESS INDEX: 0.93 (DI)
APPEARANCE FLD: ABNORMAL
ASCENDING AORTA: 3.3 CM
AV MEAN PRESS GRAD SYS DOP V1V2: 3.9 MMHG
AV VMAX SYS DOP: 129.1 CM/SEC
BASOPHILS # BLD AUTO: 0.06 10*3/MM3 (ref 0–0.2)
BASOPHILS NFR BLD AUTO: 0.3 % (ref 0–1.5)
BH CV ECHO MEAS - ACS: 2.13 CM
BH CV ECHO MEAS - AO MAX PG: 6.7 MMHG
BH CV ECHO MEAS - AO ROOT AREA (BSA CORRECTED): 1.9 CM2
BH CV ECHO MEAS - AO ROOT DIAM: 3.6 CM
BH CV ECHO MEAS - AO V2 VTI: 22.8 CM
BH CV ECHO MEAS - AVA(I,D): 3 CM2
BH CV ECHO MEAS - EDV(CUBED): 123 ML
BH CV ECHO MEAS - EDV(MOD-SP2): 95 ML
BH CV ECHO MEAS - EDV(MOD-SP4): 105 ML
BH CV ECHO MEAS - EF(MOD-SP2): 50.5 %
BH CV ECHO MEAS - EF(MOD-SP4): 56.2 %
BH CV ECHO MEAS - ESV(CUBED): 50.4 ML
BH CV ECHO MEAS - ESV(MOD-SP2): 47 ML
BH CV ECHO MEAS - ESV(MOD-SP4): 46 ML
BH CV ECHO MEAS - FS: 25.7 %
BH CV ECHO MEAS - IVS/LVPW: 1.04 CM
BH CV ECHO MEAS - IVSD: 0.83 CM
BH CV ECHO MEAS - LAT PEAK E' VEL: 9.1 CM/SEC
BH CV ECHO MEAS - LV DIASTOLIC VOL/BSA (35-75): 54.5 CM2
BH CV ECHO MEAS - LV MASS(C)D: 138.1 GRAMS
BH CV ECHO MEAS - LV MAX PG: 4.2 MMHG
BH CV ECHO MEAS - LV MEAN PG: 1.82 MMHG
BH CV ECHO MEAS - LV SYSTOLIC VOL/BSA (12-30): 23.9 CM2
BH CV ECHO MEAS - LV V1 MAX: 103.1 CM/SEC
BH CV ECHO MEAS - LV V1 VTI: 21.2 CM
BH CV ECHO MEAS - LVIDD: 5 CM
BH CV ECHO MEAS - LVIDS: 3.7 CM
BH CV ECHO MEAS - LVOT AREA: 3.2 CM2
BH CV ECHO MEAS - LVOT DIAM: 2.02 CM
BH CV ECHO MEAS - LVPWD: 0.8 CM
BH CV ECHO MEAS - MED PEAK E' VEL: 4.9 CM/SEC
BH CV ECHO MEAS - MV A DUR: 0.13 SEC
BH CV ECHO MEAS - MV A MAX VEL: 99.2 CM/SEC
BH CV ECHO MEAS - MV DEC SLOPE: 439.6 CM/SEC2
BH CV ECHO MEAS - MV DEC TIME: 0.21 SEC
BH CV ECHO MEAS - MV E MAX VEL: 66.1 CM/SEC
BH CV ECHO MEAS - MV E/A: 0.67
BH CV ECHO MEAS - MV MAX PG: 5.3 MMHG
BH CV ECHO MEAS - MV MEAN PG: 2.19 MMHG
BH CV ECHO MEAS - MV P1/2T: 57.2 MSEC
BH CV ECHO MEAS - MV V2 VTI: 22.7 CM
BH CV ECHO MEAS - MVA(P1/2T): 3.8 CM2
BH CV ECHO MEAS - MVA(VTI): 3 CM2
BH CV ECHO MEAS - PA ACC TIME: 0.17 SEC
BH CV ECHO MEAS - PA V2 MAX: 89.8 CM/SEC
BH CV ECHO MEAS - RV MAX PG: 2.34 MMHG
BH CV ECHO MEAS - RV V1 MAX: 76.5 CM/SEC
BH CV ECHO MEAS - RV V1 VTI: 14.7 CM
BH CV ECHO MEAS - SV(LVOT): 68.3 ML
BH CV ECHO MEAS - SV(MOD-SP2): 48 ML
BH CV ECHO MEAS - SV(MOD-SP4): 59 ML
BH CV ECHO MEAS - SVI(LVOT): 35.4 ML/M2
BH CV ECHO MEAS - SVI(MOD-SP2): 24.9 ML/M2
BH CV ECHO MEAS - SVI(MOD-SP4): 30.6 ML/M2
BH CV ECHO MEAS - TAPSE (>1.6): 2 CM
BH CV ECHO MEASUREMENTS AVERAGE E/E' RATIO: 9.44
BH CV XLRA - RV BASE: 3.8 CM
BH CV XLRA - RV LENGTH: 7.5 CM
BH CV XLRA - RV MID: 2.28 CM
BH CV XLRA - TDI S': 18.3 CM/SEC
BUN SERPL-MCNC: 21 MG/DL (ref 8–23)
BUN/CREAT SERPL: 21.2 (ref 7–25)
CALCIUM SPEC-SCNC: 9 MG/DL (ref 8.6–10.5)
CHLORIDE SERPL-SCNC: 98 MMOL/L (ref 98–107)
CO2 SERPL-SCNC: 16.3 MMOL/L (ref 22–29)
COLOR FLD: YELLOW
CREAT SERPL-MCNC: 0.99 MG/DL (ref 0.57–1)
DEPRECATED RDW RBC AUTO: 45.6 FL (ref 37–54)
EGFRCR SERPLBLD CKD-EPI 2021: 61.9 ML/MIN/1.73
EOSINOPHIL # BLD AUTO: 0.08 10*3/MM3 (ref 0–0.4)
EOSINOPHIL NFR BLD AUTO: 0.4 % (ref 0.3–6.2)
ERYTHROCYTE [DISTWIDTH] IN BLOOD BY AUTOMATED COUNT: 12.8 % (ref 12.3–15.4)
GLUCOSE FLD-MCNC: 62 MG/DL
GLUCOSE SERPL-MCNC: 143 MG/DL (ref 65–99)
HCT VFR BLD AUTO: 38.2 % (ref 34–46.6)
HGB BLD-MCNC: 12.8 G/DL (ref 12–15.9)
IMM GRANULOCYTES # BLD AUTO: 0.16 10*3/MM3 (ref 0–0.05)
IMM GRANULOCYTES NFR BLD AUTO: 0.8 % (ref 0–0.5)
LDH FLD-CCNC: 1148 U/L
LEFT ATRIUM VOLUME INDEX: 28.9 ML/M2
LV EF BIPLANE MOD: 50.9 %
LYMPHOCYTES # BLD AUTO: 2.07 10*3/MM3 (ref 0.7–3.1)
LYMPHOCYTES NFR BLD AUTO: 10.4 % (ref 19.6–45.3)
LYMPHOCYTES NFR FLD MANUAL: 13 %
MCH RBC QN AUTO: 32.6 PG (ref 26.6–33)
MCHC RBC AUTO-ENTMCNC: 33.5 G/DL (ref 31.5–35.7)
MCV RBC AUTO: 97.2 FL (ref 79–97)
METHOD: ABNORMAL
MONOCYTES # BLD AUTO: 1.6 10*3/MM3 (ref 0.1–0.9)
MONOCYTES NFR BLD AUTO: 8 % (ref 5–12)
MONOCYTES NFR FLD: 16 %
NEUTROPHILS NFR BLD AUTO: 16.02 10*3/MM3 (ref 1.7–7)
NEUTROPHILS NFR BLD AUTO: 80.1 % (ref 42.7–76)
NEUTROPHILS NFR FLD MANUAL: 71 %
NRBC BLD AUTO-RTO: 0 /100 WBC (ref 0–0.2)
NUC CELL # FLD: 162 /MM3
PH FLD: 7.18 [PH]
PLATELET # BLD AUTO: 713 10*3/MM3 (ref 140–450)
PMV BLD AUTO: 8.4 FL (ref 6–12)
POTASSIUM SERPL-SCNC: 3.8 MMOL/L (ref 3.5–5.2)
PROT FLD-MCNC: 5.1 G/DL
RBC # BLD AUTO: 3.93 10*6/MM3 (ref 3.77–5.28)
RBC # FLD AUTO: 2309 /MM3
SINUS: 3.4 CM
SODIUM SERPL-SCNC: 131 MMOL/L (ref 136–145)
STJ: 3.4 CM
TROPONIN T SERPL HS-MCNC: 34 NG/L
WBC NRBC COR # BLD AUTO: 19.99 10*3/MM3 (ref 3.4–10.8)

## 2025-07-01 PROCEDURE — 88305 TISSUE EXAM BY PATHOLOGIST: CPT | Performed by: HOSPITALIST

## 2025-07-01 PROCEDURE — 89051 BODY FLUID CELL COUNT: CPT | Performed by: HOSPITALIST

## 2025-07-01 PROCEDURE — 0W993ZX DRAINAGE OF RIGHT PLEURAL CAVITY, PERCUTANEOUS APPROACH, DIAGNOSTIC: ICD-10-PCS | Performed by: RADIOLOGY

## 2025-07-01 PROCEDURE — 94760 N-INVAS EAR/PLS OXIMETRY 1: CPT

## 2025-07-01 PROCEDURE — 83986 ASSAY PH BODY FLUID NOS: CPT | Performed by: HOSPITALIST

## 2025-07-01 PROCEDURE — 83615 LACTATE (LD) (LDH) ENZYME: CPT | Performed by: HOSPITALIST

## 2025-07-01 PROCEDURE — 25010000002 HYDROMORPHONE 1 MG/ML SOLUTION: Performed by: INTERNAL MEDICINE

## 2025-07-01 PROCEDURE — 87116 MYCOBACTERIA CULTURE: CPT | Performed by: HOSPITALIST

## 2025-07-01 PROCEDURE — 84157 ASSAY OF PROTEIN OTHER: CPT | Performed by: HOSPITALIST

## 2025-07-01 PROCEDURE — 94799 UNLISTED PULMONARY SVC/PX: CPT

## 2025-07-01 PROCEDURE — 25010000002 LIDOCAINE 1 % SOLUTION: Performed by: RADIOLOGY

## 2025-07-01 PROCEDURE — 25010000002 HYDROMORPHONE PER 4 MG: Performed by: INTERNAL MEDICINE

## 2025-07-01 PROCEDURE — 87070 CULTURE OTHR SPECIMN AEROBIC: CPT | Performed by: HOSPITALIST

## 2025-07-01 PROCEDURE — 80048 BASIC METABOLIC PNL TOTAL CA: CPT | Performed by: INTERNAL MEDICINE

## 2025-07-01 PROCEDURE — 87206 SMEAR FLUORESCENT/ACID STAI: CPT | Performed by: HOSPITALIST

## 2025-07-01 PROCEDURE — 85025 COMPLETE CBC W/AUTO DIFF WBC: CPT | Performed by: INTERNAL MEDICINE

## 2025-07-01 PROCEDURE — 99222 1ST HOSP IP/OBS MODERATE 55: CPT | Performed by: INTERNAL MEDICINE

## 2025-07-01 PROCEDURE — 82150 ASSAY OF AMYLASE: CPT | Performed by: HOSPITALIST

## 2025-07-01 PROCEDURE — 25510000001 PERFLUTREN 6.52 MG/ML SUSPENSION 2 ML VIAL: Performed by: INTERNAL MEDICINE

## 2025-07-01 PROCEDURE — 82042 OTHER SOURCE ALBUMIN QUAN EA: CPT | Performed by: HOSPITALIST

## 2025-07-01 PROCEDURE — 84484 ASSAY OF TROPONIN QUANT: CPT | Performed by: INTERNAL MEDICINE

## 2025-07-01 PROCEDURE — 25810000003 SODIUM CHLORIDE 0.9 % SOLUTION: Performed by: INTERNAL MEDICINE

## 2025-07-01 PROCEDURE — 87205 SMEAR GRAM STAIN: CPT | Performed by: HOSPITALIST

## 2025-07-01 PROCEDURE — 93306 TTE W/DOPPLER COMPLETE: CPT | Performed by: STUDENT IN AN ORGANIZED HEALTH CARE EDUCATION/TRAINING PROGRAM

## 2025-07-01 PROCEDURE — 87102 FUNGUS ISOLATION CULTURE: CPT | Performed by: HOSPITALIST

## 2025-07-01 PROCEDURE — 76942 ECHO GUIDE FOR BIOPSY: CPT

## 2025-07-01 PROCEDURE — 87075 CULTR BACTERIA EXCEPT BLOOD: CPT | Performed by: HOSPITALIST

## 2025-07-01 PROCEDURE — 25010000002 CEFTRIAXONE PER 250 MG: Performed by: HOSPITALIST

## 2025-07-01 PROCEDURE — 94664 DEMO&/EVAL PT USE INHALER: CPT

## 2025-07-01 PROCEDURE — 87015 SPECIMEN INFECT AGNT CONCNTJ: CPT | Performed by: HOSPITALIST

## 2025-07-01 PROCEDURE — 94761 N-INVAS EAR/PLS OXIMETRY MLT: CPT

## 2025-07-01 PROCEDURE — 88112 CYTOPATH CELL ENHANCE TECH: CPT | Performed by: HOSPITALIST

## 2025-07-01 PROCEDURE — 93306 TTE W/DOPPLER COMPLETE: CPT

## 2025-07-01 PROCEDURE — 82945 GLUCOSE OTHER FLUID: CPT | Performed by: HOSPITALIST

## 2025-07-01 RX ORDER — LIDOCAINE HYDROCHLORIDE 10 MG/ML
10 INJECTION, SOLUTION INFILTRATION; PERINEURAL ONCE
Status: COMPLETED | OUTPATIENT
Start: 2025-07-01 | End: 2025-07-01

## 2025-07-01 RX ADMIN — SODIUM CHLORIDE 75 ML/HR: 9 INJECTION, SOLUTION INTRAVENOUS at 07:49

## 2025-07-01 RX ADMIN — TIZANIDINE 4 MG: 4 TABLET ORAL at 20:26

## 2025-07-01 RX ADMIN — TIZANIDINE 4 MG: 4 TABLET ORAL at 10:30

## 2025-07-01 RX ADMIN — ZOLPIDEM TARTRATE 5 MG: 5 TABLET, FILM COATED ORAL at 20:26

## 2025-07-01 RX ADMIN — PRAVASTATIN SODIUM 20 MG: 20 TABLET ORAL at 08:49

## 2025-07-01 RX ADMIN — HYDROMORPHONE HYDROCHLORIDE 0.5 MG: 1 INJECTION, SOLUTION INTRAMUSCULAR; INTRAVENOUS; SUBCUTANEOUS at 02:21

## 2025-07-01 RX ADMIN — ESCITALOPRAM 20 MG: 10 TABLET, FILM COATED ORAL at 08:49

## 2025-07-01 RX ADMIN — IPRATROPIUM BROMIDE AND ALBUTEROL SULFATE 3 ML: .5; 3 SOLUTION RESPIRATORY (INHALATION) at 13:05

## 2025-07-01 RX ADMIN — FLUOXETINE 40 MG: 20 CAPSULE ORAL at 08:49

## 2025-07-01 RX ADMIN — HYDROMORPHONE HYDROCHLORIDE 1 MG: 1 INJECTION, SOLUTION INTRAMUSCULAR; INTRAVENOUS; SUBCUTANEOUS at 14:44

## 2025-07-01 RX ADMIN — LIDOCAINE HYDROCHLORIDE 10 ML: 10 INJECTION, SOLUTION INFILTRATION; PERINEURAL at 15:58

## 2025-07-01 RX ADMIN — IPRATROPIUM BROMIDE AND ALBUTEROL SULFATE 3 ML: .5; 3 SOLUTION RESPIRATORY (INHALATION) at 07:02

## 2025-07-01 RX ADMIN — IPRATROPIUM BROMIDE AND ALBUTEROL SULFATE 3 ML: .5; 3 SOLUTION RESPIRATORY (INHALATION) at 20:18

## 2025-07-01 RX ADMIN — OXYCODONE AND ACETAMINOPHEN 1 TABLET: 5; 325 TABLET ORAL at 17:15

## 2025-07-01 RX ADMIN — CEFTRIAXONE 2000 MG: 2 INJECTION, POWDER, FOR SOLUTION INTRAMUSCULAR; INTRAVENOUS at 12:37

## 2025-07-01 RX ADMIN — PERFLUTREN 2 ML: 6.52 INJECTION, SUSPENSION INTRAVENOUS at 17:49

## 2025-07-01 RX ADMIN — LORAZEPAM 1 MG: 1 TABLET ORAL at 14:45

## 2025-07-01 RX ADMIN — HYDROMORPHONE HYDROCHLORIDE 1 MG: 1 INJECTION, SOLUTION INTRAMUSCULAR; INTRAVENOUS; SUBCUTANEOUS at 16:52

## 2025-07-01 RX ADMIN — HYDROMORPHONE HYDROCHLORIDE 1 MG: 1 INJECTION, SOLUTION INTRAMUSCULAR; INTRAVENOUS; SUBCUTANEOUS at 20:26

## 2025-07-01 RX ADMIN — OXYCODONE AND ACETAMINOPHEN 1 TABLET: 5; 325 TABLET ORAL at 10:30

## 2025-07-01 RX ADMIN — SODIUM CHLORIDE 75 ML/HR: 9 INJECTION, SOLUTION INTRAVENOUS at 22:47

## 2025-07-01 RX ADMIN — LORAZEPAM 1 MG: 1 TABLET ORAL at 08:50

## 2025-07-01 RX ADMIN — LORAZEPAM 1 MG: 1 TABLET ORAL at 02:22

## 2025-07-01 RX ADMIN — LORAZEPAM 1 MG: 1 TABLET ORAL at 23:23

## 2025-07-01 RX ADMIN — HYDROMORPHONE HYDROCHLORIDE 0.5 MG: 1 INJECTION, SOLUTION INTRAMUSCULAR; INTRAVENOUS; SUBCUTANEOUS at 06:41

## 2025-07-01 RX ADMIN — HYDROMORPHONE HYDROCHLORIDE 0.5 MG: 1 INJECTION, SOLUTION INTRAMUSCULAR; INTRAVENOUS; SUBCUTANEOUS at 08:50

## 2025-07-01 RX ADMIN — TIZANIDINE 4 MG: 4 TABLET ORAL at 02:22

## 2025-07-01 RX ADMIN — BUPROPION HYDROCHLORIDE 150 MG: 150 TABLET, EXTENDED RELEASE ORAL at 06:42

## 2025-07-01 RX ADMIN — OXYCODONE AND ACETAMINOPHEN 1 TABLET: 5; 325 TABLET ORAL at 23:23

## 2025-07-01 NOTE — PROGRESS NOTES
Discharge Planning Assessment  [facility]     Patient Name: Yoanna Reed  MRN: [id number]  Today's Date: 7/1/2025    Admit Date: 6/30/2025    Plan: Return home - lives alone   Discharge Needs Assessment       Row Name 07/01/25 0933       Living Environment    People in Home alone    Current Living Arrangements apartment    Potentially Unsafe Housing Conditions none    Primary Care Provided by self    Provides Primary Care For no one    Family Caregiver if Needed friend(s)    Family Caregiver Names Friend Allison hernandez [phone]    Quality of Family Relationships helpful    Able to Return to Prior Arrangements yes       Resource/Environmental Concerns    Resource/Environmental Concerns none    Transportation Concerns none       Transition Planning    Patient/Family Anticipates Transition to home    Patient/Family Anticipated Services at Transition none    Transportation Anticipated family or friend will provide       Discharge Needs Assessment    Readmission Within the Last 30 Days no previous admission in last 30 days    Equipment Currently Used at Home cane, straight;grab bar;shower chair;nebulizer    Concerns to be Addressed denies needs/concerns at this time    Anticipated Changes Related to Illness none    Equipment Needed After Discharge none                   Discharge Plan       Row Name 07/01/25 0939       Plan    Plan Return home - lives alone    Patient/Family in Agreement with Plan yes    Plan Comments Spoke with patient at bedside.  Patient lives alone in aparment with bedroom upstairs.  She has a cane, grab bars, shower chair, nebulizer.  She has never used HH or been to SNF.  PCP is Dr. Tom Muller and pharmacy is [facility] on [address] [place] [facility] [place]  [place].  Friend Allison Hernandez [phone] is emergency contact.  She plans to return home at [place].  CCP will follow.  Mica LORD                  Continued Care and Services - Admitted Since 6/30/2025    No active coordination exists.        Expected Discharge Date and Time       Expected Discharge Date Expected Discharge Time    Jul 3, 2025            Demographic Summary       Row Name 07/01/25 0932       General Information    Admission Type inpatient    Arrived From home    Referral Source admission list    Reason for Consult discharge planning    Preferred Language English                   Functional Status       Row Name 07/01/25 0932       Functional Status    Usual Activity Tolerance good    Current Activity Tolerance moderate       Functional Status, IADL    Medications independent    Meal Preparation independent    Housekeeping independent    Laundry independent    Shopping independent       Mental Status    General Appearance WDL WDL       Mental Status Summary    Recent Changes in Mental Status/Cognitive Functioning no changes                               Becky S. [place], RN

## 2025-07-01 NOTE — PROGRESS NOTES
Name: Yoanna Reed ADMIT: 2025   : 1955  PCP: Tom Muller MD    MRN: [id number] LOS: 1 days   AGE/SEX: 69 y.o. female  ROOM: [facility]     Subjective   Subjective   .  Patient is lying on the bed and is complaining of pain.  Denied nausea, vomiting, chest pain, palpitations.       Objective   Objective   Vital Signs  Temp:  [98.3 °F (36.8 °C)-98.5 °F (36.9 °C)] 98.3 °F (36.8 °C)  Heart Rate:  [] 91  Resp:  [16-20] 18  BP: (122-168)/(69-99) 122/69  SpO2:  [90 %-97 %] 94 %  on   ;   Device (Oxygen Therapy): room air  Body mass index is 26.46 kg/m².  Physical Exam      HEENT: PERRLA, extraocular movements intact, Scleras no icterus  Neck: Supple, no JVD  Cardiovascular: Regular rate and rhythm with normal S1 and S2  Respiratory: Diminished breath sounds on the right and left is fairly clear.  GI: Soft, nontender, bowel sounds present  Extremities: No edema, palpable pedal pulses  Neurologic: Grossly nonfocal, no facial asymmetry        Results Review     I reviewed the patient's new clinical results.  Results from last 7 days   Lab Units 25  0230 25  1002   WBC 10*3/mm3 19.99* 18.20*   HEMOGLOBIN g/dL 12.8 12.8   PLATELETS 10*3/mm3 713* 597*     Results from last 7 days   Lab Units 25  0230 25  1002 25  1335   SODIUM mmol/L 131* 131* 137   POTASSIUM mmol/L 3.8 3.3* 4.1   CHLORIDE mmol/L 98 95* 98   CO2 mmol/L 16.3* 18.1* 22   BUN mg/dL 21.0 29.0* 11   CREATININE mg/dL 0.99 1.46* 0.82   GLUCOSE mg/dL 143* 124* 94   EGFR mL/min/1.73 61.9 38.8*  --    EGFR RESULT mL/min/1.73  --   --  77     Results from last 7 days   Lab Units 25  1002 25  1335   ALBUMIN g/dL 3.4* 4.4   BILIRUBIN mg/dL 0.8 0.5   ALK PHOS U/L 88 84   AST (SGOT) U/L 25 13   ALT (SGPT) U/L 14 8     Results from last 7 days   Lab Units 25  0230 25  1002 25  1335   CALCIUM mg/dL 9.0 9.4 9.6   ALBUMIN g/dL  --  3.4* 4.4     Results from last 7 days   Lab Units  "06/30/25  1140 06/30/25  1002   PROCALCITONIN ng/mL  --  2.18*   LACTATE mmol/L 1.6  --      No results found for: \"HGBA1C\", \"POCGLU\"    US Thoracentesis  Result Date: 7/1/2025  Ultrasound-guided right thoracentesis as described.    This report was finalized on 7/1/2025 5:19 PM by Dr. Baron Chung M.D on Workstation: [id number]      CT Angiogram Chest Pulmonary Embolism  Result Date: 6/30/2025   1. The study is negative for pulmonary embolism. 2. Large right pleural effusion with associated compressive atelectasis of the right lung. 3. Suspected partially obscured partially cavitary subpleural nodule in the posterior right upper lobe measuring 1.9 cm in average diameter. 4. Trace left pleural fluid with multifocal groundglass opacities in the left lung that could represent atelectasis or could be infectious/inflammatory. 5. Mild enlargement of the main pulmonary arteries could reflect pulmonary arterial hypertension.  This report was finalized on 6/30/2025 12:03 PM by Ridge Ragland MD on Workstation: [id number]        I have personally reviewed all medications:  Scheduled Medications  buPROPion XL, 150 mg, Oral, QAM  cefTRIAXone, 2,000 mg, Intravenous, Q24H  escitalopram, 20 mg, Oral, Daily  FLUoxetine, 40 mg, Oral, Daily  ipratropium-albuterol, 3 mL, Nebulization, Q6H While Awake - RT  pravastatin, 20 mg, Oral, Daily  zolpidem, 5 mg, Oral, Nightly    Infusions  Pharmacy To Dose:,   sodium chloride, 75 mL/hr, Last Rate: 75 mL/hr (07/01/25 0749)    Diet  Diet: Cardiac; Healthy Heart (2-3 Na+); Fluid Consistency: Thin (IDDSI 0)    I have personally reviewed:  [x]  Laboratory   [x]  Microbiology   [x]  Radiology   [x]  EKG/Telemetry  [x]  Cardiology/Vascular   []  Pathology    []  Records       Assessment/Plan     Active Hospital Problems    Diagnosis  POA    Pleural effusion [J90]  Unknown    Near syncope [R55]  Unknown    TUSHAR (acute kidney injury) [N17.9]  Unknown    Elevated troponin [R79.89]  Unknown    " Chronic pain [G89.29]  Yes      Resolved Hospital Problems   No resolved problems to display.       69 y.o. female admitted with <principal problem not specified>.      1. Dyspnea/pleural effusion with underlying nodule/mass/leukocytosis, underlying pneumonia cannot be completely ruled out and therefore IV Rocephin was initiated.  Pulmonary did evaluate and plan is for ultrasound-guided thoracentesis.  2.  Elevated troponin, not secondary to MI and cardiology did evaluate.  2D echo has been done and results are pending.  3.  Chronic pain syndrome, continue with analgesics.  4.  History of asthma, on albuterol nebs as needed and currently not in any exacerbation.  5.  Acute kidney injury, will gently volume resuscitate and anticipate renal function to improve.  Rising creatinine likely due to poor oral intake.  6.  History of hep C, treated according to patient.  7.  Right upper lobe lung nodule, management per pulmonary.  8.  On SCDs for DVT prophylaxis.  9.  CODE STATUS is full code.    Expected Discharge Date: 7/3/2025; Expected Discharge Time:       Chinedu Blandon MD  [facility]  07/01/25  17:48 EDT

## 2025-07-01 NOTE — SIGNIFICANT NOTE
07/01/25 1548   OTHER   Discipline physical therapist   Rehab Time/Intention   Session Not Performed patient/family declined, not feeling well  (Pt adamantly decline PT eval today immediately refusing as soon as PT entered room; Nurse present. Pt rates back pain = 10 and states she just got back in bed. f/u 7/2 if appropriate. Nurse aware and in agreement)   Recommendation   PT - Next Appointment 07/02/25

## 2025-07-01 NOTE — PLAN OF CARE
Goal Outcome Evaluation:  Plan of Care Reviewed With: patient   VSS, A/Ox4, RA, Thoracentesis done removed 1L, ECHO results still pending, Pain meds given per MAR, Self turns, IVF continued, No other issues noted.      Progress: no change

## 2025-07-01 NOTE — CONSULTS
Chap visited pt and discussed advance directive. Pt was in a lot of pain and did not want to complete it today.  Pt knows to contact chap through nurse if she wishes to complete document. Chap remains available.

## 2025-07-01 NOTE — PROGRESS NOTES
Consult Daily Progress Note  [facility]   07/01/25      Patient Name:  Yoanna Reed  MRN:  [id number]   YOB: 1955  Age: 69 y.o.  Sex: female  LOS: 1    Reason for Consult:  Large right pleural effusion    Hospital Course:   69-year-old female who presented with recurrent falls and syncope and found to have large loculated right pleural effusion.    Interval History:  No acute events overnight  Continue to have pain in the right chest  Going to go down for thoracentesis soon  Breathing is stable  No nausea vomiting    Physical Exam:  Vitals:    07/01/25 1305   BP:    Pulse: 74   Resp: 20   Temp:    SpO2: 93%       Intake/Output    Intake/Output Summary (Last 24 hours) at 7/1/2025 1437  Last data filed at 7/1/2025 0749  Gross per 24 hour   Intake 998.75 ml   Output --   Net 998.75 ml       General: Alert, nontoxic, NAD  HEENT: NC/AT, EOMI, MMM  Neck: Supple, trachea midline  Cardiac: RRR, no murmur, gallops, rubs  Pulmonary: Diminished in left lung field  GI: Soft, non-tender, non-distended, normal bowel sounds  Extremities: Warm, well perfused, no LE edema  Skin: no visible rash  Neuro: CN II - XII grossly intact  Psychiatry: Normal mood and affect      Data Review:  Results from last 7 days   Lab Units 07/01/25  0230 06/30/25  1002   WBC 10*3/mm3 19.99* 18.20*   HEMOGLOBIN g/dL 12.8 12.8   PLATELETS 10*3/mm3 713* 597*     Results from last 7 days   Lab Units 07/01/25  0230 06/30/25  1002 06/25/25  1335   SODIUM mmol/L 131* 131* 137   POTASSIUM mmol/L 3.8 3.3* 4.1   CHLORIDE mmol/L 98 95* 98   CO2 mmol/L 16.3* 18.1* 22   BUN mg/dL 21.0 29.0* 11   CREATININE mg/dL 0.99 1.46* 0.82   GLUCOSE mg/dL 143* 124* 94   CALCIUM mg/dL 9.0 9.4 9.6   Estimated Creatinine Clearance: 59.2 mL/min (by C-G formula based on SCr of 0.99 mg/dL).    Results from last 7 days   Lab Units 07/01/25  0230 06/30/25  1140 06/30/25  1002 06/25/25  1335   AST (SGOT) U/L  --   --  25 13   ALT (SGPT) U/L  --    --  14 8   PROCALCITONIN ng/mL  --   --  2.18*  --    LACTATE mmol/L  --  1.6  --   --    D DIMER QUANT MCGFEU/mL  --   --  4.44*  --    PLATELETS 10*3/mm3 713*  --  597*  --              Result Review:  I have personally reviewed the results from the time of this admission to 7/1/2025 14:37 EDT and agree with these findings:  [x]  Laboratory list / accordion  [x]  Microbiology  [x]  Radiology  []  EKG/Telemetry   [x]  Cardiology/Vascular   []  Pathology  [x]  Old records  []  Other:    ASSESSMENT  /  PLAN:    Large right loculated pleural effusion  Right upper lobe pulmonary nodule  Suspect sepsis secondary to pneumonia  Asthma  Elevated BNP  Near syncope  Frequent falls  Chronic pain  Migraines  Hyponatremia  Acute kidney injury   Leukocytosis  Dextroscoliosis of lumbar spine  History of tobacco abuse     - Patient presented today with shortness of breath, back pain, and near syncope.  - CT chest was performed which demonstrated large loculated right pleural effusion with right lung atelectasis and possible underlying pulmonary nodule/mass.  - Ultrasound-guided thoracentesis of the right effusion ordered with labs, planning to go soon  -Will need post Thora chest x-ray  - Concern is for parapneumonic effusion/empyema versus fluid overload (less likely)  - Empiric antibiotics with ceftriaxone at this time, procalcitonin elevated, new leukocytosis, subjective fevers  - BNP elevated, troponin elevated, echocardiogram pending.  - On room air saturating well  - Kidney function is improved    Thank you for allowing us to participate in this patients care. Pulmonary will continue to follow.     Александр Marion MD  [facility]  Pulmonary and Critical Care Medicine, Interventional Pulmonology    Parts of this note may be an electronic transcription/translation of spoken language to printed text using the Dragon dictation system.

## 2025-07-01 NOTE — PAYOR COMM NOTE
"Yoanna Reed (69 y.o. Female)     ATTN: NURSE REVIEWER  RE: INITIAL INPT AUTH CLINICALS   REF: [id number]  PLS REPLY TO TRISHA MANDEL [phone] OR FAX# [phone]      Date of Birth   1955    Social Security Number       Address   [address] [place] [address]    Home Phone   [phone]    MRN   [id number]       Syn[facility]   [facility]    Marital Status   Single                            Admission Date   6/30/2025    Admission Type   Emergency    Admitting Provider   Chinedu Blandon MD    Attending Provider   Chinedu Blandon MD    Department, Room/Bed   [facility], 3108/1       Discharge Date       Discharge Disposition       Discharge Destination                                 Attending Provider: Chinedu Blandon MD    Allergies: No Known Allergies    Isolation: None   Infection: None   Code Status: CPR    Ht: 172.7 cm (68\")   Wt: 79 kg (174 lb 2.6 oz)    Admission Cmt: None   Principal Problem: None                  Active Insurance as of 6/30/2025       Primary Coverage       Payor Plan Insurance Group Employer/Plan Group    ANTHEM MEDICARE REPLACEMENT ANTHEM MEDICARE ADVANTAGE HMO [id number]       Payor Plan Address Payor Plan Phone Number Payor Plan Fax Number Effective Dates    [address] [phone]  1/1/2025 - None Entered    [facility] [address]         Subscriber Name Subscriber Birth Date Member ID       YOANNA REED 1955 [id number]                     Emergency Contacts        (Rel.) Home Phone Work Phone Mobile Phone    AMY MAHONEY (Friend) [phone] -- [phone]    doris tapia (Friend) -- -- [phone]                 History & Physical        Chinedu Blandon MD at 06/30/25 1811              Patient Name:  Yoanna Reed  YOB: 1955  MRN:  [id number]  Admit Date:  6/30/2025  Patient Care Team:  Tom Muller MD as PCP - General (Family " Medicine)      Subjective  History Present Illness     Chief Complaint   Patient presents with    Dizziness     Patient is a 69-year-old female with known history of asthma, chronic pain, migraines, hepatitis C presented to the emergency room with complaints of progressively increasing shortness of breath along with near syncopal episodes and falls at home.  Has also been complaining of pain in her thoracic spine area radiating across to the front.  Denies any major cough or sputum production.  Denies any fevers or chills.  Denies any numbness, tingling, one-sided weakness.  In the emergency room she was found to have BNP of 2400, creatinine was 1.46 and baseline is normal, procalcitonin elevated at 2 however lactic acid level was normal.  WBC was 18,000 and underwent CT chest which was suggestive of large right-sided pleural effusion with atelectasis.  Given the above patient is being hospitalized.  No reports of nausea, vomiting, palpitations, dizziness, numbness, tingling, chest pain.      Review of Systems   A 12 system review has been performed and they are negative other than mentioned in the H&P    Personal History     Past Medical History:   Diagnosis Date    Anxiety and depression     Asthma     Limited joint range of motion     NECK    Liver failure 2002    DUE TO MEDICATION    Mass of nipple     RIGHT    Skin cancer     RIGHT CHEEK     Past Surgical History:   Procedure Laterality Date    ANTERIOR CERVICAL FUSION      ARTERY SURGERY Left     KNEE AGE 15 DUE TO CUT TO KNEE    BREAST BIOPSY Right 11/22/2024    Procedure: RIGHT NIPPLE LESION EXCISION;  Surgeon: Raven Aviles MD;  Location: [facility] OR [facility];  Service: General;  Laterality: Right;    CHOLECYSTECTOMY      COLONOSCOPY W/ POLYPECTOMY N/A 05/23/2024    Procedure: COLONOSCOPY to cecum with cold polypectomies;  Surgeon: Ankur Beebe MD;  Location: [facility] ENDOSCOPY;  Service: Gastroenterology;  Laterality: N/A;  PRE - screening  POST -  polyps    HYSTERECTOMY      Partial    POSTERIOR CERVICAL FUSION       Family History   Problem Relation Age of Onset    Hypertension Mother     Ovarian cancer Mother     Malig Hyperthermia Neg Hx      Social History     Tobacco Use    Smoking status: Some Days     Current packs/day: 0.25     Average packs/day: 0.2 packs/day for 47.5 years (11.9 ttl pk-yrs)     Types: Cigarettes     Start date: 1978     Passive exposure: Never    Smokeless tobacco: Never   Vaping Use    Vaping status: Never Used   Substance Use Topics    Alcohol use: Yes     Comment: social    Drug use: Defer     No current facility-administered medications on file prior to encounter.     Current Outpatient Medications on File Prior to Encounter   Medication Sig Dispense Refill    albuterol sulfate  (90 Base) MCG/ACT inhaler INHALE 2 PUFFS BY MOUTH EVERY 4 HOURS AS NEEDED FOR WHEEZE 18 g 3    buPROPion XL (Wellbutrin XL) 150 MG 24 hr tablet Take 1 tablet by mouth Every Morning. 90 tablet 3    escitalopram (LEXAPRO) 20 MG tablet TAKE 1 TABLET BY MOUTH EVERY DAY 90 tablet 1    FLUoxetine (PROzac) 40 MG capsule Take 1 capsule by mouth Daily. 90 capsule 3    HYDROcodone-acetaminophen (Norco) 7.5-325 MG per tablet Take 1 tablet by mouth Every 6 (Six) Hours As Needed for Moderate Pain. 90 tablet 0    ketoconazole (NIZORAL) 2 % shampoo Apply 1 Application topically to the appropriate area as directed 2 (Two) Times a Week.      LORazepam (Ativan) 1 MG tablet Take 1 tablet by mouth Every 6 (Six) Hours As Needed for Anxiety. 240 tablet 1    meloxicam (MOBIC) 15 MG tablet TAKE 1 TABLET BY MOUTH EVERY DAY 60 tablet 3    ondansetron ODT (ZOFRAN-ODT) 4 MG disintegrating tablet Place 1 tablet on the tongue Every 8 (Eight) Hours As Needed for Nausea or Vomiting. 30 tablet 1    pimecrolimus (ELIDEL) 1 % cream Apply 1 Application topically to the appropriate area as directed 2 (Two) Times a Day.      pravastatin (PRAVACHOL) 20 MG tablet Take 1 tablet by mouth  Daily. 90 tablet 3    tiZANidine (Zanaflex) 4 MG tablet Take 1 tablet by mouth Every 8 (Eight) Hours As Needed for Muscle Spasms. 42 tablet 1    ubrogepant (Ubrelvy) 100 MG tablet Take 1 tablet by mouth 1 (One) Time As Needed (migraine). 16 tablet 11    zolpidem (AMBIEN) 10 MG tablet Take 1 tablet by mouth Every Night. 90 tablet 1     No Known Allergies    Objective   Objective     Vital Signs  Temp:  [98.1 °F (36.7 °C)-98.8 °F (37.1 °C)] 98.1 °F (36.7 °C)  Heart Rate:  [] 89  Resp:  [16-20] 20  BP: (111-164)/(62-88) 164/88  SpO2:  [94 %-98 %] 95 %  on  Flow (L/min) (Oxygen Therapy):  [1] 1;   Device (Oxygen Therapy): room air  Body mass index is 25.7 kg/m².    Physical Exam  HEENT: PERRLA, extraocular movements intact, Scleras no icterus  Neck: Supple, no JVD  Cardiovascular: Regular rate and rhythm with normal S1 and S2  Respiratory: Diminished breath sounds on the right and left is fairly clear.  GI: Soft, nontender, bowel sounds present  Extremities: No edema, palpable pedal pulses  Neurologic: Grossly nonfocal, no facial asymmetry    Results Review:  I reviewed the patient's new clinical results.  I reviewed the patient's new imaging results and agree with the interpretation.  I reviewed the patient's other test results and agree with the interpretation  I personally viewed and interpreted the patient's EKG/Telemetry data  Discussed with ED provider.    Lab Results (last 24 hours)       Procedure Component Value Units Date/Time    Comprehensive Metabolic Panel [[id number]]  (Abnormal) Collected: 06/30/25 1002    Specimen: Blood Updated: 06/30/25 1033     Glucose 124 mg/dL      BUN 29.0 mg/dL      Creatinine 1.46 mg/dL      Sodium 131 mmol/L      Potassium 3.3 mmol/L      Chloride 95 mmol/L      CO2 18.1 mmol/L      Calcium 9.4 mg/dL      Total Protein 7.3 g/dL      Albumin 3.4 g/dL      ALT (SGPT) 14 U/L      AST (SGOT) 25 U/L      Alkaline Phosphatase 88 U/L      Total Bilirubin 0.8 mg/dL      Globulin  3.9 gm/dL      A/G Ratio 0.9 g/dL      BUN/Creatinine Ratio 19.9     Anion Gap 17.9 mmol/L      eGFR 38.8 mL/min/1.73     Narrative:      GFR Categories in Chronic Kidney Disease (CKD)              GFR Category          GFR (mL/min/1.73)    Interpretation  G1                    90 or greater        Normal or high (1)  G2                    60-89                Mild decrease (1)  G3a                   45-59                Mild to moderate decrease  G3b                   30-44                Moderate to severe decrease  G4                    15-29                Severe decrease  G5                    14 or less           Kidney failure    (1)In the absence of evidence of kidney disease, neither GFR category G1 or G2 fulfill the criteria for CKD.    eGFR calculation 2021 CKD-EPI creatinine equation, which does not include race as a factor    CBC & Differential [[id number]]  (Abnormal) Collected: 06/30/25 1002    Specimen: Blood Updated: 06/30/25 1133    Narrative:      The following orders were created for panel order CBC & Differential.  Procedure                               Abnormality         Status                     ---------                               -----------         ------                     CBC Auto Differential[[id number]]        Abnormal            Final result                 Please view results for these tests on the individual orders.    Protime-INR [[id number]]  (Abnormal) Collected: 06/30/25 1002    Specimen: Blood from Hand, Right Updated: 06/30/25 1033     Protime 17.0 Seconds      INR 1.39    aPTT [[id number]]  (Normal) Collected: 06/30/25 1002    Specimen: Blood from Hand, Right Updated: 06/30/25 1034     PTT 28.9 seconds     BNP [[id number]]  (Abnormal) Collected: 06/30/25 1002    Specimen: Blood Updated: 06/30/25 1033     proBNP 2,460.0 pg/mL     Narrative:      This assay is used as an aid in the diagnosis of individuals suspected of having heart failure. It can be used as an aid in the  diagnosis of acute decompensated heart failure (ADHF) in patients presenting with signs and symptoms of ADHF to the emergency department (ED). In addition, NT-proBNP of <300 pg/mL indicates ADHF is not likely.    Age Range Result Interpretation  NT-proBNP Concentration (pg/mL:      <50             Positive            >450                   Gray                 300-450                    Negative             <300    50-75           Positive            >900                  Gray                300-900                  Negative            <300      >75             Positive            >1800                  Gray                300-1800                  Negative            <300    High Sensitivity Troponin T [[id number]]  (Abnormal) Collected: 06/30/25 1002    Specimen: Blood Updated: 06/30/25 1035     HS Troponin T 73 ng/L     Narrative:      High Sensitive Troponin T Reference Range:  <14.0 ng/L- Negative Female for AMI  <22.0 ng/L- Negative Male for AMI  >=14 - Abnormal Female indicating possible myocardial injury.  >=22 - Abnormal Male indicating possible myocardial injury.   Clinicians would have to utilize clinical acumen, EKG, Troponin, and serial changes to determine if it is an Acute Myocardial Infarction or myocardial injury due to an underlying chronic condition.         D-dimer, Quantitative [[id number]]  (Abnormal) Collected: 06/30/25 1002    Specimen: Blood from Hand, Right Updated: 06/30/25 1039     D-Dimer, Quantitative 4.44 MCGFEU/mL     Narrative:      According to the assay 's published package insert, a normal (<0.50 MCGFEU/mL) D-dimer result in conjunction with a non-high clinical probability assessment, excludes deep vein thrombosis (DVT) and pulmonary embolism (PE) with high sensitivity.    D-dimer values increase with age and this can make VTE exclusion of an older population difficult. To address this, the American College [employer], based on best available evidence and recent  "guidelines, recommends that clinicians use age-adjusted D-dimer thresholds in patients greater than 50 years of age with: a) a low probability of PE who do not meet all Pulmonary Embolism Rule Out Criteria, or b) in those with intermediate probability of PE.   The formula for an age-adjusted D-dimer cut-off is \"age/100\".  For example, a 60 year old patient would have an age-adjusted cut-off of 0.60 MCGFEU/mL and an 80 year old 0.80 MCGFEU/mL.    CK [[id number]]  (Abnormal) Collected: 06/30/25 1002    Specimen: Blood Updated: 06/30/25 1103     Creatine Kinase 833 U/L     Procalcitonin [[id number]]  (Abnormal) Collected: 06/30/25 1002    Specimen: Blood Updated: 06/30/25 1114     Procalcitonin 2.18 ng/mL     Narrative:      As a Marker for Sepsis (Non-Neonates):    1. <0.5 ng/mL represents a low risk of severe sepsis and/or septic shock.  2. >2 ng/mL represents a high risk of severe sepsis and/or septic shock.    As a Marker for Lower Respiratory Tract Infections that require antibiotic therapy:    PCT on Admission    Antibiotic Therapy       6-12 Hrs later    >0.5                Strongly Recommended  >0.25 - <0.5        Recommended   0.1 - 0.25          Discouraged              Remeasure/reassess PCT  <0.1                Strongly Discouraged     Remeasure/reassess PCT    As 28 day mortality risk marker: \"Change in Procalcitonin Result\" (>80% or <=80%) if Day 0 (or Day 1) and Day 4 values are available. Refer to http://www.[employer]-pct-calculator.com    Change in PCT <=80%  A decrease of PCT levels below or equal to 80% defines a positive change in PCT test result representing a higher risk for 28-day all-cause mortality of patients diagnosed with severe sepsis for septic shock.    Change in PCT >80%  A decrease of PCT levels of more than 80% defines a negative change in PCT result representing a lower risk for 28-day all-cause mortality of patients diagnosed with severe sepsis or septic shock.       CBC Auto Differential " [[id number]]  (Abnormal) Collected: 06/30/25 1002    Specimen: Blood Updated: 06/30/25 1133     WBC 18.20 10*3/mm3      RBC 3.85 10*6/mm3      Hemoglobin 12.8 g/dL      Hematocrit 37.8 %      MCV 98.2 fL      MCH 33.2 pg      MCHC 33.9 g/dL      RDW 12.5 %      RDW-SD 44.7 fl      MPV 8.7 fL      Platelets 597 10*3/mm3      Neutrophil % 86.9 %      Lymphocyte % 5.6 %      Monocyte % 5.8 %      Eosinophil % 0.2 %      Basophil % 0.2 %      Immature Grans % 1.3 %      Neutrophils, Absolute 15.82 10*3/mm3      Lymphocytes, Absolute 1.02 10*3/mm3      Monocytes, Absolute 1.05 10*3/mm3      Eosinophils, Absolute 0.03 10*3/mm3      Basophils, Absolute 0.04 10*3/mm3      Immature Grans, Absolute 0.24 10*3/mm3      nRBC 0.0 /100 WBC     High Sensitivity Troponin T 1Hr [[id number]]  (Abnormal) Collected: 06/30/25 1140    Specimen: Blood from Arm, Right Updated: 06/30/25 1213     HS Troponin T 61 ng/L      Troponin T Numeric Delta -12 ng/L      Troponin T % Delta -16    Narrative:      High Sensitive Troponin T Reference Range:  <14.0 ng/L- Negative Female for AMI  <22.0 ng/L- Negative Male for AMI  >=14 - Abnormal Female indicating possible myocardial injury.  >=22 - Abnormal Male indicating possible myocardial injury.   Clinicians would have to utilize clinical acumen, EKG, Troponin, and serial changes to determine if it is an Acute Myocardial Infarction or myocardial injury due to an underlying chronic condition.         Lactic Acid, Plasma [[id number]]  (Normal) Collected: 06/30/25 1140    Specimen: Blood from Arm, Right Updated: 06/30/25 1208     Lactate 1.6 mmol/L     Blood Culture - Blood, Arm, Left [[id number]] Collected: 06/30/25 1200    Specimen: Blood from Arm, Left Updated: 06/30/25 1215    Blood Culture - Blood, Arm, Right [[id number]] Collected: 06/30/25 1210    Specimen: Blood from Arm, Right Updated: 06/30/25 1215    Urinalysis With Microscopic If Indicated (No Culture) - Urine, Clean Catch [[id number]]  (Abnormal)  Collected: 06/30/25 1448    Specimen: Urine, Clean Catch Updated: 06/30/25 1530     Color, UA Yellow     Appearance, UA Clear     pH, UA 6.0     Specific Gravity, UA >1.030     Glucose, UA Negative     Ketones, UA 15 mg/dL (1+)     Bilirubin, UA Negative     Blood, UA Small (1+)     Protein, UA 30 mg/dL (1+)     Leuk Esterase, UA Trace     Nitrite, UA Negative     Urobilinogen, UA 1.0 E.U./dL    Urinalysis, Microscopic Only - Urine, Clean Catch [[id number]]  (Abnormal) Collected: 06/30/25 1448    Specimen: Urine, Clean Catch Updated: 06/30/25 1532     RBC, UA 3-5 /HPF      WBC, UA 6-10 /HPF      Bacteria, UA None Seen /HPF      Squamous Epithelial Cells, UA 7-12 /HPF      Hyaline Casts, UA None Seen /LPF      Granular Casts, UA 0-2 /LPF      Methodology Manual Light Microscopy    Legionella Antigen, Urine - Urine, Urine, Clean Catch [[id number]] Collected: 06/30/25 1448    Specimen: Urine, Clean Catch Updated: 06/30/25 1728    S. Pneumo Ag Urine or CSF - Urine, Urine, Clean Catch [[id number]] Collected: 06/30/25 1448    Specimen: Urine, Clean Catch Updated: 06/30/25 1728            Imaging Results (Last 24 Hours)       Procedure Component Value Units Date/Time    CT Angiogram Chest Pulmonary Embolism [[id number]] Collected: 06/30/25 1150     Updated: 06/30/25 1206    Narrative:      CT ANGIOGRAM CHEST PULMONARY EMBOLISM-     DATE OF EXAM: 6/30/2025 10:59 AM     INDICATION: Near syncope, elevated cardiac enzymes, positive D-dimer,  rule out PE.     COMPARISON: Breast MRI 10/29/2024. Right shoulder and humerus  radiographs 7/11/2024. Chest radiographs 3/2/2023.     TECHNIQUE: Multiple contiguous axial images were acquired through the  chest following the intravenous administration of 95 mL of Isovue-370.  Reformatted coronal, sagittal, and 3D reconstruction images were also  reviewed. Radiation dose reduction techniques were utilized, including  automated exposure control and exposure modulation based on body size.      FINDINGS:  Imaging was performed with the arms at the side of the patient's body,  limiting evaluation.     No evidence of a pulmonary arterial filling defect to suggest pulmonary  embolism. Mild margin of the main pulmonary arteries could reflect  pulmonary arterial hypertension. The heart is normal size. Moderate to  severe calcified coronary artery disease. Trace pericardial fluid.  Normal thoracic aortic arch anatomy without evidence of aneurysm. No  pathologically enlarged intrathoracic lymph nodes are identified. Tiny  hiatal hernia.     Large right pleural effusion with associated compressive atelectasis of  the right lung. Partially obscured suspected 2 cm x 1.8 cm partially  cavitary subpleural nodule in the posterior right upper lobe (axial  series 6 image 35). Consolidation in the base of the right lower lobe  and right middle lobe could reflect superimposed pneumonia. Trace left  pleural fluid. Dependent and basilar groundglass opacities in the left  lung could represent atelectasis or atypical pneumonia. Patchy  subpleural groundglass opacity in the anterior left upper lobe, which  could also represent atelectasis or could be infectious/inflammatory.  The central airways are widely patent. No pneumothorax.     Limited imaging of the upper abdomen partially includes postoperative  changes from cholecystectomy. Partially imaged incompletely evaluated  low-density lesions in the liver, statistically representing benign  cysts and/or hemangiomas.     Diffuse osteopenia. Postoperative changes from ACDF from C4-C7 and  posterior spinal fusion at C4-C5 without evidence of hardware  complications. Solid bony bridging across the disc spaces and facets.  Mild multilevel thoracic spondylosis and exaggerated thoracic kyphosis.  Irregular 2 cm sclerotic bone lesion in the proximal right humerus,  probable benign enchondroma. No acute osseous abnormality or concerning  osseous lesion.       Impression:         1.  The study is negative for pulmonary embolism.  2. Large right pleural effusion with associated compressive atelectasis  of the right lung.  3. Suspected partially obscured partially cavitary subpleural nodule in  the posterior right upper lobe measuring 1.9 cm in average diameter.  4. Trace left pleural fluid with multifocal groundglass opacities in the  left lung that could represent atelectasis or could be  infectious/inflammatory.  5. Mild enlargement of the main pulmonary arteries could reflect  pulmonary arterial hypertension.     This report was finalized on 6/30/2025 12:03 PM by Ridge Ragland MD on  Workstation: [id number]       CT Head Without Contrast [[id number]] Collected: 06/30/25 1131     Updated: 06/30/25 1150    Narrative:      CT HEAD WO CONTRAST-, CT CERVICAL SPINE WO CONTRAST-, CT LUMBAR SPINE WO  CONTRAST-     HISTORY:  falls, near syncope     COMPARISON: MRI lumbar spine 9/15/2024     CT HEAD WITHOUT CONTRAST: The brain ventricles are symmetrical. There is  no acute hemorrhage, hydrocephalus or of abnormal extra-axial fluid. No  focal area of decreased attenuation to suggest acute infarction is  identified. Bone windows show no evidence of a calvarial fracture.     CT CERVICAL SPINE WITHOUT CONTRAST: The study is significantly degraded  by patient motion. The patient is undergone anterior cervical fusion  from C4-C5 with anterior plate, screws and interbody graft material.  Interbody graft material is also present at C5-6 and C6-7. The patient  is undergone posterior fusion from C4-C5. The alignment of the cervical  spine is within normal limits. No gross or obvious fracture is  appreciated. A large pleural fluid collection is noted on the right,  only partially visualized. Further evaluation could be performed with a  MRI examination of the cervical spine as indicated.     CT LUMBAR SPINE WITHOUT CONTRAST: The study is degraded by patient  motion. Dextroscoliosis of the lumbar spine is  appreciated with apex at  the level of L3. There is grade 1 retrolisthesis of L5 on S1. There is  no evidence of disc herniation. A mild central disc bulge is present at  L4-L5 and L5-S1 resulting in mild flattening of the ventral surface of  the thecal sac. Further evaluation could be performed with MRI  examination of the lumbar spine as indicated.           Radiation dose reduction techniques were utilized, including automated  exposure modulation based on body size.     This report was finalized on 6/30/2025 11:47 AM by Dr. Jerson Viera M.D on Workstation: [id number]       CT Cervical Spine Without Contrast [[id number]] Collected: 06/30/25 1131     Updated: 06/30/25 1150    Narrative:      CT HEAD WO CONTRAST-, CT CERVICAL SPINE WO CONTRAST-, CT LUMBAR SPINE WO  CONTRAST-     HISTORY:  falls, near syncope     COMPARISON: MRI lumbar spine 9/15/2024     CT HEAD WITHOUT CONTRAST: The brain ventricles are symmetrical. There is  no acute hemorrhage, hydrocephalus or of abnormal extra-axial fluid. No  focal area of decreased attenuation to suggest acute infarction is  identified. Bone windows show no evidence of a calvarial fracture.     CT CERVICAL SPINE WITHOUT CONTRAST: The study is significantly degraded  by patient motion. The patient is undergone anterior cervical fusion  from C4-C5 with anterior plate, screws and interbody graft material.  Interbody graft material is also present at C5-6 and C6-7. The patient  is undergone posterior fusion from C4-C5. The alignment of the cervical  spine is within normal limits. No gross or obvious fracture is  appreciated. A large pleural fluid collection is noted on the right,  only partially visualized. Further evaluation could be performed with a  MRI examination of the cervical spine as indicated.     CT LUMBAR SPINE WITHOUT CONTRAST: The study is degraded by patient  motion. Dextroscoliosis of the lumbar spine is appreciated with apex at  the level of L3. There is  grade 1 retrolisthesis of L5 on S1. There is  no evidence of disc herniation. A mild central disc bulge is present at  L4-L5 and L5-S1 resulting in mild flattening of the ventral surface of  the thecal sac. Further evaluation could be performed with MRI  examination of the lumbar spine as indicated.           Radiation dose reduction techniques were utilized, including automated  exposure modulation based on body size.     This report was finalized on 6/30/2025 11:47 AM by Dr. Jerson Viera M.D on Workstation: [id number]       CT Lumbar Spine Without Contrast [[id number]] Collected: 06/30/25 1131     Updated: 06/30/25 1150    Narrative:      CT HEAD WO CONTRAST-, CT CERVICAL SPINE WO CONTRAST-, CT LUMBAR SPINE WO  CONTRAST-     HISTORY:  falls, near syncope     COMPARISON: MRI lumbar spine 9/15/2024     CT HEAD WITHOUT CONTRAST: The brain ventricles are symmetrical. There is  no acute hemorrhage, hydrocephalus or of abnormal extra-axial fluid. No  focal area of decreased attenuation to suggest acute infarction is  identified. Bone windows show no evidence of a calvarial fracture.     CT CERVICAL SPINE WITHOUT CONTRAST: The study is significantly degraded  by patient motion. The patient is undergone anterior cervical fusion  from C4-C5 with anterior plate, screws and interbody graft material.  Interbody graft material is also present at C5-6 and C6-7. The patient  is undergone posterior fusion from C4-C5. The alignment of the cervical  spine is within normal limits. No gross or obvious fracture is  appreciated. A large pleural fluid collection is noted on the right,  only partially visualized. Further evaluation could be performed with a  MRI examination of the cervical spine as indicated.     CT LUMBAR SPINE WITHOUT CONTRAST: The study is degraded by patient  motion. Dextroscoliosis of the lumbar spine is appreciated with apex at  the level of L3. There is grade 1 retrolisthesis of L5 on S1. There is  no  evidence of disc herniation. A mild central disc bulge is present at  L4-L5 and L5-S1 resulting in mild flattening of the ventral surface of  the thecal sac. Further evaluation could be performed with MRI  examination of the lumbar spine as indicated.           Radiation dose reduction techniques were utilized, including automated  exposure modulation based on body size.     This report was finalized on 6/30/2025 11:47 AM by Dr. Jerson Viera M.D on Workstation: [id number]                   Telemetry Scan   Final Result      Telemetry Scan   Final Result      ECG 12 Lead Other; near syncope   Preliminary Result   HEART RATE=81  bpm   RR Iofxdzzn=746  ms   OH Oclzrvcl=656  ms   P Horizontal Axis=13  deg   P Front Axis=47  deg   QRSD Bwulzmsv=843  ms   QT Lupmkiur=919  ms   RSqB=765  ms   QRS Axis=-59  deg   T Wave Axis=89  deg   - ABNORMAL ECG -   Sinus rhythm   Left anterior fascicular block   LVH with secondary repolarization abnormality   Date and Time of Study:2025-06-30 10:47:48           Assessment/Plan     Active Hospital Problems    Diagnosis  POA    Pleural effusion [J90]  Unknown    Near syncope [R55]  Unknown    TUSHAR (acute kidney injury) [N17.9]  Unknown    Elevated troponin [R79.89]  Unknown    Chronic pain [G89.29]  Yes      Resolved Hospital Problems   No resolved problems to display.       1. Dyspnea/pleural effusion/leukocytosis, underlying pneumonia cannot be completely ruled out and therefore will be on Rocephin.  Pulmonary did evaluate and plan is for ultrasound-guided thoracentesis.  2.  Elevated troponin, unlikely secondary to MI however await 2D echo report and cardiology consultation will be obtained.  3.  Chronic pain syndrome, continue with analgesics.  4.  History of asthma, on albuterol nebs as needed and currently not in any exacerbation.  5.  Acute kidney injury, will gently volume resuscitate and anticipate renal function to improve.  Rising creatinine likely due to poor oral  intake.  6.  History of hep C, treated according to patient.  7.  Right upper lobe lung nodule, management per pulmonary.  8.  On SCDs for DVT prophylaxis.  9.  CODE STATUS is full code.       Chinedu Blandon MD  [facility]  06/30/25  18:11 EDT      Electronically signed by Chinedu Blandon MD at 06/30/25 1815       Facility-Administered Medications as of 7/1/2025   Medication Dose Route Frequency Provider Last Rate Last Admin    acetaminophen (TYLENOL) tablet 650 mg  650 mg Oral Q4H PRN Chinedu Blandon MD        albuterol (PROVENTIL) nebulizer solution 0.083% 2.5 mg/3mL  2.5 mg Nebulization Q6H PRN Chinedu Blandon MD        sennosides-docusate (PERICOLACE) 8.6-50 MG per tablet 2 tablet  2 tablet Oral BID PRN Chinedu Blandon MD        And    polyethylene glycol (MIRALAX) packet 17 g  17 g Oral Daily PRN Chinedu Blandon MD        And    bisacodyl (DULCOLAX) EC tablet 5 mg  5 mg Oral Daily PRN Chinedu Blandon MD        And    bisacodyl (DULCOLAX) suppository 10 mg  10 mg Rectal Daily PRN Chindeu Blandon MD        buPROPion XL (WELLBUTRIN XL) 24 hr tablet 150 mg  150 mg Oral QAM Chinedu Blandon MD   150 mg at 07/01/25 0642    Calcium Replacement - Follow Nurse / BPA Driven Protocol   Not Applicable PRN Chinedu Blandon MD        [COMPLETED] cefTRIAXone (ROCEPHIN) 2,000 mg in sodium chloride 0.9 % 100 mL MBP  2,000 mg Intravenous Once Waqar Stearns MD   Stopped at 06/30/25 1329    cefTRIAXone (ROCEPHIN) 2,000 mg in sodium chloride 0.9 % 100 mL MBP  2,000 mg Intravenous Q24H Александр Marion  mL/hr at 07/01/25 1237 2,000 mg at 07/01/25 1237    escitalopram (LEXAPRO) tablet 20 mg  20 mg Oral Daily Chinedu Blandon MD   20 mg at 07/01/25 0849    FLUoxetine (PROzac) capsule 40 mg  40 mg Oral Daily Cihnedu Blandon MD   40 mg at 07/01/25 0849    [COMPLETED] HYDROcodone-acetaminophen (NORCO) 7.5-325 MG per tablet 1 tablet  1  tablet Oral Once Waqar Stearns MD   1 tablet at 06/30/25 1051    HYDROmorphone (DILAUDID) injection 1 mg  1 mg Intravenous Q2H PRN Chinedu Blandon MD        [COMPLETED] iopamidol (ISOVUE-370) 76 % injection 100 mL  100 mL Intravenous Once in imaging Waqar Stearns MD   95 mL at 06/30/25 1102    ipratropium-albuterol (DUO-NEB) nebulizer solution 3 mL  3 mL Nebulization Q6H While Awake - RT Александр Marion MD   3 mL at 07/01/25 1305    [COMPLETED] lactated ringers bolus 1,000 mL  1,000 mL Intravenous Once Waqar Stearns MD   Stopped at 06/30/25 1220    LORazepam (ATIVAN) tablet 1 mg  1 mg Oral Q6H PRN Chinedu Blandon MD   1 mg at 07/01/25 0850    Magnesium Standard Dose Replacement - Follow Nurse / BPA Driven Protocol   Not Applicable PRN Chinedu Blandon MD        [COMPLETED] morphine injection 4 mg  4 mg Intravenous Once Waqar Stearns MD   4 mg at 06/30/25 1050    [COMPLETED] morphine injection 4 mg  4 mg Intravenous Once Waqar Stearns MD   4 mg at 06/30/25 1236    nitroglycerin (NITROSTAT) SL tablet 0.4 mg  0.4 mg Sublingual Q5 Min PRN Chinedu Blandon MD        ondansetron ODT (ZOFRAN-ODT) disintegrating tablet 4 mg  4 mg Oral Q6H PRN Chinedu Blandon MD        Or    ondansetron (ZOFRAN) injection 4 mg  4 mg Intravenous Q6H PRN Chinedu Blandon MD        ondansetron ODT (ZOFRAN-ODT) disintegrating tablet 4 mg  4 mg Translingual Q8H PRN Chinedu Blandon MD        oxyCODONE-acetaminophen (PERCOCET) 5-325 MG per tablet 1 tablet  1 tablet Oral Q4H PRN Chinedu Blandon MD   1 tablet at 07/01/25 1030    Pharmacy To Dose: Ceftriaxone   Not Applicable Continuous PRN Александр Marion MD        Phosphorus Replacement - Follow Nurse / BPA Driven Protocol   Not Applicable PRN Chinedu Blandon MD        [COMPLETED] potassium chloride (KLOR-CON M20) CR tablet 40 mEq  40 mEq Oral Q4H Chinedu Blandon MD   40 mEq at 06/30/25 2221    Potassium Replacement -  Follow Nurse / BPA Driven Protocol   Not Applicable PRN Chinedu Blandon MD        pravastatin (PRAVACHOL) tablet 20 mg  20 mg Oral Daily Chinedu Blandon MD   20 mg at 07/01/25 0849    sodium chloride 0.9 % infusion  75 mL/hr Intravenous Continuous Chinedu Blandon MD 75 mL/hr at 07/01/25 0749 75 mL/hr at 07/01/25 0749    tiZANidine (ZANAFLEX) tablet 4 mg  4 mg Oral Q8H PRN Chinedu Blandon MD   4 mg at 07/01/25 1030    zolpidem (AMBIEN) tablet 5 mg  5 mg Oral Nightly Chinedu Blandon MD   5 mg at 06/30/25 2017     Lab Results (last 24 hours)       Procedure Component Value Units Date/Time    Blood Culture - Blood, Arm, Left [[id number]]  (Normal) Collected: 06/30/25 1200    Specimen: Blood from Arm, Left Updated: 07/01/25 1230     Blood Culture No growth at 24 hours    Blood Culture - Blood, Arm, Right [[id number]]  (Normal) Collected: 06/30/25 1210    Specimen: Blood from Arm, Right Updated: 07/01/25 1230     Blood Culture No growth at 24 hours    High Sensitivity Troponin T [[id number]]  (Abnormal) Collected: 07/01/25 0230    Specimen: Blood Updated: 07/01/25 0748     HS Troponin T 34 ng/L     Narrative:      High Sensitive Troponin T Reference Range:  <14.0 ng/L- Negative Female for AMI  <22.0 ng/L- Negative Male for AMI  >=14 - Abnormal Female indicating possible myocardial injury.  >=22 - Abnormal Male indicating possible myocardial injury.   Clinicians would have to utilize clinical acumen, EKG, Troponin, and serial changes to determine if it is an Acute Myocardial Infarction or myocardial injury due to an underlying chronic condition.         Basic Metabolic Panel [[id number]]  (Abnormal) Collected: 07/01/25 0230    Specimen: Blood Updated: 07/01/25 0309     Glucose 143 mg/dL      BUN 21.0 mg/dL      Creatinine 0.99 mg/dL      Sodium 131 mmol/L      Potassium 3.8 mmol/L      Chloride 98 mmol/L      CO2 16.3 mmol/L      Calcium 9.0 mg/dL      BUN/Creatinine Ratio 21.2     Anion Gap 16.7  mmol/L      eGFR 61.9 mL/min/1.73     Narrative:      GFR Categories in Chronic Kidney Disease (CKD)              GFR Category          GFR (mL/min/1.73)    Interpretation  G1                    90 or greater        Normal or high (1)  G2                    60-89                Mild decrease (1)  G3a                   45-59                Mild to moderate decrease  G3b                   30-44                Moderate to severe decrease  G4                    15-29                Severe decrease  G5                    14 or less           Kidney failure    (1)In the absence of evidence of kidney disease, neither GFR category G1 or G2 fulfill the criteria for CKD.    eGFR calculation 2021 CKD-EPI creatinine equation, which does not include race as a factor    CBC & Differential [[id number]]  (Abnormal) Collected: 07/01/25 0230    Specimen: Blood Updated: 07/01/25 0253    Narrative:      The following orders were created for panel order CBC & Differential.  Procedure                               Abnormality         Status                     ---------                               -----------         ------                     CBC Auto Differential[[id number]]        Abnormal            Final result                 Please view results for these tests on the individual orders.    CBC Auto Differential [[id number]]  (Abnormal) Collected: 07/01/25 0230    Specimen: Blood Updated: 07/01/25 0253     WBC 19.99 10*3/mm3      RBC 3.93 10*6/mm3      Hemoglobin 12.8 g/dL      Hematocrit 38.2 %      MCV 97.2 fL      MCH 32.6 pg      MCHC 33.5 g/dL      RDW 12.8 %      RDW-SD 45.6 fl      MPV 8.4 fL      Platelets 713 10*3/mm3      Neutrophil % 80.1 %      Lymphocyte % 10.4 %      Monocyte % 8.0 %      Eosinophil % 0.4 %      Basophil % 0.3 %      Immature Grans % 0.8 %      Neutrophils, Absolute 16.02 10*3/mm3      Lymphocytes, Absolute 2.07 10*3/mm3      Monocytes, Absolute 1.60 10*3/mm3      Eosinophils, Absolute 0.08 10*3/mm3       Basophils, Absolute 0.06 10*3/mm3      Immature Grans, Absolute 0.16 10*3/mm3      nRBC 0.0 /100 WBC     S. Pneumo Ag Urine or CSF - Urine, Urine, Clean Catch [[id number]]  (Normal) Collected: 06/30/25 1448    Specimen: Urine, Clean Catch Updated: 06/30/25 1927     Strep Pneumo Ag Negative    Legionella Antigen, Urine - Urine, Urine, Clean Catch [[id number]]  (Normal) Collected: 06/30/25 1448    Specimen: Urine, Clean Catch Updated: 06/30/25 1926     LEGIONELLA ANTIGEN, URINE Negative    Urinalysis, Microscopic Only - Urine, Clean Catch [[id number]]  (Abnormal) Collected: 06/30/25 1448    Specimen: Urine, Clean Catch Updated: 06/30/25 1532     RBC, UA 3-5 /HPF      WBC, UA 6-10 /HPF      Bacteria, UA None Seen /HPF      Squamous Epithelial Cells, UA 7-12 /HPF      Hyaline Casts, UA None Seen /LPF      Granular Casts, UA 0-2 /LPF      Methodology Manual Light Microscopy    Urinalysis With Microscopic If Indicated (No Culture) - Urine, Clean Catch [[id number]]  (Abnormal) Collected: 06/30/25 1448    Specimen: Urine, Clean Catch Updated: 06/30/25 1530     Color, UA Yellow     Appearance, UA Clear     pH, UA 6.0     Specific Gravity, UA >1.030     Glucose, UA Negative     Ketones, UA 15 mg/dL (1+)     Bilirubin, UA Negative     Blood, UA Small (1+)     Protein, UA 30 mg/dL (1+)     Leuk Esterase, UA Trace     Nitrite, UA Negative     Urobilinogen, UA 1.0 E.U./dL          Imaging Results (Last 24 Hours)       ** No results found for the last 24 hours. **          ECG/EMG Results (last 24 hours)       Procedure Component Value Units Date/Time    Telemetry Scan [[id number]] Resulted: 06/30/25 1625     Updated: 06/30/25 1708    ECG 12 Lead Other; near syncope [[id number]] Collected: 06/30/25 1047     Updated: 06/30/25 1834     QT Interval 374 ms      QTC Interval 434 ms     Narrative:      HEART RATE=81  bpm  RR Zqvnpxqv=352  ms  VT Thigsixh=599  ms  P Horizontal Axis=13  deg  P Front Axis=47  deg  QRSD Rawgyxaj=855  ms  QT  Cgxvzavl=748  ms  HYuT=513  ms  QRS Axis=-59  deg  T Wave Axis=89  deg  - ABNORMAL ECG -  Sinus rhythm  Left anterior fascicular block  LVH with secondary repolarization abnormality  When compared with ECG of 12-Nov-2024 12:35:15,  No change from prior tracing  Electronically Signed By: Nisa Phelps ([facility]) 2025-06-30 18:06:27  Date and Time of Study:2025-06-30 10:47:48    Telemetry Scan [[id number]] Resulted: 06/30/25 2359     Updated: 07/01/25 0540    Telemetry Scan [[id number]] Resulted: 07/01/25 0501     Updated: 07/01/25 0540          Orders (last 24 hrs)        Start     Ordered    07/01/25 1200  cefTRIAXone (ROCEPHIN) 2,000 mg in sodium chloride 0.9 % 100 mL MBP  Every 24 Hours         06/30/25 1835    07/01/25 1051  HYDROmorphone (DILAUDID) injection 1 mg  Every 2 Hours PRN         07/01/25 1051    07/01/25 0720  High Sensitivity Troponin T  Once         07/01/25 0719    07/01/25 0700  buPROPion XL (WELLBUTRIN XL) 24 hr tablet 150 mg  Every Morning         06/30/25 1811    07/01/25 0600  CBC & Differential  Daily       06/30/25 1646    07/01/25 0600  Basic Metabolic Panel  Daily       06/30/25 1646    07/01/25 0600  CBC Auto Differential  PROCEDURE ONCE         06/30/25 2202    07/01/25 0501  Telemetry Scan  Once         07/01/25 0501    07/01/25 0231  Initiate & Follow Hypercapnic Monitoring Guideline for Opioid Administration via EtCO2 and / or SpO2  Continuous        Comments: Follow Hypercapnic Monitoring Guideline As Outlined in Process Instructions (Open Order Report to View Full Instructions)    07/01/25 0230    07/01/25 0231  Opioid Administration - Document EtCO2 and / or SpO2 With Each Set of Vitals & Any Change in Patient Status  Per Order Details        Comments: With Each Set of Vitals & Any Change in Patient Status    07/01/25 0230    07/01/25 0231  Opioid Administration - Notify Provider Hypercapnic Monitoring  Continuous        Comments: Open Order Report to View Parameters Requiring Provider  Notification    07/01/25 0230    07/01/25 0231  Opioid Administration - Continuous Pulse Oximetry (SpO2)  Continuous         07/01/25 0230    07/01/25 0149  Potassium  Timed,   Status:  Canceled         06/30/25 1648    07/01/25 0000  PT Consult: Eval & Treat as tolerated  Once         06/30/25 1646    06/30/25 2359  Telemetry Scan  Once         06/30/25 2359    06/30/25 2100  zolpidem (AMBIEN) tablet 5 mg  Nightly         06/30/25 1811 06/30/25 2000  Vital Signs  Every 4 Hours      Comments: Per per hospital policy    06/30/25 1646    06/30/25 2000  Strict Intake & Output  Every 4 Hours       06/30/25 1646    06/30/25 1915  sodium chloride 0.9 % infusion  Continuous         06/30/25 1816    06/30/25 1900  ipratropium-albuterol (DUO-NEB) nebulizer solution 3 mL  Every 6 Hours While Awake - RT         06/30/25 1754    06/30/25 1900  escitalopram (LEXAPRO) tablet 20 mg  Daily         06/30/25 1811 06/30/25 1900  FLUoxetine (PROzac) capsule 40 mg  Daily         06/30/25 1811    06/30/25 1900  pravastatin (PRAVACHOL) tablet 20 mg  Daily         06/30/25 1811 06/30/25 1821  HYDROmorphone (DILAUDID) injection 0.5 mg  Every 2 Hours PRN,   Status:  Discontinued         06/30/25 1821    06/30/25 1817  Inpatient Cardiology Consult  Once        Specialty:  Cardiology  Provider:  Jeanette Clark MD    06/30/25 1816 06/30/25 1811  tiZANidine (ZANAFLEX) tablet 4 mg  Every 8 Hours PRN         06/30/25 1811    06/30/25 1811  ondansetron ODT (ZOFRAN-ODT) disintegrating tablet 4 mg  Every 8 Hours PRN         06/30/25 1811 06/30/25 1810  LORazepam (ATIVAN) tablet 1 mg  Every 6 Hours PRN         06/30/25 1811 06/30/25 1810  albuterol (PROVENTIL) nebulizer solution 0.083% 2.5 mg/3mL  Every 6 Hours PRN         06/30/25 1811    06/30/25 1800  Oral Care  2 Times Daily       06/30/25 1646    06/30/25 1800  Incentive Spirometry  Every 4 Hours While Awake       06/30/25 1646    06/30/25 1745  potassium chloride (KLOR-CON  M20) CR tablet 40 mEq  Every 4 Hours         06/30/25 1648    06/30/25 1721  Legionella Antigen, Urine - Urine, Urine, Clean Catch  Once         06/30/25 1720    06/30/25 1721  S. Pneumo Ag Urine or CSF - Urine, Urine, Clean Catch  Once         06/30/25 1720    06/30/25 1720  Pharmacy To Dose: Ceftriaxone  Continuous PRN         06/30/25 1720    06/30/25 1718  US Thoracentesis  1 Time Imaging         06/30/25 1718    06/30/25 1650  Inpatient Pulmonology Consult  Once        Specialty:  Pulmonary Disease  Provider:  Yogesh Cordoba MD    06/30/25 1649    06/30/25 1650  Adult Transthoracic Echo Complete W/ Cont if Necessary Per Protocol  Once         06/30/25 1649    06/30/25 1647  oxyCODONE-acetaminophen (PERCOCET) 5-325 MG per tablet 1 tablet  Every 4 Hours PRN         06/30/25 1647    06/30/25 1647  Daily Weights  Daily       06/30/25 1646    06/30/25 1647  Intake & Output  Every Shift       06/30/25 1646    06/30/25 1647  Diet: Cardiac; Healthy Heart (2-3 Na+); Fluid Consistency: Thin (IDDSI 0)  Diet Effective Now         06/30/25 1646    06/30/25 1646  Potassium Replacement - Follow Nurse / BPA Driven Protocol  As Needed         06/30/25 1646    06/30/25 1646  Magnesium Standard Dose Replacement - Follow Nurse / BPA Driven Protocol  As Needed         06/30/25 1646    06/30/25 1646  Phosphorus Replacement - Follow Nurse / BPA Driven Protocol  As Needed         06/30/25 1646    06/30/25 1646  Calcium Replacement - Follow Nurse / BPA Driven Protocol  As Needed         06/30/25 1646    06/30/25 1646  Code Status and Medical Interventions: CPR (Attempt to Resuscitate); Full  Continuous         06/30/25 1646    06/30/25 1646  Continuous Cardiac Monitoring  Continuous        Comments: Follow Standing Orders As Outlined in Process Instructions (Open Order Report to View Full Instructions)    06/30/25 1646    06/30/25 1646  Maintain IV Access  Continuous         06/30/25 1646    06/30/25 1646  Telemetry - Place  "Orders & Notify Provider of Results When Patient Experiences Acute Chest Pain, Dysrhythmia or Respiratory Distress  Continuous        Comments: Open Order Report to View Parameters Requiring Provider Notification    06/30/25 1646    06/30/25 1646  May Be Off Telemetry for Tests  Continuous         06/30/25 1646    06/30/25 1645  sennosides-docusate (PERICOLACE) 8.6-50 MG per tablet 2 tablet  2 Times Daily PRN        Placed in \"And\" Linked Group    06/30/25 1646    06/30/25 1645  polyethylene glycol (MIRALAX) packet 17 g  Daily PRN        Placed in \"And\" Linked Group    06/30/25 1646    06/30/25 1645  bisacodyl (DULCOLAX) EC tablet 5 mg  Daily PRN        Placed in \"And\" Linked Group    06/30/25 1646    06/30/25 1645  bisacodyl (DULCOLAX) suppository 10 mg  Daily PRN        Placed in \"And\" Linked Group    06/30/25 1646    06/30/25 1645  ondansetron ODT (ZOFRAN-ODT) disintegrating tablet 4 mg  Every 6 Hours PRN        Placed in \"Or\" Linked Group    06/30/25 1646    06/30/25 1645  ondansetron (ZOFRAN) injection 4 mg  Every 6 Hours PRN        Placed in \"Or\" Linked Group    06/30/25 1646    06/30/25 1645  nitroglycerin (NITROSTAT) SL tablet 0.4 mg  Every 5 Minutes PRN         06/30/25 1646    06/30/25 1645  acetaminophen (TYLENOL) tablet 650 mg  Every 4 Hours PRN         06/30/25 1646    06/30/25 1625  Telemetry Scan  Once         06/30/25 1625    06/30/25 1533  Inpatient Advance Care Planning Consult  Once        Provider:  (Not yet assigned)    06/30/25 1532    06/30/25 1532  Inpatient Case Management  Consult  Once        Provider:  (Not yet assigned)    06/30/25 1531    06/30/25 1506  Urinalysis, Microscopic Only - Urine, Clean Catch  Once         06/30/25 1505    06/30/25 1137  cefTRIAXone (ROCEPHIN) 2,000 mg in sodium chloride 0.9 % 100 mL MBP  Once         06/30/25 1121    Unscheduled  Up with assistance  As Needed       06/30/25 1646    Unscheduled  Oxygen Therapy- Nasal Cannula; Titrate 1-6 LPM Per " SpO2; 90 - 95%  Continuous PRN       06/30/25 1646    Signed and Held  Body Fluid Cell Count With Differential - Pleural Fluid, Pleural Cavity  STAT         Signed and Held    Signed and Held  pH, Body Fluid - Pleural Fluid, Pleural Cavity  STAT         Signed and Held    Signed and Held  Albumin, Fluid - Pleural Fluid, Pleural Cavity  STAT         Signed and Held    Signed and Held  Protein, Body Fluid - Pleural Fluid, Pleural Cavity  STAT         Signed and Held    Signed and Held  Lactate Dehydrogenase, Body Fluid - Pleural Fluid, Pleural Cavity  STAT         Signed and Held    Signed and Held  Glucose, Body Fluid - Pleural Fluid, Pleural Cavity  STAT         Signed and Held    Signed and Held  AFB Culture - Body Fluid, Pleural Cavity  STAT         Signed and Held    Signed and Held  Non-gynecologic Cytology  STAT         Signed and Held    Signed and Held  Body Fluid Culture - Body Fluid, Pleural Cavity  STAT         Signed and Held    Signed and Held  Obtain Informed Consent  Once         Signed and Held    Signed and Held  Amylase, Body Fluid - Pleural Fluid, Pleural Cavity  STAT         Signed and Held    Signed and Held  Anaerobic Culture - Pleural Fluid, Pleural Cavity  STAT         Signed and Held    Signed and Held  Fungus Culture - Body Fluid, Pleural Cavity  STAT         Signed and Held                  Operative/Procedure Notes (last 24 hours)  Notes from 06/30/25 1308 through 07/01/25 1308   No notes of this type exist for this encounter.       Physician Progress Notes (last 24 hours)  Notes from 06/30/25 1308 through 07/01/25 1308   No notes of this type exist for this encounter.          Consult Notes (last 24 hours)        Nisa Phelps MD at 07/01/25 0716        Consult Orders    1. Inpatient Cardiology Consult [[id number]] ordered by Chinedu Blandon MD at 06/30/25 1816                   [facility] Consult    Patient Name: Yoanna Reed  Age/Sex: 69 y.o.  female  : 1955  MRN: [id number]    Date of Admission: 2025  Date of Encounter Visit: 25  Encounter Provider: Nisa Phelps MD  Referring Provider: Chinedu Blandon MD  Place of Service: [facility] CARDIOLOGY  Patient Care Team:  Tom Muller MD as PCP - General (Family Medicine)    Subjective:     Consulted for: Elevated troponin    Chief Complaint: Back pain, shortness of breath    History of Present Illness:  Yoanna Reed is a 69 y.o. female with asthma, chronic pain, migraines, treated hepatitis C, hyperlipidemia, who presented with complaints of lower back pain.    The patient reports that over the last month she has been having episodes of dizziness associated with loss of balance resulting in recurrent falls.  Because of the recurrent falls she has developed low back pain which became unbearable yesterday which point she opted to come to the emergency room.  Additionally she reports that last week she fell on her right side and subsequently developed right sided chest discomfort that would worsen with inspiration and certain movements as well as worsening shortness of breath.    Following her arrival to the emergency room her EKG was unremarkable.  Troponin was elevated at 73 but declined to 61 and then down to 34 this morning.  Creatinine was elevated at 1.46 which is higher than her normal baseline.  D-dimer was abnormal at 4.44.  Creatinine kinase was elevated 833.  Procalcitonin was elevated at 2.18.  White blood count was elevated at 18 and trended up further to 20 this morning.  Sodium has been low at 131.  Creatinine is normalized this morning.      CT angiogram of the chest showed no pulmonary embolism but did show evidence of large right-sided pleural effusion with compressive atelectasis of the right lung.  There also appear to be nodules on the right side.  She has been evaluated by pulmonary and thoracentesis was  recommended.      Past Medical History:  Past Medical History:   Diagnosis Date    Anxiety and depression     Asthma     Limited joint range of motion     NECK    Liver failure 2002    DUE TO MEDICATION    Mass of nipple     RIGHT    Skin cancer     RIGHT CHEEK       Past Surgical History:   Procedure Laterality Date    ANTERIOR CERVICAL FUSION      ARTERY SURGERY Left     KNEE AGE 15 DUE TO CUT TO KNEE    BREAST BIOPSY Right 11/22/2024    Procedure: RIGHT NIPPLE LESION EXCISION;  Surgeon: Raven Aviles MD;  [place] [facility] [place] [facility] [place]  Service: General;  Laterality: Right;    CHOLECYSTECTOMY      COLONOSCOPY W/ POLYPECTOMY N/A 05/23/2024    Procedure: COLONOSCOPY to cecum with cold polypectomies;  Surgeon: Ankur Beebe MD;  Location: [facility] ENDOSCOPY;  Service: Gastroenterology;  Laterality: N/A;  PRE - screening  POST - polyps    HYSTERECTOMY      Partial    POSTERIOR CERVICAL FUSION         Home Medications:   Medications Prior to Admission   Medication Sig Dispense Refill Last Dose/Taking    albuterol sulfate  (90 Base) MCG/ACT inhaler INHALE 2 PUFFS BY MOUTH EVERY 4 HOURS AS NEEDED FOR WHEEZE 18 g 3 Taking As Needed    buPROPion XL (Wellbutrin XL) 150 MG 24 hr tablet Take 1 tablet by mouth Every Morning. 90 tablet 3 Taking    escitalopram (LEXAPRO) 20 MG tablet TAKE 1 TABLET BY MOUTH EVERY DAY 90 tablet 1 Taking    FLUoxetine (PROzac) 40 MG capsule Take 1 capsule by mouth Daily. 90 capsule 3 Taking    HYDROcodone-acetaminophen (Norco) 7.5-325 MG per tablet Take 1 tablet by mouth Every 6 (Six) Hours As Needed for Moderate Pain. 90 tablet 0 Taking As Needed    ketoconazole (NIZORAL) 2 % shampoo Apply 1 Application topically to the appropriate area as directed 2 (Two) Times a Week.   Taking    LORazepam (Ativan) 1 MG tablet Take 1 tablet by mouth Every 6 (Six) Hours As Needed for Anxiety. 240 tablet 1 Taking As Needed    meloxicam (MOBIC) 15 MG tablet TAKE 1 TABLET BY MOUTH EVERY DAY 60 tablet 3  Taking    ondansetron ODT (ZOFRAN-ODT) 4 MG disintegrating tablet Place 1 tablet on the tongue Every 8 (Eight) Hours As Needed for Nausea or Vomiting. 30 tablet 1 Taking As Needed    pimecrolimus (ELIDEL) 1 % cream Apply 1 Application topically to the appropriate area as directed 2 (Two) Times a Day.   Taking    pravastatin (PRAVACHOL) 20 MG tablet Take 1 tablet by mouth Daily. 90 tablet 3 Taking    tiZANidine (Zanaflex) 4 MG tablet Take 1 tablet by mouth Every 8 (Eight) Hours As Needed for Muscle Spasms. 42 tablet 1 Taking As Needed    ubrogepant (Ubrelvy) 100 MG tablet Take 1 tablet by mouth 1 (One) Time As Needed (migraine). 16 tablet 11 Taking As Needed    zolpidem (AMBIEN) 10 MG tablet Take 1 tablet by mouth Every Night. 90 tablet 1 Taking       Allergies:  No Known Allergies    Past Social History:  Social History     Socioeconomic History    Marital status: Single   Tobacco Use    Smoking status: Some Days     Current packs/day: 0.25     Average packs/day: 0.3 packs/day for 47.5 years (11.9 ttl pk-yrs)     Types: Cigarettes     Start date: 1978     Passive exposure: Never    Smokeless tobacco: Never   Vaping Use    Vaping status: Never Used   Substance and Sexual Activity    Alcohol use: Yes     Comment: social    Drug use: Defer    Sexual activity: Defer       Past Family History:  Family History   Problem Relation Age of Onset    Hypertension Mother     Ovarian cancer Mother     Malig Hyperthermia Neg Hx        Review of Systems:   All systems reviewed. Pertinent positives identified in HPI. All other systems are negative.    Objective:   Temp:  [98.1 °F (36.7 °C)-98.8 °F (37.1 °C)] 98.3 °F (36.8 °C)  Heart Rate:  [] 116  Resp:  [16-20] 20  BP: (111-168)/(62-99) 164/90   No intake or output data in the 24 hours ending 07/01/25 0716  Body mass index is 26.48 kg/m².      06/30/25  1008 07/01/25  0640   Weight: 76.7 kg (169 lb) 79 kg (174 lb 2.6 oz)     Weight change:     Physical Exam:   General  "Appearance:    Alert, cooperative, in no acute distress   Head:    Normocephalic, without obvious abnormality, atraumatic   Eyes:            Lids and lashes normal, conjunctivae and sclerae normal, no   icterus, no pallor, corneas clear, PERRLA   Ears:    Ears appear intact with no abnormalities noted   Neck:   No adenopathy, supple, trachea midline, no thyromegaly, no   carotid bruit, no JVD   Lungs:     Clear to auscultation,respirations regular, even and unlabored    Heart:    Regular rhythm and normal rate, normal S1 and S2, no murmur, no gallop, no rub, no click   Chest Wall:    No abnormalities observed   Abdomen:     Normal bowel sounds, no masses, no organomegaly, soft        non-tender, non-distended, no guarding, no rebound  tenderness   Extremities:   Moves all extremities well, no edema, no cyanosis, no redness   Pulses:   Pulses palpable and equal bilaterally. Normal radial, carotid, femoral, dorsalis pedis and posterior tibial pulses bilaterally. Normal abdominal aorta   Skin:  Psychiatric:   No bleeding, bruising or rash    Alert and oriented x 3, normal mood and affect       Lab Review:   Results from last 7 days   Lab Units 07/01/25  0230 06/30/25  1002 06/25/25  1335   SODIUM mmol/L 131* 131* 137   POTASSIUM mmol/L 3.8 3.3* 4.1   CHLORIDE mmol/L 98 95* 98   CO2 mmol/L 16.3* 18.1* 22   BUN mg/dL 21.0 29.0* 11   CREATININE mg/dL 0.99 1.46* 0.82   GLUCOSE mg/dL 143* 124* 94   CALCIUM mg/dL 9.0 9.4 9.6   AST (SGOT) U/L  --  25 13   ALT (SGPT) U/L  --  14 8     Results from last 7 days   Lab Units 06/30/25  1140 06/30/25  1002   CK TOTAL U/L  --  833*   HSTROP T ng/L 61* 73*     Results from last 7 days   Lab Units 07/01/25  0230 06/30/25  1002   WBC 10*3/mm3 19.99* 18.20*   HEMOGLOBIN g/dL 12.8 12.8   HEMATOCRIT % 38.2 37.8   PLATELETS 10*3/mm3 713* 597*     Results from last 7 days   Lab Units 06/30/25  1002   INR  1.39*   APTT seconds 28.9               Invalid input(s): \"LDLCALC\"  Results from last " 7 days   Lab Units 06/30/25  1002   PROBNP pg/mL 2,460.0*       Imaging:  Imaging Results (Most Recent)       Procedure Component Value Units Date/Time    CT Angiogram Chest Pulmonary Embolism [[id number]] Collected: 06/30/25 1150     Updated: 06/30/25 1206    Narrative:      CT ANGIOGRAM CHEST PULMONARY EMBOLISM-     DATE OF EXAM: 6/30/2025 10:59 AM     INDICATION: Near syncope, elevated cardiac enzymes, positive D-dimer,  rule out PE.     COMPARISON: Breast MRI 10/29/2024. Right shoulder and humerus  radiographs 7/11/2024. Chest radiographs 3/2/2023.     TECHNIQUE: Multiple contiguous axial images were acquired through the  chest following the intravenous administration of 95 mL of Isovue-370.  Reformatted coronal, sagittal, and 3D reconstruction images were also  reviewed. Radiation dose reduction techniques were utilized, including  automated exposure control and exposure modulation based on body size.     FINDINGS:  Imaging was performed with the arms at the side of the patient's body,  limiting evaluation.     No evidence of a pulmonary arterial filling defect to suggest pulmonary  embolism. Mild margin of the main pulmonary arteries could reflect  pulmonary arterial hypertension. The heart is normal size. Moderate to  severe calcified coronary artery disease. Trace pericardial fluid.  Normal thoracic aortic arch anatomy without evidence of aneurysm. No  pathologically enlarged intrathoracic lymph nodes are identified. Tiny  hiatal hernia.     Large right pleural effusion with associated compressive atelectasis of  the right lung. Partially obscured suspected 2 cm x 1.8 cm partially  cavitary subpleural nodule in the posterior right upper lobe (axial  series 6 image 35). Consolidation in the base of the right lower lobe  and right middle lobe could reflect superimposed pneumonia. Trace left  pleural fluid. Dependent and basilar groundglass opacities in the left  lung could represent atelectasis or atypical  pneumonia. Patchy  subpleural groundglass opacity in the anterior left upper lobe, which  could also represent atelectasis or could be infectious/inflammatory.  The central airways are widely patent. No pneumothorax.     Limited imaging of the upper abdomen partially includes postoperative  changes from cholecystectomy. Partially imaged incompletely evaluated  low-density lesions in the liver, statistically representing benign  cysts and/or hemangiomas.     Diffuse osteopenia. Postoperative changes from ACDF from C4-C7 and  posterior spinal fusion at C4-C5 without evidence of hardware  complications. Solid bony bridging across the disc spaces and facets.  Mild multilevel thoracic spondylosis and exaggerated thoracic kyphosis.  Irregular 2 cm sclerotic bone lesion in the proximal right humerus,  probable benign enchondroma. No acute osseous abnormality or concerning  osseous lesion.       Impression:         1. The study is negative for pulmonary embolism.  2. Large right pleural effusion with associated compressive atelectasis  of the right lung.  3. Suspected partially obscured partially cavitary subpleural nodule in  the posterior right upper lobe measuring 1.9 cm in average diameter.  4. Trace left pleural fluid with multifocal groundglass opacities in the  left lung that could represent atelectasis or could be  infectious/inflammatory.  5. Mild enlargement of the main pulmonary arteries could reflect  pulmonary arterial hypertension.     This report was finalized on 6/30/2025 12:03 PM by Ridge Ragland MD on  Workstation: [id number]       CT Head Without Contrast [[id number]] Collected: 06/30/25 1131     Updated: 06/30/25 1150    Narrative:      CT HEAD WO CONTRAST-, CT CERVICAL SPINE WO CONTRAST-, CT LUMBAR SPINE WO  CONTRAST-     HISTORY:  falls, near syncope     COMPARISON: MRI lumbar spine 9/15/2024     CT HEAD WITHOUT CONTRAST: The brain ventricles are symmetrical. There is  no acute hemorrhage,  hydrocephalus or of abnormal extra-axial fluid. No  focal area of decreased attenuation to suggest acute infarction is  identified. Bone windows show no evidence of a calvarial fracture.     CT CERVICAL SPINE WITHOUT CONTRAST: The study is significantly degraded  by patient motion. The patient is undergone anterior cervical fusion  from C4-C5 with anterior plate, screws and interbody graft material.  Interbody graft material is also present at C5-6 and C6-7. The patient  is undergone posterior fusion from C4-C5. The alignment of the cervical  spine is within normal limits. No gross or obvious fracture is  appreciated. A large pleural fluid collection is noted on the right,  only partially visualized. Further evaluation could be performed with a  MRI examination of the cervical spine as indicated.     CT LUMBAR SPINE WITHOUT CONTRAST: The study is degraded by patient  motion. Dextroscoliosis of the lumbar spine is appreciated with apex at  the level of L3. There is grade 1 retrolisthesis of L5 on S1. There is  no evidence of disc herniation. A mild central disc bulge is present at  L4-L5 and L5-S1 resulting in mild flattening of the ventral surface of  the thecal sac. Further evaluation could be performed with MRI  examination of the lumbar spine as indicated.           Radiation dose reduction techniques were utilized, including automated  exposure modulation based on body size.     This report was finalized on 6/30/2025 11:47 AM by Dr. Jerson Viera M.D on Workstation: [id number]       CT Cervical Spine Without Contrast [[id number]] Collected: 06/30/25 1131     Updated: 06/30/25 1150    Narrative:      CT HEAD WO CONTRAST-, CT CERVICAL SPINE WO CONTRAST-, CT LUMBAR SPINE WO  CONTRAST-     HISTORY:  falls, near syncope     COMPARISON: MRI lumbar spine 9/15/2024     CT HEAD WITHOUT CONTRAST: The brain ventricles are symmetrical. There is  no acute hemorrhage, hydrocephalus or of abnormal extra-axial fluid.  No  focal area of decreased attenuation to suggest acute infarction is  identified. Bone windows show no evidence of a calvarial fracture.     CT CERVICAL SPINE WITHOUT CONTRAST: The study is significantly degraded  by patient motion. The patient is undergone anterior cervical fusion  from C4-C5 with anterior plate, screws and interbody graft material.  Interbody graft material is also present at C5-6 and C6-7. The patient  is undergone posterior fusion from C4-C5. The alignment of the cervical  spine is within normal limits. No gross or obvious fracture is  appreciated. A large pleural fluid collection is noted on the right,  only partially visualized. Further evaluation could be performed with a  MRI examination of the cervical spine as indicated.     CT LUMBAR SPINE WITHOUT CONTRAST: The study is degraded by patient  motion. Dextroscoliosis of the lumbar spine is appreciated with apex at  the level of L3. There is grade 1 retrolisthesis of L5 on S1. There is  no evidence of disc herniation. A mild central disc bulge is present at  L4-L5 and L5-S1 resulting in mild flattening of the ventral surface of  the thecal sac. Further evaluation could be performed with MRI  examination of the lumbar spine as indicated.           Radiation dose reduction techniques were utilized, including automated  exposure modulation based on body size.     This report was finalized on 6/30/2025 11:47 AM by Dr. Jerson Viera M.D on Workstation: [id number]       CT Lumbar Spine Without Contrast [[id number]] Collected: 06/30/25 1131     Updated: 06/30/25 1150    Narrative:      CT HEAD WO CONTRAST-, CT CERVICAL SPINE WO CONTRAST-, CT LUMBAR SPINE WO  CONTRAST-     HISTORY:  falls, near syncope     COMPARISON: MRI lumbar spine 9/15/2024     CT HEAD WITHOUT CONTRAST: The brain ventricles are symmetrical. There is  no acute hemorrhage, hydrocephalus or of abnormal extra-axial fluid. No  focal area of decreased attenuation to suggest acute  infarction is  identified. Bone windows show no evidence of a calvarial fracture.     CT CERVICAL SPINE WITHOUT CONTRAST: The study is significantly degraded  by patient motion. The patient is undergone anterior cervical fusion  from C4-C5 with anterior plate, screws and interbody graft material.  Interbody graft material is also present at C5-6 and C6-7. The patient  is undergone posterior fusion from C4-C5. The alignment of the cervical  spine is within normal limits. No gross or obvious fracture is  appreciated. A large pleural fluid collection is noted on the right,  only partially visualized. Further evaluation could be performed with a  MRI examination of the cervical spine as indicated.     CT LUMBAR SPINE WITHOUT CONTRAST: The study is degraded by patient  motion. Dextroscoliosis of the lumbar spine is appreciated with apex at  the level of L3. There is grade 1 retrolisthesis of L5 on S1. There is  no evidence of disc herniation. A mild central disc bulge is present at  L4-L5 and L5-S1 resulting in mild flattening of the ventral surface of  the thecal sac. Further evaluation could be performed with MRI  examination of the lumbar spine as indicated.           Radiation dose reduction techniques were utilized, including automated  exposure modulation based on body size.     This report was finalized on 6/30/2025 11:47 AM by Dr. Jerson Viera M.D on Workstation: [id number]               I personally viewed and interpreted the patient's EKG    Assessment/Plan:     1.  Abnormal troponin.  Trending down this morning.  No other evidence of ACS with unremarkable EKG and no symptoms suggestive of angina.  Abnormal troponin may be related to poor renal clearance in setting of acute kidney injury.  2.  Right pleural effusion.  Plan for thoracentesis today.  3.  Chest pain.  This sounds atypical and musculoskeletal.  4.  Low back pain  5.  Acute kidney injury.  Improved with IV fluids.  6.  Right upper lobe lung  nodule.  Management per pulmonary.    - Plan for thoracentesis noted.  Suspect her dyspnea and chest discomfort will improve following thoracentesis.  - Echocardiogram ordered.  Will follow-up on this.  If this looks okay would not pursue any further cardiac workup.    Thank you for allowing me to participate in the care of Yoanna Reed. Feel free to contact me directly with any further questions or concerns.    Nisa Phelps MD  [facility]  07/01/25  07:16 EDT        Electronically signed by Nisa Phelps MD at 07/01/25 0920       Александр Marion MD at 06/30/25 1654        Consult Orders    1. Inpatient Pulmonology Consult [[id number]] ordered by Chinedu Blandon MD at 06/30/25 1647                       Patient Identification:  Yoanna Reed  69 y.o.  female  1955  [id number]          LOS 0    Requesting physician:   Chinedu Blandon MD    Reason for Consultation:    Large right pleural effusion    History of Present Illness:   69-year-old female with a history of asthma, hyponatremia, migraines, chronic pain who presented to the emergency department for recurrent falls and near syncope.    On presentation to the emergency department, patient was afebrile, hemodynamically stable with mild tachycardia to the low 100s.  BNP was elevated to 2400, troponin down trended with a max of 73, mild hyponatremia to 131, TUSHAR to 1.46.  Procalcitonin elevated to 2.  Leukocytosis to 18.  Not requiring any supplemental oxygen.  CT angiogram of the chest was performed which did not demonstrate any pulmonary emboli however large right pleural effusion with lung atelectasis.    On evaluation patient, states that she has been having pain in her thoracic back which radiates to the front.  This has been ongoing for the past several days.  Also more short of breath that has been worsening over the past week.  Mild cough without any sputum production.  Also feels hot however no  documented fevers.  Denies any chills.  No sick contacts.  Previous history of asthma, on bronchodilator with albuterol as needed, rarely having to use.  No underlying cardiac disease.  Does have a history of hepatitis C in the past for which she was treated.  No underlying renal disease that she knows of.    Past Medical History:  Past Medical History:   Diagnosis Date    Anxiety and depression     Asthma     Limited joint range of motion     NECK    Liver failure 2002    DUE TO MEDICATION    Mass of nipple     RIGHT    Skin cancer     RIGHT CHEEK       Past Surgical History:  Past Surgical History:   Procedure Laterality Date    ANTERIOR CERVICAL FUSION      ARTERY SURGERY Left     KNEE AGE 15 DUE TO CUT TO KNEE    BREAST BIOPSY Right 11/22/2024    Procedure: RIGHT NIPPLE LESION EXCISION;  Surgeon: Raven Aviles MD;  [place] [facility] [place] [facility] [place]  Service: General;  Laterality: Right;    CHOLECYSTECTOMY      COLONOSCOPY W/ POLYPECTOMY N/A 05/23/2024    Procedure: COLONOSCOPY to cecum with cold polypectomies;  Surgeon: Ankur Beebe MD;  Location: [facility] ENDOSCOPY;  Service: Gastroenterology;  Laterality: N/A;  PRE - screening  POST - polyps    HYSTERECTOMY      Partial    POSTERIOR CERVICAL FUSION          Home Meds:  Medications Prior to Admission   Medication Sig Dispense Refill Last Dose/Taking    albuterol sulfate  (90 Base) MCG/ACT inhaler INHALE 2 PUFFS BY MOUTH EVERY 4 HOURS AS NEEDED FOR WHEEZE 18 g 3 Taking As Needed    buPROPion XL (Wellbutrin XL) 150 MG 24 hr tablet Take 1 tablet by mouth Every Morning. 90 tablet 3 Taking    escitalopram (LEXAPRO) 20 MG tablet TAKE 1 TABLET BY MOUTH EVERY DAY 90 tablet 1 Taking    FLUoxetine (PROzac) 40 MG capsule Take 1 capsule by mouth Daily. 90 capsule 3 Taking    HYDROcodone-acetaminophen (Norco) 7.5-325 MG per tablet Take 1 tablet by mouth Every 6 (Six) Hours As Needed for Moderate Pain. 90 tablet 0 Taking As Needed    ketoconazole (NIZORAL) 2 %  shampoo Apply 1 Application topically to the appropriate area as directed 2 (Two) Times a Week.   Taking    LORazepam (Ativan) 1 MG tablet Take 1 tablet by mouth Every 6 (Six) Hours As Needed for Anxiety. 240 tablet 1 Taking As Needed    meloxicam (MOBIC) 15 MG tablet TAKE 1 TABLET BY MOUTH EVERY DAY 60 tablet 3 Taking    ondansetron ODT (ZOFRAN-ODT) 4 MG disintegrating tablet Place 1 tablet on the tongue Every 8 (Eight) Hours As Needed for Nausea or Vomiting. 30 tablet 1 Taking As Needed    pimecrolimus (ELIDEL) 1 % cream Apply 1 Application topically to the appropriate area as directed 2 (Two) Times a Day.   Taking    pravastatin (PRAVACHOL) 20 MG tablet Take 1 tablet by mouth Daily. 90 tablet 3 Taking    tiZANidine (Zanaflex) 4 MG tablet Take 1 tablet by mouth Every 8 (Eight) Hours As Needed for Muscle Spasms. 42 tablet 1 Taking As Needed    ubrogepant (Ubrelvy) 100 MG tablet Take 1 tablet by mouth 1 (One) Time As Needed (migraine). 16 tablet 11 Taking As Needed    zolpidem (AMBIEN) 10 MG tablet Take 1 tablet by mouth Every Night. 90 tablet 1 Taking         Allergies:  No Known Allergies    Social History:   Social History     Socioeconomic History    Marital status: Single   Tobacco Use    Smoking status: Some Days     Current packs/day: 0.25     Average packs/day: 0.2 packs/day for 47.5 years (11.9 ttl pk-yrs)     Types: Cigarettes     Start date: 1978     Passive exposure: Never    Smokeless tobacco: Never   Vaping Use    Vaping status: Never Used   Substance and Sexual Activity    Alcohol use: Yes     Comment: social    Drug use: Defer    Sexual activity: Defer       Family History:  Family History   Problem Relation Age of Onset    Hypertension Mother     Ovarian cancer Mother     Malig Hyperthermia Neg Hx        Review of Systems:  12 point review of systems performed and all else negative except as per HPI above.    Objective:    PHYSICAL EXAM:    /88 (BP Location: Right arm, Patient Position:  "Sitting) Comment: Simultaneous filing. User may be unaware of other data. Comment (BP Location): Simultaneous filing. User may be unaware of other data. Comment (Patient Position): Simultaneous filing. User may be unaware of other data.  Pulse 89   Temp 98.1 °F (36.7 °C) (Oral) Comment: Simultaneous filing. User may be unaware of other data. Comment (Src): Simultaneous filing. User may be unaware of other data.  Resp 20 Comment: Simultaneous filing. User may be unaware of other data.  Ht 172.7 cm (68\")   Wt 76.7 kg (169 lb)   SpO2 95%   BMI 25.70 kg/m²  Body mass index is 25.7 kg/m². 95% 76.7 kg (169 lb)    General: Alert, nontoxic, NAD  HEENT: NC/AT, EOMI, MMM  Neck: Supple, trachea midline  Cardiac: RRR, no murmur, gallops, rubs  Pulmonary: Diminished in right lung field  GI: Soft, non-tender, non-distended, normal bowel sounds  Extremities: Warm, well perfused, no LE edema  Skin: no visible rash  Neuro: CN II - XII grossly intact  Psychiatry: Normal mood and affect    Lab Review:   Results from last 7 days   Lab Units 06/30/25  1002   WBC 10*3/mm3 18.20*   HEMOGLOBIN g/dL 12.8   PLATELETS 10*3/mm3 597*     Results from last 7 days   Lab Units 06/30/25  1002 06/25/25  1335   SODIUM mmol/L 131* 137   POTASSIUM mmol/L 3.3* 4.1   CHLORIDE mmol/L 95* 98   CO2 mmol/L 18.1* 22   BUN mg/dL 29.0* 11   CREATININE mg/dL 1.46* 0.82   GLUCOSE mg/dL 124* 94   CALCIUM mg/dL 9.4 9.6   Estimated Creatinine Clearance: 39.6 mL/min (A) (by C-G formula based on SCr of 1.46 mg/dL (H)).    Results from last 7 days   Lab Units 06/30/25  1140 06/30/25  1002 06/25/25  1335   AST (SGOT) U/L  --  25 13   ALT (SGPT) U/L  --  14 8   PROCALCITONIN ng/mL  --  2.18*  --    LACTATE mmol/L 1.6  --   --    D DIMER QUANT MCGFEU/mL  --  4.44*  --    PLATELETS 10*3/mm3  --  597*  --               Result Review:  I have personally reviewed the results from the time of this admission to 6/30/2025 16:55 EDT and agree with these findings:  [x]  " Laboratory list / accordion  [x]  Microbiology  [x]  Radiology  []  EKG/Telemetry   [x]  Cardiology/Vascular   []  Pathology  [x]  Old records  []  Other:    Assessment / Recommendations:    Large right loculated pleural effusion  Right upper lobe pulmonary nodule  Suspect sepsis secondary to pneumonia  Asthma  Elevated BNP  Near syncope  Frequent falls  Chronic pain  Migraines  Hyponatremia  Acute kidney injury   Leukocytosis  Dextroscoliosis of lumbar spine  History of tobacco abuse    - Patient presented today with shortness of breath, back pain, and near syncope.  - CT chest was performed which demonstrated large loculated right pleural effusion with right lung atelectasis and possible underlying pulmonary nodule/mass.  - Ultrasound-guided thoracentesis of the right effusion ordered with labs.  - Concern is for parapneumonic effusion/empyema versus fluid overload (less likely)  - Empiric antibiotics with ceftriaxone at this time, procalcitonin elevated, new leukocytosis, subjective fevers  - BNP elevated, troponin elevated, echocardiogram pending.  - Weaned to room air, saturating well  - New TUSHAR, likely from infection, monitor.    Thank you for allowing me to participate in the care of this patient.  I will continue to follow along with you.    Александр Marion MD  [facility], [facility]  Pulmonary and Critical Care Medicine, Interventional Pulmonology    6/30/2025  16:54 EDT    Parts of this note may be an electronic transcription/translation of spoken language to printed text using the Dragon dictation system.             Electronically signed by Александр Marion MD at 06/30/25 3553

## 2025-07-01 NOTE — TELEPHONE ENCOUNTER
Called pt to schedule appointment. She stated she didn't feel well, her whole body hurt. I let pt speak to Dr. Muller she told him all the issues/pain she was having. He instructed her to go to the emergency room she then stated she couldn't get out of her bed. He then told her to call 911 since she was in so much pain. She stated she would call 911. This was around 8:15 on Monday June 30, 2025

## 2025-07-01 NOTE — CONSULTS
[facility] Consult    Patient Name: Yoanna Reed  Age/Sex: 69 y.o. female  : 1955  MRN: [id number]    Date of Admission: 2025  Date of Encounter Visit: 25  Encounter Provider: Nisa Phelps MD  Referring Provider: Chinedu Blandon MD  Place of Service: [facility]  Patient Care Team:  Tom Muller MD as PCP - General (Family Medicine)    Subjective:     Consulted for: Elevated troponin    Chief Complaint: Back pain, shortness of breath    History of Present Illness:  Yoanna Reed is a 69 y.o. female with asthma, chronic pain, migraines, treated hepatitis C, hyperlipidemia, who presented with complaints of lower back pain.    The patient reports that over the last month she has been having episodes of dizziness associated with loss of balance resulting in recurrent falls.  Because of the recurrent falls she has developed low back pain which became unbearable yesterday which point she opted to come to the emergency room.  Additionally she reports that last week she fell on her right side and subsequently developed right sided chest discomfort that would worsen with inspiration and certain movements as well as worsening shortness of breath.    Following her arrival to the emergency room her EKG was unremarkable.  Troponin was elevated at 73 but declined to 61 and then down to 34 this morning.  Creatinine was elevated at 1.46 which is higher than her normal baseline.  D-dimer was abnormal at 4.44.  Creatinine kinase was elevated 833.  Procalcitonin was elevated at 2.18.  White blood count was elevated at 18 and trended up further to 20 this morning.  Sodium has been low at 131.  Creatinine is normalized this morning.      CT angiogram of the chest showed no pulmonary embolism but did show evidence of large right-sided pleural effusion with compressive atelectasis of the right lung.  There also appear to be nodules  on the right side.  She has been evaluated by pulmonary and thoracentesis was recommended.      Past Medical History:  Past Medical History:   Diagnosis Date    Anxiety and depression     Asthma     Limited joint range of motion     NECK    Liver failure 2002    DUE TO MEDICATION    Mass of nipple     RIGHT    Skin cancer     RIGHT CHEEK       Past Surgical History:   Procedure Laterality Date    ANTERIOR CERVICAL FUSION      ARTERY SURGERY Left     KNEE AGE 15 DUE TO CUT TO KNEE    BREAST BIOPSY Right 11/22/2024    Procedure: RIGHT NIPPLE LESION EXCISION;  Surgeon: Raven Avlies MD;  Location:  [facility] [place];  Service: General;  Laterality: Right;    CHOLECYSTECTOMY      COLONOSCOPY W/ POLYPECTOMY N/A 05/23/2024    Procedure: COLONOSCOPY to cecum with cold polypectomies;  Surgeon: Ankur Beebe MD;  Location: [facility];  Service: Gastroenterology;  Laterality: N/A;  PRE - screening  POST - polyps    HYSTERECTOMY      Partial    POSTERIOR CERVICAL FUSION         Home Medications:   Medications Prior to Admission   Medication Sig Dispense Refill Last Dose/Taking    albuterol sulfate  (90 Base) MCG/ACT inhaler INHALE 2 PUFFS BY MOUTH EVERY 4 HOURS AS NEEDED FOR WHEEZE 18 g 3 Taking As Needed    buPROPion XL (Wellbutrin XL) 150 MG 24 hr tablet Take 1 tablet by mouth Every Morning. 90 tablet 3 Taking    escitalopram (LEXAPRO) 20 MG tablet TAKE 1 TABLET BY MOUTH EVERY DAY 90 tablet 1 Taking    FLUoxetine (PROzac) 40 MG capsule Take 1 capsule by mouth Daily. 90 capsule 3 Taking    HYDROcodone-acetaminophen (Norco) 7.5-325 MG per tablet Take 1 tablet by mouth Every 6 (Six) Hours As Needed for Moderate Pain. 90 tablet 0 Taking As Needed    ketoconazole (NIZORAL) 2 % shampoo Apply 1 Application topically to the appropriate area as directed 2 (Two) Times a Week.   Taking    LORazepam (Ativan) 1 MG tablet Take 1 tablet by mouth Every 6 (Six) Hours As Needed for Anxiety. 240 tablet 1 Taking As Needed     meloxicam (MOBIC) 15 MG tablet TAKE 1 TABLET BY MOUTH EVERY DAY 60 tablet 3 Taking    ondansetron ODT (ZOFRAN-ODT) 4 MG disintegrating tablet Place 1 tablet on the tongue Every 8 (Eight) Hours As Needed for Nausea or Vomiting. 30 tablet 1 Taking As Needed    pimecrolimus (ELIDEL) 1 % cream Apply 1 Application topically to the appropriate area as directed 2 (Two) Times a Day.   Taking    pravastatin (PRAVACHOL) 20 MG tablet Take 1 tablet by mouth Daily. 90 tablet 3 Taking    tiZANidine (Zanaflex) 4 MG tablet Take 1 tablet by mouth Every 8 (Eight) Hours As Needed for Muscle Spasms. 42 tablet 1 Taking As Needed    ubrogepant (Ubrelvy) 100 MG tablet Take 1 tablet by mouth 1 (One) Time As Needed (migraine). 16 tablet 11 Taking As Needed    zolpidem (AMBIEN) 10 MG tablet Take 1 tablet by mouth Every Night. 90 tablet 1 Taking       Allergies:  No Known Allergies    Past Social History:  Social History     Socioeconomic History    Marital status: Single   Tobacco Use    Smoking status: Some Days     Current packs/day: 0.25     Average packs/day: 0.3 packs/day for 47.5 years (11.9 ttl pk-yrs)     Types: Cigarettes     Start date: 1978     Passive exposure: Never    Smokeless tobacco: Never   Vaping Use    Vaping status: Never Used   Substance and Sexual Activity    Alcohol use: Yes     Comment: social    Drug use: Defer    Sexual activity: Defer       Past Family History:  Family History   Problem Relation Age of Onset    Hypertension Mother     Ovarian cancer Mother     Malig Hyperthermia Neg Hx        Review of Systems:   All systems reviewed. Pertinent positives identified in HPI. All other systems are negative.    Objective:   Temp:  [98.1 °F (36.7 °C)-98.8 °F (37.1 °C)] 98.3 °F (36.8 °C)  Heart Rate:  [] 116  Resp:  [16-20] 20  BP: (111-168)/(62-99) 164/90   No intake or output data in the 24 hours ending 07/01/25 0716  Body mass index is 26.48 kg/m².      06/30/25  1008 07/01/25  0640   Weight: 76.7 kg (169  lb) 79 kg (174 lb 2.6 oz)     Weight change:     Physical Exam:   General Appearance:    Alert, cooperative, in no acute distress   Head:    Normocephalic, without obvious abnormality, atraumatic   Eyes:            Lids and lashes normal, conjunctivae and sclerae normal, no   icterus, no pallor, corneas clear, PERRLA   Ears:    Ears appear intact with no abnormalities noted   Neck:   No adenopathy, supple, trachea midline, no thyromegaly, no   carotid bruit, no JVD   Lungs:     Clear to auscultation,respirations regular, even and unlabored    Heart:    Regular rhythm and normal rate, normal S1 and S2, no murmur, no gallop, no rub, no click   Chest Wall:    No abnormalities observed   Abdomen:     Normal bowel sounds, no masses, no organomegaly, soft        non-tender, non-distended, no guarding, no rebound  tenderness   Extremities:   Moves all extremities well, no edema, no cyanosis, no redness   Pulses:   Pulses palpable and equal bilaterally. Normal radial, carotid, femoral, dorsalis pedis and posterior tibial pulses bilaterally. Normal abdominal aorta   Skin:  Psychiatric:   No bleeding, bruising or rash    Alert and oriented x 3, normal mood and affect       Lab Review:   Results from last 7 days   Lab Units 07/01/25  0230 06/30/25  1002 06/25/25  1335   SODIUM mmol/L 131* 131* 137   POTASSIUM mmol/L 3.8 3.3* 4.1   CHLORIDE mmol/L 98 95* 98   CO2 mmol/L 16.3* 18.1* 22   BUN mg/dL 21.0 29.0* 11   CREATININE mg/dL 0.99 1.46* 0.82   GLUCOSE mg/dL 143* 124* 94   CALCIUM mg/dL 9.0 9.4 9.6   AST (SGOT) U/L  --  25 13   ALT (SGPT) U/L  --  14 8     Results from last 7 days   Lab Units 06/30/25  1140 06/30/25  1002   CK TOTAL U/L  --  833*   HSTROP T ng/L 61* 73*     Results from last 7 days   Lab Units 07/01/25  0230 06/30/25  1002   WBC 10*3/mm3 19.99* 18.20*   HEMOGLOBIN g/dL 12.8 12.8   HEMATOCRIT % 38.2 37.8   PLATELETS 10*3/mm3 713* 597*     Results from last 7 days   Lab Units 06/30/25  1002   INR  1.39*   APTT  "seconds 28.9               Invalid input(s): \"LDLCALC\"  Results from last 7 days   Lab Units 06/30/25  1002   PROBNP pg/mL 2,460.0*       Imaging:  Imaging Results (Most Recent)       Procedure Component Value Units Date/Time    CT Angiogram Chest Pulmonary Embolism [[id number]] Collected: 06/30/25 1150     Updated: 06/30/25 1206    Narrative:      CT ANGIOGRAM CHEST PULMONARY EMBOLISM-     DATE OF EXAM: 6/30/2025 10:59 AM     INDICATION: Near syncope, elevated cardiac enzymes, positive D-dimer,  rule out PE.     COMPARISON: Breast MRI 10/29/2024. Right shoulder and humerus  radiographs 7/11/2024. Chest radiographs 3/2/2023.     TECHNIQUE: Multiple contiguous axial images were acquired through the  chest following the intravenous administration of 95 mL of Isovue-370.  Reformatted coronal, sagittal, and 3D reconstruction images were also  reviewed. Radiation dose reduction techniques were utilized, including  automated exposure control and exposure modulation based on body size.     FINDINGS:  Imaging was performed with the arms at the side of the patient's body,  limiting evaluation.     No evidence of a pulmonary arterial filling defect to suggest pulmonary  embolism. Mild margin of the main pulmonary arteries could reflect  pulmonary arterial hypertension. The heart is normal size. Moderate to  severe calcified coronary artery disease. Trace pericardial fluid.  Normal thoracic aortic arch anatomy without evidence of aneurysm. No  pathologically enlarged intrathoracic lymph nodes are identified. Tiny  hiatal hernia.     Large right pleural effusion with associated compressive atelectasis of  the right lung. Partially obscured suspected 2 cm x 1.8 cm partially  cavitary subpleural nodule in the posterior right upper lobe (axial  series 6 image 35). Consolidation in the base of the right lower lobe  and right middle lobe could reflect superimposed pneumonia. Trace left  pleural fluid. Dependent and basilar " groundglass opacities in the left  lung could represent atelectasis or atypical pneumonia. Patchy  subpleural groundglass opacity in the anterior left upper lobe, which  could also represent atelectasis or could be infectious/inflammatory.  The central airways are widely patent. No pneumothorax.     Limited imaging of the upper abdomen partially includes postoperative  changes from cholecystectomy. Partially imaged incompletely evaluated  low-density lesions in the liver, statistically representing benign  cysts and/or hemangiomas.     Diffuse osteopenia. Postoperative changes from ACDF from C4-C7 and  posterior spinal fusion at C4-C5 without evidence of hardware  complications. Solid bony bridging across the disc spaces and facets.  Mild multilevel thoracic spondylosis and exaggerated thoracic kyphosis.  Irregular 2 cm sclerotic bone lesion in the proximal right humerus,  probable benign enchondroma. No acute osseous abnormality or concerning  osseous lesion.       Impression:         1. The study is negative for pulmonary embolism.  2. Large right pleural effusion with associated compressive atelectasis  of the right lung.  3. Suspected partially obscured partially cavitary subpleural nodule in  the posterior right upper lobe measuring 1.9 cm in average diameter.  4. Trace left pleural fluid with multifocal groundglass opacities in the  left lung that could represent atelectasis or could be  infectious/inflammatory.  5. Mild enlargement of the main pulmonary arteries could reflect  pulmonary arterial hypertension.     This report was finalized on 6/30/2025 12:03 PM by Ridge Ragland MD on  Workstation: [id number]       CT Head Without Contrast [[id number]] Collected: 06/30/25 1131     Updated: 06/30/25 1150    Narrative:      CT HEAD WO CONTRAST-, CT CERVICAL SPINE WO CONTRAST-, CT LUMBAR SPINE WO  CONTRAST-     HISTORY:  falls, near syncope     COMPARISON: MRI lumbar spine 9/15/2024     CT HEAD WITHOUT CONTRAST:  The brain ventricles are symmetrical. There is  no acute hemorrhage, hydrocephalus or of abnormal extra-axial fluid. No  focal area of decreased attenuation to suggest acute infarction is  identified. Bone windows show no evidence of a calvarial fracture.     CT CERVICAL SPINE WITHOUT CONTRAST: The study is significantly degraded  by patient motion. The patient is undergone anterior cervical fusion  from C4-C5 with anterior plate, screws and interbody graft material.  Interbody graft material is also present at C5-6 and C6-7. The patient  is undergone posterior fusion from C4-C5. The alignment of the cervical  spine is within normal limits. No gross or obvious fracture is  appreciated. A large pleural fluid collection is noted on the right,  only partially visualized. Further evaluation could be performed with a  MRI examination of the cervical spine as indicated.     CT LUMBAR SPINE WITHOUT CONTRAST: The study is degraded by patient  motion. Dextroscoliosis of the lumbar spine is appreciated with apex at  the level of L3. There is grade 1 retrolisthesis of L5 on S1. There is  no evidence of disc herniation. A mild central disc bulge is present at  L4-L5 and L5-S1 resulting in mild flattening of the ventral surface of  the thecal sac. Further evaluation could be performed with MRI  examination of the lumbar spine as indicated.           Radiation dose reduction techniques were utilized, including automated  exposure modulation based on body size.     This report was finalized on 6/30/2025 11:47 AM by Dr. Jerson Viera M.D on Workstation: [id number]       CT Cervical Spine Without Contrast [[id number]] Collected: 06/30/25 1131     Updated: 06/30/25 1150    Narrative:      CT HEAD WO CONTRAST-, CT CERVICAL SPINE WO CONTRAST-, CT LUMBAR SPINE WO  CONTRAST-     HISTORY:  falls, near syncope     COMPARISON: MRI lumbar spine 9/15/2024     CT HEAD WITHOUT CONTRAST: The brain ventricles are symmetrical. There is  no  acute hemorrhage, hydrocephalus or of abnormal extra-axial fluid. No  focal area of decreased attenuation to suggest acute infarction is  identified. Bone windows show no evidence of a calvarial fracture.     CT CERVICAL SPINE WITHOUT CONTRAST: The study is significantly degraded  by patient motion. The patient is undergone anterior cervical fusion  from C4-C5 with anterior plate, screws and interbody graft material.  Interbody graft material is also present at C5-6 and C6-7. The patient  is undergone posterior fusion from C4-C5. The alignment of the cervical  spine is within normal limits. No gross or obvious fracture is  appreciated. A large pleural fluid collection is noted on the right,  only partially visualized. Further evaluation could be performed with a  MRI examination of the cervical spine as indicated.     CT LUMBAR SPINE WITHOUT CONTRAST: The study is degraded by patient  motion. Dextroscoliosis of the lumbar spine is appreciated with apex at  the level of L3. There is grade 1 retrolisthesis of L5 on S1. There is  no evidence of disc herniation. A mild central disc bulge is present at  L4-L5 and L5-S1 resulting in mild flattening of the ventral surface of  the thecal sac. Further evaluation could be performed with MRI  examination of the lumbar spine as indicated.           Radiation dose reduction techniques were utilized, including automated  exposure modulation based on body size.     This report was finalized on 6/30/2025 11:47 AM by Dr. Jerson Viera M.D on Workstation: [id number]       CT Lumbar Spine Without Contrast [[id number]] Collected: 06/30/25 1131     Updated: 06/30/25 1150    Narrative:      CT HEAD WO CONTRAST-, CT CERVICAL SPINE WO CONTRAST-, CT LUMBAR SPINE WO  CONTRAST-     HISTORY:  falls, near syncope     COMPARISON: MRI lumbar spine 9/15/2024     CT HEAD WITHOUT CONTRAST: The brain ventricles are symmetrical. There is  no acute hemorrhage, hydrocephalus or of abnormal  extra-axial fluid. No  focal area of decreased attenuation to suggest acute infarction is  identified. Bone windows show no evidence of a calvarial fracture.     CT CERVICAL SPINE WITHOUT CONTRAST: The study is significantly degraded  by patient motion. The patient is undergone anterior cervical fusion  from C4-C5 with anterior plate, screws and interbody graft material.  Interbody graft material is also present at C5-6 and C6-7. The patient  is undergone posterior fusion from C4-C5. The alignment of the cervical  spine is within normal limits. No gross or obvious fracture is  appreciated. A large pleural fluid collection is noted on the right,  only partially visualized. Further evaluation could be performed with a  MRI examination of the cervical spine as indicated.     CT LUMBAR SPINE WITHOUT CONTRAST: The study is degraded by patient  motion. Dextroscoliosis of the lumbar spine is appreciated with apex at  the level of L3. There is grade 1 retrolisthesis of L5 on S1. There is  no evidence of disc herniation. A mild central disc bulge is present at  L4-L5 and L5-S1 resulting in mild flattening of the ventral surface of  the thecal sac. Further evaluation could be performed with MRI  examination of the lumbar spine as indicated.           Radiation dose reduction techniques were utilized, including automated  exposure modulation based on body size.     This report was finalized on 6/30/2025 11:47 AM by Dr. Jerson Viera M.D on Workstation: [id number]               I personally viewed and interpreted the patient's EKG    Assessment/Plan:     1.  Abnormal troponin.  Trending down this morning.  No other evidence of ACS with unremarkable EKG and no symptoms suggestive of angina.  Abnormal troponin may be related to poor renal clearance in setting of acute kidney injury.  2.  Right pleural effusion.  Plan for thoracentesis today.  3.  Chest pain.  This sounds atypical and musculoskeletal.  4.  Low back pain  5.   Acute kidney injury.  Improved with IV fluids.  6.  Right upper lobe lung nodule.  Management per pulmonary.    - Plan for thoracentesis noted.  Suspect her dyspnea and chest discomfort will improve following thoracentesis.  - Echocardiogram ordered.  Will follow-up on this.  If this looks okay would not pursue any further cardiac workup.    Thank you for allowing me to participate in the care of Yoanna Reed. Feel free to contact me directly with any further questions or concerns.    Nisa Phelps MD  [facility]  07/01/25  07:16 EDT

## 2025-07-01 NOTE — PLAN OF CARE
Pt medicated for back pain and anxiety multiple times this shift with tizanidine, percocet, dilaudid, and ativan. ST/SR on the monitor. Remains on RA. No change in assessment this shift.   Problem: Adult Inpatient Plan of Care  Goal: Plan of Care Review  Outcome: Progressing  Flowsheets (Taken 7/1/2025 0554)  Plan of Care Reviewed With: patient  Goal: Patient-Specific Goal (Individualized)  Outcome: Progressing  Goal: Absence of Hospital-Acquired Illness or Injury  Outcome: Progressing  Intervention: Identify and Manage Fall Risk  Description: Perform standard risk assessment on admission using a validated tool or comprehensive approach appropriate to the patient; reassess fall risk frequently, with change in status or transfer to another level of care.Communicate risk to interprofessional healthcare team; ensure fall risk visible cue.Determine need for increased observation, equipment and environmental modification, as well as use of supportive, nonskid footwear.Adjust safety measures to individual needs and identified risk factors.Reinforce the importance of active participation with fall risk prevention, safety, and physical activity with the patient and family.Perform regular intentional rounding to assess need for position change, pain assessment and personal needs, including assistance with toileting.  Recent Flowsheet Documentation  Taken 7/1/2025 0409 by Yesy Vee, RN  Safety Promotion/Fall Prevention:   activity supervised   assistive device/personal items within reach   clutter free environment maintained   fall prevention program maintained   nonskid shoes/slippers when out of bed   room organization consistent   safety round/check completed  Taken 7/1/2025 0221 by Yesy Vee, RN  Safety Promotion/Fall Prevention:   activity supervised   assistive device/personal items within reach   clutter free environment maintained   fall prevention program maintained   nonskid shoes/slippers  when out of bed   room organization consistent   safety round/check completed  Taken 6/30/2025 2355 by Yesy Vee, RN  Safety Promotion/Fall Prevention:   activity supervised   assistive device/personal items within reach   clutter free environment maintained   fall prevention program maintained   nonskid shoes/slippers when out of bed   room organization consistent   safety round/check completed  Taken 6/30/2025 2224 by Yesy Vee, RN  Safety Promotion/Fall Prevention:   activity supervised   assistive device/personal items within reach   clutter free environment maintained   fall prevention program maintained   nonskid shoes/slippers when out of bed   room organization consistent   safety round/check completed  Taken 6/30/2025 2017 by Yesy Vee, RN  Safety Promotion/Fall Prevention:   activity supervised   assistive device/personal items within reach   clutter free environment maintained   fall prevention program maintained   nonskid shoes/slippers when out of bed   room organization consistent   safety round/check completed  Intervention: Prevent Skin Injury  Description: Perform a screening for skin injury risk, such as pressure or moisture-associated skin damage on admission and at regular intervals throughout hospital stay.Keep all areas of skin (especially folds) clean and dry.Maintain adequate skin hydration.Relieve and redistribute pressure and protect bony prominences and skin at risk for injury; implement measures based on patient-specific risk factors.Match turning and repositioning schedule to clinical condition.Encourage weight shift frequently; assist with reposition if unable to complete independently.Float heels off bed; avoid pressure on the Achilles tendon.Keep skin free from extended contact with medical devices.Optimize nutrition and hydration.Encourage functional activity and mobility, as early as tolerated.Use aids (e.g., slide boards, mechanical lift) during  transfer.  Recent Flowsheet Documentation  Taken 7/1/2025 0409 by Yesy Vee RN  Body Position: position changed independently  Taken 7/1/2025 0221 by Yesy Vee RN  Body Position: position changed independently  Taken 6/30/2025 2355 by Yesy Vee RN  Body Position: position changed independently  Taken 6/30/2025 2224 by Yesy Vee RN  Body Position: position changed independently  Taken 6/30/2025 2017 by Yesy Vee RN  Body Position: position changed independently  Skin Protection: incontinence pads utilized  Goal: Optimal Comfort and Wellbeing  Outcome: Progressing  Intervention: Monitor Pain and Promote Comfort  Description: Assess pain level, treatment efficacy and patient response at regular intervals using a consistent pain scale.Consider the presence and impact of preexisting chronic pain.Encourage patient and caregiver involvement in pain assessment, interventions and safety measures.Promote activity; balance with sleep and rest to enhance healing.  Recent Flowsheet Documentation  Taken 7/1/2025 0221 by Yesy Vee RN  Pain Management Interventions:   pain medication given   pillow support provided   position adjusted  Taken 6/30/2025 2325 by Yesy Vee RN  Pain Management Interventions:   pain medication given   position adjusted  Taken 6/30/2025 2224 by Yesy Vee RN  Pain Management Interventions:   pain medication given   pillow support provided   position adjusted  Taken 6/30/2025 2017 by Yesy Vee RN  Pain Management Interventions:   pillow support provided   position adjusted   pain medication given  Intervention: Provide Person-Centered Care  Description: Use a family-focused approach to care; encourage support system presence and participation.Develop trust and rapport by proactively providing information, encouraging questions, addressing concerns and offering reassurance.Acknowledge  emotional response to hospitalization.Recognize and utilize personal coping strategies and strengths; develop goals via shared decision-making.Honor spiritual and cultural preferences.  Recent Flowsheet Documentation  Taken 6/30/2025 2017 by Yesy Vee RN  Trust Relationship/Rapport: care explained  Goal: Readiness for Transition of Care  Outcome: Progressing   Goal Outcome Evaluation:  Plan of Care Reviewed With: patient

## 2025-07-02 ENCOUNTER — APPOINTMENT (OUTPATIENT)
Dept: GENERAL RADIOLOGY | Facility: HOSPITAL | Age: 70
DRG: 871 | End: 2025-07-02
Payer: MEDICARE

## 2025-07-02 LAB
ANION GAP SERPL CALCULATED.3IONS-SCNC: 11.2 MMOL/L (ref 5–15)
BASOPHILS # BLD AUTO: 0.05 10*3/MM3 (ref 0–0.2)
BASOPHILS NFR BLD AUTO: 0.4 % (ref 0–1.5)
BUN SERPL-MCNC: 10 MG/DL (ref 8–23)
BUN/CREAT SERPL: 17.2 (ref 7–25)
CALCIUM SPEC-SCNC: 8.1 MG/DL (ref 8.6–10.5)
CHLORIDE SERPL-SCNC: 98 MMOL/L (ref 98–107)
CO2 SERPL-SCNC: 21.8 MMOL/L (ref 22–29)
CREAT SERPL-MCNC: 0.58 MG/DL (ref 0.57–1)
DEPRECATED RDW RBC AUTO: 44.1 FL (ref 37–54)
EGFRCR SERPLBLD CKD-EPI 2021: 98.1 ML/MIN/1.73
EOSINOPHIL # BLD AUTO: 0.18 10*3/MM3 (ref 0–0.4)
EOSINOPHIL NFR BLD AUTO: 1.5 % (ref 0.3–6.2)
ERYTHROCYTE [DISTWIDTH] IN BLOOD BY AUTOMATED COUNT: 12.5 % (ref 12.3–15.4)
GLUCOSE SERPL-MCNC: 111 MG/DL (ref 65–99)
HCT VFR BLD AUTO: 30.5 % (ref 34–46.6)
HGB BLD-MCNC: 10.5 G/DL (ref 12–15.9)
IMM GRANULOCYTES # BLD AUTO: 0.07 10*3/MM3 (ref 0–0.05)
IMM GRANULOCYTES NFR BLD AUTO: 0.6 % (ref 0–0.5)
LYMPHOCYTES # BLD AUTO: 1.67 10*3/MM3 (ref 0.7–3.1)
LYMPHOCYTES NFR BLD AUTO: 13.5 % (ref 19.6–45.3)
MCH RBC QN AUTO: 33.2 PG (ref 26.6–33)
MCHC RBC AUTO-ENTMCNC: 34.4 G/DL (ref 31.5–35.7)
MCV RBC AUTO: 96.5 FL (ref 79–97)
MONOCYTES # BLD AUTO: 1.07 10*3/MM3 (ref 0.1–0.9)
MONOCYTES NFR BLD AUTO: 8.7 % (ref 5–12)
NEUTROPHILS NFR BLD AUTO: 75.3 % (ref 42.7–76)
NEUTROPHILS NFR BLD AUTO: 9.32 10*3/MM3 (ref 1.7–7)
NRBC BLD AUTO-RTO: 0 /100 WBC (ref 0–0.2)
PLATELET # BLD AUTO: 592 10*3/MM3 (ref 140–450)
PMV BLD AUTO: 8.3 FL (ref 6–12)
POTASSIUM SERPL-SCNC: 3.2 MMOL/L (ref 3.5–5.2)
POTASSIUM SERPL-SCNC: 3.4 MMOL/L (ref 3.5–5.2)
RBC # BLD AUTO: 3.16 10*6/MM3 (ref 3.77–5.28)
SODIUM SERPL-SCNC: 131 MMOL/L (ref 136–145)
WBC NRBC COR # BLD AUTO: 12.36 10*3/MM3 (ref 3.4–10.8)

## 2025-07-02 PROCEDURE — 25810000003 SODIUM CHLORIDE 0.9 % SOLUTION: Performed by: INTERNAL MEDICINE

## 2025-07-02 PROCEDURE — 25010000002 HYDROMORPHONE 1 MG/ML SOLUTION: Performed by: INTERNAL MEDICINE

## 2025-07-02 PROCEDURE — 94799 UNLISTED PULMONARY SVC/PX: CPT

## 2025-07-02 PROCEDURE — 94761 N-INVAS EAR/PLS OXIMETRY MLT: CPT

## 2025-07-02 PROCEDURE — 85025 COMPLETE CBC W/AUTO DIFF WBC: CPT | Performed by: INTERNAL MEDICINE

## 2025-07-02 PROCEDURE — 63710000001 ONDANSETRON ODT 4 MG TABLET DISPERSIBLE: Performed by: INTERNAL MEDICINE

## 2025-07-02 PROCEDURE — 80048 BASIC METABOLIC PNL TOTAL CA: CPT | Performed by: INTERNAL MEDICINE

## 2025-07-02 PROCEDURE — 25010000002 CEFTRIAXONE PER 250 MG: Performed by: HOSPITALIST

## 2025-07-02 PROCEDURE — 94664 DEMO&/EVAL PT USE INHALER: CPT

## 2025-07-02 PROCEDURE — 99232 SBSQ HOSP IP/OBS MODERATE 35: CPT | Performed by: INTERNAL MEDICINE

## 2025-07-02 PROCEDURE — 97162 PT EVAL MOD COMPLEX 30 MIN: CPT

## 2025-07-02 PROCEDURE — 71045 X-RAY EXAM CHEST 1 VIEW: CPT

## 2025-07-02 PROCEDURE — 97530 THERAPEUTIC ACTIVITIES: CPT

## 2025-07-02 PROCEDURE — 84132 ASSAY OF SERUM POTASSIUM: CPT | Performed by: INTERNAL MEDICINE

## 2025-07-02 PROCEDURE — 25010000002 ONDANSETRON PER 1 MG: Performed by: INTERNAL MEDICINE

## 2025-07-02 RX ORDER — POTASSIUM CHLORIDE 1500 MG/1
40 TABLET, EXTENDED RELEASE ORAL EVERY 4 HOURS
Status: COMPLETED | OUTPATIENT
Start: 2025-07-02 | End: 2025-07-02

## 2025-07-02 RX ADMIN — POTASSIUM CHLORIDE 40 MEQ: 1500 TABLET, EXTENDED RELEASE ORAL at 05:55

## 2025-07-02 RX ADMIN — IPRATROPIUM BROMIDE AND ALBUTEROL SULFATE 3 ML: .5; 3 SOLUTION RESPIRATORY (INHALATION) at 13:35

## 2025-07-02 RX ADMIN — POTASSIUM CHLORIDE 40 MEQ: 1500 TABLET, EXTENDED RELEASE ORAL at 21:28

## 2025-07-02 RX ADMIN — LORAZEPAM 1 MG: 1 TABLET ORAL at 13:22

## 2025-07-02 RX ADMIN — LORAZEPAM 1 MG: 1 TABLET ORAL at 05:55

## 2025-07-02 RX ADMIN — HYDROMORPHONE HYDROCHLORIDE 1 MG: 1 INJECTION, SOLUTION INTRAMUSCULAR; INTRAVENOUS; SUBCUTANEOUS at 15:04

## 2025-07-02 RX ADMIN — ONDANSETRON 4 MG: 2 INJECTION, SOLUTION INTRAMUSCULAR; INTRAVENOUS at 10:20

## 2025-07-02 RX ADMIN — OXYCODONE AND ACETAMINOPHEN 1 TABLET: 5; 325 TABLET ORAL at 05:55

## 2025-07-02 RX ADMIN — IPRATROPIUM BROMIDE AND ALBUTEROL SULFATE 3 ML: .5; 3 SOLUTION RESPIRATORY (INHALATION) at 19:31

## 2025-07-02 RX ADMIN — ONDANSETRON 4 MG: 4 TABLET, ORALLY DISINTEGRATING ORAL at 18:11

## 2025-07-02 RX ADMIN — ZOLPIDEM TARTRATE 5 MG: 5 TABLET, FILM COATED ORAL at 21:28

## 2025-07-02 RX ADMIN — BUPROPION HYDROCHLORIDE 150 MG: 150 TABLET, EXTENDED RELEASE ORAL at 05:55

## 2025-07-02 RX ADMIN — SODIUM CHLORIDE 75 ML/HR: 9 INJECTION, SOLUTION INTRAVENOUS at 13:44

## 2025-07-02 RX ADMIN — ESCITALOPRAM 20 MG: 10 TABLET, FILM COATED ORAL at 08:18

## 2025-07-02 RX ADMIN — POTASSIUM CHLORIDE 40 MEQ: 1500 TABLET, EXTENDED RELEASE ORAL at 10:14

## 2025-07-02 RX ADMIN — CEFTRIAXONE 2000 MG: 2 INJECTION, POWDER, FOR SOLUTION INTRAMUSCULAR; INTRAVENOUS at 12:59

## 2025-07-02 RX ADMIN — HYDROMORPHONE HYDROCHLORIDE 1 MG: 1 INJECTION, SOLUTION INTRAMUSCULAR; INTRAVENOUS; SUBCUTANEOUS at 05:11

## 2025-07-02 RX ADMIN — PRAVASTATIN SODIUM 20 MG: 20 TABLET ORAL at 08:15

## 2025-07-02 RX ADMIN — HYDROMORPHONE HYDROCHLORIDE 1 MG: 1 INJECTION, SOLUTION INTRAMUSCULAR; INTRAVENOUS; SUBCUTANEOUS at 02:51

## 2025-07-02 RX ADMIN — HYDROMORPHONE HYDROCHLORIDE 1 MG: 1 INJECTION, SOLUTION INTRAMUSCULAR; INTRAVENOUS; SUBCUTANEOUS at 19:27

## 2025-07-02 RX ADMIN — HYDROMORPHONE HYDROCHLORIDE 1 MG: 1 INJECTION, SOLUTION INTRAMUSCULAR; INTRAVENOUS; SUBCUTANEOUS at 08:15

## 2025-07-02 RX ADMIN — TIZANIDINE 4 MG: 4 TABLET ORAL at 15:05

## 2025-07-02 RX ADMIN — POTASSIUM CHLORIDE 40 MEQ: 1500 TABLET, EXTENDED RELEASE ORAL at 17:17

## 2025-07-02 RX ADMIN — FLUOXETINE 40 MG: 20 CAPSULE ORAL at 08:18

## 2025-07-02 RX ADMIN — HYDROMORPHONE HYDROCHLORIDE 1 MG: 1 INJECTION, SOLUTION INTRAMUSCULAR; INTRAVENOUS; SUBCUTANEOUS at 21:29

## 2025-07-02 RX ADMIN — IPRATROPIUM BROMIDE AND ALBUTEROL SULFATE 3 ML: .5; 3 SOLUTION RESPIRATORY (INHALATION) at 08:31

## 2025-07-02 RX ADMIN — HYDROMORPHONE HYDROCHLORIDE 1 MG: 1 INJECTION, SOLUTION INTRAMUSCULAR; INTRAVENOUS; SUBCUTANEOUS at 10:39

## 2025-07-02 RX ADMIN — OXYCODONE AND ACETAMINOPHEN 1 TABLET: 5; 325 TABLET ORAL at 14:18

## 2025-07-02 RX ADMIN — OXYCODONE AND ACETAMINOPHEN 1 TABLET: 5; 325 TABLET ORAL at 10:13

## 2025-07-02 NOTE — PLAN OF CARE
Goal Outcome Evaluation:  Plan of Care Reviewed With: patient      A/O x4. RA. CXR completed this morning. Frequent doses of Dilauded with Percocet, Tizanidine, Ativan and Zofran for pain. No skin issues. D/c'd NS drip at 1830.   Progress: improving

## 2025-07-02 NOTE — THERAPY EVALUATION
Patient Name: Yoanna Reed  : 1955    MRN: [id number]                              Today's Date: 2025       Admit Date: 2025    Visit Dx:     ICD-10-CM ICD-9-CM   1. Severe sepsis  A41.9 038.9    R65.20 995.92   2. Near syncope  R55 780.2   3. NSTEMI (non-ST elevated myocardial infarction)  I21.4 410.70   4. Pleural effusion  J90 511.9   5. Acute midline low back pain without sciatica  M54.50 724.2   6. Recurrent falls  R29.6 V15.88     Patient Active Problem List   Diagnosis    Encounter for screening for malignant neoplasm of colon    Acute pain of right shoulder    Pain of right humerus    Chronic pain    Flank pain    Acute cystitis with hematuria    Acute vaginitis    Dysuria    Hematuria    Mass of nipple    Severe sepsis    Pleural effusion    Near syncope    TUSHAR (acute kidney injury)    Elevated troponin     Past Medical History:   Diagnosis Date    Anxiety and depression     Asthma     Limited joint range of motion     NECK    Liver failure     DUE TO MEDICATION    Mass of nipple     RIGHT    Skin cancer     RIGHT CHEEK     Past Surgical History:   Procedure Laterality Date    ANTERIOR CERVICAL FUSION      ARTERY SURGERY Left     KNEE AGE 15 DUE TO CUT TO KNEE    BREAST BIOPSY Right 2024    Procedure: RIGHT NIPPLE LESION EXCISION;  Surgeon: Raven Aviles MD;  [place] [facility] [place] [facility] [place]  Service: General;  Laterality: Right;    CHOLECYSTECTOMY      COLONOSCOPY W/ POLYPECTOMY N/A 2024    Procedure: COLONOSCOPY to cecum with cold polypectomies;  Surgeon: Ankur Beebe MD;  Location: [facility] ENDOSCOPY;  Service: Gastroenterology;  Laterality: N/A;  PRE - screening  POST - polyps    HYSTERECTOMY      Partial    POSTERIOR CERVICAL FUSION        General Information       Row Name 25 1659          Physical Therapy Time and Intention    Document Type evaluation  -EM     Mode of Treatment individual therapy;physical therapy  -EM       Row Name 25 1656           General Information    Patient Profile Reviewed yes  -EM     Prior Level of Function independent:  -EM     Existing Precautions/Restrictions fall  -EM       Row Name 07/02/25 1659          Living Environment    Current Living Arrangements apartment  -EM     People in Home alone  -EM       Row Name 07/02/25 1659          Home Main Entrance    Number of Stairs, Main Entrance none  -EM       Row Name 07/02/25 1659          Stairs Within Home, Primary    Stairs, Within Home, Primary pt reports full flight of stairs up to bedroom  -EM     Number of Stairs, Within Home, Primary twelve  -EM       Row Name 07/02/25 1659          Cognition    Orientation Status (Cognition) oriented x 3  -EM       Row Name 07/02/25 1659          Safety Issues/Impairments Affecting Functional Mobility    Safety Issues Affecting Function (Mobility) insight into deficits/self-awareness;judgment  -EM     Impairments Affecting Function (Mobility) endurance/activity tolerance;strength;pain  -EM               User Key  (r) = Recorded By, (t) = Taken By, (c) = Cosigned By      Initials Name Provider Type    EM Krystyna De León, PT Physical Therapist                   Mobility       Row Name 07/02/25 1700          Bed Mobility    Bed Mobility supine-sit  -EM     Supine-Sit Thicket (Bed Mobility) standby assist  -EM     Assistive Device (Bed Mobility) head of bed elevated  -EM     Comment, (Bed Mobility) pt adamant that HOB not be adjusted and did not want any assistance getting up to EOB, extra time to complete task  -EM       Row Name 07/02/25 1700          Sit-Stand Transfer    Sit-Stand Thicket (Transfers) standby assist  -EM     Comment, (Sit-Stand Transfer) pt did not want any assistance, able to stand with SBA  -EM       Row Name 07/02/25 1700          Gait/Stairs (Locomotion)    Thicket Level (Gait) contact guard  -EM     Distance in Feet (Gait) 10  -EM     Deviations/Abnormal Patterns (Gait) stride length  decreased;gait speed decreased  -EM     Comment, (Gait/Stairs) pt ambulated with very slow pace to bathroom, then asked to be left alone. notified RN, OK to leave pt up in bathroom with instructions to pull call bell when finished  -EM               User Key  (r) = Recorded By, (t) = Taken By, (c) = Cosigned By      Initials Name Provider Type    EM Krystyna De León PT Physical Therapist                   Obj/Interventions       Row Name 07/02/25 1703          Range of Motion Comprehensive    General Range of Motion no range of motion deficits identified  -EM       Row Name 07/02/25 1703          Strength Comprehensive (MMT)    General Manual Muscle Testing (MMT) Assessment other (see comments)  -EM     Comment, General Manual Muscle Testing (MMT) Assessment no focal deficits identified  -EM       Row Name 07/02/25 1703          Balance    Balance Assessment sitting static balance;sitting dynamic balance;standing static balance;standing dynamic balance  -EM     Static Sitting Balance supervision  -EM     Dynamic Sitting Balance supervision  -EM     Position, Sitting Balance sitting edge of bed  -EM     Static Standing Balance contact guard  -EM     Dynamic Standing Balance contact guard  -EM               User Key  (r) = Recorded By, (t) = Taken By, (c) = Cosigned By      Initials Name Provider Type    EM Krystyna De León PT Physical Therapist                   Goals/Plan       Row Name 07/02/25 1705          Transfer Goal 1 (PT)    Activity/Assistive Device (Transfer Goal 1, PT) transfers, all  -EM     Cal[place]oun Level/Cues Needed (Transfer Goal 1, PT) supervision required  -EM     Time Frame (Transfer Goal 1, PT) 1 week  -EM       Row Name 07/02/25 1705          Gait Training Goal 1 (PT)    Activity/Assistive Device (Gait Training Goal 1, PT) gait (walking locomotion);improve balance and speed  -EM     [place]oun Level (Gait Training Goal 1, PT) standby assist  -EM     Distance (Gait Training Goal 1,  "PT) 100  -EM     Time Frame (Gait Training Goal 1, PT) 1 week  -EM       Row Name 07/02/25 9881          Therapy Assessment/Plan (PT)    Planned Therapy Interventions (PT) bed mobility training;gait training;patient/family education;transfer training;strengthening  -EM               User Key  (r) = Recorded By, (t) = Taken By, (c) = Cosigned By      Initials Name Provider Type    EM Krystyna De León, PT Physical Therapist                   Clinical Impression       Row Name 07/02/25 4678          Pain    Pretreatment Pain Rating 10/10  -EM     Posttreatment Pain Rating 10/10  -EM     Pain Location back  -EM     Pain Management Interventions premedicated for activity  -EM     Pre/Posttreatment Pain Comment pt had pain meds prior to tx, confirmed with RN. pt reports she has 10/10 pain \"all the time\"  -EM       Row Name 07/02/25 5603          Plan of Care Review    Plan of Care Reviewed With patient  -EM     Outcome Evaluation Pt is 68 yo female admitted to [facility] with pleural effusion, near syncope. Pt had thoracentesis on 7/1. PMH significant for hep C, asthma, chronic pain syndrome. Pt lives alone in apartment, independent with mobility prior to admission. Pt reports 10/10 pain, had pain meds prior to tx, performed bed mobility with SBA, sit to stand with SBA, ambulated short distance to bathroom with CGA. Pt has impairments consisting of generalized weakness, decreased activity tolerance and may benefit from skilled PT. dc plan is to return home, may benefit from HH if pt agreeable.  -EM       Row Name 07/02/25 8431          Therapy Assessment/Plan (PT)    Patient/Family Therapy Goals Statement (PT) go home  -EM     Rehab Potential (PT) fair  -EM     Criteria for Skilled Interventions Met (PT) yes;skilled treatment is necessary  -EM     Therapy Frequency (PT) 3 times/wk  -EM       Row Name 07/02/25 3348          Positioning and Restraints    Pre-Treatment Position in bed  -EM     Post Treatment Position bathroom  " -EM     Bathroom sitting;call light within reach;encouraged to call for assist;notified nsg  -EM               User Key  (r) = Recorded By, (t) = Taken By, (c) = Cosigned By      Initials Name Provider Type    EM Krystyna De León PT Physical Therapist                   Outcome Measures       Row Name 07/02/25 1705          How much help from another person do you currently need...    Turning from your back to your side while in flat bed without using bedrails? 4  -EM     Moving from lying on back to sitting on the side of a flat bed without bedrails? 3  -EM     Moving to and from a bed to a chair (including a wheelchair)? 3  -EM     Standing up from a chair using your arms (e.g., wheelchair, bedside chair)? 3  -EM     Climbing 3-5 steps with a railing? 3  -EM     To walk in hospital room? 3  -EM     AM-PAC 6 Clicks Score (PT) 19  -EM               User Key  (r) = Recorded By, (t) = Taken By, (c) = Cosigned By      Initials Name Provider Type    EM Krystyna De León PT Physical Therapist                                 Physical Therapy Education       Title: PT OT SLP Therapies (In Progress)       Topic: Physical Therapy (In Progress)       Point: Mobility training (In Progress)       Learning Progress Summary            Patient Acceptance, E, NR by EM at 7/2/2025 1706                      Point: Home exercise program (Not Started)       Learner Progress:  Not documented in this visit.              Point: Body mechanics (Not Started)       Learner Progress:  Not documented in this visit.              Point: Precautions (Not Started)       Learner Progress:  Not documented in this visit.                              User Key       Initials Effective Dates Name Provider Type Discipline    EM 06/16/21 -  Krystyna De León PT Physical Therapist PT                  PT Recommendation and Plan  Planned Therapy Interventions (PT): bed mobility training, gait training, patient/family education, transfer training,  strengthening  Outcome Evaluation: Pt is 70 yo female admitted to [facility] with pleural effusion, near syncope. Pt had thoracentesis on 7/1. PMH significant for hep C, asthma, chronic pain syndrome. Pt lives alone in apartment, independent with mobility prior to admission. Pt reports 10/10 pain, had pain meds prior to tx, performed bed mobility with SBA, sit to stand with SBA, ambulated short distance to bathroom with CGA. Pt has impairments consisting of generalized weakness, decreased activity tolerance and may benefit from skilled PT. dc plan is to return home, may benefit from HH if pt agreeable.     Time Calculation:         PT Charges       Row Name 07/02/25 1707             Time Calculation    Start Time 1535  -EM      Stop Time 1557  -EM      Time Calculation (min) 22 min  -EM      PT Received On 07/02/25  -EM      PT - Next Appointment 07/03/25  -EM      PT Goal Re-Cert Due Date 07/09/25  -EM         Time Calculation- PT    Total Timed Code Minutes- PT 15 minute(s)  -EM         Timed Charges    45607 - PT Therapeutic Activity Minutes 15  -EM         Total Minutes    Timed Charges Total Minutes 15  -EM       Total Minutes 15  -EM                User Key  (r) = Recorded By, (t) = Taken By, (c) = Cosigned By      Initials Name Provider Type    EM Krystyna De León, PT Physical Therapist                  Therapy Charges for Today       Code Description Service Date Service Provider Modifiers Qty    15295914696  PT THERAPEUTIC ACT EA 15 MIN 7/2/2025 Krystyna De León, PT GP 1    48387236668 HC PT EVAL MOD COMPLEXITY 3 7/2/2025 Krystyna De León, PT GP 1            PT G-Codes  AM-PAC 6 Clicks Score (PT): 19  PT Discharge Summary  Anticipated Discharge Disposition (PT): home with home health, home    Krystyna De León PT  7/2/2025

## 2025-07-02 NOTE — PROGRESS NOTES
Name: Yoanna Reed ADMIT: 2025   : 1955  PCP: Tom Muller MD    MRN: [id number] LOS: 2 days   AGE/SEX: 69 y.o. female  ROOM: [facility]     Subjective   Subjective   .  Patient is lying on the bed and is complaining of pain.  Denied nausea, vomiting, chest pain, palpitations.       Objective   Objective   Vital Signs  Temp:  [97.7 °F (36.5 °C)-98.6 °F (37 °C)] 98.5 °F (36.9 °C)  Heart Rate:  [] 90  Resp:  [18-20] 20  BP: (122-154)/(69-84) 154/82  SpO2:  [93 %-100 %] 100 %  on   ;   Device (Oxygen Therapy): room air  Body mass index is 26.62 kg/m².  Physical Exam      HEENT: PERRLA, extraocular movements intact, Scleras no icterus  Neck: Supple, no JVD  Cardiovascular: Regular rate and rhythm with normal S1 and S2  Respiratory: Diminished breath sounds on the right and left is fairly clear.  GI: Soft, nontender, bowel sounds present  Extremities: No edema, palpable pedal pulses  Neurologic: Grossly nonfocal, no facial asymmetry        Results Review     I reviewed the patient's new clinical results.  Results from last 7 days   Lab Units 25  0403 25  0230 25  1002   WBC 10*3/mm3 12.36* 19.99* 18.20*   HEMOGLOBIN g/dL 10.5* 12.8 12.8   PLATELETS 10*3/mm3 592* 713* 597*     Results from last 7 days   Lab Units 25  1453 25  0403 25  0230 25  1002   SODIUM mmol/L  --  131* 131* 131*   POTASSIUM mmol/L 3.4* 3.2* 3.8 3.3*   CHLORIDE mmol/L  --  98 98 95*   CO2 mmol/L  --  21.8* 16.3* 18.1*   BUN mg/dL  --  10.0 21.0 29.0*   CREATININE mg/dL  --  0.58 0.99 1.46*   GLUCOSE mg/dL  --  111* 143* 124*   EGFR mL/min/1.73  --  98.1 61.9 38.8*     Results from last 7 days   Lab Units 25  1002   ALBUMIN g/dL 3.4*   BILIRUBIN mg/dL 0.8   ALK PHOS U/L 88   AST (SGOT) U/L 25   ALT (SGPT) U/L 14     Results from last 7 days   Lab Units 25  0403 25  0230 25  1002   CALCIUM mg/dL 8.1* 9.0 9.4   ALBUMIN g/dL  --   --  3.4*     Results from  "last 7 days   Lab Units 06/30/25  1140 06/30/25  1002   PROCALCITONIN ng/mL  --  2.18*   LACTATE mmol/L 1.6  --      No results found for: \"HGBA1C\", \"POCGLU\"    XR Chest 1 View  Result Date: 7/2/2025  As described.  This report was finalized on 7/2/2025 8:25 AM by Dr. Huan Robert M.D on Workstation: [id number]      US Thoracentesis  Result Date: 7/1/2025  Ultrasound-guided right thoracentesis as described.    This report was finalized on 7/1/2025 5:19 PM by Dr. Baron Chung M.D on Workstation: [id number]        I have personally reviewed all medications:  Scheduled Medications  buPROPion XL, 150 mg, Oral, QAM  cefTRIAXone, 2,000 mg, Intravenous, Q24H  escitalopram, 20 mg, Oral, Daily  FLUoxetine, 40 mg, Oral, Daily  ipratropium-albuterol, 3 mL, Nebulization, Q6H While Awake - RT  potassium chloride ER, 40 mEq, Oral, Q4H  pravastatin, 20 mg, Oral, Daily  zolpidem, 5 mg, Oral, Nightly    Infusions  sodium chloride, 75 mL/hr, Last Rate: 75 mL/hr (07/02/25 1344)    Diet  Diet: Cardiac; Healthy Heart (2-3 Na+); Fluid Consistency: Thin (IDDSI 0)  NPO Diet NPO Type: Sips with Meds    I have personally reviewed:  [x]  Laboratory   [x]  Microbiology   [x]  Radiology   [x]  EKG/Telemetry  [x]  Cardiology/Vascular   []  Pathology    []  Records       Assessment/Plan     Active Hospital Problems    Diagnosis  POA    Pleural effusion [J90]  Unknown    Near syncope [R55]  Unknown    TUSHAR (acute kidney injury) [N17.9]  Unknown    Elevated troponin [R79.89]  Unknown    Chronic pain [G89.29]  Yes      Resolved Hospital Problems   No resolved problems to display.       69 y.o. female admitted with <principal problem not specified>.      1. Dyspnea/pleural effusion with underlying nodule/mass/leukocytosis, underlying pneumonia cannot be completely ruled out and therefore IV Rocephin was initiated.  Status post thoracentesis with persistent pleural effusion that is loculated.  IR guided chest tube placement is being planned " and appreciate pulmonary input.  2.  Elevated troponin, not secondary to MI and cardiology did evaluate.  2D echo has been done and revealed normal ejection fraction.  3.  Chronic pain syndrome, continue with analgesics.  4.  History of asthma, on albuterol nebs as needed and currently not in any exacerbation.  5.  Acute kidney injury, will gently volume resuscitate and anticipate renal function to improve.  Rising creatinine likely due to poor oral intake.  6.  History of hep C, treated according to patient.  7.  Right upper lobe lung nodule, management per pulmonary.  8.  On SCDs for DVT prophylaxis.  9.  CODE STATUS is full code.    Copy text on This note has been reviewed by me on 7-2-25          Chinedu Blandon MD  [facility]  07/02/25  17:06 EDT

## 2025-07-02 NOTE — PLAN OF CARE
Goal Outcome Evaluation:  Plan of Care Reviewed With: patient           Outcome Evaluation: Pt is 68 yo female admitted to [facility] with pleural effusion, near syncope. Pt had thoracentesis on 7/1. PMH significant for hep C, asthma, chronic pain syndrome. Pt lives alone in apartment, independent with mobility prior to admission. Pt reports 10/10 pain, had pain meds prior to tx, performed bed mobility with SBA, sit to stand with SBA, ambulated short distance to bathroom with CGA. Pt has impairments consisting of generalized weakness, decreased activity tolerance and may benefit from skilled PT. dc plan is to return home, may benefit from HH if pt agreeable.    Anticipated Discharge Disposition (PT): home with home health, home

## 2025-07-02 NOTE — PLAN OF CARE
Goal Outcome Evaluation:  Plan of Care Reviewed With: patient           Outcome Evaluation: Patient alert and oriented x4, saturating well on room air. Stand by assist to BSC. PRN pain meds given as per MAR. No acute changes overnight. VSS.

## 2025-07-02 NOTE — PROGRESS NOTES
Continued Stay Note  [facility]     Patient Name: Yoanna Reed  MRN: [id number]  Today's Date: 7/2/2025    Admit Date: 6/30/2025    Plan: Return home - lives alone, may need HH   Discharge Plan       Row Name 07/02/25 1354       Plan    Plan Return home - lives alone, may need HH    Plan Comments Spoke with patient at bedside.  Given patient choice list for HH agency.  May need HH.  Plan for insertion of pleurx cath 7/3. CCP continues to follow.  Mica LORD                         Expected Discharge Date and Time       Expected Discharge Date Expected Discharge Time    Jul 4, 2025               Becky S. [place]eni[place], RN

## 2025-07-02 NOTE — PROGRESS NOTES
[facility] Follow Up    Chief Complaint: Follow up elevated troponin    Interval History: She underwent thoracentesis yesterday with removal of 1 L of fluid.  She continues to report back pain.  No reports of chest pain.  She cannot really tell me if her breathing is any better following her thoracentesis.    Objective:     Objective:  Temp:  [97.7 °F (36.5 °C)-98.6 °F (37 °C)] 98.6 °F (37 °C)  Heart Rate:  [] 92  Resp:  [16-20] 20  BP: (122-154)/(69-84) 145/81     Intake/Output Summary (Last 24 hours) at 7/2/2025 1252  Last data filed at 7/1/2025 1620  Gross per 24 hour   Intake --   Output 1000 ml   Net -1000 ml     Body mass index is 26.62 kg/m².      07/01/25  0640 07/01/25  1748 07/02/25  0436   Weight: 79 kg (174 lb 2.6 oz) 78.9 kg (174 lb) 79.4 kg (175 lb 1.6 oz)     Weight change: 2.268 kg (5 lb)      Physical Exam:   General : Alert, cooperative, in no acute distress.  Neuro: Alert,cooperative and oriented.  Lungs: CTAB. Normal respiratory effort and rate.  CV: Regular rate and rhythm, normal S1 and S2, no murmurs, gallops or rubs.  ABD: Soft, nontender, nondistended. Positive bowel sounds.  Extr: No edema or cyanosis, moves all extremities.    Lab Review:   Results from last 7 days   Lab Units 07/02/25  0403 07/01/25  0230 06/30/25  1002 06/30/25  1002 06/25/25  1335   SODIUM mmol/L 131* 131*   < > 131* 137   POTASSIUM mmol/L 3.2* 3.8   < > 3.3* 4.1   CHLORIDE mmol/L 98 98   < > 95* 98   CO2 mmol/L 21.8* 16.3*   < > 18.1* 22   BUN mg/dL 10.0 21.0   < > 29.0* 11   CREATININE mg/dL 0.58 0.99   < > 1.46* 0.82   GLUCOSE mg/dL 111* 143*   < > 124* 94   CALCIUM mg/dL 8.1* 9.0   < > 9.4 9.6   AST (SGOT) U/L  --   --   --  25 13   ALT (SGPT) U/L  --   --   --  14 8    < > = values in this interval not displayed.     Results from last 7 days   Lab Units 07/01/25  0230 06/30/25  1140 06/30/25  1002   CK TOTAL U/L  --   --  833*   HSTROP T ng/L 34* 61* 73*     Results from last 7 days  "  Lab Units 07/02/25  0403 07/01/25  0230   WBC 10*3/mm3 12.36* 19.99*   HEMOGLOBIN g/dL 10.5* 12.8   HEMATOCRIT % 30.5* 38.2   PLATELETS 10*3/mm3 592* 713*     Results from last 7 days   Lab Units 06/30/25  1002   INR  1.39*   APTT seconds 28.9               Invalid input(s): \"LDLCALC\"  Results from last 7 days   Lab Units 06/30/25  1002   PROBNP pg/mL 2,460.0*         I reviewed the patient's new clinical results.  I personally viewed and interpreted the patient's EKG  Current Medications:   Scheduled Meds:buPROPion XL, 150 mg, Oral, QAM  cefTRIAXone, 2,000 mg, Intravenous, Q24H  escitalopram, 20 mg, Oral, Daily  FLUoxetine, 40 mg, Oral, Daily  ipratropium-albuterol, 3 mL, Nebulization, Q6H While Awake - RT  pravastatin, 20 mg, Oral, Daily  zolpidem, 5 mg, Oral, Nightly      Continuous Infusions:sodium chloride, 75 mL/hr, Last Rate: 75 mL/hr (07/01/25 6462)        Allergies:  No Known Allergies    Assessment/Plan:     1.  Abnormal troponin.  Trending down this morning.  No other evidence of ACS with unremarkable EKG and no symptoms suggestive of angina.  Abnormal troponin may be related to poor renal clearance in setting of acute kidney injury.  No symptoms concerning for angina and her echocardiogram shows preserved left ventricular function and wall motion.  2.  Right pleural effusion.  Status post thoracentesis on 7/1 with 1000 mL of fluid removed.  3.  Chest pain.  This sounds atypical and musculoskeletal.  Echocardiogram with normal left ventricular function and wall motion.  4.  Low back pain  5.  Acute kidney injury.  Improved with IV fluids.  6.  Right upper lobe lung nodule.  Management per pulmonary.    -With atypical symptoms for cardiac issue, downtrending troponins, unremarkable EKG, and normal left ventricular function wall motion on echocardiogram I would recommend holding off on any further cardiac workup at this time.  - At this time we will sign off and see again as needed.  Please contact with " any further cardiac questions or concerns.    Nisa Phelps MD  07/02/25  12:52 EDT

## 2025-07-02 NOTE — PROGRESS NOTES
Consult Daily Progress Note  [facility]   07/02/25      Patient Name:  Yoanna Reed  MRN:  [id number]   YOB: 1955  Age: 69 y.o.  Sex: female  LOS: 2    Reason for Consult:  Large right pleural effusion    Hospital Course:   69-year-old female who presented with recurrent falls and syncope and found to have large loculated right pleural effusion.    Interval History:  No acute events overnight  Underwent thoracentesis yesterday with 1 L removed  Chest x-ray this morning with loculated effusion  Feels slightly better however still continues to have significant right-sided chest pain  Breathing is mildly improved  Cough remains unchanged  Not requiring any supplemental oxygen    Physical Exam:  Vitals:    07/02/25 0722   BP:    Pulse:    Resp: 18   Temp: 98.6 °F (37 °C)   SpO2:        Intake/Output    Intake/Output Summary (Last 24 hours) at 7/2/2025 0825  Last data filed at 7/1/2025 1620  Gross per 24 hour   Intake --   Output 1000 ml   Net -1000 ml       General: Alert, nontoxic, NAD  HEENT: NC/AT, EOMI, MMM  Neck: Supple, trachea midline  Cardiac: RRR, no murmur, gallops, rubs  Pulmonary: Diminished in right lung field  GI: Soft, non-tender, non-distended, normal bowel sounds  Extremities: Warm, well perfused, no LE edema  Skin: no visible rash  Neuro: CN II - XII grossly intact  Psychiatry: Normal mood and affect      Data Review:  Results from last 7 days   Lab Units 07/02/25  0403 07/01/25  0230 06/30/25  1002   WBC 10*3/mm3 12.36* 19.99* 18.20*   HEMOGLOBIN g/dL 10.5* 12.8 12.8   PLATELETS 10*3/mm3 592* 713* 597*     Results from last 7 days   Lab Units 07/02/25  0403 07/01/25  0230 06/30/25  1002 06/25/25  1335   SODIUM mmol/L 131* 131* 131* 137   POTASSIUM mmol/L 3.2* 3.8 3.3* 4.1   CHLORIDE mmol/L 98 98 95* 98   CO2 mmol/L 21.8* 16.3* 18.1* 22   BUN mg/dL 10.0 21.0 29.0* 11   CREATININE mg/dL 0.58 0.99 1.46* 0.82   GLUCOSE mg/dL 111* 143* 124* 94   CALCIUM mg/dL 8.1*  9.0 9.4 9.6   Estimated Creatinine Clearance: 101.3 mL/min (by C-G formula based on SCr of 0.58 mg/dL).    Results from last 7 days   Lab Units 07/02/25  0403 07/01/25  0230 06/30/25  1140 06/30/25  1002 06/25/25  1335   AST (SGOT) U/L  --   --   --  25 13   ALT (SGPT) U/L  --   --   --  14 8   PROCALCITONIN ng/mL  --   --   --  2.18*  --    LACTATE mmol/L  --   --  1.6  --   --    D DIMER QUANT MCGFEU/mL  --   --   --  4.44*  --    PLATELETS 10*3/mm3 592* 713*  --  597*  --              Result Review:  I have personally reviewed the results from the time of this admission to 7/2/2025 08:25 EDT and agree with these findings:  [x]  Laboratory list / accordion  [x]  Microbiology  [x]  Radiology  []  EKG/Telemetry   [x]  Cardiology/Vascular   []  Pathology  [x]  Old records  []  Other:    ASSESSMENT  /  PLAN:    Right loculated parapneumonic effusion  Thoracentesis (7/1)-1 L removed, exudative  Right upper lobe pulmonary nodule  Suspect sepsis secondary to pneumonia  Asthma  Elevated BNP  Near syncope  Frequent falls  Chronic pain  Migraines  Hyponatremia  Acute kidney injury   Leukocytosis  Dextroscoliosis of lumbar spine  History of tobacco abuse     - Patient presented today with shortness of breath, back pain, and near syncope.  - CT chest was performed which demonstrated large loculated right pleural effusion with right lung atelectasis and possible underlying pulmonary nodule/mass.  Underwent thoracentesis with 1 L removed and found to have parapneumonic effusion.  -Thoracentesis cultures pending  -Chest x-ray with persistent loculated effusion, will have IR placed chest tube for drainage.  - Continue antibiotics with ceftriaxone, leukocytosis improving  - BNP elevated, troponin elevated, echocardiogram with preserved EF, no wall motion abnormalities  - Suspect that right-sided chest pain is likely due to parapneumonic effusion with irritation of the pleura, should get better with evacuation of the fluid.  -Pain  control, on Dilaudid as needed  - On room air saturating well  - Kidney function is improved    Thank you for allowing us to participate in this patients care. Pulmonary will continue to follow.     Александр Marion MD  [facility]  Pulmonary and Critical Care Medicine, Interventional Pulmonology    Parts of this note may be an electronic transcription/translation of spoken language to printed text using the Dragon dictation system.        none

## 2025-07-02 NOTE — NURSING NOTE
Patient refused blood sugar check and medications  (Hydralazine, Docusate and Heparin). Explained to patient the effect of medications and purpose of blood sugar check but patient still refused.

## 2025-07-03 ENCOUNTER — APPOINTMENT (OUTPATIENT)
Dept: GENERAL RADIOLOGY | Facility: HOSPITAL | Age: 70
DRG: 871 | End: 2025-07-03
Payer: MEDICARE

## 2025-07-03 ENCOUNTER — APPOINTMENT (OUTPATIENT)
Dept: INTERVENTIONAL RADIOLOGY/VASCULAR | Facility: HOSPITAL | Age: 70
DRG: 871 | End: 2025-07-03
Payer: MEDICARE

## 2025-07-03 ENCOUNTER — TELEPHONE (OUTPATIENT)
Dept: INTERNAL MEDICINE | Facility: CLINIC | Age: 70
End: 2025-07-03
Payer: MEDICARE

## 2025-07-03 LAB
ANION GAP SERPL CALCULATED.3IONS-SCNC: 12.3 MMOL/L (ref 5–15)
BASOPHILS # BLD AUTO: 0.06 10*3/MM3 (ref 0–0.2)
BASOPHILS NFR BLD AUTO: 0.3 % (ref 0–1.5)
BUN SERPL-MCNC: 6 MG/DL (ref 8–23)
BUN/CREAT SERPL: 11.5 (ref 7–25)
CALCIUM SPEC-SCNC: 8.4 MG/DL (ref 8.6–10.5)
CHLORIDE SERPL-SCNC: 97 MMOL/L (ref 98–107)
CO2 SERPL-SCNC: 19.7 MMOL/L (ref 22–29)
CREAT SERPL-MCNC: 0.52 MG/DL (ref 0.57–1)
DEPRECATED RDW RBC AUTO: 44.3 FL (ref 37–54)
EGFRCR SERPLBLD CKD-EPI 2021: 100.7 ML/MIN/1.73
EOSINOPHIL # BLD AUTO: 0.17 10*3/MM3 (ref 0–0.4)
EOSINOPHIL NFR BLD AUTO: 0.9 % (ref 0.3–6.2)
ERYTHROCYTE [DISTWIDTH] IN BLOOD BY AUTOMATED COUNT: 12.3 % (ref 12.3–15.4)
GLUCOSE SERPL-MCNC: 100 MG/DL (ref 65–99)
HCT VFR BLD AUTO: 33.6 % (ref 34–46.6)
HGB BLD-MCNC: 11.3 G/DL (ref 12–15.9)
IMM GRANULOCYTES # BLD AUTO: 0.18 10*3/MM3 (ref 0–0.05)
IMM GRANULOCYTES NFR BLD AUTO: 1 % (ref 0–0.5)
LAB AP CASE REPORT: NORMAL
LYMPHOCYTES # BLD AUTO: 2.27 10*3/MM3 (ref 0.7–3.1)
LYMPHOCYTES NFR BLD AUTO: 12.7 % (ref 19.6–45.3)
MCH RBC QN AUTO: 32.9 PG (ref 26.6–33)
MCHC RBC AUTO-ENTMCNC: 33.6 G/DL (ref 31.5–35.7)
MCV RBC AUTO: 98 FL (ref 79–97)
MONOCYTES # BLD AUTO: 1.47 10*3/MM3 (ref 0.1–0.9)
MONOCYTES NFR BLD AUTO: 8.2 % (ref 5–12)
NEUTROPHILS NFR BLD AUTO: 13.79 10*3/MM3 (ref 1.7–7)
NEUTROPHILS NFR BLD AUTO: 76.9 % (ref 42.7–76)
NRBC BLD AUTO-RTO: 0 /100 WBC (ref 0–0.2)
OSMOLALITY SERPL: 271 MOSM/KG (ref 280–301)
OSMOLALITY UR: 558 MOSM/KG
PATH REPORT.FINAL DX SPEC: NORMAL
PATH REPORT.GROSS SPEC: NORMAL
PLATELET # BLD AUTO: 693 10*3/MM3 (ref 140–450)
PMV BLD AUTO: 8.4 FL (ref 6–12)
POTASSIUM SERPL-SCNC: 4.5 MMOL/L (ref 3.5–5.2)
PROCALCITONIN SERPL-MCNC: 0.42 NG/ML (ref 0–0.25)
RBC # BLD AUTO: 3.43 10*6/MM3 (ref 3.77–5.28)
SODIUM SERPL-SCNC: 129 MMOL/L (ref 136–145)
WBC NRBC COR # BLD AUTO: 17.94 10*3/MM3 (ref 3.4–10.8)

## 2025-07-03 PROCEDURE — 83935 ASSAY OF URINE OSMOLALITY: CPT | Performed by: INTERNAL MEDICINE

## 2025-07-03 PROCEDURE — 84145 PROCALCITONIN (PCT): CPT | Performed by: HOSPITALIST

## 2025-07-03 PROCEDURE — 25010000002 CEFAZOLIN PER 500 MG: Performed by: RADIOLOGY

## 2025-07-03 PROCEDURE — 25010000002 CEFTRIAXONE PER 250 MG: Performed by: HOSPITALIST

## 2025-07-03 PROCEDURE — 94799 UNLISTED PULMONARY SVC/PX: CPT

## 2025-07-03 PROCEDURE — 0W9930Z DRAINAGE OF RIGHT PLEURAL CAVITY WITH DRAINAGE DEVICE, PERCUTANEOUS APPROACH: ICD-10-PCS | Performed by: RADIOLOGY

## 2025-07-03 PROCEDURE — 94761 N-INVAS EAR/PLS OXIMETRY MLT: CPT

## 2025-07-03 PROCEDURE — 25010000002 HYDROMORPHONE 1 MG/ML SOLUTION: Performed by: INTERNAL MEDICINE

## 2025-07-03 PROCEDURE — C1729 CATH, DRAINAGE: HCPCS

## 2025-07-03 PROCEDURE — 25010000002 FENTANYL CITRATE (PF) 50 MCG/ML SOLUTION: Performed by: RADIOLOGY

## 2025-07-03 PROCEDURE — 71045 X-RAY EXAM CHEST 1 VIEW: CPT

## 2025-07-03 PROCEDURE — 85025 COMPLETE CBC W/AUTO DIFF WBC: CPT | Performed by: INTERNAL MEDICINE

## 2025-07-03 PROCEDURE — 94664 DEMO&/EVAL PT USE INHALER: CPT

## 2025-07-03 PROCEDURE — 25010000002 MIDAZOLAM PER 1 MG: Performed by: RADIOLOGY

## 2025-07-03 PROCEDURE — C1894 INTRO/SHEATH, NON-LASER: HCPCS

## 2025-07-03 PROCEDURE — 25010000002 LIDOCAINE PF 1% 1 % SOLUTION: Performed by: RADIOLOGY

## 2025-07-03 PROCEDURE — 83930 ASSAY OF BLOOD OSMOLALITY: CPT | Performed by: INTERNAL MEDICINE

## 2025-07-03 PROCEDURE — 80048 BASIC METABOLIC PNL TOTAL CA: CPT | Performed by: INTERNAL MEDICINE

## 2025-07-03 RX ORDER — LIDOCAINE HYDROCHLORIDE 10 MG/ML
INJECTION, SOLUTION EPIDURAL; INFILTRATION; INTRACAUDAL; PERINEURAL AS NEEDED
Status: COMPLETED | OUTPATIENT
Start: 2025-07-03 | End: 2025-07-03

## 2025-07-03 RX ORDER — MIDAZOLAM HYDROCHLORIDE 1 MG/ML
INJECTION, SOLUTION INTRAMUSCULAR; INTRAVENOUS AS NEEDED
Status: COMPLETED | OUTPATIENT
Start: 2025-07-03 | End: 2025-07-03

## 2025-07-03 RX ORDER — OXYCODONE AND ACETAMINOPHEN 5; 325 MG/1; MG/1
2 TABLET ORAL EVERY 4 HOURS PRN
Status: DISPENSED | OUTPATIENT
Start: 2025-07-03 | End: 2025-07-10

## 2025-07-03 RX ORDER — FENTANYL CITRATE 50 UG/ML
INJECTION, SOLUTION INTRAMUSCULAR; INTRAVENOUS AS NEEDED
Status: COMPLETED | OUTPATIENT
Start: 2025-07-03 | End: 2025-07-03

## 2025-07-03 RX ADMIN — HYDROMORPHONE HYDROCHLORIDE 1 MG: 1 INJECTION, SOLUTION INTRAMUSCULAR; INTRAVENOUS; SUBCUTANEOUS at 17:28

## 2025-07-03 RX ADMIN — FENTANYL CITRATE 50 MCG: 50 INJECTION INTRAMUSCULAR; INTRAVENOUS at 13:45

## 2025-07-03 RX ADMIN — HYDROMORPHONE HYDROCHLORIDE 1 MG: 1 INJECTION, SOLUTION INTRAMUSCULAR; INTRAVENOUS; SUBCUTANEOUS at 20:09

## 2025-07-03 RX ADMIN — FLUOXETINE 40 MG: 20 CAPSULE ORAL at 07:48

## 2025-07-03 RX ADMIN — HYDROMORPHONE HYDROCHLORIDE 1 MG: 1 INJECTION, SOLUTION INTRAMUSCULAR; INTRAVENOUS; SUBCUTANEOUS at 11:15

## 2025-07-03 RX ADMIN — LIDOCAINE HYDROCHLORIDE 17 ML: 10 INJECTION, SOLUTION EPIDURAL; INFILTRATION; INTRACAUDAL; PERINEURAL at 13:46

## 2025-07-03 RX ADMIN — OXYCODONE AND ACETAMINOPHEN 2 TABLET: 5; 325 TABLET ORAL at 18:29

## 2025-07-03 RX ADMIN — MIDAZOLAM 0.5 MG: 1 INJECTION INTRAMUSCULAR; INTRAVENOUS at 13:45

## 2025-07-03 RX ADMIN — HYDROMORPHONE HYDROCHLORIDE 1 MG: 1 INJECTION, SOLUTION INTRAMUSCULAR; INTRAVENOUS; SUBCUTANEOUS at 05:05

## 2025-07-03 RX ADMIN — FENTANYL CITRATE 25 MCG: 50 INJECTION INTRAMUSCULAR; INTRAVENOUS at 13:49

## 2025-07-03 RX ADMIN — IPRATROPIUM BROMIDE AND ALBUTEROL SULFATE 3 ML: .5; 3 SOLUTION RESPIRATORY (INHALATION) at 07:04

## 2025-07-03 RX ADMIN — TIZANIDINE 4 MG: 4 TABLET ORAL at 20:05

## 2025-07-03 RX ADMIN — LORAZEPAM 1 MG: 1 TABLET ORAL at 20:35

## 2025-07-03 RX ADMIN — HYDROMORPHONE HYDROCHLORIDE 1 MG: 1 INJECTION, SOLUTION INTRAMUSCULAR; INTRAVENOUS; SUBCUTANEOUS at 15:21

## 2025-07-03 RX ADMIN — ESCITALOPRAM 20 MG: 10 TABLET, FILM COATED ORAL at 07:47

## 2025-07-03 RX ADMIN — ACETAMINOPHEN 650 MG: 325 TABLET, FILM COATED ORAL at 20:22

## 2025-07-03 RX ADMIN — IPRATROPIUM BROMIDE AND ALBUTEROL SULFATE 3 ML: .5; 3 SOLUTION RESPIRATORY (INHALATION) at 11:27

## 2025-07-03 RX ADMIN — MIDAZOLAM 0.5 MG: 1 INJECTION INTRAMUSCULAR; INTRAVENOUS at 13:55

## 2025-07-03 RX ADMIN — PRAVASTATIN SODIUM 20 MG: 20 TABLET ORAL at 07:48

## 2025-07-03 RX ADMIN — FENTANYL CITRATE 25 MCG: 50 INJECTION INTRAMUSCULAR; INTRAVENOUS at 13:55

## 2025-07-03 RX ADMIN — ZOLPIDEM TARTRATE 5 MG: 5 TABLET, FILM COATED ORAL at 20:18

## 2025-07-03 RX ADMIN — HYDROMORPHONE HYDROCHLORIDE 1 MG: 1 INJECTION, SOLUTION INTRAMUSCULAR; INTRAVENOUS; SUBCUTANEOUS at 01:25

## 2025-07-03 RX ADMIN — MIDAZOLAM 0.5 MG: 1 INJECTION INTRAMUSCULAR; INTRAVENOUS at 13:49

## 2025-07-03 RX ADMIN — HYDROMORPHONE HYDROCHLORIDE 1 MG: 1 INJECTION, SOLUTION INTRAMUSCULAR; INTRAVENOUS; SUBCUTANEOUS at 08:54

## 2025-07-03 RX ADMIN — HYDROMORPHONE HYDROCHLORIDE 1 MG: 1 INJECTION, SOLUTION INTRAMUSCULAR; INTRAVENOUS; SUBCUTANEOUS at 13:00

## 2025-07-03 RX ADMIN — CEFTRIAXONE 2000 MG: 2 INJECTION, POWDER, FOR SOLUTION INTRAMUSCULAR; INTRAVENOUS at 11:15

## 2025-07-03 RX ADMIN — OXYCODONE AND ACETAMINOPHEN 2 TABLET: 5; 325 TABLET ORAL at 23:48

## 2025-07-03 RX ADMIN — OXYCODONE AND ACETAMINOPHEN 1 TABLET: 5; 325 TABLET ORAL at 02:49

## 2025-07-03 RX ADMIN — BUPROPION HYDROCHLORIDE 150 MG: 150 TABLET, EXTENDED RELEASE ORAL at 07:48

## 2025-07-03 RX ADMIN — SODIUM CHLORIDE 2000 MG: 900 INJECTION INTRAVENOUS at 13:18

## 2025-07-03 RX ADMIN — MIDAZOLAM 0.5 MG: 1 INJECTION INTRAMUSCULAR; INTRAVENOUS at 13:29

## 2025-07-03 NOTE — H&P (VIEW-ONLY)
Consult Daily Progress Note  [facility]   07/03/25      Patient Name:  Yoanna eRed  MRN:  [id number]   YOB: 1955  Age: 69 y.o.  Sex: female  LOS: 3    Reason for Consult:  Large right pleural effusion    Hospital Course:   69-year-old female who presented with recurrent falls and syncope and found to have large loculated right pleural effusion.    Interval History:  No acute events overnight  Mildly worse hyponatremia  Continues to complain of pain in her right chest  States the pain medicines help but only for short period of time  No shortness of breath at rest  No nausea vomiting  Cough is persistent    Physical Exam:  Vitals:    07/03/25 0740   BP: 151/81   Pulse: 98   Resp: 19   Temp: 98.6 °F (37 °C)   SpO2: 99%       Intake/Output    Intake/Output Summary (Last 24 hours) at 7/3/2025 1026  Last data filed at 7/2/2025 1645  Gross per 24 hour   Intake --   Output 250 ml   Net -250 ml       General: Alert, nontoxic, NAD  HEENT: NC/AT, EOMI, MMM  Neck: Supple, trachea midline  Cardiac: RRR, no murmur, gallops, rubs  Pulmonary: Diminished in right lung field  GI: Soft, non-tender, non-distended, normal bowel sounds  Extremities: Warm, well perfused, no LE edema  Skin: no visible rash  Neuro: CN II - XII grossly intact  Psychiatry: Normal mood and affect      Data Review:  Results from last 7 days   Lab Units 07/03/25  0222 07/02/25  0403 07/01/25  0230 06/30/25  1002   WBC 10*3/mm3 17.94* 12.36* 19.99* 18.20*   HEMOGLOBIN g/dL 11.3* 10.5* 12.8 12.8   PLATELETS 10*3/mm3 693* 592* 713* 597*     Results from last 7 days   Lab Units 07/03/25  0222 07/02/25  1453 07/02/25  0403 07/01/25  0230 06/30/25  1002   SODIUM mmol/L 129*  --  131* 131* 131*   POTASSIUM mmol/L 4.5 3.4* 3.2* 3.8 3.3*   CHLORIDE mmol/L 97*  --  98 98 95*   CO2 mmol/L 19.7*  --  21.8* 16.3* 18.1*   BUN mg/dL 6.0*  --  10.0 21.0 29.0*   CREATININE mg/dL 0.52*  --  0.58 0.99 1.46*   GLUCOSE mg/dL 100*  --  111*  143* 124*   CALCIUM mg/dL 8.4*  --  8.1* 9.0 9.4   Estimated Creatinine Clearance: 113.6 mL/min (A) (by C-G formula based on SCr of 0.52 mg/dL (L)).    Results from last 7 days   Lab Units 07/03/25  0222 07/02/25  0403 07/01/25  0230 06/30/25  1140 06/30/25  1002   AST (SGOT) U/L  --   --   --   --  25   ALT (SGPT) U/L  --   --   --   --  14   PROCALCITONIN ng/mL 0.42*  --   --   --  2.18*   LACTATE mmol/L  --   --   --  1.6  --    D DIMER QUANT MCGFEU/mL  --   --   --   --  4.44*   PLATELETS 10*3/mm3 693* 592* 713*  --  597*             Result Review:  I have personally reviewed the results from the time of this admission to 7/3/2025 10:26 EDT and agree with these findings:  [x]  Laboratory list / accordion  [x]  Microbiology  [x]  Radiology  []  EKG/Telemetry   [x]  Cardiology/Vascular   []  Pathology  [x]  Old records  []  Other:    ASSESSMENT  /  PLAN:    Right loculated parapneumonic effusion  Thoracentesis (7/1)-1 L removed, exudative  Right upper lobe pulmonary nodule  Suspect sepsis secondary to pneumonia  Asthma  Elevated BNP  Near syncope  Frequent falls  Chronic pain  Migraines  Hyponatremia  Acute kidney injury   Leukocytosis  Dextroscoliosis of lumbar spine  History of tobacco abuse     - Patient presented today with shortness of breath, back pain, and near syncope.  - CT chest was performed which demonstrated large loculated right pleural effusion with right lung atelectasis and possible underlying pulmonary nodule/mass.  Underwent thoracentesis with 1 L removed and found to have parapneumonic effusion.  -Thoracentesis cultures no growth to date  -Chest x-ray with persistent loculated effusion, will have IR placed chest tube for drainage, plan is for today  - Continue antibiotics with ceftriaxone, leukocytosis increased today  - BNP elevated, troponin elevated, echocardiogram with preserved EF, no wall motion abnormalities  - Suspect that right-sided chest pain is likely due to parapneumonic effusion  with irritation of the pleura, should get better with evacuation of the fluid.  -Pain control, on Dilaudid as needed  - On room air saturating well  - Kidney function is returned to normal    Thank you for allowing us to participate in this patients care. Pulmonary will continue to follow.     Александр Marion MD  [facility]  Pulmonary and Critical Care Medicine, Interventional Pulmonology    Parts of this note may be an electronic transcription/translation of spoken language to printed text using the Dragon dictation system.

## 2025-07-03 NOTE — PROGRESS NOTES
Consult Daily Progress Note  [facility]   07/03/25      Patient Name:  Yoanna Reed  MRN:  [id number]   YOB: 1955  Age: 69 y.o.  Sex: female  LOS: 3    Reason for Consult:  Large right pleural effusion    Hospital Course:   69-year-old female who presented with recurrent falls and syncope and found to have large loculated right pleural effusion.    Interval History:  No acute events overnight  Mildly worse hyponatremia  Continues to complain of pain in her right chest  States the pain medicines help but only for short period of time  No shortness of breath at rest  No nausea vomiting  Cough is persistent    Physical Exam:  Vitals:    07/03/25 0740   BP: 151/81   Pulse: 98   Resp: 19   Temp: 98.6 °F (37 °C)   SpO2: 99%       Intake/Output    Intake/Output Summary (Last 24 hours) at 7/3/2025 1026  Last data filed at 7/2/2025 1645  Gross per 24 hour   Intake --   Output 250 ml   Net -250 ml       General: Alert, nontoxic, NAD  HEENT: NC/AT, EOMI, MMM  Neck: Supple, trachea midline  Cardiac: RRR, no murmur, gallops, rubs  Pulmonary: Diminished in right lung field  GI: Soft, non-tender, non-distended, normal bowel sounds  Extremities: Warm, well perfused, no LE edema  Skin: no visible rash  Neuro: CN II - XII grossly intact  Psychiatry: Normal mood and affect      Data Review:  Results from last 7 days   Lab Units 07/03/25  0222 07/02/25  0403 07/01/25  0230 06/30/25  1002   WBC 10*3/mm3 17.94* 12.36* 19.99* 18.20*   HEMOGLOBIN g/dL 11.3* 10.5* 12.8 12.8   PLATELETS 10*3/mm3 693* 592* 713* 597*     Results from last 7 days   Lab Units 07/03/25  0222 07/02/25  1453 07/02/25  0403 07/01/25  0230 06/30/25  1002   SODIUM mmol/L 129*  --  131* 131* 131*   POTASSIUM mmol/L 4.5 3.4* 3.2* 3.8 3.3*   CHLORIDE mmol/L 97*  --  98 98 95*   CO2 mmol/L 19.7*  --  21.8* 16.3* 18.1*   BUN mg/dL 6.0*  --  10.0 21.0 29.0*   CREATININE mg/dL 0.52*  --  0.58 0.99 1.46*   GLUCOSE mg/dL 100*  --  111*  143* 124*   CALCIUM mg/dL 8.4*  --  8.1* 9.0 9.4   Estimated Creatinine Clearance: 113.6 mL/min (A) (by C-G formula based on SCr of 0.52 mg/dL (L)).    Results from last 7 days   Lab Units 07/03/25  0222 07/02/25  0403 07/01/25  0230 06/30/25  1140 06/30/25  1002   AST (SGOT) U/L  --   --   --   --  25   ALT (SGPT) U/L  --   --   --   --  14   PROCALCITONIN ng/mL 0.42*  --   --   --  2.18*   LACTATE mmol/L  --   --   --  1.6  --    D DIMER QUANT MCGFEU/mL  --   --   --   --  4.44*   PLATELETS 10*3/mm3 693* 592* 713*  --  597*             Result Review:  I have personally reviewed the results from the time of this admission to 7/3/2025 10:26 EDT and agree with these findings:  [x]  Laboratory list / accordion  [x]  Microbiology  [x]  Radiology  []  EKG/Telemetry   [x]  Cardiology/Vascular   []  Pathology  [x]  Old records  []  Other:    ASSESSMENT  /  PLAN:    Right loculated parapneumonic effusion  Thoracentesis (7/1)-1 L removed, exudative  Right upper lobe pulmonary nodule  Suspect sepsis secondary to pneumonia  Asthma  Elevated BNP  Near syncope  Frequent falls  Chronic pain  Migraines  Hyponatremia  Acute kidney injury   Leukocytosis  Dextroscoliosis of lumbar spine  History of tobacco abuse     - Patient presented today with shortness of breath, back pain, and near syncope.  - CT chest was performed which demonstrated large loculated right pleural effusion with right lung atelectasis and possible underlying pulmonary nodule/mass.  Underwent thoracentesis with 1 L removed and found to have parapneumonic effusion.  -Thoracentesis cultures no growth to date  -Chest x-ray with persistent loculated effusion, will have IR placed chest tube for drainage, plan is for today  - Continue antibiotics with ceftriaxone, leukocytosis increased today  - BNP elevated, troponin elevated, echocardiogram with preserved EF, no wall motion abnormalities  - Suspect that right-sided chest pain is likely due to parapneumonic effusion  with irritation of the pleura, should get better with evacuation of the fluid.  -Pain control, on Dilaudid as needed  - On room air saturating well  - Kidney function is returned to normal    Thank you for allowing us to participate in this patients care. Pulmonary will continue to follow.     Александр Marion MD  [facility]  Pulmonary and Critical Care Medicine, Interventional Pulmonology    Parts of this note may be an electronic transcription/translation of spoken language to printed text using the Dragon dictation system.

## 2025-07-03 NOTE — PLAN OF CARE
Goal Outcome Evaluation:         Patient A&Ox4. Up to BSC with assist x1. Pleurx cath planned for today. Patient had an unattended fall overnight. The patient was instructed to use call light when she was finished on the BSC. The patient stated she could not wait, slid to her knees on the ground, and got herself back in the bed. No known injury. [facility] notfied. House manger notified. Bed alarm in place.

## 2025-07-03 NOTE — PROGRESS NOTES
Name: Yoanna Reed ADMIT: 2025   : 1955  PCP: Tom Muller MD    MRN: [id number] LOS: 3 days   AGE/SEX: 69 y.o. female  ROOM: [facility]     Subjective   Subjective   .  Patient is lying on the bed and is complaining of pain.  Denied nausea, vomiting, chest pain, palpitations.  Continues to complain of pleuritic chest pain.       Objective   Objective   Vital Signs  Temp:  [98.2 °F (36.8 °C)-98.6 °F (37 °C)] 98.6 °F (37 °C)  Heart Rate:  [] 108  Resp:  [18-23] 18  BP: ()/() 158/88  FiO2 (%):  [21 %] 21 %  SpO2:  [93 %-100 %] 99 %  on  Flow (L/min) (Oxygen Therapy):  [1-4] 2;   Device (Oxygen Therapy): room air  Body mass index is 26.91 kg/m².  Physical Exam      HEENT: PERRLA, extraocular movements intact, Scleras no icterus  Neck: Supple, no JVD  Cardiovascular: Regular rate and rhythm with normal S1 and S2  Respiratory: Diminished breath sounds on the right and left is fairly clear.  GI: Soft, nontender, bowel sounds present  Extremities: No edema, palpable pedal pulses  Neurologic: Grossly nonfocal, no facial asymmetry        Results Review     I reviewed the patient's new clinical results.  Results from last 7 days   Lab Units 25  0222 25  0403 25  02325  1002   WBC 10*3/mm3 17.94* 12.36* 19.99* 18.20*   HEMOGLOBIN g/dL 11.3* 10.5* 12.8 12.8   PLATELETS 10*3/mm3 693* 592* 713* 597*     Results from last 7 days   Lab Units 252 25  1453 25  0403 25  0230 25  1002   SODIUM mmol/L 129*  --  131* 131* 131*   POTASSIUM mmol/L 4.5 3.4* 3.2* 3.8 3.3*   CHLORIDE mmol/L 97*  --  98 98 95*   CO2 mmol/L 19.7*  --  21.8* 16.3* 18.1*   BUN mg/dL 6.0*  --  10.0 21.0 29.0*   CREATININE mg/dL 0.52*  --  0.58 0.99 1.46*   GLUCOSE mg/dL 100*  --  111* 143* 124*   EGFR mL/min/1.73 100.7  --  98.1 61.9 38.8*     Results from last 7 days   Lab Units 25  1002   ALBUMIN g/dL 3.4*   BILIRUBIN mg/dL 0.8   ALK PHOS U/L 88   AST  "(SGOT) U/L 25   ALT (SGPT) U/L 14     Results from last 7 days   Lab Units 07/03/25  0222 07/02/25  0403 07/01/25  0230 06/30/25  1002   CALCIUM mg/dL 8.4* 8.1* 9.0 9.4   ALBUMIN g/dL  --   --   --  3.4*     Results from last 7 days   Lab Units 07/03/25  0222 06/30/25  1140 06/30/25  1002   PROCALCITONIN ng/mL 0.42*  --  2.18*   LACTATE mmol/L  --  1.6  --      No results found for: \"HGBA1C\", \"POCGLU\"    XR Chest 1 View  Result Date: 7/2/2025  As described.  This report was finalized on 7/2/2025 8:25 AM by Dr. Huan Robert M.D on Workstation: [id number]      US Thoracentesis  Result Date: 7/1/2025  Ultrasound-guided right thoracentesis as described.    This report was finalized on 7/1/2025 5:19 PM by Dr. Baron Chung M.D on Workstation: [id number]        I have personally reviewed all medications:  Scheduled Medications  buPROPion XL, 150 mg, Oral, QAM  cefTRIAXone, 2,000 mg, Intravenous, Q24H  escitalopram, 20 mg, Oral, Daily  FLUoxetine, 40 mg, Oral, Daily  ipratropium-albuterol, 3 mL, Nebulization, Q6H While Awake - RT  pravastatin, 20 mg, Oral, Daily  zolpidem, 5 mg, Oral, Nightly    Infusions     Diet  NPO Diet NPO Type: Sips with Meds    I have personally reviewed:  [x]  Laboratory   [x]  Microbiology   [x]  Radiology   [x]  EKG/Telemetry  [x]  Cardiology/Vascular   []  Pathology    []  Records       Assessment/Plan     Active Hospital Problems    Diagnosis  POA    Pleural effusion [J90]  Unknown    Near syncope [R55]  Unknown    TUSHAR (acute kidney injury) [N17.9]  Unknown    Elevated troponin [R79.89]  Unknown    Chronic pain [G89.29]  Yes      Resolved Hospital Problems   No resolved problems to display.       69 y.o. female admitted with <principal problem not specified>.      1. Dyspnea/pleural effusion with underlying nodule/mass/leukocytosis, underlying pneumonia cannot be completely ruled out and therefore IV Rocephin was initiated.  Status post thoracentesis with persistent pleural effusion " that is loculated.  IR guided chest tube placement today.  Pulmonary following along.  Given poor pain control oxycodone is being further advanced.  2.  Elevated troponin, not secondary to MI and cardiology did evaluate.  2D echo has been done and revealed normal ejection fraction.  3.  Chronic pain syndrome, continue with analgesics.  4.  History of asthma, on albuterol nebs as needed and currently not in any exacerbation.  5.  Acute kidney injury, was gently volume resuscitated with which creatinine has improved from 1.46-0.52.    6.  History of hep C, treated according to patient.  7.  Right upper lobe lung nodule, management per pulmonary.  8.  Hyponatremia, likely secondary to SIADH and will check serum and urine osmolarity.  8.  On SCDs for DVT prophylaxis.  9.  CODE STATUS is full code.    Copy text on This note has been reviewed by me on 7-3-25          Chinedu Blandon MD  [facility]  07/03/25  15:56 EDT

## 2025-07-03 NOTE — POST-PROCEDURE NOTE
POST PROCEDURE NOTE    Procedure: chest tube    Pre-Procedure Diagnosis: pleur.eff.    Post-procedure Diagnosis: same    Findings: successful right 12Fr chest tube plcmt    Complications: none    Blood loss: min    Specimen Removed: not requested    Disposition:   Transfer back to inpatient room

## 2025-07-04 LAB
ANION GAP SERPL CALCULATED.3IONS-SCNC: 13.5 MMOL/L (ref 5–15)
BASOPHILS # BLD AUTO: 0.06 10*3/MM3 (ref 0–0.2)
BASOPHILS NFR BLD AUTO: 0.3 % (ref 0–1.5)
BUN SERPL-MCNC: 5 MG/DL (ref 8–23)
BUN/CREAT SERPL: 9.6 (ref 7–25)
CALCIUM SPEC-SCNC: 8.9 MG/DL (ref 8.6–10.5)
CHLORIDE SERPL-SCNC: 97 MMOL/L (ref 98–107)
CO2 SERPL-SCNC: 22.5 MMOL/L (ref 22–29)
CREAT SERPL-MCNC: 0.52 MG/DL (ref 0.57–1)
DEPRECATED RDW RBC AUTO: 44.6 FL (ref 37–54)
EGFRCR SERPLBLD CKD-EPI 2021: 100.7 ML/MIN/1.73
EOSINOPHIL # BLD AUTO: 0.22 10*3/MM3 (ref 0–0.4)
EOSINOPHIL NFR BLD AUTO: 1.1 % (ref 0.3–6.2)
ERYTHROCYTE [DISTWIDTH] IN BLOOD BY AUTOMATED COUNT: 12.5 % (ref 12.3–15.4)
GLUCOSE SERPL-MCNC: 126 MG/DL (ref 65–99)
HCT VFR BLD AUTO: 36.9 % (ref 34–46.6)
HGB BLD-MCNC: 12.3 G/DL (ref 12–15.9)
IMM GRANULOCYTES # BLD AUTO: 0.2 10*3/MM3 (ref 0–0.05)
IMM GRANULOCYTES NFR BLD AUTO: 1 % (ref 0–0.5)
LYMPHOCYTES # BLD AUTO: 2.5 10*3/MM3 (ref 0.7–3.1)
LYMPHOCYTES NFR BLD AUTO: 12.9 % (ref 19.6–45.3)
MCH RBC QN AUTO: 32.5 PG (ref 26.6–33)
MCHC RBC AUTO-ENTMCNC: 33.3 G/DL (ref 31.5–35.7)
MCV RBC AUTO: 97.4 FL (ref 79–97)
MONOCYTES # BLD AUTO: 1.35 10*3/MM3 (ref 0.1–0.9)
MONOCYTES NFR BLD AUTO: 7 % (ref 5–12)
NEUTROPHILS NFR BLD AUTO: 14.99 10*3/MM3 (ref 1.7–7)
NEUTROPHILS NFR BLD AUTO: 77.7 % (ref 42.7–76)
NRBC BLD AUTO-RTO: 0 /100 WBC (ref 0–0.2)
PLATELET # BLD AUTO: 743 10*3/MM3 (ref 140–450)
PMV BLD AUTO: 8.3 FL (ref 6–12)
POTASSIUM SERPL-SCNC: 3.8 MMOL/L (ref 3.5–5.2)
RBC # BLD AUTO: 3.79 10*6/MM3 (ref 3.77–5.28)
SODIUM SERPL-SCNC: 133 MMOL/L (ref 136–145)
WBC NRBC COR # BLD AUTO: 19.32 10*3/MM3 (ref 3.4–10.8)

## 2025-07-04 PROCEDURE — 85025 COMPLETE CBC W/AUTO DIFF WBC: CPT | Performed by: INTERNAL MEDICINE

## 2025-07-04 PROCEDURE — 94799 UNLISTED PULMONARY SVC/PX: CPT

## 2025-07-04 PROCEDURE — 25010000002 ONDANSETRON PER 1 MG: Performed by: INTERNAL MEDICINE

## 2025-07-04 PROCEDURE — 25010000002 CEFTRIAXONE PER 250 MG: Performed by: HOSPITALIST

## 2025-07-04 PROCEDURE — 25010000002 METRONIDAZOLE 500 MG/100ML SOLUTION: Performed by: INTERNAL MEDICINE

## 2025-07-04 PROCEDURE — 80048 BASIC METABOLIC PNL TOTAL CA: CPT | Performed by: INTERNAL MEDICINE

## 2025-07-04 PROCEDURE — 25010000002 HYDROMORPHONE 1 MG/ML SOLUTION: Performed by: INTERNAL MEDICINE

## 2025-07-04 PROCEDURE — 94664 DEMO&/EVAL PT USE INHALER: CPT

## 2025-07-04 PROCEDURE — 94761 N-INVAS EAR/PLS OXIMETRY MLT: CPT

## 2025-07-04 RX ORDER — CALCIUM CARBONATE 500 MG/1
2 TABLET, CHEWABLE ORAL EVERY 6 HOURS PRN
Status: DISPENSED | OUTPATIENT
Start: 2025-07-04 | End: 2025-07-09

## 2025-07-04 RX ORDER — METRONIDAZOLE 500 MG/100ML
500 INJECTION, SOLUTION INTRAVENOUS EVERY 8 HOURS
Status: DISCONTINUED | OUTPATIENT
Start: 2025-07-04 | End: 2025-07-05

## 2025-07-04 RX ORDER — SUCRALFATE 1 G/1
1 TABLET ORAL
Status: DISCONTINUED | OUTPATIENT
Start: 2025-07-04 | End: 2025-07-13 | Stop reason: HOSPADM

## 2025-07-04 RX ADMIN — OXYCODONE AND ACETAMINOPHEN 2 TABLET: 5; 325 TABLET ORAL at 10:45

## 2025-07-04 RX ADMIN — IPRATROPIUM BROMIDE AND ALBUTEROL SULFATE 3 ML: .5; 3 SOLUTION RESPIRATORY (INHALATION) at 12:42

## 2025-07-04 RX ADMIN — HYDROMORPHONE HYDROCHLORIDE 1 MG: 1 INJECTION, SOLUTION INTRAMUSCULAR; INTRAVENOUS; SUBCUTANEOUS at 06:41

## 2025-07-04 RX ADMIN — METRONIDAZOLE 500 MG: 500 INJECTION, SOLUTION INTRAVENOUS at 15:50

## 2025-07-04 RX ADMIN — ONDANSETRON 4 MG: 2 INJECTION, SOLUTION INTRAMUSCULAR; INTRAVENOUS at 05:36

## 2025-07-04 RX ADMIN — IPRATROPIUM BROMIDE AND ALBUTEROL SULFATE 3 ML: .5; 3 SOLUTION RESPIRATORY (INHALATION) at 21:39

## 2025-07-04 RX ADMIN — LORAZEPAM 1 MG: 1 TABLET ORAL at 13:18

## 2025-07-04 RX ADMIN — OXYCODONE AND ACETAMINOPHEN 2 TABLET: 5; 325 TABLET ORAL at 15:37

## 2025-07-04 RX ADMIN — ACETAMINOPHEN 650 MG: 325 TABLET, FILM COATED ORAL at 20:45

## 2025-07-04 RX ADMIN — ONDANSETRON 4 MG: 2 INJECTION, SOLUTION INTRAMUSCULAR; INTRAVENOUS at 22:06

## 2025-07-04 RX ADMIN — OXYCODONE AND ACETAMINOPHEN 2 TABLET: 5; 325 TABLET ORAL at 05:36

## 2025-07-04 RX ADMIN — ANTACID TABLETS 2 TABLET: 500 TABLET, CHEWABLE ORAL at 15:38

## 2025-07-04 RX ADMIN — HYDROMORPHONE HYDROCHLORIDE 1 MG: 1 INJECTION, SOLUTION INTRAMUSCULAR; INTRAVENOUS; SUBCUTANEOUS at 16:28

## 2025-07-04 RX ADMIN — HYDROMORPHONE HYDROCHLORIDE 1 MG: 1 INJECTION, SOLUTION INTRAMUSCULAR; INTRAVENOUS; SUBCUTANEOUS at 02:03

## 2025-07-04 RX ADMIN — HYDROMORPHONE HYDROCHLORIDE 1 MG: 1 INJECTION, SOLUTION INTRAMUSCULAR; INTRAVENOUS; SUBCUTANEOUS at 23:56

## 2025-07-04 RX ADMIN — HYDROMORPHONE HYDROCHLORIDE 1 MG: 1 INJECTION, SOLUTION INTRAMUSCULAR; INTRAVENOUS; SUBCUTANEOUS at 13:18

## 2025-07-04 RX ADMIN — IPRATROPIUM BROMIDE AND ALBUTEROL SULFATE 3 ML: .5; 3 SOLUTION RESPIRATORY (INHALATION) at 08:15

## 2025-07-04 RX ADMIN — SUCRALFATE 1 G: 1 TABLET ORAL at 16:30

## 2025-07-04 RX ADMIN — TIZANIDINE 4 MG: 4 TABLET ORAL at 06:41

## 2025-07-04 RX ADMIN — FLUOXETINE 40 MG: 20 CAPSULE ORAL at 08:06

## 2025-07-04 RX ADMIN — BUPROPION HYDROCHLORIDE 150 MG: 150 TABLET, EXTENDED RELEASE ORAL at 05:37

## 2025-07-04 RX ADMIN — PRAVASTATIN SODIUM 20 MG: 20 TABLET ORAL at 08:06

## 2025-07-04 RX ADMIN — LORAZEPAM 1 MG: 1 TABLET ORAL at 05:36

## 2025-07-04 RX ADMIN — CEFTRIAXONE 2000 MG: 2 INJECTION, POWDER, FOR SOLUTION INTRAMUSCULAR; INTRAVENOUS at 11:12

## 2025-07-04 RX ADMIN — OXYCODONE AND ACETAMINOPHEN 2 TABLET: 5; 325 TABLET ORAL at 22:41

## 2025-07-04 RX ADMIN — SUCRALFATE 1 G: 1 TABLET ORAL at 20:45

## 2025-07-04 RX ADMIN — HYDROMORPHONE HYDROCHLORIDE 1 MG: 1 INJECTION, SOLUTION INTRAMUSCULAR; INTRAVENOUS; SUBCUTANEOUS at 20:45

## 2025-07-04 RX ADMIN — LORAZEPAM 1 MG: 1 TABLET ORAL at 19:22

## 2025-07-04 RX ADMIN — ANTACID TABLETS 2 TABLET: 500 TABLET, CHEWABLE ORAL at 21:52

## 2025-07-04 RX ADMIN — ZOLPIDEM TARTRATE 5 MG: 5 TABLET, FILM COATED ORAL at 20:45

## 2025-07-04 RX ADMIN — ESCITALOPRAM 20 MG: 10 TABLET, FILM COATED ORAL at 08:06

## 2025-07-04 NOTE — PROGRESS NOTES
Name: Yoanna Reed ADMIT: 2025   : 1955  PCP: Tom Muller MD    MRN: [id number] LOS: 4 days   AGE/SEX: 69 y.o. female  ROOM: [facility]     Subjective   Subjective   .  Patient is lying on the bed and is complaining of pain.  Denied nausea, vomiting, chest pain, palpitations.  Continues to complain of pleuritic chest pain.       Objective   Objective   Vital Signs  Temp:  [97.7 °F (36.5 °C)-100.4 °F (38 °C)] 97.7 °F (36.5 °C)  Heart Rate:  [71-96] 79  Resp:  [20] 20  BP: (127-160)/(73-92) 131/80  SpO2:  [92 %-100 %] 99 %  on   ;   Device (Oxygen Therapy): room air  Body mass index is 26.91 kg/m².  Physical Exam      HEENT: PERRLA, extraocular movements intact, Scleras no icterus  Neck: Supple, no JVD  Cardiovascular: Regular rate and rhythm with normal S1 and S2  Respiratory: Diminished breath sounds on the right and left is fairly clear.  GI: Soft, nontender, bowel sounds present  Extremities: No edema, palpable pedal pulses  Neurologic: Grossly nonfocal, no facial asymmetry        Results Review     I reviewed the patient's new clinical results.  Results from last 7 days   Lab Units 25  0559 252 25  0403 25  0230   WBC 10*3/mm3 19.32* 17.94* 12.36* 19.99*   HEMOGLOBIN g/dL 12.3 11.3* 10.5* 12.8   PLATELETS 10*3/mm3 743* 693* 592* 713*     Results from last 7 days   Lab Units 25  0559 25  0222 25  1453 25  0403 25  0230   SODIUM mmol/L 133* 129*  --  131* 131*   POTASSIUM mmol/L 3.8 4.5 3.4* 3.2* 3.8   CHLORIDE mmol/L 97* 97*  --  98 98   CO2 mmol/L 22.5 19.7*  --  21.8* 16.3*   BUN mg/dL 5.0* 6.0*  --  10.0 21.0   CREATININE mg/dL 0.52* 0.52*  --  0.58 0.99   GLUCOSE mg/dL 126* 100*  --  111* 143*   EGFR mL/min/1.73 100.7 100.7  --  98.1 61.9     Results from last 7 days   Lab Units 25  1002   ALBUMIN g/dL 3.4*   BILIRUBIN mg/dL 0.8   ALK PHOS U/L 88   AST (SGOT) U/L 25   ALT (SGPT) U/L 14     Results from last 7 days  "  Lab Units 07/04/25  0559 07/03/25  0222 07/02/25  0403 07/01/25  0230 06/30/25  1002   CALCIUM mg/dL 8.9 8.4* 8.1* 9.0 9.4   ALBUMIN g/dL  --   --   --   --  3.4*     Results from last 7 days   Lab Units 07/03/25  0222 06/30/25  1140 06/30/25  1002   PROCALCITONIN ng/mL 0.42*  --  2.18*   LACTATE mmol/L  --  1.6  --      No results found for: \"HGBA1C\", \"POCGLU\"    IR Pleural Drain Plcmt  Result Date: 7/3/2025  Successful 12 [place] chest tube placement into right pleural effusion under ultrasound and fluoroscopic guidance.   This report was finalized on 7/3/2025 7:18 PM by Dr. Ovidio Ndiaye M.D on Workstation: [id number]      XR Chest 1 View  Result Date: 7/3/2025  As described.  This report was finalized on 7/3/2025 4:43 PM by Dr. Huan Robert M.D on Workstation: [id number]        I have personally reviewed all medications:  Scheduled Medications  buPROPion XL, 150 mg, Oral, QAM  cefTRIAXone, 2,000 mg, Intravenous, Q24H  escitalopram, 20 mg, Oral, Daily  FLUoxetine, 40 mg, Oral, Daily  ipratropium-albuterol, 3 mL, Nebulization, Q6H While Awake - RT  pravastatin, 20 mg, Oral, Daily  zolpidem, 5 mg, Oral, Nightly    Infusions     Diet  Diet: Regular/House; Fluid Consistency: Thin (IDDSI 0)    I have personally reviewed:  [x]  Laboratory   [x]  Microbiology   [x]  Radiology   [x]  EKG/Telemetry  [x]  Cardiology/Vascular   []  Pathology    []  Records       Assessment/Plan     Active Hospital Problems    Diagnosis  POA    Pleural effusion [J90]  Unknown    Near syncope [R55]  Unknown    TUSHAR (acute kidney injury) [N17.9]  Unknown    Elevated troponin [R79.89]  Unknown    Chronic pain [G89.29]  Yes      Resolved Hospital Problems   No resolved problems to display.       69 y.o. female admitted with <principal problem not specified>.      1. Dyspnea/pleural effusion with underlying nodule/mass/leukocytosis, underlying pneumonia cannot be completely ruled out and therefore IV Rocephin was initiated.  Status post " thoracentesis On 7/1 with liver of 1 L and it was noted to be exudative.  Given presence of loculated effusion underwent IR guided chest tube placement on 7/3/2025. Pulmonary following along.     2.  Elevated troponin, not secondary to MI and cardiology did evaluate.  2D echo has been done and revealed normal ejection fraction.    3.  Chronic pain syndrome, continue with analgesics.    4.  History of asthma, on albuterol nebs as needed and currently not in any exacerbation.    5.  Acute kidney injury, was gently volume resuscitated with which creatinine has improved from 1.46-0.52.      6.  History of hep C, treated according to patient.    7.  Right upper lobe lung nodule, management per pulmonary.    8.  Hyponatremia,sodium level better at 133 and clinically insignificant.    8.  On SCDs for DVT prophylaxis.    9.  CODE STATUS is full code.    Copy text on This note has been reviewed by me on 7-4-25          Chinedu Blandon MD  [facility]  07/04/25  14:41 EDT

## 2025-07-04 NOTE — PROGRESS NOTES
[facility]  [phone]  Dr. Kelvin Falcon    Subjective:  LOS: 4  [unfilled]      Chief Complaint: Right effusion    Her breathing feels much better since chest tube placed by IR.  Also complains of severe GERD.    Objective   Vital Signs past 24hrs  Temp range: Temp (24hrs), Av.4 °F (36.9 °C), Min:97.7 °F (36.5 °C), Max:100.4 °F (38 °C)    BP range: BP: (127-160)/(73-92) 131/80  Pulse range: Heart Rate:  [71-96] 89  Resp rate range: Resp:  [18-20] 18  Device (Oxygen Therapy): room air   Oxygen range:SpO2:  [92 %-100 %] 93 %   Mechanical Ventilator:     Physical Exam  Eyes:      Pupils: Pupils are equal, round, and reactive to light.   Cardiovascular:      Rate and Rhythm: Normal rate and regular rhythm.      Heart sounds: No murmur heard.  Pulmonary:      Effort: Pulmonary effort is normal.      Breath sounds: Examination of the right-lower field reveals decreased breath sounds. Decreased breath sounds present.      Comments: Right chest tube  Abdominal:      General: Bowel sounds are normal.      Palpations: Abdomen is soft. There is no mass.      Tenderness: There is no abdominal tenderness.   Musculoskeletal:         General: No swelling.   Neurological:      Mental Status: She is alert.       Results Review:    I have reviewed the laboratory and imaging data since the last note by [facility] physician.  My annotations are noted in assessment and plan.      Result Review:  I have personally reviewed the results from last note by [facility] physician to 2025 15:31 EDT and agree with these findings:  [x]  Laboratory list / accordion  [x]  Microbiology  [x]  Radiology  []  EKG/Telemetry   []  Cardiology/Vascular   []  Pathology  []  Old records  []  Other:      Medication Review:  I have reviewed the current MAR.  My annotations are noted in assessment and plan.    buPROPion XL, 150 mg, Oral, QAM  cefTRIAXone, 2,000 mg, Intravenous, Q24H  escitalopram, 20 mg, Oral, Daily  FLUoxetine, 40 mg, Oral,  Daily  ipratropium-albuterol, 3 mL, Nebulization, Q6H While Awake - RT  pravastatin, 20 mg, Oral, Daily  zolpidem, 5 mg, Oral, Nightly           Lines, Drains & Airways       Active LDAs       Name Placement date Placement time Site Days    Peripheral IV 07/03/25 0829 20 G Anterior;Right;Upper Arm 07/03/25  0829  Arm  1    Chest Tube 1 Right Pleural 07/03/25  1404  Pleural  1                    PCCM Problems  Right loculated parapneumonic effusion  Thoracentesis (7/1)-1 L removed, exudative  Right upper lobe pulmonary nodule  Suspect sepsis secondary to pneumonia  Asthma  Elevated BNP  Near syncope  Frequent falls  Chronic pain  Migraines  Hyponatremia  Acute kidney injury   Leukocytosis  Dextroscoliosis of lumbar spine  History of tobacco abuse        THESE ARE NEW MEDICAL PROBLEMS TO ME.    Plan of Treatment    Now with chest tube and right loculated pleural effusion.  Drainage from chest tube appears to be tapering off.  As the effusion is loculated on CT, will trial tPA tomorrow.    Remains on antibiotics for pneumonia with parapneumonic effusion.  Low-grade fever of 100.4 on 7/3/2025's last recorded.  Note elevated white count.  Continue to monitor for now.  In view of parapneumonic effusion will add Flagyl to regimen.    Kelvin Falcon MD  07/04/25  15:31 EDT    Isolation status: No active isolations    Dietary Orders (From admission, onward)       Start     Ordered    07/03/25 1724  Diet: Regular/House; Fluid Consistency: Thin (IDDSI 0)  Diet Effective Now        References:    Diet Order Definitions   Question Answer Comment   Diets: Regular/House    Fluid Consistency: Thin (IDDSI 0)        07/03/25 1723                    Part of this note may be an electronic transcription/translation of spoken language to printed text using the Dragon Dictation System.

## 2025-07-04 NOTE — PLAN OF CARE
Goal Outcome Evaluation:  VSS. Pain controlled with Percocet and IV dilaudid. CT is to -20 suction, no air leak noted. Monitoring output. Pulmonary hygiene encouraged. Patient very anxious at times. Plan of care ongoing.

## 2025-07-05 ENCOUNTER — APPOINTMENT (OUTPATIENT)
Dept: GENERAL RADIOLOGY | Facility: HOSPITAL | Age: 70
DRG: 871 | End: 2025-07-05
Payer: MEDICARE

## 2025-07-05 LAB
ANION GAP SERPL CALCULATED.3IONS-SCNC: 11 MMOL/L (ref 5–15)
BACTERIA SPEC AEROBE CULT: NORMAL
BACTERIA SPEC AEROBE CULT: NORMAL
BASOPHILS # BLD AUTO: 0.05 10*3/MM3 (ref 0–0.2)
BASOPHILS NFR BLD AUTO: 0.3 % (ref 0–1.5)
BILIRUB UR QL STRIP: NEGATIVE
BUN SERPL-MCNC: 5 MG/DL (ref 8–23)
BUN/CREAT SERPL: 10.9 (ref 7–25)
CALCIUM SPEC-SCNC: 8.2 MG/DL (ref 8.6–10.5)
CHLORIDE SERPL-SCNC: 96 MMOL/L (ref 98–107)
CLARITY UR: CLEAR
CO2 SERPL-SCNC: 25 MMOL/L (ref 22–29)
COLOR UR: YELLOW
CREAT SERPL-MCNC: 0.46 MG/DL (ref 0.57–1)
DEPRECATED RDW RBC AUTO: 41.8 FL (ref 37–54)
EGFRCR SERPLBLD CKD-EPI 2021: 103.7 ML/MIN/1.73
EOSINOPHIL # BLD AUTO: 0.14 10*3/MM3 (ref 0–0.4)
EOSINOPHIL NFR BLD AUTO: 0.7 % (ref 0.3–6.2)
ERYTHROCYTE [DISTWIDTH] IN BLOOD BY AUTOMATED COUNT: 12.2 % (ref 12.3–15.4)
GLUCOSE SERPL-MCNC: 117 MG/DL (ref 65–99)
GLUCOSE UR STRIP-MCNC: NEGATIVE MG/DL
HCT VFR BLD AUTO: 31.2 % (ref 34–46.6)
HGB BLD-MCNC: 10.9 G/DL (ref 12–15.9)
HGB UR QL STRIP.AUTO: NEGATIVE
IMM GRANULOCYTES # BLD AUTO: 0.18 10*3/MM3 (ref 0–0.05)
IMM GRANULOCYTES NFR BLD AUTO: 0.9 % (ref 0–0.5)
KETONES UR QL STRIP: NEGATIVE
LEUKOCYTE ESTERASE UR QL STRIP.AUTO: NEGATIVE
LYMPHOCYTES # BLD AUTO: 1.84 10*3/MM3 (ref 0.7–3.1)
LYMPHOCYTES NFR BLD AUTO: 9.6 % (ref 19.6–45.3)
MCH RBC QN AUTO: 32.7 PG (ref 26.6–33)
MCHC RBC AUTO-ENTMCNC: 34.9 G/DL (ref 31.5–35.7)
MCV RBC AUTO: 93.7 FL (ref 79–97)
MONOCYTES # BLD AUTO: 1.16 10*3/MM3 (ref 0.1–0.9)
MONOCYTES NFR BLD AUTO: 6.1 % (ref 5–12)
NEUTROPHILS NFR BLD AUTO: 15.79 10*3/MM3 (ref 1.7–7)
NEUTROPHILS NFR BLD AUTO: 82.4 % (ref 42.7–76)
NITRITE UR QL STRIP: NEGATIVE
NRBC BLD AUTO-RTO: 0.1 /100 WBC (ref 0–0.2)
PH UR STRIP.AUTO: 6 [PH] (ref 5–8)
PLATELET # BLD AUTO: 677 10*3/MM3 (ref 140–450)
PMV BLD AUTO: 8.2 FL (ref 6–12)
POTASSIUM SERPL-SCNC: 3.4 MMOL/L (ref 3.5–5.2)
POTASSIUM SERPL-SCNC: 3.4 MMOL/L (ref 3.5–5.2)
PROCALCITONIN SERPL-MCNC: 38.4 NG/ML (ref 0–0.25)
PROT UR QL STRIP: NEGATIVE
RBC # BLD AUTO: 3.33 10*6/MM3 (ref 3.77–5.28)
SODIUM SERPL-SCNC: 132 MMOL/L (ref 136–145)
SP GR UR STRIP: <=1.005 (ref 1–1.03)
UROBILINOGEN UR QL STRIP: NORMAL
WBC NRBC COR # BLD AUTO: 19.16 10*3/MM3 (ref 3.4–10.8)

## 2025-07-05 PROCEDURE — 81003 URINALYSIS AUTO W/O SCOPE: CPT | Performed by: NURSE PRACTITIONER

## 2025-07-05 PROCEDURE — 25010000002 METRONIDAZOLE 500 MG/100ML SOLUTION: Performed by: INTERNAL MEDICINE

## 2025-07-05 PROCEDURE — 94760 N-INVAS EAR/PLS OXIMETRY 1: CPT

## 2025-07-05 PROCEDURE — 71045 X-RAY EXAM CHEST 1 VIEW: CPT

## 2025-07-05 PROCEDURE — 25010000002 PIPERACILLIN SOD-TAZOBACTAM PER 1 G: Performed by: INTERNAL MEDICINE

## 2025-07-05 PROCEDURE — 85025 COMPLETE CBC W/AUTO DIFF WBC: CPT | Performed by: INTERNAL MEDICINE

## 2025-07-05 PROCEDURE — 25010000002 PROCHLORPERAZINE 10 MG/2ML SOLUTION: Performed by: NURSE PRACTITIONER

## 2025-07-05 PROCEDURE — 94799 UNLISTED PULMONARY SVC/PX: CPT

## 2025-07-05 PROCEDURE — 80048 BASIC METABOLIC PNL TOTAL CA: CPT | Performed by: INTERNAL MEDICINE

## 2025-07-05 PROCEDURE — 94761 N-INVAS EAR/PLS OXIMETRY MLT: CPT

## 2025-07-05 PROCEDURE — 25010000002 HYDROMORPHONE 1 MG/ML SOLUTION: Performed by: INTERNAL MEDICINE

## 2025-07-05 PROCEDURE — 25010000002 ONDANSETRON PER 1 MG: Performed by: INTERNAL MEDICINE

## 2025-07-05 PROCEDURE — 84132 ASSAY OF SERUM POTASSIUM: CPT | Performed by: INTERNAL MEDICINE

## 2025-07-05 PROCEDURE — 84145 PROCALCITONIN (PCT): CPT | Performed by: INTERNAL MEDICINE

## 2025-07-05 PROCEDURE — 94664 DEMO&/EVAL PT USE INHALER: CPT

## 2025-07-05 RX ORDER — POTASSIUM CHLORIDE 1500 MG/1
40 TABLET, EXTENDED RELEASE ORAL EVERY 4 HOURS
Status: COMPLETED | OUTPATIENT
Start: 2025-07-05 | End: 2025-07-05

## 2025-07-05 RX ORDER — PROCHLORPERAZINE EDISYLATE 5 MG/ML
5 INJECTION INTRAMUSCULAR; INTRAVENOUS EVERY 6 HOURS PRN
Status: DISCONTINUED | OUTPATIENT
Start: 2025-07-05 | End: 2025-07-10

## 2025-07-05 RX ORDER — POTASSIUM CHLORIDE 1500 MG/1
40 TABLET, EXTENDED RELEASE ORAL EVERY 4 HOURS
Status: COMPLETED | OUTPATIENT
Start: 2025-07-05 | End: 2025-07-06

## 2025-07-05 RX ADMIN — PIPERACILLIN AND TAZOBACTAM 3.38 G: 3; .375 INJECTION, POWDER, FOR SOLUTION INTRAVENOUS at 18:25

## 2025-07-05 RX ADMIN — LORAZEPAM 1 MG: 1 TABLET ORAL at 14:21

## 2025-07-05 RX ADMIN — OXYCODONE AND ACETAMINOPHEN 2 TABLET: 5; 325 TABLET ORAL at 10:54

## 2025-07-05 RX ADMIN — ONDANSETRON 4 MG: 2 INJECTION, SOLUTION INTRAMUSCULAR; INTRAVENOUS at 10:54

## 2025-07-05 RX ADMIN — ZOLPIDEM TARTRATE 5 MG: 5 TABLET, FILM COATED ORAL at 21:37

## 2025-07-05 RX ADMIN — OXYCODONE AND ACETAMINOPHEN 2 TABLET: 5; 325 TABLET ORAL at 05:08

## 2025-07-05 RX ADMIN — PROCHLORPERAZINE EDISYLATE 5 MG: 5 INJECTION INTRAMUSCULAR; INTRAVENOUS at 01:24

## 2025-07-05 RX ADMIN — SUCRALFATE 1 G: 1 TABLET ORAL at 17:05

## 2025-07-05 RX ADMIN — METRONIDAZOLE 500 MG: 500 INJECTION, SOLUTION INTRAVENOUS at 00:19

## 2025-07-05 RX ADMIN — ONDANSETRON 4 MG: 2 INJECTION, SOLUTION INTRAMUSCULAR; INTRAVENOUS at 19:42

## 2025-07-05 RX ADMIN — LORAZEPAM 1 MG: 1 TABLET ORAL at 05:08

## 2025-07-05 RX ADMIN — ACETAMINOPHEN 325 MG: 325 TABLET, FILM COATED ORAL at 00:54

## 2025-07-05 RX ADMIN — SUCRALFATE 1 G: 1 TABLET ORAL at 08:24

## 2025-07-05 RX ADMIN — OXYCODONE AND ACETAMINOPHEN 2 TABLET: 5; 325 TABLET ORAL at 17:05

## 2025-07-05 RX ADMIN — IPRATROPIUM BROMIDE AND ALBUTEROL SULFATE 3 ML: .5; 3 SOLUTION RESPIRATORY (INHALATION) at 19:54

## 2025-07-05 RX ADMIN — POTASSIUM CHLORIDE 40 MEQ: 1500 TABLET, EXTENDED RELEASE ORAL at 10:54

## 2025-07-05 RX ADMIN — FLUOXETINE 40 MG: 20 CAPSULE ORAL at 08:25

## 2025-07-05 RX ADMIN — OXYCODONE AND ACETAMINOPHEN 2 TABLET: 5; 325 TABLET ORAL at 21:37

## 2025-07-05 RX ADMIN — BUPROPION HYDROCHLORIDE 150 MG: 150 TABLET, EXTENDED RELEASE ORAL at 08:24

## 2025-07-05 RX ADMIN — POTASSIUM CHLORIDE 40 MEQ: 1500 TABLET, EXTENDED RELEASE ORAL at 21:37

## 2025-07-05 RX ADMIN — LORAZEPAM 1 MG: 1 TABLET ORAL at 21:43

## 2025-07-05 RX ADMIN — IPRATROPIUM BROMIDE AND ALBUTEROL SULFATE 3 ML: .5; 3 SOLUTION RESPIRATORY (INHALATION) at 07:15

## 2025-07-05 RX ADMIN — HYDROMORPHONE HYDROCHLORIDE 1 MG: 1 INJECTION, SOLUTION INTRAMUSCULAR; INTRAVENOUS; SUBCUTANEOUS at 14:07

## 2025-07-05 RX ADMIN — ESCITALOPRAM 20 MG: 10 TABLET, FILM COATED ORAL at 08:25

## 2025-07-05 RX ADMIN — POTASSIUM CHLORIDE 40 MEQ: 1500 TABLET, EXTENDED RELEASE ORAL at 16:04

## 2025-07-05 RX ADMIN — PIPERACILLIN AND TAZOBACTAM 3.38 G: 3; .375 INJECTION, POWDER, FOR SOLUTION INTRAVENOUS at 11:29

## 2025-07-05 RX ADMIN — SUCRALFATE 1 G: 1 TABLET ORAL at 21:37

## 2025-07-05 RX ADMIN — HYDROMORPHONE HYDROCHLORIDE 1 MG: 1 INJECTION, SOLUTION INTRAMUSCULAR; INTRAVENOUS; SUBCUTANEOUS at 19:42

## 2025-07-05 RX ADMIN — SUCRALFATE 1 G: 1 TABLET ORAL at 10:54

## 2025-07-05 RX ADMIN — METRONIDAZOLE 500 MG: 500 INJECTION, SOLUTION INTRAVENOUS at 08:25

## 2025-07-05 RX ADMIN — PRAVASTATIN SODIUM 20 MG: 20 TABLET ORAL at 08:25

## 2025-07-05 RX ADMIN — ONDANSETRON 4 MG: 2 INJECTION, SOLUTION INTRAMUSCULAR; INTRAVENOUS at 05:09

## 2025-07-05 NOTE — PROGRESS NOTES
Name: Yoanna Reed ADMIT: 2025   : 1955  PCP: Tom Muller MD    MRN: [id number] LOS: 5 days   AGE/SEX: 69 y.o. female  ROOM: [facility]     Subjective   Subjective   .  Patient is lying on the bed and is complaining of pain.  Denied nausea, vomiting, chest pain, palpitations.  Continues to complain of pleuritic chest pain.       Objective   Objective   Vital Signs  Temp:  [97.6 °F (36.4 °C)-98.1 °F (36.7 °C)] 97.6 °F (36.4 °C)  Heart Rate:  [] 100  Resp:  [16-20] 16  BP: (132-169)/(80-85) 152/80  SpO2:  [87 %-99 %] 96 %  on  Flow (L/min) (Oxygen Therapy):  [2] 2;   Device (Oxygen Therapy): room air  Body mass index is 27.92 kg/m².  Physical Exam      HEENT: PERRLA, extraocular movements intact, Scleras no icterus  Neck: Supple, no JVD  Cardiovascular: Regular rate and rhythm with normal S1 and S2  Respiratory: Diminished breath sounds on the right and left is fairly clear.  GI: Soft, nontender, bowel sounds present  Extremities: No edema, palpable pedal pulses  Neurologic: Grossly nonfocal, no facial asymmetry        Results Review     I reviewed the patient's new clinical results.  Results from last 7 days   Lab Units 25  0641 25  0559 252 25  0403   WBC 10*3/mm3 19.16* 19.32* 17.94* 12.36*   HEMOGLOBIN g/dL 10.9* 12.3 11.3* 10.5*   PLATELETS 10*3/mm3 677* 743* 693* 592*     Results from last 7 days   Lab Units 25  0641 25  0559 25  1453 25  0403   SODIUM mmol/L 132* 133* 129*  --  131*   POTASSIUM mmol/L 3.4* 3.8 4.5 3.4* 3.2*   CHLORIDE mmol/L 96* 97* 97*  --  98   CO2 mmol/L 25.0 22.5 19.7*  --  21.8*   BUN mg/dL 5.0* 5.0* 6.0*  --  10.0   CREATININE mg/dL 0.46* 0.52* 0.52*  --  0.58   GLUCOSE mg/dL 117* 126* 100*  --  111*   EGFR mL/min/1.73 103.7 100.7 100.7  --  98.1     Results from last 7 days   Lab Units 25  1002   ALBUMIN g/dL 3.4*   BILIRUBIN mg/dL 0.8   ALK PHOS U/L 88   AST (SGOT) U/L 25   ALT  "(SGPT) U/L 14     Results from last 7 days   Lab Units 07/05/25  0641 07/04/25  0559 07/03/25  0222 07/02/25  0403 07/01/25  0230 06/30/25  1002   CALCIUM mg/dL 8.2* 8.9 8.4* 8.1*   < > 9.4   ALBUMIN g/dL  --   --   --   --   --  3.4*    < > = values in this interval not displayed.     Results from last 7 days   Lab Units 07/05/25  0641 07/03/25  0222 06/30/25  1140 06/30/25  1002   PROCALCITONIN ng/mL 38.40* 0.42*  --  2.18*   LACTATE mmol/L  --   --  1.6  --      No results found for: \"HGBA1C\", \"POCGLU\"    XR Chest 1 View  Result Date: 7/5/2025   1. Decrease in size of mild loculated right pleural effusion. 2. Stable small ill-defined right lung base opacity  This report was finalized on 7/5/2025 8:10 AM by Dr. Bao Wilson M.D on Workstation: [id number]      XR Chest 1 View  Result Date: 7/3/2025  As described.  This report was finalized on 7/3/2025 4:43 PM by Dr. Huan Robert M.D on Workstation: [id number]        I have personally reviewed all medications:  Scheduled Medications  alteplase ((CATHFLO/ACTIVASE)) 25 mg in sodium chloride 0.9 % 50 mL INTRAPLEURAL syringe, 25 mg, Intrapleural, Daily  buPROPion XL, 150 mg, Oral, QAM  escitalopram, 20 mg, Oral, Daily  FLUoxetine, 40 mg, Oral, Daily  ipratropium-albuterol, 3 mL, Nebulization, Q6H While Awake - RT  piperacillin-tazobactam, 3.375 g, Intravenous, Q8H  potassium chloride ER, 40 mEq, Oral, Q4H  pravastatin, 20 mg, Oral, Daily  sucralfate, 1 g, Oral, 4x Daily AC & at Bedtime  zolpidem, 5 mg, Oral, Nightly    Infusions     Diet  Diet: Regular/House; Fluid Consistency: Thin (IDDSI 0)    I have personally reviewed:  [x]  Laboratory   [x]  Microbiology   [x]  Radiology   [x]  EKG/Telemetry  [x]  Cardiology/Vascular   []  Pathology    []  Records       Assessment/Plan     Active Hospital Problems    Diagnosis  POA    Pleural effusion [J90]  Unknown    Near syncope [R55]  Unknown    TUSHAR (acute kidney injury) [N17.9]  Unknown    Elevated troponin [R79.89]  " Unknown    Chronic pain [G89.29]  Yes      Resolved Hospital Problems   No resolved problems to display.       69 y.o. female admitted with <principal problem not specified>.      1. Dyspnea/pleural effusion with underlying nodule/mass/leukocytosis, underlying pneumonia cannot be completely ruled out and therefore IV Rocephin was initiated.  Status post thoracentesis On 7/1 with liver of 1 L and it was noted to be exudative.  Given presence of loculated effusion underwent IR guided chest tube placement on 7/3/2025.  tPA was attempted but could not be administered given possible malpositioning tube. Pulmonary is following along.    2.  Elevated troponin, not secondary to MI and cardiology did evaluate.  2D echo has been done and revealed normal ejection fraction.    3.  Chronic pain syndrome, continue with analgesics.    4.  History of asthma, on albuterol nebs as needed and currently not in any exacerbation.    5.  Acute kidney injury, was gently volume resuscitated with which creatinine has improved from 1.46>0.52>0.46.      6.  History of hep C, treated according to patient.    7.  Right upper lobe lung nodule, management per pulmonary.    8.  Hyponatremia,sodium level better at 132 and clinically insignificant.    8.  On SCDs for DVT prophylaxis.    9.  CODE STATUS is full code.    Copy text on This note has been reviewed by me on 7-5-25          Chinedu Blandon MD  [facility]  07/05/25  15:32 EDT

## 2025-07-05 NOTE — CONSULTS
"Patient Name: Yoanna Reed  YOB: 1955  MRN: [id number]  Admission date: 6/30/2025  Reason for Encounter: MD Consult  - Chronic Poor Intake    [facility] Clinical Nutrition Assessment     Subjective    Subjective Information     Pt is a 69 y.o. female with a history of asthma, chronic pain, migraines, hepatitis C who presented to the ED with c/o progressively increasing shortness of breath along with near syncopal episodes and falls at home.      Assessment    H&P and Current Problems      H&P  Past Medical History:   Diagnosis Date    Anxiety and depression     Asthma     Limited joint range of motion     NECK    Liver failure 2002    DUE TO MEDICATION    Mass of nipple     RIGHT    Skin cancer     RIGHT CHEEK      Past Surgical History:   Procedure Laterality Date    ANTERIOR CERVICAL FUSION      ARTERY SURGERY Left     KNEE AGE 15 DUE TO CUT TO KNEE    BREAST BIOPSY Right 11/22/2024    Procedure: RIGHT NIPPLE LESION EXCISION;  Surgeon: Raven Aviles MD;  Location: [facility];  Service: General;  Laterality: Right;    CHOLECYSTECTOMY      COLONOSCOPY W/ POLYPECTOMY N/A 05/23/2024    Procedure: COLONOSCOPY to cecum with cold polypectomies;  Surgeon: Ankur Beebe MD;  Location: [facility] ENDOSCOPY;  Service: Gastroenterology;  Laterality: N/A;  PRE - screening  POST - polyps    HYSTERECTOMY      Partial    POSTERIOR CERVICAL FUSION        Current Problems   Admission Diagnosis:  Pleural effusion [J90]  NSTEMI (non-ST elevated myocardial infarction) [I21.4]  Near syncope [R55]  Recurrent falls [R29.6]  Severe sepsis [A41.9, R65.20]  Acute midline low back pain without sciatica [M54.50]    Problem List:    Chronic pain    Pleural effusion    Near syncope    TUSHAR (acute kidney injury)    Elevated troponin       Anthropometrics      BMI, Height, Weight Estimated body mass index is 27.92 kg/m² as calculated from the following:    Height as of this encounter: 172.7 cm (68\").    " Weight as of this encounter: 83.3 kg (183 lb 10.3 oz).    Weight Method: Bed scale       Trending Weight Changes 7/5:No significant changes       Weight History  Wt Readings from Last 10 Encounters:   07/05/25 83.3 kg (183 lb 10.3 oz)   06/25/25 78.5 kg (173 lb)   05/19/25 78 kg (172 lb)   04/16/25 77.2 kg (170 lb 3.2 oz)   03/19/25 76.7 kg (169 lb)   02/17/25 79.4 kg (175 lb)   01/16/25 79.8 kg (176 lb)   12/16/24 78.9 kg (174 lb)   12/16/24 79.3 kg (174 lb 12.8 oz)   11/22/24 78.9 kg (173 lb 15.1 oz)        Labs      Comment: Na 132, Glu 126/117, K 3.4, BUN 5.0, Creat 0.46     Results from last 7 days   Lab Units 07/05/25  0641 07/04/25  0559 07/03/25  0222 07/01/25  0230 06/30/25  1140 06/30/25  1002   SODIUM mmol/L 132* 133* 129*   < >  --  131*   POTASSIUM mmol/L 3.4* 3.8 4.5   < >  --  3.3*   GLUCOSE mg/dL 117* 126* 100*   < >  --  124*   BUN mg/dL 5.0* 5.0* 6.0*   < >  --  29.0*   CREATININE mg/dL 0.46* 0.52* 0.52*   < >  --  1.46*   CALCIUM mg/dL 8.2* 8.9 8.4*   < >  --  9.4   ALBUMIN g/dL  --   --   --   --   --  3.4*   LACTATE mmol/L  --   --   --   --  1.6  --    BILIRUBIN mg/dL  --   --   --   --   --  0.8   ALK PHOS U/L  --   --   --   --   --  88   AST (SGOT) U/L  --   --   --   --   --  25   ALT (SGPT) U/L  --   --   --   --   --  14   PROBNP pg/mL  --   --   --   --   --  2,460.0*    < > = values in this interval not displayed.     Results from last 7 days   Lab Units 07/05/25  0641 07/04/25  0559 07/03/25  0222   PLATELETS 10*3/mm3 677* 743* 693*   HEMOGLOBIN g/dL 10.9* 12.3 11.3*   HEMATOCRIT % 31.2* 36.9 33.6*     Lab Results   Component Value Date    HGBA1C 5.5 05/19/2025          Medications       Scheduled Medications alteplase ((CATHFLO/ACTIVASE)) 25 mg in sodium chloride 0.9 % 50 mL INTRAPLEURAL syringe, 25 mg, Intrapleural, Daily  buPROPion XL, 150 mg, Oral, QAM  cefTRIAXone, 2,000 mg, Intravenous, Q24H  escitalopram, 20 mg, Oral, Daily  FLUoxetine, 40 mg, Oral, Daily  ipratropium-albuterol,  3 mL, Nebulization, Q6H While Awake - RT  metroNIDAZOLE, 500 mg, Intravenous, Q8H  pravastatin, 20 mg, Oral, Daily  sucralfate, 1 g, Oral, 4x Daily AC & at Bedtime  zolpidem, 5 mg, Oral, Nightly        Infusions      PRN Medications   acetaminophen    albuterol    senna-docusate sodium **AND** polyethylene glycol **AND** bisacodyl **AND** bisacodyl    calcium carbonate    Calcium Replacement - Follow Nurse / BPA Driven Protocol    HYDROmorphone    LORazepam    Magnesium Standard Dose Replacement - Follow Nurse / BPA Driven Protocol    nitroglycerin    ondansetron ODT **OR** ondansetron    ondansetron ODT    oxyCODONE-acetaminophen    Phosphorus Replacement - Follow Nurse / BPA Driven Protocol    Potassium Replacement - Follow Nurse / BPA Driven Protocol    prochlorperazine    tiZANidine     Physical Findings      Chewing/Swallowing    No issues identified at this time   Dentition Mouth/Teeth WDL: WDL       Edema   no edema    Gastrointestinal last bowel movement: 7/4   Skin skin intact    Lines/Drains chest tube   I/O reviewed      Nutrition Focused Physical Exam     NFPE Pending, due to: unable to complete in person this date     Malnutrition Severity Assessment            (1)   Current Nutrition Orders & Evaluation of Intake      Oral Nutrition     Food Allergies  and Intolerances NKFA   Current PO Diet Diet: Regular/House; Fluid Consistency: Thin (IDDSI 0)   Oral Nutrition Supplement Boost Original TID     Trending % PO Intake 25% x 1 meal - minimal po intake per pt   (2)  Assessment & Plan   Nutrition Diagnosis and Goals       Nutrition Diagnosis Problem: Inadequate Oral Intake  Etiology: Medical Diagnosis - Pleural effusion,Chronic pain syndrome and Factors Affecting Nutrition - N/V   Signs/Symptoms: Report of Minimal PO Intake and Report/Observation        Goal(s) Increase PO Intake , Accepts Oral Nutrition Supplement, and No Significant Weight Loss     Nutrition Intervention and Prescription        Intervention  Start oral nutrition supplement, f/u for NFPE as able      Diet Prescription     Supplement Prescription Boost TID    Education Provided     (3)  Monitoring/Evaluation       Monitor/Evaluation Per Protocol and PO Intake      Electronically signed by:  Lissy Gaspar RD  07/05/25 08:39 EDT

## 2025-07-05 NOTE — PROGRESS NOTES
Intrapleural tPA attempted.  However the line will not flush despite my best efforts.  The tube may be kinked.  Patient states when she was moved from a different room the tube was yanked a few times.  Will see how much output she has over the next 24 hours otherwise may elect to withdraw and remove the pigtail catheter as it may now be kinked and malpositioned and not functioning.  Further attempts at intrapleural tPA was abandoned.    Electronically signed by Kelvin Falcon MD, 07/05/25, 3:10 PM EDT.

## 2025-07-05 NOTE — PROGRESS NOTES
[facility]  [phone]  Dr. Kelvin Falcon    Subjective:  LOS: 5  [unfilled]      Chief Complaint: Right effusion    Complains of some right sided pleuritic pains.  Has a chest tube in place.    Objective   Vital Signs past 24hrs  Temp range: Temp (24hrs), Av.9 °F (36.6 °C), Min:97.6 °F (36.4 °C), Max:98.1 °F (36.7 °C)    BP range: BP: (132-169)/(80-85) 152/80  Pulse range: Heart Rate:  [] 100  Resp rate range: Resp:  [16-20] 16  Device (Oxygen Therapy): nasal cannulaFlow (L/min) (Oxygen Therapy):  [2] 2  Oxygen range:SpO2:  [87 %-99 %] 96 %   Mechanical Ventilator:     Physical Exam  Eyes:      Pupils: Pupils are equal, round, and reactive to light.   Cardiovascular:      Rate and Rhythm: Normal rate and regular rhythm.      Heart sounds: No murmur heard.  Pulmonary:      Effort: Pulmonary effort is normal.      Breath sounds: Examination of the right-lower field reveals decreased breath sounds. Decreased breath sounds present.      Comments: Right chest tube  Abdominal:      General: Bowel sounds are normal.      Palpations: Abdomen is soft. There is no mass.      Tenderness: There is no abdominal tenderness.   Musculoskeletal:         General: No swelling.   Neurological:      Mental Status: She is alert.       Results Review:    I have reviewed the laboratory and imaging data since the last note by [facility] physician.  My annotations are noted in assessment and plan.      Result Review:  I have personally reviewed the results from last note by [facility] physician to 2025 10:47 EDT and agree with these findings:  [x]  Laboratory list / accordion  [x]  Microbiology  [x]  Radiology  []  EKG/Telemetry   []  Cardiology/Vascular   []  Pathology  []  Old records  []  Other:      Medication Review:  I have reviewed the current MAR.  My annotations are noted in assessment and plan.    alteplase ((CATHFLO/ACTIVASE)) 25 mg in sodium chloride 0.9 % 50 mL INTRAPLEURAL syringe, 25 mg, Intrapleural,  Daily  buPROPion XL, 150 mg, Oral, QAM  cefTRIAXone, 2,000 mg, Intravenous, Q24H  escitalopram, 20 mg, Oral, Daily  FLUoxetine, 40 mg, Oral, Daily  ipratropium-albuterol, 3 mL, Nebulization, Q6H While Awake - RT  metroNIDAZOLE, 500 mg, Intravenous, Q8H  potassium chloride ER, 40 mEq, Oral, Q4H  pravastatin, 20 mg, Oral, Daily  sucralfate, 1 g, Oral, 4x Daily AC & at Bedtime  zolpidem, 5 mg, Oral, Nightly           Lines, Drains & Airways       Active LDAs       Name Placement date Placement time Site Days    Peripheral IV 07/03/25 0829 20 G Anterior;Right;Upper Arm 07/03/25  0829  Arm  1    Chest Tube 1 Right Pleural 07/03/25  1404  Pleural  1                    PCCM Problems  Right loculated parapneumonic effusion  Thoracentesis (7/1)-1 L removed, exudative  Right upper lobe pulmonary nodule  Suspect sepsis secondary to pneumonia  Asthma  Elevated BNP  Near syncope  Frequent falls  Chronic pain  Migraines  Hyponatremia  Acute kidney injury   Leukocytosis  Dextroscoliosis of lumbar spine  History of tobacco abuse        Plan of Treatment    Now with chest tube and right loculated pleural effusion.  Drainage from chest tube appears to be tapering off.  As the effusion is loculated on CT, will trial tPA.  Administer tPA 25 mg via pleural tube today.  Will clamp chest tube for 2 hours and then open to drainage again.    Remains on antibiotics for pneumonia with parapneumonic effusion.  No fever in the last 24 hours.  Note elevated white count.  Continue to monitor for now.  In view of parapneumonic effusion with loculation and very high procalcitonin, switch to Zosyn.    Kelvin Falcon MD  07/05/25  10:47 EDT    Isolation status: No active isolations    Dietary Orders (From admission, onward)       Start     Ordered    07/05/25 0800  Dietary Nutrition Supplements Boost Plus (Ensure Enlive, Ensure Plus)  Daily With Breakfast, Lunch & Dinner      Question:  Select Supplement:  Answer:  Boost Plus (Ensure Enlive, Ensure  Plus)    07/04/25 2306    07/03/25 1724  Diet: Regular/House; Fluid Consistency: Thin (IDDSI 0)  Diet Effective Now        References:    Diet Order Definitions   Question Answer Comment   Diets: Regular/House    Fluid Consistency: Thin (IDDSI 0)        07/03/25 1720                    Part of this note may be an electronic transcription/translation of spoken language to printed text using the Dragon Dictation System.

## 2025-07-05 NOTE — PROGRESS NOTES
[facility] Clinical Pharmacy Services: Piperacillin-Tazobactam Consult    Pt Name: Yoanna Reed   : 1955    Indication: Pneumonia    Relevant clinical data and objective history reviewed:    Past Medical History:   Diagnosis Date    Anxiety and depression     Asthma     Limited joint range of motion     NECK    Liver failure     DUE TO MEDICATION    Mass of nipple     RIGHT    Skin cancer     RIGHT CHEEK     Creatinine   Date Value Ref Range Status   2025 0.46 (L) 0.57 - 1.00 mg/dL Final   2025 0.52 (L) 0.57 - 1.00 mg/dL Final   2025 0.52 (L) 0.57 - 1.00 mg/dL Final     BUN   Date Value Ref Range Status   2025 5.0 (L) 8.0 - 23.0 mg/dL Final     Estimated Creatinine Clearance: 130.6 mL/min (A) (by C-G formula based on SCr of 0.46 mg/dL (L)).    Lab Results   Component Value Date    WBC 19.16 (H) 2025     Temp Readings from Last 3 Encounters:   25 97.6 °F (36.4 °C) (Oral)   25 98.1 °F (36.7 °C) (Oral)   25 98.4 °F (36.9 °C) (Oral)      Assessment/Plan  Estimated CrCl >20 mL/min at this time; BMI 1.97 kg/m2  Will start piperacillin-tazobactam 3.375 g IV every 8 hours extended infusion x 5 days    Pharmacy will continue to follow daily while on piperacillin-tazobactam and adjust as needed. Thank you for this consult.    Kolton Mcgovern formerly [facility]  Clinical Pharmacist

## 2025-07-05 NOTE — PLAN OF CARE
Goal Outcome Evaluation:   VSS. A&OX4. Pain consistently rated high, PRNs given. Intermittent nausea overnight. Pt states she has not been eating. Dietician consult placed and order for boosts/ensure. Vomited x2 overnight. N/O for compazine. Pt up frequently to urinate. Pt c/o urgency and frequency that is not normal for her. Pt is also requiring water to run to be able to void, when able only voiding 50-100ml at a time. Post void bladder scan done and was 0. UA ordered. Right CT to -20 suction. Plan for TPA today. Call light in reach.

## 2025-07-05 NOTE — PLAN OF CARE
Goal Outcome Evaluation:           Progress: no change  Outcome Evaluation: a&o x4, VSS, RA, pain and anxiety controlled with PRN meds, chest tube still in place, TPA was attempted to be given by Dr. Falcon and he believed the tube was kinked and that is why it could not go through, pt biggest c/o is back and R arm pain believing it is from the chest tube placement

## 2025-07-06 ENCOUNTER — APPOINTMENT (OUTPATIENT)
Dept: GENERAL RADIOLOGY | Facility: HOSPITAL | Age: 70
DRG: 871 | End: 2025-07-06
Payer: MEDICARE

## 2025-07-06 LAB
ANION GAP SERPL CALCULATED.3IONS-SCNC: 10.1 MMOL/L (ref 5–15)
BACTERIA FLD CULT: NORMAL
BACTERIA SPEC ANAEROBE CULT: NORMAL
BASOPHILS # BLD AUTO: 0.06 10*3/MM3 (ref 0–0.2)
BASOPHILS NFR BLD AUTO: 0.3 % (ref 0–1.5)
BUN SERPL-MCNC: 6 MG/DL (ref 8–23)
BUN/CREAT SERPL: 11.8 (ref 7–25)
CALCIUM SPEC-SCNC: 8.4 MG/DL (ref 8.6–10.5)
CHLORIDE SERPL-SCNC: 98 MMOL/L (ref 98–107)
CO2 SERPL-SCNC: 24.9 MMOL/L (ref 22–29)
CREAT SERPL-MCNC: 0.51 MG/DL (ref 0.57–1)
DEPRECATED RDW RBC AUTO: 42.9 FL (ref 37–54)
EGFRCR SERPLBLD CKD-EPI 2021: 101.2 ML/MIN/1.73
EOSINOPHIL # BLD AUTO: 0.12 10*3/MM3 (ref 0–0.4)
EOSINOPHIL NFR BLD AUTO: 0.6 % (ref 0.3–6.2)
ERYTHROCYTE [DISTWIDTH] IN BLOOD BY AUTOMATED COUNT: 12.3 % (ref 12.3–15.4)
GLUCOSE SERPL-MCNC: 124 MG/DL (ref 65–99)
GRAM STN SPEC: NORMAL
GRAM STN SPEC: NORMAL
HCT VFR BLD AUTO: 32.4 % (ref 34–46.6)
HGB BLD-MCNC: 11.1 G/DL (ref 12–15.9)
IMM GRANULOCYTES # BLD AUTO: 0.19 10*3/MM3 (ref 0–0.05)
IMM GRANULOCYTES NFR BLD AUTO: 1 % (ref 0–0.5)
LYMPHOCYTES # BLD AUTO: 2 10*3/MM3 (ref 0.7–3.1)
LYMPHOCYTES NFR BLD AUTO: 10.4 % (ref 19.6–45.3)
MCH RBC QN AUTO: 32.8 PG (ref 26.6–33)
MCHC RBC AUTO-ENTMCNC: 34.3 G/DL (ref 31.5–35.7)
MCV RBC AUTO: 95.9 FL (ref 79–97)
MONOCYTES # BLD AUTO: 1.03 10*3/MM3 (ref 0.1–0.9)
MONOCYTES NFR BLD AUTO: 5.4 % (ref 5–12)
NEUTROPHILS NFR BLD AUTO: 15.76 10*3/MM3 (ref 1.7–7)
NEUTROPHILS NFR BLD AUTO: 82.3 % (ref 42.7–76)
NRBC BLD AUTO-RTO: 0 /100 WBC (ref 0–0.2)
PLATELET # BLD AUTO: 655 10*3/MM3 (ref 140–450)
PMV BLD AUTO: 7.9 FL (ref 6–12)
POTASSIUM SERPL-SCNC: 4.1 MMOL/L (ref 3.5–5.2)
RBC # BLD AUTO: 3.38 10*6/MM3 (ref 3.77–5.28)
SODIUM SERPL-SCNC: 133 MMOL/L (ref 136–145)
WBC NRBC COR # BLD AUTO: 19.16 10*3/MM3 (ref 3.4–10.8)

## 2025-07-06 PROCEDURE — 85025 COMPLETE CBC W/AUTO DIFF WBC: CPT | Performed by: INTERNAL MEDICINE

## 2025-07-06 PROCEDURE — 94664 DEMO&/EVAL PT USE INHALER: CPT

## 2025-07-06 PROCEDURE — 94761 N-INVAS EAR/PLS OXIMETRY MLT: CPT

## 2025-07-06 PROCEDURE — 94799 UNLISTED PULMONARY SVC/PX: CPT

## 2025-07-06 PROCEDURE — 71045 X-RAY EXAM CHEST 1 VIEW: CPT

## 2025-07-06 PROCEDURE — 25010000002 HYDROMORPHONE 1 MG/ML SOLUTION: Performed by: INTERNAL MEDICINE

## 2025-07-06 PROCEDURE — 25010000002 ONDANSETRON PER 1 MG: Performed by: INTERNAL MEDICINE

## 2025-07-06 PROCEDURE — 94760 N-INVAS EAR/PLS OXIMETRY 1: CPT

## 2025-07-06 PROCEDURE — 25010000002 PIPERACILLIN SOD-TAZOBACTAM PER 1 G: Performed by: INTERNAL MEDICINE

## 2025-07-06 PROCEDURE — 80048 BASIC METABOLIC PNL TOTAL CA: CPT | Performed by: INTERNAL MEDICINE

## 2025-07-06 RX ADMIN — OXYCODONE AND ACETAMINOPHEN 2 TABLET: 5; 325 TABLET ORAL at 18:42

## 2025-07-06 RX ADMIN — SUCRALFATE 1 G: 1 TABLET ORAL at 18:08

## 2025-07-06 RX ADMIN — IPRATROPIUM BROMIDE AND ALBUTEROL SULFATE 3 ML: .5; 3 SOLUTION RESPIRATORY (INHALATION) at 06:41

## 2025-07-06 RX ADMIN — LORAZEPAM 1 MG: 1 TABLET ORAL at 22:32

## 2025-07-06 RX ADMIN — HYDROMORPHONE HYDROCHLORIDE 1 MG: 1 INJECTION, SOLUTION INTRAMUSCULAR; INTRAVENOUS; SUBCUTANEOUS at 08:37

## 2025-07-06 RX ADMIN — PIPERACILLIN AND TAZOBACTAM 3.38 G: 3; .375 INJECTION, POWDER, FOR SOLUTION INTRAVENOUS at 02:21

## 2025-07-06 RX ADMIN — PIPERACILLIN AND TAZOBACTAM 3.38 G: 3; .375 INJECTION, POWDER, FOR SOLUTION INTRAVENOUS at 18:08

## 2025-07-06 RX ADMIN — FLUOXETINE 40 MG: 20 CAPSULE ORAL at 08:29

## 2025-07-06 RX ADMIN — SUCRALFATE 1 G: 1 TABLET ORAL at 20:29

## 2025-07-06 RX ADMIN — OXYCODONE AND ACETAMINOPHEN 2 TABLET: 5; 325 TABLET ORAL at 23:16

## 2025-07-06 RX ADMIN — IPRATROPIUM BROMIDE AND ALBUTEROL SULFATE 3 ML: .5; 3 SOLUTION RESPIRATORY (INHALATION) at 19:16

## 2025-07-06 RX ADMIN — IPRATROPIUM BROMIDE AND ALBUTEROL SULFATE 3 ML: .5; 3 SOLUTION RESPIRATORY (INHALATION) at 13:29

## 2025-07-06 RX ADMIN — OXYCODONE AND ACETAMINOPHEN 2 TABLET: 5; 325 TABLET ORAL at 07:10

## 2025-07-06 RX ADMIN — PRAVASTATIN SODIUM 20 MG: 20 TABLET ORAL at 08:29

## 2025-07-06 RX ADMIN — SUCRALFATE 1 G: 1 TABLET ORAL at 08:29

## 2025-07-06 RX ADMIN — HYDROMORPHONE HYDROCHLORIDE 1 MG: 1 INJECTION, SOLUTION INTRAMUSCULAR; INTRAVENOUS; SUBCUTANEOUS at 11:19

## 2025-07-06 RX ADMIN — LORAZEPAM 1 MG: 1 TABLET ORAL at 07:10

## 2025-07-06 RX ADMIN — ONDANSETRON 4 MG: 2 INJECTION, SOLUTION INTRAMUSCULAR; INTRAVENOUS at 02:21

## 2025-07-06 RX ADMIN — BUPROPION HYDROCHLORIDE 150 MG: 150 TABLET, EXTENDED RELEASE ORAL at 08:29

## 2025-07-06 RX ADMIN — POTASSIUM CHLORIDE 40 MEQ: 1500 TABLET, EXTENDED RELEASE ORAL at 02:21

## 2025-07-06 RX ADMIN — ONDANSETRON 4 MG: 2 INJECTION, SOLUTION INTRAMUSCULAR; INTRAVENOUS at 11:19

## 2025-07-06 RX ADMIN — HYDROMORPHONE HYDROCHLORIDE 1 MG: 1 INJECTION, SOLUTION INTRAMUSCULAR; INTRAVENOUS; SUBCUTANEOUS at 02:21

## 2025-07-06 RX ADMIN — LORAZEPAM 1 MG: 1 TABLET ORAL at 12:39

## 2025-07-06 RX ADMIN — OXYCODONE AND ACETAMINOPHEN 2 TABLET: 5; 325 TABLET ORAL at 14:35

## 2025-07-06 RX ADMIN — ESCITALOPRAM 20 MG: 10 TABLET, FILM COATED ORAL at 08:29

## 2025-07-06 RX ADMIN — PIPERACILLIN AND TAZOBACTAM 3.38 G: 3; .375 INJECTION, POWDER, FOR SOLUTION INTRAVENOUS at 10:25

## 2025-07-06 RX ADMIN — ZOLPIDEM TARTRATE 5 MG: 5 TABLET, FILM COATED ORAL at 20:29

## 2025-07-06 RX ADMIN — SUCRALFATE 1 G: 1 TABLET ORAL at 11:20

## 2025-07-06 NOTE — PLAN OF CARE
Goal Outcome Evaluation:   VSS. A&OX4. Pain managed with PRN pain medication. Up with assist x1. Right chest tube remains to -20 suction. Pt able to tolerate some crackers overnight, milk with medications, states helped with nausea. Continues on IV abt. Call light in reach.

## 2025-07-06 NOTE — PROGRESS NOTES
Name: Yoanna Reed ADMIT: 2025   : 1955  PCP: Tom Muller MD    MRN: [id number] LOS: 6 days   AGE/SEX: 69 y.o. female  ROOM: [facility]     Subjective   Subjective   .  Patient is lying on the bed and is complaining of pain.  Denied nausea, vomiting, chest pain, palpitations.         Objective   Objective   Vital Signs  Temp:  [97.9 °F (36.6 °C)-98.8 °F (37.1 °C)] 97.9 °F (36.6 °C)  Heart Rate:  [] 98  Resp:  [16-20] 18  BP: ()/(78-92) 134/78  SpO2:  [92 %-94 %] 94 %  on  Flow (L/min) (Oxygen Therapy):  [2] 2;   Device (Oxygen Therapy): room air  Body mass index is 27.92 kg/m².  Physical Exam      HEENT: PERRLA, extraocular movements intact, Scleras no icterus  Neck: Supple, no JVD  Cardiovascular: Regular rate and rhythm with normal S1 and S2  Respiratory: Diminished breath sounds on the right and left is fairly clear.  GI: Soft, nontender, bowel sounds present  Extremities: No edema, palpable pedal pulses  Neurologic: Grossly nonfocal, no facial asymmetry        Results Review     I reviewed the patient's new clinical results.  Results from last 7 days   Lab Units 25   WBC 10*3/mm3 19.16* 19.16* 19.32* 17.94*   HEMOGLOBIN g/dL 11.1* 10.9* 12.3 11.3*   PLATELETS 10*3/mm3 655* 677* 743* 693*     Results from last 7 days   Lab Units 2559 25   SODIUM mmol/L 133*  --  132* 133* 129*   POTASSIUM mmol/L 4.1 3.4* 3.4* 3.8 4.5   CHLORIDE mmol/L 98  --  96* 97* 97*   CO2 mmol/L 24.9  --  25.0 22.5 19.7*   BUN mg/dL 6.0*  --  5.0* 5.0* 6.0*   CREATININE mg/dL 0.51*  --  0.46* 0.52* 0.52*   GLUCOSE mg/dL 124*  --  117* 126* 100*   EGFR mL/min/1.73 101.2  --  103.7 100.7 100.7     Results from last 7 days   Lab Units 25  1002   ALBUMIN g/dL 3.4*   BILIRUBIN mg/dL 0.8   ALK PHOS U/L 88   AST (SGOT) U/L 25   ALT (SGPT) U/L 14     Results from last 7 days   Lab  "Units 07/06/25  0518 07/05/25  0641 07/04/25  0559 07/03/25  0222 07/01/25  0230 06/30/25  1002   CALCIUM mg/dL 8.4* 8.2* 8.9 8.4*   < > 9.4   ALBUMIN g/dL  --   --   --   --   --  3.4*    < > = values in this interval not displayed.     Results from last 7 days   Lab Units 07/05/25  0641 07/03/25  0222 06/30/25  1140 06/30/25  1002   PROCALCITONIN ng/mL 38.40* 0.42*  --  2.18*   LACTATE mmol/L  --   --  1.6  --      No results found for: \"HGBA1C\", \"POCGLU\"    XR Chest 1 View  Result Date: 7/5/2025   1. Decrease in size of mild loculated right pleural effusion. 2. Stable small ill-defined right lung base opacity  This report was finalized on 7/5/2025 8:10 AM by Dr. Bao Wilson M.D on Workstation: [id number]        I have personally reviewed all medications:  Scheduled Medications  alteplase ((CATHFLO/ACTIVASE)) 25 mg in sodium chloride 0.9 % 50 mL INTRAPLEURAL syringe, 25 mg, Intrapleural, Daily  buPROPion XL, 150 mg, Oral, QAM  escitalopram, 20 mg, Oral, Daily  FLUoxetine, 40 mg, Oral, Daily  ipratropium-albuterol, 3 mL, Nebulization, Q6H While Awake - RT  piperacillin-tazobactam, 3.375 g, Intravenous, Q8H  pravastatin, 20 mg, Oral, Daily  sucralfate, 1 g, Oral, 4x Daily AC & at Bedtime  zolpidem, 5 mg, Oral, Nightly    Infusions     Diet  Diet: Regular/House; Fluid Consistency: Thin (IDDSI 0)    I have personally reviewed:  [x]  Laboratory   [x]  Microbiology   [x]  Radiology   [x]  EKG/Telemetry  [x]  Cardiology/Vascular   []  Pathology    []  Records       Assessment/Plan     Active Hospital Problems    Diagnosis  POA    Pleural effusion [J90]  Unknown    Near syncope [R55]  Unknown    TUSHAR (acute kidney injury) [N17.9]  Unknown    Elevated troponin [R79.89]  Unknown    Chronic pain [G89.29]  Yes      Resolved Hospital Problems   No resolved problems to display.       69 y.o. female admitted with <principal problem not specified>.      1. Dyspnea/pleural effusion with underlying nodule/mass/leukocytosis, " underlying pneumonia cannot be completely ruled out and therefore IV Rocephin was initiated.  Status post thoracentesis On 7/1 with liver of 1 L and it was noted to be exudative.  Given presence of loculated effusion underwent IR guided chest tube placement on 7/3/2025.  tPA was attempted on 7/5 but could not be administered given possible malpositioning tube.  Chest tube output appears to be tapering off therefore pulmonary plans on discontinuing today and obtain x-rays.    2.  Elevated troponin, not secondary to MI and cardiology did evaluate.  2D echo has been done and revealed normal ejection fraction.    3.  Chronic pain syndrome, continue with analgesics.    4.  History of asthma, on albuterol nebs as needed and currently not in any exacerbation.    5.  Acute kidney injury, was gently volume resuscitated with which creatinine has improved from 1.46>0.51.      6.  History of hep C, treated according to patient.    7.  Right upper lobe lung nodule, management per pulmonary.    8.  Hyponatremia,sodium level better at 133 and clinically insignificant.    8.  On SCDs for DVT prophylaxis.    9.  CODE STATUS is full code.    Copy text on This note has been reviewed by me on 7-6-25          Chinedu Blandon MD  [facility]  07/06/25  14:19 EDT

## 2025-07-06 NOTE — PROGRESS NOTES
[facility]  [phone]  Dr. Kelvin Falcon    Subjective:  LOS: 6  [unfilled]      Chief Complaint: Right effusion    Increased pain at the chest tube site.  Chest tube is no longer draining much fluid.    Objective   Vital Signs past 24hrs  Temp range: Temp (24hrs), Av.2 °F (36.8 °C), Min:97.9 °F (36.6 °C), Max:98.8 °F (37.1 °C)    BP range: BP: ()/(78-92) 134/78  Pulse range: Heart Rate:  [] 99  Resp rate range: Resp:  [16-20] 16  Device (Oxygen Therapy): room airFlow (L/min) (Oxygen Therapy):  [2] 2  Oxygen range:SpO2:  [92 %-94 %] 92 %   Mechanical Ventilator:     Physical Exam  Eyes:      Pupils: Pupils are equal, round, and reactive to light.   Cardiovascular:      Rate and Rhythm: Normal rate and regular rhythm.      Heart sounds: No murmur heard.  Pulmonary:      Effort: Pulmonary effort is normal.      Breath sounds: Examination of the right-lower field reveals decreased breath sounds. Decreased breath sounds present.      Comments: Right chest tube  Abdominal:      General: Bowel sounds are normal.      Palpations: Abdomen is soft. There is no mass.      Tenderness: There is no abdominal tenderness.   Musculoskeletal:         General: No swelling.   Neurological:      Mental Status: She is alert.       Results Review:    I have reviewed the laboratory and imaging data since the last note by [facility] physician.  My annotations are noted in assessment and plan.      Result Review:  I have personally reviewed the results from last note by [facility] physician to 2025 11:47 EDT and agree with these findings:  [x]  Laboratory list / accordion  [x]  Microbiology  [x]  Radiology  []  EKG/Telemetry   []  Cardiology/Vascular   []  Pathology  []  Old records  []  Other:      Medication Review:  I have reviewed the current MAR.  My annotations are noted in assessment and plan.    alteplase ((CATHFLO/ACTIVASE)) 25 mg in sodium chloride 0.9 % 50 mL INTRAPLEURAL syringe, 25 mg, Intrapleural,  Daily  buPROPion XL, 150 mg, Oral, QAM  escitalopram, 20 mg, Oral, Daily  FLUoxetine, 40 mg, Oral, Daily  ipratropium-albuterol, 3 mL, Nebulization, Q6H While Awake - RT  piperacillin-tazobactam, 3.375 g, Intravenous, Q8H  pravastatin, 20 mg, Oral, Daily  sucralfate, 1 g, Oral, 4x Daily AC & at Bedtime  zolpidem, 5 mg, Oral, Nightly           Lines, Drains & Airways       Active LDAs       Name Placement date Placement time Site Days    Peripheral IV 07/03/25 0829 20 G Anterior;Right;Upper Arm 07/03/25  0829  Arm  1    Chest Tube 1 Right Pleural 07/03/25  1404  Pleural  1                    PCCM Problems  Right loculated parapneumonic effusion  Thoracentesis (7/1)-1 L removed, exudative  Right upper lobe pulmonary nodule  Suspect sepsis secondary to pneumonia  Asthma  Elevated BNP  Near syncope  Frequent falls  Chronic pain  Migraines  Hyponatremia  Acute kidney injury   Leukocytosis  Dextroscoliosis of lumbar spine  History of tobacco abuse        Plan of Treatment    Now with chest tube and right loculated pleural effusion.  Drainage from chest tube appears to have tapered off.  Increasing pain in that area.  Will remove pigtail catheter today.    Remains on antibiotics for pneumonia with parapneumonic effusion.  No fever in the last 24 hours.  Note elevated white count.  Continue to monitor for now.  In view of parapneumonic effusion with loculation and very high procalcitonin, switched to Zosyn.    Kelvin Falcon MD  07/06/25  11:47 EDT    Isolation status: No active isolations    Dietary Orders (From admission, onward)       Start     Ordered    07/05/25 0800  Dietary Nutrition Supplements Boost Plus (Ensure Enlive, Ensure Plus)  Daily With Breakfast, Lunch & Dinner      Question:  Select Supplement:  Answer:  Boost Plus (Ensure Enlive, Ensure Plus)    07/04/25 2306    07/03/25 1724  Diet: Regular/House; Fluid Consistency: Thin (IDDSI 0)  Diet Effective Now        References:    Diet Order Definitions   Question  Answer Comment   Diets: Regular/House    Fluid Consistency: Thin (IDDSI 0)        07/03/25 8349                    Part of this note may be an electronic transcription/translation of spoken language to printed text using the Dragon Dictation System.

## 2025-07-06 NOTE — PLAN OF CARE
Goal Outcome Evaluation:           Progress: improving  Outcome Evaluation: a&o x4, VSS, RA, pt ate part of salad and shake brought in by visitors, ambulated in the hallway to the nurse's station and back became dizzy and SOB but never needed oxygen. CT was pulled around 1420, TPA was never given yesterday or today. waiting results to see if CT needs to be put back in with a bigger tube or it has resolved itself

## 2025-07-07 ENCOUNTER — APPOINTMENT (OUTPATIENT)
Dept: GENERAL RADIOLOGY | Facility: HOSPITAL | Age: 70
DRG: 871 | End: 2025-07-07
Payer: MEDICARE

## 2025-07-07 LAB
ANION GAP SERPL CALCULATED.3IONS-SCNC: 9.5 MMOL/L (ref 5–15)
BASOPHILS # BLD AUTO: 0.06 10*3/MM3 (ref 0–0.2)
BASOPHILS NFR BLD AUTO: 0.3 % (ref 0–1.5)
BUN SERPL-MCNC: 7 MG/DL (ref 8–23)
BUN/CREAT SERPL: 12.5 (ref 7–25)
CALCIUM SPEC-SCNC: 8.5 MG/DL (ref 8.6–10.5)
CHLORIDE SERPL-SCNC: 100 MMOL/L (ref 98–107)
CO2 SERPL-SCNC: 23.5 MMOL/L (ref 22–29)
CREAT SERPL-MCNC: 0.56 MG/DL (ref 0.57–1)
DEPRECATED RDW RBC AUTO: 45.3 FL (ref 37–54)
EGFRCR SERPLBLD CKD-EPI 2021: 98.9 ML/MIN/1.73
EOSINOPHIL # BLD AUTO: 0.33 10*3/MM3 (ref 0–0.4)
EOSINOPHIL NFR BLD AUTO: 1.9 % (ref 0.3–6.2)
ERYTHROCYTE [DISTWIDTH] IN BLOOD BY AUTOMATED COUNT: 12.4 % (ref 12.3–15.4)
GLUCOSE SERPL-MCNC: 113 MG/DL (ref 65–99)
HCT VFR BLD AUTO: 34.1 % (ref 34–46.6)
HGB BLD-MCNC: 11.1 G/DL (ref 12–15.9)
IMM GRANULOCYTES # BLD AUTO: 0.13 10*3/MM3 (ref 0–0.05)
IMM GRANULOCYTES NFR BLD AUTO: 0.7 % (ref 0–0.5)
LYMPHOCYTES # BLD AUTO: 2.39 10*3/MM3 (ref 0.7–3.1)
LYMPHOCYTES NFR BLD AUTO: 13.7 % (ref 19.6–45.3)
MCH RBC QN AUTO: 32.4 PG (ref 26.6–33)
MCHC RBC AUTO-ENTMCNC: 32.6 G/DL (ref 31.5–35.7)
MCV RBC AUTO: 99.4 FL (ref 79–97)
MONOCYTES # BLD AUTO: 1.05 10*3/MM3 (ref 0.1–0.9)
MONOCYTES NFR BLD AUTO: 6 % (ref 5–12)
NEUTROPHILS NFR BLD AUTO: 13.49 10*3/MM3 (ref 1.7–7)
NEUTROPHILS NFR BLD AUTO: 77.4 % (ref 42.7–76)
NRBC BLD AUTO-RTO: 0 /100 WBC (ref 0–0.2)
PLATELET # BLD AUTO: 692 10*3/MM3 (ref 140–450)
PMV BLD AUTO: 8.2 FL (ref 6–12)
POTASSIUM SERPL-SCNC: 3.7 MMOL/L (ref 3.5–5.2)
RBC # BLD AUTO: 3.43 10*6/MM3 (ref 3.77–5.28)
SODIUM SERPL-SCNC: 133 MMOL/L (ref 136–145)
WBC NRBC COR # BLD AUTO: 17.45 10*3/MM3 (ref 3.4–10.8)

## 2025-07-07 PROCEDURE — 25010000002 HYDROMORPHONE 1 MG/ML SOLUTION: Performed by: INTERNAL MEDICINE

## 2025-07-07 PROCEDURE — 85025 COMPLETE CBC W/AUTO DIFF WBC: CPT | Performed by: INTERNAL MEDICINE

## 2025-07-07 PROCEDURE — 71045 X-RAY EXAM CHEST 1 VIEW: CPT

## 2025-07-07 PROCEDURE — 80048 BASIC METABOLIC PNL TOTAL CA: CPT | Performed by: INTERNAL MEDICINE

## 2025-07-07 PROCEDURE — 94761 N-INVAS EAR/PLS OXIMETRY MLT: CPT

## 2025-07-07 PROCEDURE — 25010000002 PIPERACILLIN SOD-TAZOBACTAM PER 1 G: Performed by: INTERNAL MEDICINE

## 2025-07-07 PROCEDURE — 97530 THERAPEUTIC ACTIVITIES: CPT

## 2025-07-07 PROCEDURE — 94799 UNLISTED PULMONARY SVC/PX: CPT

## 2025-07-07 PROCEDURE — 94664 DEMO&/EVAL PT USE INHALER: CPT

## 2025-07-07 PROCEDURE — 25010000002 ONDANSETRON PER 1 MG: Performed by: INTERNAL MEDICINE

## 2025-07-07 RX ADMIN — IPRATROPIUM BROMIDE AND ALBUTEROL SULFATE 3 ML: .5; 3 SOLUTION RESPIRATORY (INHALATION) at 11:35

## 2025-07-07 RX ADMIN — ESCITALOPRAM 20 MG: 10 TABLET, FILM COATED ORAL at 08:32

## 2025-07-07 RX ADMIN — HYDROMORPHONE HYDROCHLORIDE 1 MG: 1 INJECTION, SOLUTION INTRAMUSCULAR; INTRAVENOUS; SUBCUTANEOUS at 12:47

## 2025-07-07 RX ADMIN — LORAZEPAM 1 MG: 1 TABLET ORAL at 15:21

## 2025-07-07 RX ADMIN — LORAZEPAM 1 MG: 1 TABLET ORAL at 22:14

## 2025-07-07 RX ADMIN — ONDANSETRON 4 MG: 2 INJECTION, SOLUTION INTRAMUSCULAR; INTRAVENOUS at 10:52

## 2025-07-07 RX ADMIN — IPRATROPIUM BROMIDE AND ALBUTEROL SULFATE 3 ML: .5; 3 SOLUTION RESPIRATORY (INHALATION) at 20:14

## 2025-07-07 RX ADMIN — OXYCODONE AND ACETAMINOPHEN 2 TABLET: 5; 325 TABLET ORAL at 19:06

## 2025-07-07 RX ADMIN — SUCRALFATE 1 G: 1 TABLET ORAL at 16:59

## 2025-07-07 RX ADMIN — SUCRALFATE 1 G: 1 TABLET ORAL at 08:33

## 2025-07-07 RX ADMIN — OXYCODONE AND ACETAMINOPHEN 2 TABLET: 5; 325 TABLET ORAL at 05:03

## 2025-07-07 RX ADMIN — ANTACID TABLETS 2 TABLET: 500 TABLET, CHEWABLE ORAL at 02:22

## 2025-07-07 RX ADMIN — PIPERACILLIN AND TAZOBACTAM 3.38 G: 3; .375 INJECTION, POWDER, FOR SOLUTION INTRAVENOUS at 09:58

## 2025-07-07 RX ADMIN — SUCRALFATE 1 G: 1 TABLET ORAL at 20:59

## 2025-07-07 RX ADMIN — IPRATROPIUM BROMIDE AND ALBUTEROL SULFATE 3 ML: .5; 3 SOLUTION RESPIRATORY (INHALATION) at 08:10

## 2025-07-07 RX ADMIN — OXYCODONE AND ACETAMINOPHEN 2 TABLET: 5; 325 TABLET ORAL at 09:59

## 2025-07-07 RX ADMIN — PRAVASTATIN SODIUM 20 MG: 20 TABLET ORAL at 08:32

## 2025-07-07 RX ADMIN — ZOLPIDEM TARTRATE 5 MG: 5 TABLET, FILM COATED ORAL at 20:59

## 2025-07-07 RX ADMIN — FLUOXETINE 40 MG: 20 CAPSULE ORAL at 08:32

## 2025-07-07 RX ADMIN — BUPROPION HYDROCHLORIDE 150 MG: 150 TABLET, EXTENDED RELEASE ORAL at 08:33

## 2025-07-07 RX ADMIN — OXYCODONE AND ACETAMINOPHEN 2 TABLET: 5; 325 TABLET ORAL at 15:23

## 2025-07-07 RX ADMIN — PIPERACILLIN AND TAZOBACTAM 3.38 G: 3; .375 INJECTION, POWDER, FOR SOLUTION INTRAVENOUS at 18:11

## 2025-07-07 RX ADMIN — HYDROMORPHONE HYDROCHLORIDE 1 MG: 1 INJECTION, SOLUTION INTRAMUSCULAR; INTRAVENOUS; SUBCUTANEOUS at 16:59

## 2025-07-07 RX ADMIN — SUCRALFATE 1 G: 1 TABLET ORAL at 10:52

## 2025-07-07 RX ADMIN — PIPERACILLIN AND TAZOBACTAM 3.38 G: 3; .375 INJECTION, POWDER, FOR SOLUTION INTRAVENOUS at 02:14

## 2025-07-07 RX ADMIN — LORAZEPAM 1 MG: 1 TABLET ORAL at 08:32

## 2025-07-07 NOTE — PROGRESS NOTES
Patient Name: Yoanna Reed  YOB: 1955  MRN: [id number]  Admission date: 6/30/2025    Reason for Encounter: Follow-up/Progress Note    [facility] Nutrition Progress Note       Nutrition Intervention Updates:  Visited pt and encouraged good po intake.  Will continue supplements   RD to follow      Subjective: Chest tube removed       PO Diet: Diet: Regular/House; Fluid Consistency: Thin (IDDSI 0)   PO Supplements: TID Boost Boost Original TID (Provides 720 kcals, 30 g protein if consumed)    PO Intake:  <50%, pt ate part of salad and shake brought in by visitor  Pt states she is drinking some of the supplement and will take the others home to drink.       Current nutrition support: --   Nutrition support review: --       Labs: Na 133        GI Function:  BM + 7/7        Brief Weight Review:    Wt Readings from Last 1 Encounters:   07/07/25 0500 82.3 kg (181 lb 7 oz)   07/05/25 0654 83.3 kg (183 lb 10.3 oz)   07/03/25 1240 80.3 kg (177 lb)   07/03/25 0500 80.4 kg (177 lb 3.2 oz)   07/02/25 0436 79.4 kg (175 lb 1.6 oz)   07/01/25 1748 78.9 kg (174 lb)   07/01/25 0640 79 kg (174 lb 2.6 oz)   06/30/25 1008 76.7 kg (169 lb)        Results from last 7 days   Lab Units 07/07/25  0539 07/06/25  0518 07/05/25  1929 07/05/25  0641 07/01/25  0230 06/30/25  1002   SODIUM mmol/L 133* 133*  --  132*   < > 131*   POTASSIUM mmol/L 3.7 4.1 3.4* 3.4*   < > 3.3*   CHLORIDE mmol/L 100 98  --  96*   < > 95*   CO2 mmol/L 23.5 24.9  --  25.0   < > 18.1*   BUN mg/dL 7.0* 6.0*  --  5.0*   < > 29.0*   CREATININE mg/dL 0.56* 0.51*  --  0.46*   < > 1.46*   CALCIUM mg/dL 8.5* 8.4*  --  8.2*   < > 9.4   BILIRUBIN mg/dL  --   --   --   --   --  0.8   ALK PHOS U/L  --   --   --   --   --  88   ALT (SGPT) U/L  --   --   --   --   --  14   AST (SGOT) U/L  --   --   --   --   --  25   GLUCOSE mg/dL 113* 124*  --  117*   < > 124*    < > = values in this interval not displayed.     Results from last 7 days   Lab Units  07/07/25  0539   PLATELETS 10*3/mm3 692*   HEMOGLOBIN g/dL 11.1*   HEMATOCRIT % 34.1     Lab Results   Component Value Date    HGBA1C 5.5 05/19/2025       Electronically signed by:  Nilam Cleveland RD  07/07/25 09:54 EDT

## 2025-07-07 NOTE — PLAN OF CARE
Goal Outcome Evaluation:  Plan of Care Reviewed With: patient           Outcome Evaluation: Patient seen for PT session this AM. Patient sitting up in bed upon arrival. Patient sat up to EOB with SBA and increased time. Patient stood from EOB with HHA and CGA. Patient ambulated 60ft with CGA. Gait slow and gaurded with kyphotic posture. Occasional request for HHA for balance. Patient declined further 2/2 fatigue. Patient declined sitting UIC and requested to return to bed. Encouraged patient to ambulate with nsg at least 3x/day. Acute PT will continue to monitor.

## 2025-07-07 NOTE — PROGRESS NOTES
Name: Yoanna Reed ADMIT: 2025   : 1955  PCP: Tom Muller MD    MRN: [id number] LOS: 7 days   AGE/SEX: 69 y.o. female  ROOM: [facility]     Subjective   Subjective   .  Patient is lying on the bed and is complaining of pain.  Denied nausea, vomiting, chest pain, palpitations.         Objective   Objective   Vital Signs  Temp:  [97.9 °F (36.6 °C)-98.6 °F (37 °C)] 98.1 °F (36.7 °C)  Heart Rate:  [] 102  Resp:  [18-20] 18  BP: (126-158)/(70-99) 140/80  SpO2:  [92 %-95 %] 94 %  on   ;   Device (Oxygen Therapy): room air  Body mass index is 27.59 kg/m².  Physical Exam      HEENT: PERRLA, extraocular movements intact, Scleras no icterus  Neck: Supple, no JVD  Cardiovascular: Regular rate and rhythm with normal S1 and S2  Respiratory: Diminished breath sounds on the right and left is fairly clear.  GI: Soft, nontender, bowel sounds present  Extremities: No edema, palpable pedal pulses  Neurologic: Grossly nonfocal, no facial asymmetry        Results Review     I reviewed the patient's new clinical results.  Results from last 7 days   Lab Units 2539 2541 25  0559   WBC 10*3/mm3 17.45* 19.16* 19.16* 19.32*   HEMOGLOBIN g/dL 11.1* 11.1* 10.9* 12.3   PLATELETS 10*3/mm3 692* 655* 677* 743*     Results from last 7 days   Lab Units 25  0539 25  0518 25  1929 25  0641 25  0559   SODIUM mmol/L 133* 133*  --  132* 133*   POTASSIUM mmol/L 3.7 4.1 3.4* 3.4* 3.8   CHLORIDE mmol/L 100 98  --  96* 97*   CO2 mmol/L 23.5 24.9  --  25.0 22.5   BUN mg/dL 7.0* 6.0*  --  5.0* 5.0*   CREATININE mg/dL 0.56* 0.51*  --  0.46* 0.52*   GLUCOSE mg/dL 113* 124*  --  117* 126*   EGFR mL/min/1.73 98.9 101.2  --  103.7 100.7           Results from last 7 days   Lab Units 25  0539 25  0518 25  0641 25  0559   CALCIUM mg/dL 8.5* 8.4* 8.2* 8.9     Results from last 7 days   Lab Units 25  0641 25  0222   PROCALCITONIN  "ng/mL 38.40* 0.42*     No results found for: \"HGBA1C\", \"POCGLU\"    No radiology results for the last day      I have personally reviewed all medications:  Scheduled Medications  alteplase ((CATHFLO/ACTIVASE)) 25 mg in sodium chloride 0.9 % 50 mL INTRAPLEURAL syringe, 25 mg, Intrapleural, Daily  buPROPion XL, 150 mg, Oral, QAM  escitalopram, 20 mg, Oral, Daily  FLUoxetine, 40 mg, Oral, Daily  ipratropium-albuterol, 3 mL, Nebulization, Q6H While Awake - RT  piperacillin-tazobactam, 3.375 g, Intravenous, Q8H  pravastatin, 20 mg, Oral, Daily  sucralfate, 1 g, Oral, 4x Daily AC & at Bedtime  zolpidem, 5 mg, Oral, Nightly    Infusions     Diet  Diet: Regular/House; Fluid Consistency: Thin (IDDSI 0)    I have personally reviewed:  [x]  Laboratory   [x]  Microbiology   [x]  Radiology   [x]  EKG/Telemetry  [x]  Cardiology/Vascular   []  Pathology    []  Records       Assessment/Plan     Active Hospital Problems    Diagnosis  POA    Pleural effusion [J90]  Unknown    Near syncope [R55]  Unknown    TUSHAR (acute kidney injury) [N17.9]  Unknown    Elevated troponin [R79.89]  Unknown    Chronic pain [G89.29]  Yes      Resolved Hospital Problems   No resolved problems to display.       69 y.o. female admitted with <principal problem not specified>.      1. Dyspnea/pleural effusion with underlying nodule/mass/leukocytosis, underlying pneumonia cannot be completely ruled out and therefore IV Rocephin was initiated and later transitioned to IV Zosyn.  Status post thoracentesis On 7/1 with liver of 1 L and it was noted to be exudative.  Given presence of loculated effusion underwent IR guided chest tube placement on 7/3/2025.  tPA was attempted on 7/5 but could not be administered given possible malpositioning tube.  Chest tube output has tapered therefore it has been discontinued on 7/6/2025 and repeat chest x-ray revealed vague opacity in the right middle and lower lung zone consistent with combination of airspace disease and small " amount of pleural effusion.    2.  Elevated troponin, not secondary to MI and cardiology did evaluate.  2D echo has been done and revealed normal ejection fraction.    3.  Chronic pain syndrome, continue with analgesics.    4.  History of asthma, on albuterol nebs as needed and currently not in any exacerbation.    5.  Acute kidney injury, was gently volume resuscitated with which creatinine has improved from 1.46>0.51.      6.  History of hep C, treated according to patient.    7.  Right upper lobe lung nodule, management per pulmonary.    8.  Hyponatremia,sodium level better at 133 and clinically insignificant.    8.  On SCDs for DVT prophylaxis.    9.  CODE STATUS is full code.    Copy text on This note has been reviewed by me on 7-6-25          Chinedu Blandon MD  [facility]  07/07/25  13:30 EDT

## 2025-07-07 NOTE — PLAN OF CARE
Goal Outcome Evaluation:  Plan of Care Reviewed With: patient        Progress: improving  Outcome Evaluation: A&O. RA. SR. IV abx per order. PRN pain med and anxiety meds per order. BM this shift. Up with standby to BR. Needs met this shift

## 2025-07-07 NOTE — PROGRESS NOTES
[facility]  [phone]  Dr. Kelvin Falcon    Subjective:  LOS: 7  [unfilled]      Chief Complaint: Right effusion    Chest tube removed yesterday. Pain is better. Feels weak.    Objective   Vital Signs past 24hrs  Temp range: Temp (24hrs), Av.2 °F (36.8 °C), Min:97.9 °F (36.6 °C), Max:98.6 °F (37 °C)    BP range: BP: (126-158)/(70-99) 140/80  Pulse range: Heart Rate:  [] 102  Resp rate range: Resp:  [18-20] 18  Device (Oxygen Therapy): room air   Oxygen range:SpO2:  [92 %-95 %] 94 %   Mechanical Ventilator:     Physical Exam  Eyes:      Pupils: Pupils are equal, round, and reactive to light.   Cardiovascular:      Rate and Rhythm: Normal rate and regular rhythm.      Heart sounds: No murmur heard.  Pulmonary:      Effort: Pulmonary effort is normal.      Breath sounds: Examination of the right-lower field reveals decreased breath sounds. Decreased breath sounds present.   Abdominal:      General: Bowel sounds are normal.      Palpations: Abdomen is soft. There is no mass.      Tenderness: There is no abdominal tenderness.   Musculoskeletal:         General: No swelling.   Neurological:      Mental Status: She is alert.       Results Review:    I have reviewed the laboratory and imaging data since the last note by [facility] physician.  My annotations are noted in assessment and plan.      Result Review:  I have personally reviewed the results from last note by [facility] physician to 2025 12:51 EDT and agree with these findings:  [x]  Laboratory list / accordion  [x]  Microbiology  [x]  Radiology  []  EKG/Telemetry   []  Cardiology/Vascular   []  Pathology  []  Old records  []  Other:      Medication Review:  I have reviewed the current MAR.  My annotations are noted in assessment and plan.    alteplase ((CATHFLO/ACTIVASE)) 25 mg in sodium chloride 0.9 % 50 mL INTRAPLEURAL syringe, 25 mg, Intrapleural, Daily  buPROPion XL, 150 mg, Oral, QAM  escitalopram, 20 mg, Oral, Daily  FLUoxetine, 40 mg, Oral,  Daily  ipratropium-albuterol, 3 mL, Nebulization, Q6H While Awake - RT  piperacillin-tazobactam, 3.375 g, Intravenous, Q8H  pravastatin, 20 mg, Oral, Daily  sucralfate, 1 g, Oral, 4x Daily AC & at Bedtime  zolpidem, 5 mg, Oral, Nightly           Lines, Drains & Airways       Active LDAs       Name Placement date Placement time Site Days    Peripheral IV 07/03/25 0829 20 G Anterior;Right;Upper Arm 07/03/25  0829  Arm  1    Chest Tube 1 Right Pleural 07/03/25  1404  Pleural  1                    PCCM Problems  Right loculated parapneumonic effusion  Thoracentesis (7/1)-1 L removed, exudative  Right upper lobe pulmonary nodule  Suspect sepsis secondary to pneumonia  Asthma  Elevated BNP  Near syncope  Frequent falls  Chronic pain  Migraines  Hyponatremia  Acute kidney injury   Leukocytosis  Dextroscoliosis of lumbar spine  History of tobacco abuse        Plan of Treatment    Right chest tube removed. Observe on abx. No fever and WBC slightly better.   Continue to monitor for now.  In view of parapneumonic effusion with loculation and very high procalcitonin, switched to Zosyn. Finish course. Needs fu imaging.    Kelvin Falcon MD  07/07/25  12:51 EDT    Isolation status: No active isolations    Dietary Orders (From admission, onward)       Start     Ordered    07/05/25 0800  Dietary Nutrition Supplements Boost Plus (Ensure Enlive, Ensure Plus)  Daily With Breakfast, Lunch & Dinner      Question:  Select Supplement:  Answer:  Boost Plus (Ensure Enlive, Ensure Plus)    07/04/25 2306    07/03/25 1724  Diet: Regular/House; Fluid Consistency: Thin (IDDSI 0)  Diet Effective Now        References:    Diet Order Definitions   Question Answer Comment   Diets: Regular/House    Fluid Consistency: Thin (IDDSI 0)        07/03/25 1723                    Part of this note may be an electronic transcription/translation of spoken language to printed text using the Dragon Dictation System.

## 2025-07-07 NOTE — THERAPY TREATMENT NOTE
Patient Name: Yoanna Reed  : 1955    MRN: [id number]                              Today's Date: 2025       Admit Date: 2025    Visit Dx:     ICD-10-CM ICD-9-CM   1. Severe sepsis  A41.9 038.9    R65.20 995.92   2. Near syncope  R55 780.2   3. NSTEMI (non-ST elevated myocardial infarction)  I21.4 410.70   4. Pleural effusion  J90 511.9   5. Acute midline low back pain without sciatica  M54.50 724.2   6. Recurrent falls  R29.6 V15.88     Patient Active Problem List   Diagnosis    Encounter for screening for malignant neoplasm of colon    Acute pain of right shoulder    Pain of right humerus    Chronic pain    Flank pain    Acute cystitis with hematuria    Acute vaginitis    Dysuria    Hematuria    Mass of nipple    Severe sepsis    Pleural effusion    Near syncope    TUSHAR (acute kidney injury)    Elevated troponin     Past Medical History:   Diagnosis Date    Anxiety and depression     Asthma     Limited joint range of motion     NECK    Liver failure     DUE TO MEDICATION    Mass of nipple     RIGHT    Skin cancer     RIGHT CHEEK     Past Surgical History:   Procedure Laterality Date    ANTERIOR CERVICAL FUSION      ARTERY SURGERY Left     KNEE AGE 15 DUE TO CUT TO KNEE    BREAST BIOPSY Right 2024    Procedure: RIGHT NIPPLE LESION EXCISION;  Surgeon: Raven Aviles MD;  Location: [facility] [place];  Service: General;  Laterality: Right;    CHOLECYSTECTOMY      COLONOSCOPY W/ POLYPECTOMY N/A 2024    Procedure: COLONOSCOPY to cecum with cold polypectomies;  Surgeon: Ankur Beebe MD;  Location: [facility] ENDOSCOPY;  Service: Gastroenterology;  Laterality: N/A;  PRE - screening  POST - polyps    HYSTERECTOMY      Partial    POSTERIOR CERVICAL FUSION        General Information       Row Name 25 1337          Physical Therapy Time and Intention    Document Type therapy note (daily note)  -SM     Mode of Treatment individual therapy;physical therapy  -SM       Row Name  07/07/25 1337          General Information    Patient Profile Reviewed yes  -     Existing Precautions/Restrictions fall  -SM       Row Name 07/07/25 1337          Cognition    Orientation Status (Cognition) oriented x 3  -       Row Name 07/07/25 1337          Safety Issues/Impairments Affecting Functional Mobility    Impairments Affecting Function (Mobility) endurance/activity tolerance;strength;pain;shortness of breath  -               User Key  (r) = Recorded By, (t) = Taken By, (c) = Cosigned By      Initials Name Provider Type     Carmen Wade PT Physical Therapist                   Mobility       Row Name 07/07/25 1337          Bed Mobility    Bed Mobility supine-sit;sit-supine  -     Supine-Sit Marine (Bed Mobility) standby assist  -     Sit-Supine Marine (Bed Mobility) standby assist  -     Assistive Device (Bed Mobility) head of bed elevated  -       Row Name 07/07/25 1337          Sit-Stand Transfer    Sit-Stand Marine (Transfers) contact guard  -       Row Name 07/07/25 1337          Gait/Stairs (Locomotion)    Marine Level (Gait) contact guard  -     Distance in Feet (Gait) 60  -SM     Deviations/Abnormal Patterns (Gait) stride length decreased;gait speed decreased;tangela decreased  -     Bilateral Gait Deviations forward flexed posture  -     Comment, (Gait/Stairs) Gait slow and gaurded with kyphotic posture. Occasional request for HHA for balance. Patient declined further 2/2 fatigue.  -               User Key  (r) = Recorded By, (t) = Taken By, (c) = Cosigned By      Initials Name Provider Type     Carmen Wade PT Physical Therapist                   Obj/Interventions       Row Name 07/07/25 1333          Balance    Balance Assessment sitting static balance;sitting dynamic balance;standing static balance;standing dynamic balance  -     Static Sitting Balance supervision  -     Dynamic Sitting Balance standby assist  -      Position, Sitting Balance sitting edge of bed  -     Static Standing Balance standby assist;contact guard  -     Dynamic Standing Balance contact guard  -     Balance Interventions sitting;standing;sit to stand;static;dynamic  -               User Key  (r) = Recorded By, (t) = Taken By, (c) = Cosigned By      Initials Name Provider Type     Carmen Wade, PT Physical Therapist                   Goals/Plan    No documentation.                  Clinical Impression       Row Name 07/07/25 1338          Pain    Pain Side/Orientation generalized  -     Pain Management Interventions exercise or physical activity utilized  -     Response to Pain Interventions activity participation with tolerable pain  -     Pre/Posttreatment Pain Comment Patient reports chronic back pain. Did not rate  -       Row Name 07/07/25 1339          Plan of Care Review    Plan of Care Reviewed With patient  -     Outcome Evaluation Patient seen for PT session this AM. Patient sitting up in bed upon arrival. Patient sat up to EOB with SBA and increased time. Patient stood from EOB with HHA and CGA. Patient ambulated 60ft with CGA. Gait slow and gaurded with kyphotic posture. Occasional request for HHA for balance. Patient declined further 2/2 fatigue. Patient declined sitting UIC and requested to return to bed. Encouraged patient to ambulate with nsg at least 3x/day. Acute PT will continue to monitor.  -       Row Name 07/07/25 1338          Vital Signs    Pre Patient Position Supine  -     Intra Patient Position Standing  -SM     Post Patient Position Supine  -       Row Name 07/07/25 1336          Positioning and Restraints    Pre-Treatment Position in bed  -SM     Post Treatment Position bed  -SM     In Bed call light within reach;notified nsg;encouraged to call for assist;exit alarm on  -               User Key  (r) = Recorded By, (t) = Taken By, (c) = Cosigned By      Initials Name Provider Type    SM  Carmen Wade, MERA Physical Therapist                   Outcome Measures       Row Name 07/07/25 1340 07/07/25 0830       How much help from another person do you currently need...    Turning from your back to your side while in flat bed without using bedrails? 4  -SM 4  -WG    Moving from lying on back to sitting on the side of a flat bed without bedrails? 4  -SM 4  -WG    Moving to and from a bed to a chair (including a wheelchair)? 3  -SM 3  -WG    Standing up from a chair using your arms (e.g., wheelchair, bedside chair)? 3  -SM 3  -WG    Climbing 3-5 steps with a railing? 2  -SM 2  -WG    To walk in hospital room? 3  -SM 3  -WG    AM-PAC 6 Clicks Score (PT) 19  -SM 19  -WG    Highest Level of Mobility Goal Walk 10 Steps or More-6  -SM Walk 10 Steps or More-6  -WG      Row Name 07/07/25 1340          Functional Assessment    Outcome Measure Options AM-PAC 6 Clicks Basic Mobility (PT)  -               User Key  (r) = Recorded By, (t) = Taken By, (c) = Cosigned By      Initials Name Provider Type    Breezy Rogers, RN Registered Nurse     Carmen Wade, MERA Physical Therapist                                 Physical Therapy Education       Title: PT OT SLP Therapies (Done)       Topic: Physical Therapy (Done)       Point: Mobility training (Done)       Learning Progress Summary            Patient Acceptance, E, VU,NR by  at 7/7/2025 1340    Acceptance, E, VU by  at 7/4/2025 1825    Acceptance, E, NR by  at 7/2/2025 1706                      Point: Home exercise program (Done)       Learning Progress Summary            Patient Acceptance, E, VU,NR by  at 7/7/2025 1340    Acceptance, E, VU by PROSPER at 7/4/2025 1825                      Point: Body mechanics (Done)       Learning Progress Summary            Patient Acceptance, E, VU,NR by  at 7/7/2025 1340    Acceptance, E, VU by  at 7/4/2025 1825                      Point: Precautions (Done)       Learning Progress Summary            Patient  Acceptance, E, VU,NR by  at 7/7/2025 1340    Acceptance, E, VU by  at 7/4/2025 1825                                      User Key       Initials Effective Dates Name Provider Type Discipline    EM 06/16/21 -  Krystyna De León, PT Physical Therapist PT     06/12/24 -  Breezy Louis, RN Registered Nurse Nurse     05/02/22 -  Carmen Wade PT Physical Therapist PT                  PT Recommendation and Plan     Outcome Evaluation: Patient seen for PT session this AM. Patient sitting up in bed upon arrival. Patient sat up to EOB with SBA and increased time. Patient stood from EOB with HHA and CGA. Patient ambulated 60ft with CGA. Gait slow and gaurded with kyphotic posture. Occasional request for HHA for balance. Patient declined further 2/2 fatigue. Patient declined sitting UIC and requested to return to bed. Encouraged patient to ambulate with nsg at least 3x/day. Acute PT will continue to monitor.     Time Calculation:         PT Charges       Row Name 07/07/25 1340             Time Calculation    Start Time 1052  -      Stop Time 1103  -      Time Calculation (min) 11 min  -SM      PT Received On 07/07/25  -      PT - Next Appointment 07/09/25  -         Time Calculation- PT    Total Timed Code Minutes- PT 11 minute(s)  -         Timed Charges    67141 - PT Therapeutic Activity Minutes 11  -SM         Total Minutes    Timed Charges Total Minutes 11  -SM       Total Minutes 11  -SM                User Key  (r) = Recorded By, (t) = Taken By, (c) = Cosigned By      Initials Name Provider Type     Carmen Wade PT Physical Therapist                  Therapy Charges for Today       Code Description Service Date Service Provider Modifiers Qty    [id number]  PT THERAPEUTIC ACT EA 15 MIN 7/7/2025 Carmen Wade, PT GP 1            PT G-Codes  Outcome Measure Options: AM-PAC 6 Clicks Basic Mobility (PT)  AM-PAC 6 Clicks Score (PT): 19       Carmen Wade PT  7/7/2025

## 2025-07-07 NOTE — PLAN OF CARE
Problem: Adult Inpatient Plan of Care  Goal: Plan of Care Review  Outcome: Progressing  Flowsheets  Taken 7/7/2025 1846 by Breezy Louis RN  Outcome Evaluation: Ax4. VSS. Room Air. pain at removed CT site. Controlled with prn PO and IV meds. Up with satndy assistance. Anticipate DC in 1-2 days.  Taken 7/7/2025 1338 by Carmen Wade PT  Plan of Care Reviewed With: patient  Goal: Patient-Specific Goal (Individualized)  Outcome: Progressing  Goal: Absence of Hospital-Acquired Illness or Injury  Outcome: Progressing  Intervention: Identify and Manage Fall Risk  Recent Flowsheet Documentation  Taken 7/7/2025 0959 by Breezy Louis RN  Safety Promotion/Fall Prevention:   activity supervised   assistive device/personal items within reach   clutter free environment maintained   fall prevention program maintained   nonskid shoes/slippers when out of bed   gait belt   room organization consistent   safety round/check completed  Taken 7/7/2025 0830 by Breezy Louis RN  Safety Promotion/Fall Prevention:   activity supervised   assistive device/personal items within reach   clutter free environment maintained   fall prevention program maintained   nonskid shoes/slippers when out of bed   gait belt   room organization consistent   safety round/check completed  Intervention: Prevent Skin Injury  Recent Flowsheet Documentation  Taken 7/7/2025 1400 by Breezy Louis RN  Body Position: position changed independently  Taken 7/7/2025 0830 by Breezy Louis RN  Body Position: position changed independently  Goal: Optimal Comfort and Wellbeing  Outcome: Progressing  Goal: Readiness for Transition of Care  Outcome: Progressing     Problem: Fall Injury Risk  Goal: Absence of Fall and Fall-Related Injury  Outcome: Progressing  Intervention: Promote Injury-Free Environment  Recent Flowsheet Documentation  Taken 7/7/2025 0959 by Breezy Louis RN  Safety Promotion/Fall Prevention:   activity supervised   assistive  device/personal items within reach   clutter free environment maintained   fall prevention program maintained   nonskid shoes/slippers when out of bed   gait belt   room organization consistent   safety round/check completed  Taken 7/7/2025 0830 by Breezy Louis, RN  Safety Promotion/Fall Prevention:   activity supervised   assistive device/personal items within reach   clutter free environment maintained   fall prevention program maintained   nonskid shoes/slippers when out of bed   gait belt   room organization consistent   safety round/check completed     Problem: Skin Injury Risk Increased  Goal: Skin Health and Integrity  Outcome: Progressing  Intervention: Optimize Skin Protection  Recent Flowsheet Documentation  Taken 7/7/2025 0959 by Breezy Louis, RN  Activity Management: ambulated to bathroom   Goal Outcome Evaluation:           Progress: improving  Outcome Evaluation: Ax4. VSS. Room Air. pain at removed CT site. Controlled with prn PO and IV meds. Up with satndy assistance. Anticipate DC in 1-2 days.

## 2025-07-08 ENCOUNTER — APPOINTMENT (OUTPATIENT)
Dept: GENERAL RADIOLOGY | Facility: HOSPITAL | Age: 70
DRG: 871 | End: 2025-07-08
Payer: MEDICARE

## 2025-07-08 PROCEDURE — 71045 X-RAY EXAM CHEST 1 VIEW: CPT

## 2025-07-08 PROCEDURE — 94799 UNLISTED PULMONARY SVC/PX: CPT

## 2025-07-08 PROCEDURE — 94664 DEMO&/EVAL PT USE INHALER: CPT

## 2025-07-08 PROCEDURE — 25010000002 HYDROMORPHONE 1 MG/ML SOLUTION: Performed by: INTERNAL MEDICINE

## 2025-07-08 PROCEDURE — 94761 N-INVAS EAR/PLS OXIMETRY MLT: CPT

## 2025-07-08 PROCEDURE — 63710000001 ONDANSETRON ODT 4 MG TABLET DISPERSIBLE: Performed by: INTERNAL MEDICINE

## 2025-07-08 PROCEDURE — 25010000002 PIPERACILLIN SOD-TAZOBACTAM PER 1 G: Performed by: INTERNAL MEDICINE

## 2025-07-08 RX ADMIN — ANTACID TABLETS 2 TABLET: 500 TABLET, CHEWABLE ORAL at 22:42

## 2025-07-08 RX ADMIN — SUCRALFATE 1 G: 1 TABLET ORAL at 21:10

## 2025-07-08 RX ADMIN — HYDROMORPHONE HYDROCHLORIDE 1 MG: 1 INJECTION, SOLUTION INTRAMUSCULAR; INTRAVENOUS; SUBCUTANEOUS at 00:57

## 2025-07-08 RX ADMIN — FLUOXETINE 40 MG: 20 CAPSULE ORAL at 09:23

## 2025-07-08 RX ADMIN — OXYCODONE AND ACETAMINOPHEN 2 TABLET: 5; 325 TABLET ORAL at 15:09

## 2025-07-08 RX ADMIN — SUCRALFATE 1 G: 1 TABLET ORAL at 16:19

## 2025-07-08 RX ADMIN — SUCRALFATE 1 G: 1 TABLET ORAL at 09:23

## 2025-07-08 RX ADMIN — PRAVASTATIN SODIUM 20 MG: 20 TABLET ORAL at 09:24

## 2025-07-08 RX ADMIN — OXYCODONE AND ACETAMINOPHEN 2 TABLET: 5; 325 TABLET ORAL at 09:23

## 2025-07-08 RX ADMIN — SUCRALFATE 1 G: 1 TABLET ORAL at 11:01

## 2025-07-08 RX ADMIN — HYDROMORPHONE HYDROCHLORIDE 1 MG: 1 INJECTION, SOLUTION INTRAMUSCULAR; INTRAVENOUS; SUBCUTANEOUS at 11:09

## 2025-07-08 RX ADMIN — PIPERACILLIN AND TAZOBACTAM 3.38 G: 3; .375 INJECTION, POWDER, FOR SOLUTION INTRAVENOUS at 10:59

## 2025-07-08 RX ADMIN — HYDROMORPHONE HYDROCHLORIDE 1 MG: 1 INJECTION, SOLUTION INTRAMUSCULAR; INTRAVENOUS; SUBCUTANEOUS at 05:52

## 2025-07-08 RX ADMIN — ONDANSETRON 4 MG: 4 TABLET, ORALLY DISINTEGRATING ORAL at 09:30

## 2025-07-08 RX ADMIN — BUPROPION HYDROCHLORIDE 150 MG: 150 TABLET, EXTENDED RELEASE ORAL at 09:23

## 2025-07-08 RX ADMIN — IPRATROPIUM BROMIDE AND ALBUTEROL SULFATE 3 ML: .5; 3 SOLUTION RESPIRATORY (INHALATION) at 12:20

## 2025-07-08 RX ADMIN — IPRATROPIUM BROMIDE AND ALBUTEROL SULFATE 3 ML: .5; 3 SOLUTION RESPIRATORY (INHALATION) at 07:51

## 2025-07-08 RX ADMIN — IPRATROPIUM BROMIDE AND ALBUTEROL SULFATE 3 ML: .5; 3 SOLUTION RESPIRATORY (INHALATION) at 20:28

## 2025-07-08 RX ADMIN — PIPERACILLIN AND TAZOBACTAM 3.38 G: 3; .375 INJECTION, POWDER, FOR SOLUTION INTRAVENOUS at 02:15

## 2025-07-08 RX ADMIN — ZOLPIDEM TARTRATE 5 MG: 5 TABLET, FILM COATED ORAL at 21:10

## 2025-07-08 RX ADMIN — PIPERACILLIN AND TAZOBACTAM 3.38 G: 3; .375 INJECTION, POWDER, FOR SOLUTION INTRAVENOUS at 18:14

## 2025-07-08 RX ADMIN — OXYCODONE AND ACETAMINOPHEN 2 TABLET: 5; 325 TABLET ORAL at 21:10

## 2025-07-08 RX ADMIN — LORAZEPAM 1 MG: 1 TABLET ORAL at 18:14

## 2025-07-08 RX ADMIN — ESCITALOPRAM 20 MG: 10 TABLET, FILM COATED ORAL at 09:23

## 2025-07-08 NOTE — PROGRESS NOTES
Name: Yoanna Reed ADMIT: 2025   : 1955  PCP: Tom Muller MD    MRN: [id number] LOS: 8 days   AGE/SEX: 69 y.o. female  ROOM: [facility]     Subjective   Subjective   .  Patient is lying on the bed and is complaining of pain.  Denied nausea, vomiting, chest pain, palpitations.         Objective   Objective   Vital Signs  Temp:  [98.1 °F (36.7 °C)-98.5 °F (36.9 °C)] 98.5 °F (36.9 °C)  Heart Rate:  [] 111  Resp:  [18] 18  BP: (148-186)/(80-88) 154/88  SpO2:  [93 %-95 %] 94 %  on   ;   Device (Oxygen Therapy): room air  Body mass index is 26.55 kg/m².  Physical Exam      HEENT: PERRLA, extraocular movements intact, Scleras no icterus  Neck: Supple, no JVD  Cardiovascular: Regular rate and rhythm with normal S1 and S2  Respiratory: Diminished breath sounds on the right and left is fairly clear.  GI: Soft, nontender, bowel sounds present  Extremities: No edema, palpable pedal pulses  Neurologic: Grossly nonfocal, no facial asymmetry        Results Review     I reviewed the patient's new clinical results.  Results from last 7 days   Lab Units 2539 2541 25  0559   WBC 10*3/mm3 17.45* 19.16* 19.16* 19.32*   HEMOGLOBIN g/dL 11.1* 11.1* 10.9* 12.3   PLATELETS 10*3/mm3 692* 655* 677* 743*     Results from last 7 days   Lab Units 25  0539 25  0518 25  1929 25  0641 25  0559   SODIUM mmol/L 133* 133*  --  132* 133*   POTASSIUM mmol/L 3.7 4.1 3.4* 3.4* 3.8   CHLORIDE mmol/L 100 98  --  96* 97*   CO2 mmol/L 23.5 24.9  --  25.0 22.5   BUN mg/dL 7.0* 6.0*  --  5.0* 5.0*   CREATININE mg/dL 0.56* 0.51*  --  0.46* 0.52*   GLUCOSE mg/dL 113* 124*  --  117* 126*   EGFR mL/min/1.73 98.9 101.2  --  103.7 100.7           Results from last 7 days   Lab Units 25  0539 25  0518 25  0641 25  0559   CALCIUM mg/dL 8.5* 8.4* 8.2* 8.9     Results from last 7 days   Lab Units 25  0641 25  0222   PROCALCITONIN  "ng/mL 38.40* 0.42*     No results found for: \"HGBA1C\", \"POCGLU\"    No radiology results for the last day      I have personally reviewed all medications:  Scheduled Medications  alteplase ((CATHFLO/ACTIVASE)) 25 mg in sodium chloride 0.9 % 50 mL INTRAPLEURAL syringe, 25 mg, Intrapleural, Daily  buPROPion XL, 150 mg, Oral, QAM  escitalopram, 20 mg, Oral, Daily  FLUoxetine, 40 mg, Oral, Daily  ipratropium-albuterol, 3 mL, Nebulization, Q6H While Awake - RT  piperacillin-tazobactam, 3.375 g, Intravenous, Q8H  pravastatin, 20 mg, Oral, Daily  sucralfate, 1 g, Oral, 4x Daily AC & at Bedtime  zolpidem, 5 mg, Oral, Nightly    Infusions     Diet  Diet: Regular/House; Fluid Consistency: Thin (IDDSI 0)    I have personally reviewed:  [x]  Laboratory   [x]  Microbiology   [x]  Radiology   [x]  EKG/Telemetry  [x]  Cardiology/Vascular   []  Pathology    []  Records       Assessment/Plan     Active Hospital Problems    Diagnosis  POA    Pleural effusion [J90]  Unknown    Near syncope [R55]  Unknown    TUSHAR (acute kidney injury) [N17.9]  Unknown    Elevated troponin [R79.89]  Unknown    Chronic pain [G89.29]  Yes      Resolved Hospital Problems   No resolved problems to display.       69 y.o. female admitted with <principal problem not specified>.      1. Dyspnea/pleural effusion with underlying nodule/mass/leukocytosis, underlying pneumonia cannot be completely ruled out and therefore IV Rocephin was initiated and later transitioned to IV Zosyn.  Status post thoracentesis On 7/1 with liver of 1 L and it was noted to be exudative.  Given presence of loculated effusion underwent IR guided chest tube placement on 7/3/2025.  tPA was attempted on 7/5 but could not be administered given possible malpositioning tube.  Chest tube output has tapered therefore it has been discontinued on 7/6/2025 and repeat chest x-ray revealed vague opacity in the right middle and lower lung zone consistent with combination of airspace disease and small " amount of pleural effusion.  Due for CTA chest today.    2.  Elevated troponin, not secondary to MI and cardiology did evaluate.  2D echo has been done and revealed normal ejection fraction.    3.  Chronic pain syndrome, continue with analgesics.    4.  History of asthma, on albuterol nebs as needed and currently not in any exacerbation.    5.  Acute kidney injury, was gently volume resuscitated with which creatinine has improved from 1.46>0.51.      6.  History of hep C, treated according to patient.    7.  Right upper lobe lung nodule, management per pulmonary.    8.  Hyponatremia,sodium level better at 133 and clinically insignificant.    8.  On SCDs for DVT prophylaxis.    9.  CODE STATUS is full code.    Copy text on This note has been reviewed by me on 7-8-25          Chinedu Blandon MD  [facility]  07/08/25  17:32 EDT

## 2025-07-08 NOTE — PLAN OF CARE
Goal Outcome Evaluation:  Plan of Care Reviewed With: patient        Progress: improving  Outcome Evaluation: A&O. RA. SR. IV abx per order. PRN ativan, percocet and dilaudid per order. Up with standby to ABIDA Vance d/c

## 2025-07-08 NOTE — PROGRESS NOTES
[facility]  [phone]  Dr. Kelvin Falcon    Subjective:  LOS: 8  [unfilled]      Chief Complaint: Right effusion    She started having pain in the right side of the chest last night.  Pain is persisting.    Objective   Vital Signs past 24hrs  Temp range: Temp (24hrs), Av.3 °F (36.8 °C), Min:98.1 °F (36.7 °C), Max:98.4 °F (36.9 °C)    BP range: BP: (148-186)/(80-88) 148/88  Pulse range: Heart Rate:  [] 101  Resp rate range: Resp:  [18] 18  Device (Oxygen Therapy): room air   Oxygen range:SpO2:  [93 %-95 %] 93 %   Mechanical Ventilator:     Physical Exam  Eyes:      Pupils: Pupils are equal, round, and reactive to light.   Cardiovascular:      Rate and Rhythm: Normal rate and regular rhythm.      Heart sounds: No murmur heard.  Pulmonary:      Effort: Pulmonary effort is normal.      Breath sounds: Examination of the right-lower field reveals decreased breath sounds and rales. Decreased breath sounds and rales present.   Abdominal:      General: Bowel sounds are normal.      Palpations: Abdomen is soft. There is no mass.      Tenderness: There is no abdominal tenderness.   Musculoskeletal:         General: No swelling.   Neurological:      Mental Status: She is alert.       Results Review:    I have reviewed the laboratory and imaging data since the last note by [facility] physician.  My annotations are noted in assessment and plan.      Result Review:  I have personally reviewed the results from last note by [facility] physician to 2025 13:09 EDT and agree with these findings:  [x]  Laboratory list / accordion  [x]  Microbiology  [x]  Radiology  []  EKG/Telemetry   []  Cardiology/Vascular   []  Pathology  []  Old records  []  Other:      Medication Review:  I have reviewed the current MAR.  My annotations are noted in assessment and plan.    alteplase ((CATHFLO/ACTIVASE)) 25 mg in sodium chloride 0.9 % 50 mL INTRAPLEURAL syringe, 25 mg, Intrapleural, Daily  buPROPion XL, 150 mg, Oral,  QAM  escitalopram, 20 mg, Oral, Daily  FLUoxetine, 40 mg, Oral, Daily  ipratropium-albuterol, 3 mL, Nebulization, Q6H While Awake - RT  piperacillin-tazobactam, 3.375 g, Intravenous, Q8H  pravastatin, 20 mg, Oral, Daily  sucralfate, 1 g, Oral, 4x Daily AC & at Bedtime  zolpidem, 5 mg, Oral, Nightly           Lines, Drains & Airways       Active LDAs       Name Placement date Placement time Site Days    Peripheral IV 07/03/25 0829 20 G Anterior;Right;Upper Arm 07/03/25  0829  Arm  1    Chest Tube 1 Right Pleural 07/03/25  1404  Pleural  1                    PCCM Problems  Right loculated parapneumonic effusion  Thoracentesis (7/1)-1 L removed, exudative  Right upper lobe pulmonary nodule  Suspect sepsis secondary to pneumonia  Asthma  Elevated BNP  Near syncope  Frequent falls  Chronic pain  Migraines  Hyponatremia  Acute kidney injury   Leukocytosis  Dextroscoliosis of lumbar spine  History of tobacco abuse        Plan of Treatment    Right chest tube removed. Observe on abx. No fever.  No labs done today.    Continue to monitor for now.  In view of parapneumonic effusion with loculation and very high procalcitonin, switched to Zosyn. Finish course.     She complains of pain in the right side of the chest since last night.  Her chest x-ray does not look very remarkable.  Her O2 saturation is borderline at 90-91%.  She is not on DVT prophylaxis for some reason.  Patient I will obtain a CTA chest to rule out other etiology.    Please consider adding DVT prophylaxis with subcu heparin or Lovenox.    Kelvin Falcon MD  07/08/25  13:09 EDT    Isolation status: No active isolations    Dietary Orders (From admission, onward)       Start     Ordered    07/05/25 0800  Dietary Nutrition Supplements Boost Plus (Ensure Enlive, Ensure Plus)  Daily With Breakfast, Lunch & Dinner      Question:  Select Supplement:  Answer:  Boost Plus (Ensure Enlive, Ensure Plus)    07/04/25 2306    07/03/25 2004  Diet: Regular/House; Fluid  Consistency: Thin (IDDSI 0)  Diet Effective Now        References:    Diet Order Definitions   Question Answer Comment   Diets: Regular/House    Fluid Consistency: Thin (IDDSI 0)        07/03/25 6284                    Part of this note may be an electronic transcription/translation of spoken language to printed text using the Dragon Dictation System.

## 2025-07-08 NOTE — PLAN OF CARE
Goal Outcome Evaluation:              Outcome Evaluation: AOx4, room air, Iv antibioticx given during shift, prn pain medication administered, assist x1 to BR, continue plan of care

## 2025-07-09 ENCOUNTER — APPOINTMENT (OUTPATIENT)
Dept: GENERAL RADIOLOGY | Facility: HOSPITAL | Age: 70
DRG: 871 | End: 2025-07-09
Payer: MEDICARE

## 2025-07-09 ENCOUNTER — APPOINTMENT (OUTPATIENT)
Dept: CT IMAGING | Facility: HOSPITAL | Age: 70
DRG: 871 | End: 2025-07-09
Payer: MEDICARE

## 2025-07-09 LAB
ANION GAP SERPL CALCULATED.3IONS-SCNC: 11.6 MMOL/L (ref 5–15)
ANION GAP SERPL CALCULATED.3IONS-SCNC: 11.8 MMOL/L (ref 5–15)
BUN SERPL-MCNC: 5 MG/DL (ref 8–23)
BUN SERPL-MCNC: 5 MG/DL (ref 8–23)
BUN/CREAT SERPL: 10.4 (ref 7–25)
BUN/CREAT SERPL: 7.9 (ref 7–25)
CALCIUM SPEC-SCNC: 8.2 MG/DL (ref 8.6–10.5)
CALCIUM SPEC-SCNC: 8.7 MG/DL (ref 8.6–10.5)
CHLORIDE SERPL-SCNC: 97 MMOL/L (ref 98–107)
CHLORIDE SERPL-SCNC: 97 MMOL/L (ref 98–107)
CO2 SERPL-SCNC: 23.2 MMOL/L (ref 22–29)
CO2 SERPL-SCNC: 23.4 MMOL/L (ref 22–29)
CREAT SERPL-MCNC: 0.48 MG/DL (ref 0.57–1)
CREAT SERPL-MCNC: 0.63 MG/DL (ref 0.57–1)
EGFRCR SERPLBLD CKD-EPI 2021: 102.7 ML/MIN/1.73
EGFRCR SERPLBLD CKD-EPI 2021: 96.2 ML/MIN/1.73
GEN 5 1HR TROPONIN T REFLEX: 18 NG/L
GLUCOSE SERPL-MCNC: 133 MG/DL (ref 65–99)
GLUCOSE SERPL-MCNC: 141 MG/DL (ref 65–99)
MAGNESIUM SERPL-MCNC: 1.8 MG/DL (ref 1.6–2.4)
MAGNESIUM SERPL-MCNC: 2 MG/DL (ref 1.6–2.4)
POTASSIUM SERPL-SCNC: 2.9 MMOL/L (ref 3.5–5.2)
POTASSIUM SERPL-SCNC: 3.2 MMOL/L (ref 3.5–5.2)
POTASSIUM SERPL-SCNC: 3.4 MMOL/L (ref 3.5–5.2)
SODIUM SERPL-SCNC: 132 MMOL/L (ref 136–145)
SODIUM SERPL-SCNC: 132 MMOL/L (ref 136–145)
TROPONIN T % DELTA: -5
TROPONIN T NUMERIC DELTA: -1 NG/L
TROPONIN T SERPL HS-MCNC: 19 NG/L
TROPONIN T SERPL HS-MCNC: 21 NG/L

## 2025-07-09 PROCEDURE — 83735 ASSAY OF MAGNESIUM: CPT | Performed by: INTERNAL MEDICINE

## 2025-07-09 PROCEDURE — 25010000002 MAGNESIUM SULFATE 2 GM/50ML SOLUTION: Performed by: INTERNAL MEDICINE

## 2025-07-09 PROCEDURE — 84484 ASSAY OF TROPONIN QUANT: CPT | Performed by: NURSE PRACTITIONER

## 2025-07-09 PROCEDURE — 83735 ASSAY OF MAGNESIUM: CPT | Performed by: NURSE PRACTITIONER

## 2025-07-09 PROCEDURE — 94799 UNLISTED PULMONARY SVC/PX: CPT

## 2025-07-09 PROCEDURE — 80048 BASIC METABOLIC PNL TOTAL CA: CPT | Performed by: INTERNAL MEDICINE

## 2025-07-09 PROCEDURE — 84132 ASSAY OF SERUM POTASSIUM: CPT | Performed by: INTERNAL MEDICINE

## 2025-07-09 PROCEDURE — 71275 CT ANGIOGRAPHY CHEST: CPT

## 2025-07-09 PROCEDURE — 93005 ELECTROCARDIOGRAM TRACING: CPT | Performed by: INTERNAL MEDICINE

## 2025-07-09 PROCEDURE — 25510000001 IOPAMIDOL PER 1 ML: Performed by: INTERNAL MEDICINE

## 2025-07-09 PROCEDURE — 25010000002 PIPERACILLIN SOD-TAZOBACTAM PER 1 G: Performed by: INTERNAL MEDICINE

## 2025-07-09 PROCEDURE — 63710000001 ONDANSETRON ODT 4 MG TABLET DISPERSIBLE: Performed by: INTERNAL MEDICINE

## 2025-07-09 PROCEDURE — 97530 THERAPEUTIC ACTIVITIES: CPT

## 2025-07-09 PROCEDURE — 84484 ASSAY OF TROPONIN QUANT: CPT | Performed by: INTERNAL MEDICINE

## 2025-07-09 PROCEDURE — 94761 N-INVAS EAR/PLS OXIMETRY MLT: CPT

## 2025-07-09 PROCEDURE — 71045 X-RAY EXAM CHEST 1 VIEW: CPT

## 2025-07-09 PROCEDURE — 80048 BASIC METABOLIC PNL TOTAL CA: CPT | Performed by: NURSE PRACTITIONER

## 2025-07-09 PROCEDURE — 99232 SBSQ HOSP IP/OBS MODERATE 35: CPT | Performed by: INTERNAL MEDICINE

## 2025-07-09 RX ORDER — MAGNESIUM SULFATE HEPTAHYDRATE 40 MG/ML
2 INJECTION, SOLUTION INTRAVENOUS ONCE
Status: COMPLETED | OUTPATIENT
Start: 2025-07-09 | End: 2025-07-09

## 2025-07-09 RX ORDER — IOPAMIDOL 755 MG/ML
100 INJECTION, SOLUTION INTRAVASCULAR
Status: COMPLETED | OUTPATIENT
Start: 2025-07-09 | End: 2025-07-09

## 2025-07-09 RX ORDER — POTASSIUM CHLORIDE 1500 MG/1
40 TABLET, EXTENDED RELEASE ORAL EVERY 4 HOURS
Status: COMPLETED | OUTPATIENT
Start: 2025-07-09 | End: 2025-07-09

## 2025-07-09 RX ORDER — POTASSIUM CHLORIDE 1500 MG/1
40 TABLET, EXTENDED RELEASE ORAL EVERY 4 HOURS
Status: DISPENSED | OUTPATIENT
Start: 2025-07-09 | End: 2025-07-10

## 2025-07-09 RX ORDER — METOPROLOL TARTRATE 25 MG/1
25 TABLET, FILM COATED ORAL EVERY 12 HOURS SCHEDULED
Status: DISCONTINUED | OUTPATIENT
Start: 2025-07-09 | End: 2025-07-13 | Stop reason: HOSPADM

## 2025-07-09 RX ADMIN — LORAZEPAM 1 MG: 1 TABLET ORAL at 18:01

## 2025-07-09 RX ADMIN — SUCRALFATE 1 G: 1 TABLET ORAL at 20:00

## 2025-07-09 RX ADMIN — IOPAMIDOL 95 ML: 755 INJECTION, SOLUTION INTRAVENOUS at 14:59

## 2025-07-09 RX ADMIN — PIPERACILLIN AND TAZOBACTAM 3.38 G: 3; .375 INJECTION, POWDER, FOR SOLUTION INTRAVENOUS at 09:58

## 2025-07-09 RX ADMIN — POTASSIUM CHLORIDE 40 MEQ: 1500 TABLET, EXTENDED RELEASE ORAL at 16:41

## 2025-07-09 RX ADMIN — PIPERACILLIN AND TAZOBACTAM 3.38 G: 3; .375 INJECTION, POWDER, FOR SOLUTION INTRAVENOUS at 03:03

## 2025-07-09 RX ADMIN — SUCRALFATE 1 G: 1 TABLET ORAL at 12:27

## 2025-07-09 RX ADMIN — PIPERACILLIN AND TAZOBACTAM 3.38 G: 3; .375 INJECTION, POWDER, FOR SOLUTION INTRAVENOUS at 18:01

## 2025-07-09 RX ADMIN — BUPROPION HYDROCHLORIDE 150 MG: 150 TABLET, EXTENDED RELEASE ORAL at 06:22

## 2025-07-09 RX ADMIN — POTASSIUM CHLORIDE 40 MEQ: 1500 TABLET, EXTENDED RELEASE ORAL at 12:27

## 2025-07-09 RX ADMIN — OXYCODONE AND ACETAMINOPHEN 2 TABLET: 5; 325 TABLET ORAL at 21:46

## 2025-07-09 RX ADMIN — FLUOXETINE 40 MG: 20 CAPSULE ORAL at 08:08

## 2025-07-09 RX ADMIN — SUCRALFATE 1 G: 1 TABLET ORAL at 16:41

## 2025-07-09 RX ADMIN — POTASSIUM CHLORIDE 40 MEQ: 1500 TABLET, EXTENDED RELEASE ORAL at 08:11

## 2025-07-09 RX ADMIN — METOPROLOL TARTRATE 25 MG: 25 TABLET, FILM COATED ORAL at 20:00

## 2025-07-09 RX ADMIN — METOPROLOL TARTRATE 25 MG: 25 TABLET, FILM COATED ORAL at 15:27

## 2025-07-09 RX ADMIN — ONDANSETRON 4 MG: 4 TABLET, ORALLY DISINTEGRATING ORAL at 09:58

## 2025-07-09 RX ADMIN — IPRATROPIUM BROMIDE AND ALBUTEROL SULFATE 3 ML: .5; 3 SOLUTION RESPIRATORY (INHALATION) at 20:43

## 2025-07-09 RX ADMIN — IPRATROPIUM BROMIDE AND ALBUTEROL SULFATE 3 ML: .5; 3 SOLUTION RESPIRATORY (INHALATION) at 07:07

## 2025-07-09 RX ADMIN — PRAVASTATIN SODIUM 20 MG: 20 TABLET ORAL at 08:10

## 2025-07-09 RX ADMIN — OXYCODONE AND ACETAMINOPHEN 2 TABLET: 5; 325 TABLET ORAL at 08:11

## 2025-07-09 RX ADMIN — MAGNESIUM SULFATE HEPTAHYDRATE 2 G: 40 INJECTION, SOLUTION INTRAVENOUS at 21:31

## 2025-07-09 RX ADMIN — OXYCODONE AND ACETAMINOPHEN 2 TABLET: 5; 325 TABLET ORAL at 04:35

## 2025-07-09 RX ADMIN — OXYCODONE AND ACETAMINOPHEN 2 TABLET: 5; 325 TABLET ORAL at 12:27

## 2025-07-09 RX ADMIN — ONDANSETRON 4 MG: 4 TABLET, ORALLY DISINTEGRATING ORAL at 21:20

## 2025-07-09 RX ADMIN — ESCITALOPRAM 20 MG: 10 TABLET, FILM COATED ORAL at 08:11

## 2025-07-09 RX ADMIN — SUCRALFATE 1 G: 1 TABLET ORAL at 06:22

## 2025-07-09 RX ADMIN — POTASSIUM CHLORIDE 40 MEQ: 1500 TABLET, EXTENDED RELEASE ORAL at 23:21

## 2025-07-09 NOTE — SIGNIFICANT NOTE
On telemetry appears to have had episodes of torsades just after 6 pm.     Though none are consider high risk, she is on several medications which can potentially prolong the QT interval.      I have enter an order to hold wellbutrin, lexapro, prozac, and ambien.     BMP to check potassium.     Continue telemetry monitoring.

## 2025-07-09 NOTE — PLAN OF CARE
Problem: Adult Inpatient Plan of Care  Goal: Plan of Care Review  Outcome: Progressing  Goal: Patient-Specific Goal (Individualized)  Outcome: Progressing  Goal: Absence of Hospital-Acquired Illness or Injury  Outcome: Progressing  Intervention: Identify and Manage Fall Risk  Recent Flowsheet Documentation  Taken 7/9/2025 0210 by Dorita Acuña RN  Safety Promotion/Fall Prevention:   activity supervised   assistive device/personal items within reach   clutter free environment maintained   fall prevention program maintained   nonskid shoes/slippers when out of bed   room organization consistent   safety round/check completed  Taken 7/9/2025 0010 by Dorita Acuña RN  Safety Promotion/Fall Prevention:   activity supervised   assistive device/personal items within reach   clutter free environment maintained   fall prevention program maintained   nonskid shoes/slippers when out of bed   room organization consistent   safety round/check completed  Taken 7/8/2025 2200 by Dorita Acuña RN  Safety Promotion/Fall Prevention:   activity supervised   assistive device/personal items within reach   clutter free environment maintained   fall prevention program maintained   nonskid shoes/slippers when out of bed   room organization consistent   safety round/check completed  Taken 7/8/2025 2008 by Dorita Acuña RN  Safety Promotion/Fall Prevention:   activity supervised   assistive device/personal items within reach   clutter free environment maintained   fall prevention program maintained   nonskid shoes/slippers when out of bed   room organization consistent   safety round/check completed  Intervention: Prevent Skin Injury  Recent Flowsheet Documentation  Taken 7/9/2025 0210 by Dorita Acuña RN  Body Position: position changed independently  Taken 7/9/2025 0010 by Dorita Acuña RN  Body Position:   right   side-lying   position changed independently  Taken 7/8/2025 2200 by Dorita Acuña RN  Body Position:  position changed independently  Taken 7/8/2025 2008 by Dorita Acuña RN  Body Position: position changed independently  Intervention: Prevent and Manage VTE (Venous Thromboembolism) Risk  Recent Flowsheet Documentation  Taken 7/8/2025 2008 by Dorita Acuña RN  VTE Prevention/Management:   bilateral   SCDs (sequential compression devices) off  Intervention: Prevent Infection  Recent Flowsheet Documentation  Taken 7/9/2025 0210 by Dorita Acuña RN  Infection Prevention:   cohorting utilized   environmental surveillance performed   hand hygiene promoted   rest/sleep promoted   single patient room provided  Taken 7/9/2025 0010 by Dorita Acuña RN  Infection Prevention:   cohorting utilized   environmental surveillance performed   hand hygiene promoted   rest/sleep promoted   single patient room provided  Taken 7/8/2025 2200 by Dorita Acuña RN  Infection Prevention:   cohorting utilized   environmental surveillance performed   hand hygiene promoted   rest/sleep promoted   single patient room provided  Taken 7/8/2025 2008 by Dorita Acuña RN  Infection Prevention:   cohorting utilized   environmental surveillance performed   hand hygiene promoted   rest/sleep promoted   single patient room provided  Goal: Optimal Comfort and Wellbeing  Outcome: Progressing  Intervention: Monitor Pain and Promote Comfort  Recent Flowsheet Documentation  Taken 7/8/2025 2110 by Dorita Acuña RN  Pain Management Interventions:   pain medication given   pain management plan reviewed with patient/caregiver   position adjusted   pillow support provided  Intervention: Provide Person-Centered Care  Recent Flowsheet Documentation  Taken 7/9/2025 0210 by Dorita Acuña RN  Trust Relationship/Rapport:   care explained   choices provided  Taken 7/8/2025 2008 by Dorita Acuña RN  Trust Relationship/Rapport:   care explained   choices provided   emotional support provided   questions encouraged   empathic listening provided    questions answered   reassurance provided   thoughts/feelings acknowledged  Goal: Readiness for Transition of Care  Outcome: Progressing     Problem: Fall Injury Risk  Goal: Absence of Fall and Fall-Related Injury  Outcome: Progressing  Intervention: Identify and Manage Contributors  Recent Flowsheet Documentation  Taken 7/9/2025 0210 by Dorita Acuña RN  Medication Review/Management: medications reviewed  Taken 7/9/2025 0010 by Dorita Acuña RN  Medication Review/Management: medications reviewed  Taken 7/8/2025 2200 by Dorita Acuña RN  Medication Review/Management: medications reviewed  Taken 7/8/2025 2008 by Dorita Acuña RN  Medication Review/Management: medications reviewed  Intervention: Promote Injury-Free Environment  Recent Flowsheet Documentation  Taken 7/9/2025 0210 by Dorita Acuña RN  Safety Promotion/Fall Prevention:   activity supervised   assistive device/personal items within reach   clutter free environment maintained   fall prevention program maintained   nonskid shoes/slippers when out of bed   room organization consistent   safety round/check completed  Taken 7/9/2025 0010 by Dorita Acuña RN  Safety Promotion/Fall Prevention:   activity supervised   assistive device/personal items within reach   clutter free environment maintained   fall prevention program maintained   nonskid shoes/slippers when out of bed   room organization consistent   safety round/check completed  Taken 7/8/2025 2200 by Dorita Acuña RN  Safety Promotion/Fall Prevention:   activity supervised   assistive device/personal items within reach   clutter free environment maintained   fall prevention program maintained   nonskid shoes/slippers when out of bed   room organization consistent   safety round/check completed  Taken 7/8/2025 2008 by Dorita Acuña RN  Safety Promotion/Fall Prevention:   activity supervised   assistive device/personal items within reach   clutter free environment maintained   fall  prevention program maintained   nonskid shoes/slippers when out of bed   room organization consistent   safety round/check completed     Problem: Skin Injury Risk Increased  Goal: Skin Health and Integrity  Outcome: Progressing  Intervention: Optimize Skin Protection  Recent Flowsheet Documentation  Taken 7/9/2025 0210 by Dorita Acuña, RN  Activity Management:   ambulated to bathroom   back to bed  Taken 7/9/2025 0010 by Dorita Acuña, RN  Activity Management: activity minimized  Head of Bed (HOB) Positioning: HOB at 30-45 degrees  Taken 7/8/2025 2200 by Dorita Acuña, RN  Activity Management:   ambulated to bathroom   back to bed  Taken 7/8/2025 2008 by Dorita Acuña, RN  Activity Management:   ambulated to bathroom   back to bed   Goal Outcome Evaluation:

## 2025-07-09 NOTE — PROGRESS NOTES
[facility]  [phone]  Dr. Kelvin Falcon    Subjective:  LOS: 9  [unfilled]      Chief Complaint: Right effusion    Her chest pains are much better.  She is having some palpitations this morning.  CT chest has not yet been completed.  She denies any new problems with her breathing.    Objective   Vital Signs past 24hrs  Temp range: Temp (24hrs), Av.5 °F (36.9 °C), Min:98.1 °F (36.7 °C), Max:98.8 °F (37.1 °C)    BP range: BP: (130-154)/(82-95) 154/87  Pulse range: Heart Rate:  [] 99  Resp rate range: Resp:  [16-18] 18  Device (Oxygen Therapy): room air   Oxygen range:SpO2:  [91 %-95 %] 93 %   Mechanical Ventilator:     Physical Exam  Eyes:      Pupils: Pupils are equal, round, and reactive to light.   Cardiovascular:      Rate and Rhythm: Normal rate and regular rhythm.      Heart sounds: No murmur heard.  Pulmonary:      Effort: Pulmonary effort is normal.      Breath sounds: Examination of the right-lower field reveals decreased breath sounds and rales. Decreased breath sounds and rales present.   Abdominal:      General: Bowel sounds are normal.      Palpations: Abdomen is soft. There is no mass.      Tenderness: There is no abdominal tenderness.   Musculoskeletal:         General: No swelling.   Neurological:      Mental Status: She is alert.       Results Review:    I have reviewed the laboratory and imaging data since the last note by [facility] physician.  My annotations are noted in assessment and plan.      Result Review:  I have personally reviewed the results from last note by [facility] physician to 2025 08:25 EDT and agree with these findings:  [x]  Laboratory list / accordion  [x]  Microbiology  [x]  Radiology  []  EKG/Telemetry   []  Cardiology/Vascular   []  Pathology  []  Old records  []  Other:      Medication Review:  I have reviewed the current MAR.  My annotations are noted in assessment and plan.    alteplase ((CATHFLO/ACTIVASE)) 25 mg in sodium chloride 0.9 % 50 mL INTRAPLEURAL  syringe, 25 mg, Intrapleural, Daily  buPROPion XL, 150 mg, Oral, QAM  escitalopram, 20 mg, Oral, Daily  FLUoxetine, 40 mg, Oral, Daily  ipratropium-albuterol, 3 mL, Nebulization, Q6H While Awake - RT  piperacillin-tazobactam, 3.375 g, Intravenous, Q8H  potassium chloride ER, 40 mEq, Oral, Q4H  pravastatin, 20 mg, Oral, Daily  sucralfate, 1 g, Oral, 4x Daily AC & at Bedtime  zolpidem, 5 mg, Oral, Nightly           Lines, Drains & Airways       Active LDAs       Name Placement date Placement time Site Days    Peripheral IV 07/03/25 0829 20 G Anterior;Right;Upper Arm 07/03/25  0829  Arm  1    Chest Tube 1 Right Pleural 07/03/25  1404  Pleural  1                    PCCM Problems  Right loculated parapneumonic effusion  Thoracentesis (7/1)-1 L removed, exudative  Right upper lobe pulmonary nodule  Suspect sepsis secondary to pneumonia  Asthma  Elevated BNP  Near syncope  Frequent falls  Chronic pain  Migraines  Hyponatremia  Acute kidney injury   Leukocytosis  Dextroscoliosis of lumbar spine  History of tobacco abuse  Hypokalemia        Plan of Treatment    Right chest tube removed. Observe on abx. No fever.  No white count available.    Continue to monitor for now.  In view of parapneumonic effusion with loculation and very high procalcitonin, switched to Zosyn. Finish course.     Her chest pain is improved.  Pending CTA chest.    Right upper lobe pulmonary nodule 1.9 cm requires follow-up imaging.    Please consider adding DVT prophylaxis with subcu heparin or Lovenox. Message to RN.    Patient reports palpitations.  Premature complexes on the EKG.  Note potassium is low at 2.9.  Defer to primary team to replace.  Magnesium is also borderline and may benefit from replacement.    Kelvin Falcon MD  07/09/25  08:25 EDT    Isolation status: No active isolations    Dietary Orders (From admission, onward)       Start     Ordered    07/05/25 0800  Dietary Nutrition Supplements Boost Plus (Ensure Enlive, Ensure Plus)  Daily With  Breakfast, Lunch & Dinner      Question:  Select Supplement:  Answer:  Boost Plus (Ensure Enlive, Ensure Plus)    07/04/25 2306    07/03/25 1724  Diet: Regular/House; Fluid Consistency: Thin (IDDSI 0)  Diet Effective Now        References:    Diet Order Definitions   Question Answer Comment   Diets: Regular/House    Fluid Consistency: Thin (IDDSI 0)        07/03/25 1723                    Part of this note may be an electronic transcription/translation of spoken language to printed text using the Dragon Dictation System.

## 2025-07-09 NOTE — CASE MANAGEMENT/SOCIAL WORK
Continued Stay Note  [facility]     Patient Name: Yoanna Reed  MRN: [id number]  Today's Date: 7/9/2025    Admit Date: 6/30/2025    Plan: Home with HH   Discharge Plan       Row Name 07/09/25 1722       Plan    Plan Home with HH    Patient/Family in Agreement with Plan yes    Plan Comments Met with pt in room. She confirmed her plan is to return home at discharge. Family will provide transportation. We discussed home health and she is agreeable. Patient Choice provided but she declined stating that she does not have a preference for company. Referral entered. She denied any other needs at this time. CCP following. Chuy GALINDO RN                   Discharge Codes    No documentation.                 Expected Discharge Date and Time       Expected Discharge Date Expected Discharge Time    Jul 9, 2025               Chuy Tijerina RN

## 2025-07-09 NOTE — PROGRESS NOTES
Name: Yoanna Reed ADMIT: 2025   : 1955  PCP: Tom Muller MD    MRN: [id number] LOS: 9 days   AGE/SEX: 69 y.o. female  ROOM: [facility]     Subjective   Subjective   .  Patient is lying on the bed and is complaining of pain.  Denied nausea, vomiting, chest pain, palpitations.         Objective   Objective   Vital Signs  Temp:  [98.6 °F (37 °C)-98.8 °F (37.1 °C)] 98.8 °F (37.1 °C)  Heart Rate:  [] 112  Resp:  [16-18] 18  BP: (130-156)/(82-96) 156/96  SpO2:  [91 %-95 %] 93 %  on   ;   Device (Oxygen Therapy): room air  Body mass index is 26.55 kg/m².  Physical Exam      HEENT: PERRLA, extraocular movements intact, Scleras no icterus  Neck: Supple, no JVD  Cardiovascular: Regular rate and rhythm with normal S1 and S2  Respiratory: Diminished breath sounds on the right and left is fairly clear.  GI: Soft, nontender, bowel sounds present  Extremities: No edema, palpable pedal pulses  Neurologic: Grossly nonfocal, no facial asymmetry        Results Review     I reviewed the patient's new clinical results.  Results from last 7 days   Lab Units 25  0539 2518 25  0641 25  0559   WBC 10*3/mm3 17.45* 19.16* 19.16* 19.32*   HEMOGLOBIN g/dL 11.1* 11.1* 10.9* 12.3   PLATELETS 10*3/mm3 692* 655* 677* 743*     Results from last 7 days   Lab Units 25  0610 25  0539 25  0518 25  1929 25  0641   SODIUM mmol/L 132* 133* 133*  --  132*   POTASSIUM mmol/L 2.9* 3.7 4.1 3.4* 3.4*   CHLORIDE mmol/L 97* 100 98  --  96*   CO2 mmol/L 23.2 23.5 24.9  --  25.0   BUN mg/dL 5.0* 7.0* 6.0*  --  5.0*   CREATININE mg/dL 0.48* 0.56* 0.51*  --  0.46*   GLUCOSE mg/dL 133* 113* 124*  --  117*   EGFR mL/min/1.73 102.7 98.9 101.2  --  103.7           Results from last 7 days   Lab Units 25  0610 25  0539 25  0518 25  0641   CALCIUM mg/dL 8.2* 8.5* 8.4* 8.2*   MAGNESIUM mg/dL 1.8  --   --   --      Results from last 7 days   Lab Units  "07/05/25  0641 07/03/25  0222   PROCALCITONIN ng/mL 38.40* 0.42*     No results found for: \"HGBA1C\", \"POCGLU\"    XR Chest 1 View  Result Date: 7/9/2025  1. Small right pleural effusion appears slightly increased 2. Increased patchy density in the left upper lung suspicious for developing pneumonia. Follow-up to clearing recommended    This report was finalized on 7/9/2025 6:36 AM by Dr. Jayme Arnold M.D on Workstation: [id number]          I have personally reviewed all medications:  Scheduled Medications  alteplase ((CATHFLO/ACTIVASE)) 25 mg in sodium chloride 0.9 % 50 mL INTRAPLEURAL syringe, 25 mg, Intrapleural, Daily  buPROPion XL, 150 mg, Oral, QAM  escitalopram, 20 mg, Oral, Daily  FLUoxetine, 40 mg, Oral, Daily  ipratropium-albuterol, 3 mL, Nebulization, Q6H While Awake - RT  metoprolol tartrate, 25 mg, Oral, Q12H  piperacillin-tazobactam, 3.375 g, Intravenous, Q8H  potassium chloride ER, 40 mEq, Oral, Q4H  pravastatin, 20 mg, Oral, Daily  sucralfate, 1 g, Oral, 4x Daily AC & at Bedtime  zolpidem, 5 mg, Oral, Nightly    Infusions     Diet  Diet: Regular/House; Fluid Consistency: Thin (IDDSI 0)    I have personally reviewed:  [x]  Laboratory   [x]  Microbiology   [x]  Radiology   [x]  EKG/Telemetry  [x]  Cardiology/Vascular   []  Pathology    []  Records       Assessment/Plan     Active Hospital Problems    Diagnosis  POA    Pleural effusion [J90]  Unknown    Near syncope [R55]  Unknown    TUSHAR (acute kidney injury) [N17.9]  Unknown    Elevated troponin [R79.89]  Unknown    Chronic pain [G89.29]  Yes      Resolved Hospital Problems   No resolved problems to display.       69 y.o. female admitted with <principal problem not specified>.      1. Dyspnea/pleural effusion with underlying nodule/mass/leukocytosis, underlying pneumonia cannot be completely ruled out and therefore IV Rocephin was initiated and later transitioned to IV Zosyn.  Status post thoracentesis On 7/1 with liver of 1 L and it was noted to be " exudative.  Given presence of loculated effusion underwent IR guided chest tube placement on 7/3/2025.  tPA was attempted on 7/5 but could not be administered given possible malpositioning tube.  Chest tube output has tapered therefore it has been discontinued on 7/6/2025 and repeat chest x-ray revealed vague opacity in the right middle and lower lung zone consistent with combination of airspace disease and small amount of pleural effusion.  Due for CTA chest today.    2.  Elevated troponin, not secondary to MI and cardiology did evaluate.  2D echo has been done and revealed normal ejection fraction.    3.  Chronic pain syndrome, continue with analgesics.    4.  History of asthma, on albuterol nebs as needed and currently not in any exacerbation.    5.  Acute kidney injury, was gently volume resuscitated with which creatinine has improved from 1.46>0.51>0.48.      6.  History of hep C, treated according to patient.    7.  Right upper lobe lung nodule, management per pulmonary.    8.  Hyponatremia,sodium level better at 132 and clinically insignificant.    8.  On SCDs for DVT prophylaxis.    9.  CODE STATUS is full code.    Copy text on This note has been reviewed by me on 7-8-25          Chinedu Blandon MD  [facility]  07/09/25  15:47 EDT

## 2025-07-09 NOTE — PROGRESS NOTES
[facility] Follow Up    Chief Complaint: Reconsult for afib/vtach    Interval History: Reconsulted to evaluate the patient for possible atrial fibrillation and ventricular tachycardia.  The patient had episodes of elevated heart rate overnight which on my review appears to be sinus rhythm and sinus tachycardia with frequent PACs.  She also was noted to have intermittent premature ventricular tractions.  There was 1 prolonged episode of tachycardia with the same morphology as her sinus beats and appears to be a supraventricular tachycardia.  No ventricular tachycardia noted on my review.  The patient denied any symptoms with any of these episodes.  She continues to complain of right-sided chest pain which she has been having.  She denies any worsening shortness of breath.  Denies any lightheadedness, palpitations, near-syncope or syncope or any other chest discomfort.    Objective:     Objective:  Temp:  [98.1 °F (36.7 °C)-98.8 °F (37.1 °C)] 98.8 °F (37.1 °C)  Heart Rate:  [] 99  Resp:  [16-18] 18  BP: (130-154)/(82-95) 154/87     Intake/Output Summary (Last 24 hours) at 7/9/2025 1239  Last data filed at 7/9/2025 0658  Gross per 24 hour   Intake 480 ml   Output 800 ml   Net -320 ml     Body mass index is 26.55 kg/m².      07/05/25  0654 07/07/25  0500 07/08/25  0719   Weight: 83.3 kg (183 lb 10.3 oz) 82.3 kg (181 lb 7 oz) 79.2 kg (174 lb 9.7 oz)     Weight change:       Physical Exam:   General : Alert, cooperative, in no acute distress.  Neuro: Alert,cooperative and oriented.  Lungs: CTAB. Normal respiratory effort and rate.  CV: Regular rate and rhythm, normal S1 and S2, no murmurs, gallops or rubs.  ABD: Soft, nontender, nondistended. Positive bowel sounds.  Extr: No edema or cyanosis, moves all extremities.    Lab Review:   Results from last 7 days   Lab Units 07/09/25  0610 07/07/25  0539   SODIUM mmol/L 132* 133*   POTASSIUM mmol/L 2.9* 3.7   CHLORIDE mmol/L 97* 100   CO2 mmol/L 23.2  "23.5   BUN mg/dL 5.0* 7.0*   CREATININE mg/dL 0.48* 0.56*   GLUCOSE mg/dL 133* 113*   CALCIUM mg/dL 8.2* 8.5*     Results from last 7 days   Lab Units 07/09/25  0747 07/09/25  0610   HSTROP T ng/L 18* 19*     Results from last 7 days   Lab Units 07/07/25  0539 07/06/25  0518   WBC 10*3/mm3 17.45* 19.16*   HEMOGLOBIN g/dL 11.1* 11.1*   HEMATOCRIT % 34.1 32.4*   PLATELETS 10*3/mm3 692* 655*         Results from last 7 days   Lab Units 07/09/25  0610   MAGNESIUM mg/dL 1.8           Invalid input(s): \"LDLCALC\"          I reviewed the patient's new clinical results.  I personally viewed and interpreted the patient's EKG  Current Medications:   Scheduled Meds:alteplase ((CATHFLO/ACTIVASE)) 25 mg in sodium chloride 0.9 % 50 mL INTRAPLEURAL syringe, 25 mg, Intrapleural, Daily  buPROPion XL, 150 mg, Oral, QAM  escitalopram, 20 mg, Oral, Daily  FLUoxetine, 40 mg, Oral, Daily  ipratropium-albuterol, 3 mL, Nebulization, Q6H While Awake - RT  piperacillin-tazobactam, 3.375 g, Intravenous, Q8H  potassium chloride ER, 40 mEq, Oral, Q4H  pravastatin, 20 mg, Oral, Daily  sucralfate, 1 g, Oral, 4x Daily AC & at Bedtime  zolpidem, 5 mg, Oral, Nightly      Continuous Infusions:     Allergies:  No Known Allergies    Assessment/Plan:     1.  Tachycardia.  Reviewed her telemetry.  As above I do not see any definitive evidence of atrial fibrillation.  It appears that she has been in sinus with frequent premature atrial contractions.  She did have 1 episode that appears to be supraventricular tachycardia that was asymptomatic.  There was no evidence of ventricular tachycardia.  This morning she is in sinus rhythm.  2.  Hypokalemia.  Replacement underway.  3.  Right loculated parapneumonic effusion.  Status post chest tube placement and removal.  4.  Right upper lobe pulmonary nodule  5.  Pneumonia  6.  Asthma  7.  Hypertension.  Her blood pressures have been persistently elevated here.  Currently untreated.    -On my review of the telemetry " I do not see any definitive evidence of atrial fibrillation or any evidence of ventricular tachycardia.  Episode of tachycardia last night appears to be a supraventricular tachycardia that was asymptomatic.  -Agree with replacement of potassium.  - No need for any further cardiac workup at this time.  - For her hypertension and relatively elevated heart rates with ectopy would recommend starting a beta-blocker.  Will start metoprolol tartrate 25 mg twice a day.        Nisa Phelps MD  07/09/25  12:39 EDT

## 2025-07-09 NOTE — PROGRESS NOTES
Patient Name: Yoanna Reed  YOB: 1955  MRN: [id number]  Admission date: 6/30/2025    Reason for Encounter: Follow-up/Progress Note    [facility] Nutrition Progress Note       Nutrition Intervention Updates:  Pt working on breakfast when visited, good po encouraged  Will continue supplements   RD to follow      Subjective: Chest tube removed       PO Diet: Diet: Regular/House; Fluid Consistency: Thin (IDDSI 0)   PO Supplements: TID Boost Boost Original TID (Provides 720 kcals, 30 g protein if consumed)    PO Intake:  <50%       Current nutrition support: --   Nutrition support review: --       Labs: Na 132, k 2.9--replaced        GI Function:  BM + 7/9        Brief Weight Review:    Wt Readings from Last 1 Encounters:   07/08/25 0719 79.2 kg (174 lb 9.7 oz)   07/07/25 0500 82.3 kg (181 lb 7 oz)   07/05/25 0654 83.3 kg (183 lb 10.3 oz)   07/03/25 1240 80.3 kg (177 lb)   07/03/25 0500 80.4 kg (177 lb 3.2 oz)   07/02/25 0436 79.4 kg (175 lb 1.6 oz)   07/01/25 1748 78.9 kg (174 lb)   07/01/25 0640 79 kg (174 lb 2.6 oz)   06/30/25 1008 76.7 kg (169 lb)        Results from last 7 days   Lab Units 07/09/25  0610 07/07/25  0539 07/06/25  0518   SODIUM mmol/L 132* 133* 133*   POTASSIUM mmol/L 2.9* 3.7 4.1   CHLORIDE mmol/L 97* 100 98   CO2 mmol/L 23.2 23.5 24.9   BUN mg/dL 5.0* 7.0* 6.0*   CREATININE mg/dL 0.48* 0.56* 0.51*   CALCIUM mg/dL 8.2* 8.5* 8.4*   GLUCOSE mg/dL 133* 113* 124*     Results from last 7 days   Lab Units 07/09/25  0610 07/07/25  0539   MAGNESIUM mg/dL 1.8  --    PLATELETS 10*3/mm3  --  692*   HEMOGLOBIN g/dL  --  11.1*   HEMATOCRIT %  --  34.1     Lab Results   Component Value Date    HGBA1C 5.5 05/19/2025       Electronically signed by:  Nilam Cleveland RD  07/09/25 13:45 EDT

## 2025-07-09 NOTE — PLAN OF CARE
Goal Outcome Evaluation:  Plan of Care Reviewed With: patient           Outcome Evaluation: Patient seen for PT session this PM. Patient supine in bed upon arrival. Patient sat up to EOB with SBA. Patient stood from EOB with SBA. Patient ambulated 100ft with rwx and CGA. Gait slow and gaurded with fwd flexed posture. Standing rest breaks needed 2/2 fatigue and SOA. Patient declined sitting UIC and returned to bed at end of session. Encouraged patient to continue mobilizing with nsg several times per day. Acute PT will continue to monitor.

## 2025-07-09 NOTE — PAYOR COMM NOTE
"Yoanna Reed (69 y.o. Female)     ATTN: NURSE REVIEWER  RE: UPDATED IN CLINICALS  REF: [id number]  PLS REPLY TO TRISHA MANDEL [phone] OR FAX# [phone]      Date of Birth   1955    Social Security Number       Address   [address] [place] [address]    Home Phone   [phone]    MRN   [id number]       Yazidi   Protestant    Marital Status   Single                            Admission Date   6/30/2025    Admission Type   Emergency    Admitting Provider   Chinedu Blandon MD    Attending Provider   Chinedu Blandon MD    Department, Room/Bed   [address], E557/1       Discharge Date       Discharge Disposition       Discharge Destination                                 Attending Provider: Chinedu Blandon MD    Allergies: No Known Allergies    Isolation: None   Infection: None   Code Status: CPR    Ht: 172.7 cm (68\")   Wt: 79.2 kg (174 lb 9.7 oz)    Admission Cmt: None   Principal Problem: None                  Active Insurance as of 6/30/2025       Primary Coverage       Payor Plan Insurance Group Employer/Plan Group    ANTHEM MEDICARE REPLACEMENT ANTHEM MEDICARE ADVANTAGE HMO [id number]       Payor Plan Address Payor Plan Phone Number Payor Plan Fax Number Effective Dates    [address] [phone]  1/1/2025 - None Entered    [facility] [id number]         Subscriber Name Subscriber Birth Date Member ID       YOANNA REED 1955 [id number]                     Emergency Contacts        (Rel.) Home Phone Work Phone Mobile Phone    AMY MAHONEY (Friend) [phone] -- [phone]    doris tapia (Friend) -- -- [phone]              Facility-Administered Medications as of 7/9/2025   Medication Dose Route Frequency Provider Last Rate Last Admin    acetaminophen (TYLENOL) tablet 650 mg  650 mg Oral Q4H PRN Chinedu Blandon MD   325 mg at 07/05/25 0054    albuterol (PROVENTIL) nebulizer " solution 0.083% 2.5 mg/3mL  2.5 mg Nebulization Q6H PRN Chinedu Blandon MD        alteplase ((CATHFLO/ACTIVASE)) 25 mg in sodium chloride 0.9 % 50 mL INTRAPLEURAL syringe  25 mg Intrapleural Daily Kelvin Falcon MD        sennosides-docusate (PERICOLACE) 8.6-50 MG per tablet 2 tablet  2 tablet Oral BID PRN Chinedu Blandon MD        And    polyethylene glycol (MIRALAX) packet 17 g  17 g Oral Daily PRN Chinedu Blandon MD        And    bisacodyl (DULCOLAX) EC tablet 5 mg  5 mg Oral Daily PRN Chinedu Blandon MD        And    bisacodyl (DULCOLAX) suppository 10 mg  10 mg Rectal Daily PRN Chinedu Blandon MD        buPROPion XL (WELLBUTRIN XL) 24 hr tablet 150 mg  150 mg Oral QAM Chinedu Blandon MD   150 mg at 25 0622    [] calcium carbonate (TUMS) chewable tablet 500 mg (200 mg elemental)  2 tablet Oral Q6H PRN Chinedu Blandon MD   2 tablet at 25 2242    Calcium Replacement - Follow Nurse / BPA Driven Protocol   Not Applicable PRN Chinedu Blandon MD        [COMPLETED] ceFAZolin 2000 mg IVPB in 100 mL NS (MBP)  2,000 mg Intravenous Once Ovidio Ndiaye MD   Stopped at 25 1341    [COMPLETED] cefTRIAXone (ROCEPHIN) 2,000 mg in sodium chloride 0.9 % 100 mL MBP  2,000 mg Intravenous Once Waqar Stearns MD   Stopped at 25 1329    escitalopram (LEXAPRO) tablet 20 mg  20 mg Oral Daily Chinedu Blandon MD   20 mg at 25 0811    [COMPLETED] fentaNYL citrate (PF) (SUBLIMAZE) injection   Intravenous PRN Ovidio Ndiaye MD   25 mcg at 25 1355    FLUoxetine (PROzac) capsule 40 mg  40 mg Oral Daily Chinedu Blnadon MD   40 mg at 25 0808    [COMPLETED] HYDROcodone-acetaminophen (NORCO) 7.5-325 MG per tablet 1 tablet  1 tablet Oral Once Waqar Stearns MD   1 tablet at 25 1051    HYDROmorphone (DILAUDID) injection 1 mg  1 mg Intravenous Q2H PRN Chinedu Blandon MD   1 mg at 25 1109    [COMPLETED] iopamidol  (ISOVUE-370) 76 % injection 100 mL  100 mL Intravenous Once in imaging Waqar Stearns MD   95 mL at 06/30/25 1102    [COMPLETED] iopamidol (ISOVUE-370) 76 % injection 100 mL  100 mL Intravenous Once in imaging Chinedu Blandon MD   95 mL at 07/09/25 1459    ipratropium-albuterol (DUO-NEB) nebulizer solution 3 mL  3 mL Nebulization Q6H While Awake - RT Александр Marion MD   3 mL at 07/09/25 0707    [COMPLETED] lactated ringers bolus 1,000 mL  1,000 mL Intravenous Once Waqar Stearns MD   Stopped at 06/30/25 1220    [COMPLETED] lidocaine (XYLOCAINE) 1 % injection 10 mL  10 mL Subcutaneous Once Baron Chung MD   10 mL at 07/01/25 1558    [COMPLETED] lidocaine PF 1% (XYLOCAINE) injection    PRN Ovidio Ndiaye MD   17 mL at 07/03/25 1346    LORazepam (ATIVAN) tablet 1 mg  1 mg Oral Q6H PRN Chinedu Blandon MD   1 mg at 07/08/25 1814    Magnesium Standard Dose Replacement - Follow Nurse / BPA Driven Protocol   Not Applicable PRN Chinedu Blandon MD        metoprolol tartrate (LOPRESSOR) tablet 25 mg  25 mg Oral Q12H Nisa Phelps MD   25 mg at 07/09/25 1527    [COMPLETED] midazolam (VERSED) injection   Intravenous PRN Ovidio Ndiaye MD   0.5 mg at 07/03/25 1355    [COMPLETED] morphine injection 4 mg  4 mg Intravenous Once Waqar Stearns MD   4 mg at 06/30/25 1050    [COMPLETED] morphine injection 4 mg  4 mg Intravenous Once Waqar Stearns MD   4 mg at 06/30/25 1236    nitroglycerin (NITROSTAT) SL tablet 0.4 mg  0.4 mg Sublingual Q5 Min PRN Chinedu Blandon MD        ondansetron ODT (ZOFRAN-ODT) disintegrating tablet 4 mg  4 mg Oral Q6H PRN Chinedu Blandon MD   4 mg at 07/08/25 0930    Or    ondansetron (ZOFRAN) injection 4 mg  4 mg Intravenous Q6H PRN Chinedu Blandon MD   4 mg at 07/07/25 1052    ondansetron ODT (ZOFRAN-ODT) disintegrating tablet 4 mg  4 mg Translingual Q8H PRN Chinedu Blandon MD   4 mg at 07/09/25 0958    oxyCODONE-acetaminophen  (PERCOCET) 5-325 MG per tablet 2 tablet  2 tablet Oral Q4H PRN Chinedu Blandon MD   2 tablet at 25 1227    [COMPLETED] perflutren (DEFINITY) 8.476 mg in Sodium Chloride (PF) 0.9 % 10 mL injection  2 mL Intravenous Once in imaging Chinedu Blandon MD   2 mL at 25 1749    Phosphorus Replacement - Follow Nurse / BPA Driven Protocol   Not Applicable PRN Chinedu Blandon MD        [COMPLETED] piperacillin-tazobactam (ZOSYN) 3.375 g IVPB in 100 mL NS MBP (CD)  3.375 g Intravenous Once Kelvin Falocn MD   3.375 g at 25 1129    piperacillin-tazobactam (ZOSYN) 3.375 g IVPB in 100 mL NS MBP (CD)  3.375 g Intravenous Q8H Kelvin Falcon MD   3.375 g at 25 0958    [COMPLETED] potassium chloride (KLOR-CON M20) CR tablet 40 mEq  40 mEq Oral Q4H Chinedu Blandon MD   40 mEq at 25 2221    [COMPLETED] potassium chloride (KLOR-CON M20) CR tablet 40 mEq  40 mEq Oral Q4H Chinedu Blandon MD   40 mEq at 25 1014    [COMPLETED] potassium chloride (KLOR-CON M20) CR tablet 40 mEq  40 mEq Oral Q4H Chinedu Blandon MD   40 mEq at 25 2128    [COMPLETED] potassium chloride (KLOR-CON M20) CR tablet 40 mEq  40 mEq Oral Q4H Chinedu Blandon MD   40 mEq at 25 1604    [COMPLETED] potassium chloride (KLOR-CON M20) CR tablet 40 mEq  40 mEq Oral Q4H Chinedu Blandon MD   40 mEq at 25 0221    potassium chloride (KLOR-CON M20) CR tablet 40 mEq  40 mEq Oral Q4H Chinedu Blandon MD   40 mEq at 25 1227    Potassium Replacement - Follow Nurse / BPA Driven Protocol   Not Applicable PRN Chinedu Blandon MD        pravastatin (PRAVACHOL) tablet 20 mg  20 mg Oral Daily Chinedu Blandon MD   20 mg at 25 0810    prochlorperazine (COMPAZINE) injection 5 mg  5 mg Intravenous Q6H PRN Christina Sheridan APRN   5 mg at 25 0124    [] sodium chloride 0.9 % infusion  75 mL/hr Intravenous Continuous Chinedu Blandon MD   Stopped at  07/02/25 1813    sucralfate (CARAFATE) tablet 1 g  1 g Oral 4x Daily AC & at Bedtime Kelvin Falcon MD   1 g at 07/09/25 1227    tiZANidine (ZANAFLEX) tablet 4 mg  4 mg Oral Q8H PRN Chinedu Blandon MD   4 mg at 07/04/25 0641    zolpidem (AMBIEN) tablet 5 mg  5 mg Oral Nightly Chinedu Blandon MD   5 mg at 07/08/25 2110     Lab Results (last 24 hours)       Procedure Component Value Units Date/Time    High Sensitivity Troponin T 1Hr [[id number]]  (Abnormal) Collected: 07/09/25 0747    Specimen: Blood Updated: 07/09/25 1016     HS Troponin T 18 ng/L      Troponin T Numeric Delta -1 ng/L      Troponin T % Delta -5    Narrative:      High Sensitive Troponin T Reference Range:  <14.0 ng/L- Negative Female for AMI  <22.0 ng/L- Negative Male for AMI  >=14 - Abnormal Female indicating possible myocardial injury.  >=22 - Abnormal Male indicating possible myocardial injury.   Clinicians would have to utilize clinical acumen, EKG, Troponin, and serial changes to determine if it is an Acute Myocardial Infarction or myocardial injury due to an underlying chronic condition.         Basic Metabolic Panel [[id number]]  (Abnormal) Collected: 07/09/25 0610    Specimen: Blood Updated: 07/09/25 0701     Glucose 133 mg/dL      BUN 5.0 mg/dL      Creatinine 0.48 mg/dL      Sodium 132 mmol/L      Potassium 2.9 mmol/L      Chloride 97 mmol/L      CO2 23.2 mmol/L      Calcium 8.2 mg/dL      BUN/Creatinine Ratio 10.4     Anion Gap 11.8 mmol/L      eGFR 102.7 mL/min/1.73     Narrative:      GFR Categories in Chronic Kidney Disease (CKD)              GFR Category          GFR (mL/min/1.73)    Interpretation  G1                    90 or greater        Normal or high (1)  G2                    60-89                Mild decrease (1)  G3a                   45-59                Mild to moderate decrease  G3b                   30-44                Moderate to severe decrease  G4                    15-29                Severe decrease  G5                     14 or less           Kidney failure    (1)In the absence of evidence of kidney disease, neither GFR category G1 or G2 fulfill the criteria for CKD.    eGFR calculation 2021 CKD-EPI creatinine equation, which does not include race as a factor    Magnesium [[id number]]  (Normal) Collected: 07/09/25 0610    Specimen: Blood Updated: 07/09/25 0701     Magnesium 1.8 mg/dL     High Sensitivity Troponin T [[id number]]  (Abnormal) Collected: 07/09/25 0610    Specimen: Blood Updated: 07/09/25 0659     HS Troponin T 19 ng/L     Narrative:      High Sensitive Troponin T Reference Range:  <14.0 ng/L- Negative Female for AMI  <22.0 ng/L- Negative Male for AMI  >=14 - Abnormal Female indicating possible myocardial injury.  >=22 - Abnormal Male indicating possible myocardial injury.   Clinicians would have to utilize clinical acumen, EKG, Troponin, and serial changes to determine if it is an Acute Myocardial Infarction or myocardial injury due to an underlying chronic condition.         AFB Culture - Body Fluid, Pleural Cavity [[id number]] Collected: 07/01/25 1600    Specimen: Body Fluid from Pleural Cavity Updated: 07/08/25 1631     AFB Culture No AFB isolated at 1 week     AFB Stain No acid fast bacilli seen on direct smear    Fungus Culture - Body Fluid, Pleural Cavity [[id number]] Collected: 07/01/25 1600    Specimen: Body Fluid from Pleural Cavity Updated: 07/08/25 1631     Fungus Culture No fungus isolated at 1 week          Imaging Results (Last 24 Hours)       Procedure Component Value Units Date/Time    CT Angiogram Chest - In process [[id number]] Resulted: 07/09/25 1459     Updated: 07/09/25 1502    This result has not been signed. Information might be incomplete.      XR Chest 1 View [[id number]] Collected: 07/09/25 0634     Updated: 07/09/25 0639    Narrative:      AP CHEST     HISTORY: Follow-up pleural effusion     COMPARISON: 7/8/2025     FINDINGS: Small right pleural effusion appears slightly increased.  There  is some increased overlying atelectasis/consolidation. There is some  increased patchy density in the left upper lung suspicious for  developing pneumonia. Heart size stable. Postop changes cervical spine       Impression:      1. Small right pleural effusion appears slightly increased  2. Increased patchy density in the left upper lung suspicious for  developing pneumonia. Follow-up to clearing recommended           This report was finalized on 7/9/2025 6:36 AM by Dr. Jayme Arnold M.D  on Workstation: [id number]             ECG/EMG Results (last 24 hours)       Procedure Component Value Units Date/Time    Telemetry Scan [[id number]] Resulted: 07/08/25 1250     Updated: 07/08/25 1735    ECG 12 Lead Rhythm Change [[id number]] Collected: 07/09/25 0649     Updated: 07/09/25 0651     QT Interval 410 ms      QTC Interval 522 ms     Narrative:      HEART RATE=98  bpm  RR Qcyhsjmf=713  ms  AL Hduzxlns=912  ms  P Horizontal Axis=39  deg  P Front Axis=-17  deg  QRSD Fjuqtjiq=315  ms  QT Dgmkjdrt=435  ms  KGrT=933  ms  QRS Axis=117  deg  T Wave Axis=72  deg  - ABNORMAL ECG -  Sinus rhythm  Atrial premature complex  Right axis deviation  Abnormal R-wave progression, late transition  Prolonged QT interval  Date and Time of Study:2025-07-09 06:49:03    Telemetry Scan [[id number]] Resulted: 07/09/25 0556     Updated: 07/09/25 0737          Orders (last 24 hrs)        Start     Ordered    07/09/25 2034  Potassium  Timed         07/09/25 0733    07/09/25 1545  iopamidol (ISOVUE-370) 76 % injection 100 mL  Once in Imaging         07/09/25 1459    07/09/25 1330  metoprolol tartrate (LOPRESSOR) tablet 25 mg  Every 12 Hours Scheduled         07/09/25 1242    07/09/25 0830  potassium chloride (KLOR-CON M20) CR tablet 40 mEq  Every 4 Hours         07/09/25 0733    07/09/25 0710  High Sensitivity Troponin T 1Hr  PROCEDURE ONCE         07/09/25 0659    07/09/25 0633  ECG 12 Lead Rhythm Change  Once,   Status:  Canceled          07/09/25 0632    07/09/25 0600  Basic Metabolic Panel  Morning Draw         07/09/25 0537    07/09/25 0600  Magnesium  Morning Draw         07/09/25 0537    07/09/25 0556  Telemetry Scan  Once         07/09/25 0556    07/09/25 0544  Inpatient Cardiology Consult  Once        Specialty:  Cardiology  Provider:  Nisa Phelps MD    07/09/25 0543    07/09/25 0537  High Sensitivity Troponin T  Once         07/09/25 0537    07/09/25 0535  ECG 12 Lead Rhythm Change  STAT         07/09/25 0535    07/05/25 1830  piperacillin-tazobactam (ZOSYN) 3.375 g IVPB in 100 mL NS MBP (CD)  Every 8 Hours         07/05/25 1056    07/05/25 0900  alteplase ((CATHFLO/ACTIVASE)) 25 mg in sodium chloride 0.9 % 50 mL INTRAPLEURAL syringe  Daily         07/04/25 1535    07/05/25 0800  Dietary Nutrition Supplements Boost Plus (Ensure Enlive, Ensure Plus)  Daily With Breakfast, Lunch & Dinner       07/04/25 2306    07/05/25 0600  XR Chest 1 View  Daily       07/04/25 1536    07/05/25 0119  prochlorperazine (COMPAZINE) injection 5 mg  Every 6 Hours PRN         07/05/25 0119    07/04/25 1730  sucralfate (CARAFATE) tablet 1 g  4 Times Daily Before Meals & Nightly         07/04/25 1537    07/04/25 1515  calcium carbonate (TUMS) chewable tablet 500 mg (200 mg elemental)  Every 6 Hours PRN         07/04/25 1516    07/03/25 1135  oxyCODONE-acetaminophen (PERCOCET) 5-325 MG per tablet 2 tablet  Every 4 Hours PRN         07/03/25 1135    07/01/25 1051  HYDROmorphone (DILAUDID) injection 1 mg  Every 2 Hours PRN         07/01/25 1051    07/01/25 0700  buPROPion XL (WELLBUTRIN XL) 24 hr tablet 150 mg  Every Morning         06/30/25 1811    06/30/25 2100  zolpidem (AMBIEN) tablet 5 mg  Nightly         06/30/25 1811    06/30/25 2000  Vital Signs  Every 4 Hours      Comments: Per per hospital policy    06/30/25 1646    06/30/25 2000  Strict Intake & Output  Every 4 Hours       06/30/25 1646    06/30/25 1900  ipratropium-albuterol (DUO-NEB) nebulizer solution  "3 mL  Every 6 Hours While Awake - RT         06/30/25 1754    06/30/25 1900  escitalopram (LEXAPRO) tablet 20 mg  Daily         06/30/25 1811 06/30/25 1900  FLUoxetine (PROzac) capsule 40 mg  Daily         06/30/25 1811 06/30/25 1900  pravastatin (PRAVACHOL) tablet 20 mg  Daily         06/30/25 1811 06/30/25 1811  tiZANidine (ZANAFLEX) tablet 4 mg  Every 8 Hours PRN         06/30/25 1811 06/30/25 1811  ondansetron ODT (ZOFRAN-ODT) disintegrating tablet 4 mg  Every 8 Hours PRN         06/30/25 1811    06/30/25 1810  LORazepam (ATIVAN) tablet 1 mg  Every 6 Hours PRN         06/30/25 1811 06/30/25 1810  albuterol (PROVENTIL) nebulizer solution 0.083% 2.5 mg/3mL  Every 6 Hours PRN         06/30/25 1811    06/30/25 1800  Oral Care  2 Times Daily       06/30/25 1646    06/30/25 1800  Incentive Spirometry  Every 4 Hours While Awake       06/30/25 1646    06/30/25 1647  Daily Weights  Daily       06/30/25 1646    06/30/25 1647  Intake & Output  Every Shift       06/30/25 1646    06/30/25 1646  Potassium Replacement - Follow Nurse / BPA Driven Protocol  As Needed         06/30/25 1646    06/30/25 1646  Magnesium Standard Dose Replacement - Follow Nurse / BPA Driven Protocol  As Needed         06/30/25 1646    06/30/25 1646  Phosphorus Replacement - Follow Nurse / BPA Driven Protocol  As Needed         06/30/25 1646    06/30/25 1646  Calcium Replacement - Follow Nurse / BPA Driven Protocol  As Needed         06/30/25 1646    06/30/25 1645  sennosides-docusate (PERICOLACE) 8.6-50 MG per tablet 2 tablet  2 Times Daily PRN        Placed in \"And\" Linked Group    06/30/25 1646    06/30/25 1645  polyethylene glycol (MIRALAX) packet 17 g  Daily PRN        Placed in \"And\" Linked Group    06/30/25 1646    06/30/25 1645  bisacodyl (DULCOLAX) EC tablet 5 mg  Daily PRN        Placed in \"And\" Linked Group    06/30/25 1646    06/30/25 1645  bisacodyl (DULCOLAX) suppository 10 mg  Daily PRN        Placed in \"And\" Linked Group " "   25 1646    25 164  ondansetron ODT (ZOFRAN-ODT) disintegrating tablet 4 mg  Every 6 Hours PRN        Placed in \"Or\" Linked Group    25 1646    25 1645  ondansetron (ZOFRAN) injection 4 mg  Every 6 Hours PRN        Placed in \"Or\" Linked Group    25 1646    25 1645  nitroglycerin (NITROSTAT) SL tablet 0.4 mg  Every 5 Minutes PRN         25 16425 164  acetaminophen (TYLENOL) tablet 650 mg  Every 4 Hours PRN         25 164    Unscheduled  Up with assistance  As Needed       25 164    Unscheduled  Oxygen Therapy- Nasal Cannula; Titrate 1-6 LPM Per SpO2; 90 - 95%  Continuous PRN       25 164                  Operative/Procedure Notes (last 24 hours)  Notes from 25 1554 through 25 1554   No notes of this type exist for this encounter.          Physician Progress Notes (last 24 hours)        Chinedu Blandon MD at 25 1147              Name: Yoanna Reed ADMIT: 2025   : 1955  PCP: Tom Muller MD    MRN: [id number] LOS: 9 days   AGE/SEX: 69 y.o. female  ROOM: [facility]     Subjective   Subjective   .  Patient is lying on the bed and is complaining of pain.  Denied nausea, vomiting, chest pain, palpitations.        Objective   Objective   Vital Signs  Temp:  [98.6 °F (37 °C)-98.8 °F (37.1 °C)] 98.8 °F (37.1 °C)  Heart Rate:  [] 112  Resp:  [16-18] 18  BP: (130-156)/(82-96) 156/96  SpO2:  [91 %-95 %] 93 %  on   ;   Device (Oxygen Therapy): room air  Body mass index is 26.55 kg/m².  Physical Exam      HEENT: PERRLA, extraocular movements intact, Scleras no icterus  Neck: Supple, no JVD  Cardiovascular: Regular rate and rhythm with normal S1 and S2  Respiratory: Diminished breath sounds on the right and left is fairly clear.  GI: Soft, nontender, bowel sounds present  Extremities: No edema, palpable pedal pulses  Neurologic: Grossly nonfocal, no facial asymmetry        Results Review     I reviewed " "the patient's new clinical results.  Results from last 7 days   Lab Units 07/07/25  0539 07/06/25  0518 07/05/25  0641 07/04/25  0559   WBC 10*3/mm3 17.45* 19.16* 19.16* 19.32*   HEMOGLOBIN g/dL 11.1* 11.1* 10.9* 12.3   PLATELETS 10*3/mm3 692* 655* 677* 743*     Results from last 7 days   Lab Units 07/09/25  0610 07/07/25  0539 07/06/25  0518 07/05/25  1929 07/05/25  0641   SODIUM mmol/L 132* 133* 133*  --  132*   POTASSIUM mmol/L 2.9* 3.7 4.1 3.4* 3.4*   CHLORIDE mmol/L 97* 100 98  --  96*   CO2 mmol/L 23.2 23.5 24.9  --  25.0   BUN mg/dL 5.0* 7.0* 6.0*  --  5.0*   CREATININE mg/dL 0.48* 0.56* 0.51*  --  0.46*   GLUCOSE mg/dL 133* 113* 124*  --  117*   EGFR mL/min/1.73 102.7 98.9 101.2  --  103.7           Results from last 7 days   Lab Units 07/09/25  0610 07/07/25  0539 07/06/25 0518 07/05/25  0641   CALCIUM mg/dL 8.2* 8.5* 8.4* 8.2*   MAGNESIUM mg/dL 1.8  --   --   --      Results from last 7 days   Lab Units 07/05/25  0641 07/03/25  0222   PROCALCITONIN ng/mL 38.40* 0.42*     No results found for: \"HGBA1C\", \"POCGLU\"    XR Chest 1 View  Result Date: 7/9/2025  1. Small right pleural effusion appears slightly increased 2. Increased patchy density in the left upper lung suspicious for developing pneumonia. Follow-up to clearing recommended    This report was finalized on 7/9/2025 6:36 AM by Dr. Jayme Arnold M.D on Workstation: [id number]          I have personally reviewed all medications:  Scheduled Medications  alteplase ((CATHFLO/ACTIVASE)) 25 mg in sodium chloride 0.9 % 50 mL INTRAPLEURAL syringe, 25 mg, Intrapleural, Daily  buPROPion XL, 150 mg, Oral, QAM  escitalopram, 20 mg, Oral, Daily  FLUoxetine, 40 mg, Oral, Daily  ipratropium-albuterol, 3 mL, Nebulization, Q6H While Awake - RT  metoprolol tartrate, 25 mg, Oral, Q12H  piperacillin-tazobactam, 3.375 g, Intravenous, Q8H  potassium chloride ER, 40 mEq, Oral, Q4H  pravastatin, 20 mg, Oral, Daily  sucralfate, 1 g, Oral, 4x Daily AC & at Bedtime  zolpidem, " 5 mg, Oral, Nightly    Infusions     Diet  Diet: Regular/House; Fluid Consistency: Thin (IDDSI 0)    I have personally reviewed:  [x]  Laboratory   [x]  Microbiology   [x]  Radiology   [x]  EKG/Telemetry  [x]  Cardiology/Vascular   []  Pathology    []  Records      Assessment/Plan     Active Hospital Problems    Diagnosis  POA    Pleural effusion [J90]  Unknown    Near syncope [R55]  Unknown    TUSHAR (acute kidney injury) [N17.9]  Unknown    Elevated troponin [R79.89]  Unknown    Chronic pain [G89.29]  Yes      Resolved Hospital Problems   No resolved problems to display.       69 y.o. female admitted with <principal problem not specified>.      1. Dyspnea/pleural effusion with underlying nodule/mass/leukocytosis, underlying pneumonia cannot be completely ruled out and therefore IV Rocephin was initiated and later transitioned to IV Zosyn.  Status post thoracentesis On 7/1 with liver of 1 L and it was noted to be exudative.  Given presence of loculated effusion underwent IR guided chest tube placement on 7/3/2025.  tPA was attempted on 7/5 but could not be administered given possible malpositioning tube.  Chest tube output has tapered therefore it has been discontinued on 7/6/2025 and repeat chest x-ray revealed vague opacity in the right middle and lower lung zone consistent with combination of airspace disease and small amount of pleural effusion.  Due for CTA chest today.    2.  Elevated troponin, not secondary to MI and cardiology did evaluate.  2D echo has been done and revealed normal ejection fraction.    3.  Chronic pain syndrome, continue with analgesics.    4.  History of asthma, on albuterol nebs as needed and currently not in any exacerbation.    5.  Acute kidney injury, was gently volume resuscitated with which creatinine has improved from 1.46>0.51>0.48.      6.  History of hep C, treated according to patient.    7.  Right upper lobe lung nodule, management per pulmonary.    8.  Hyponatremia,sodium level  better at 132 and clinically insignificant.    8.  On SCDs for DVT prophylaxis.    9.  CODE STATUS is full code.    Copy text on This note has been reviewed by me on 25          Chinedu Blandon MD  [facility]  25  15:47 EDT      Electronically signed by Chinedu Blandon MD at 25 1037       Kelvin Falcon MD at 25 1160              [facility]  [phone]  Dr. Kelvin Falcon    Subjective:  LOS: 9  [unfilled]      Chief Complaint: Right effusion    Her chest pains are much better.  She is having some palpitations this morning.  CT chest has not yet been completed.  She denies any new problems with her breathing.    Objective   Vital Signs past 24hrs  Temp range: Temp (24hrs), Av.5 °F (36.9 °C), Min:98.1 °F (36.7 °C), Max:98.8 °F (37.1 °C)    BP range: BP: (130-154)/(82-95) 154/87  Pulse range: Heart Rate:  [] 99  Resp rate range: Resp:  [16-18] 18  Device (Oxygen Therapy): room air   Oxygen range:SpO2:  [91 %-95 %] 93 %   Mechanical Ventilator:     Physical Exam  Eyes:      Pupils: Pupils are equal, round, and reactive to light.   Cardiovascular:      Rate and Rhythm: Normal rate and regular rhythm.      Heart sounds: No murmur heard.  Pulmonary:      Effort: Pulmonary effort is normal.      Breath sounds: Examination of the right-lower field reveals decreased breath sounds and rales. Decreased breath sounds and rales present.   Abdominal:      General: Bowel sounds are normal.      Palpations: Abdomen is soft. There is no mass.      Tenderness: There is no abdominal tenderness.   Musculoskeletal:         General: No swelling.   Neurological:      Mental Status: She is alert.       Results Review:    I have reviewed the laboratory and imaging data since the last note by [facility] physician.  My annotations are noted in assessment and plan.      Result Review:  I have personally reviewed the results from last note by [facility] physician to 2025 08:25 EDT  and agree with these findings:  [x]  Laboratory list / accordion  [x]  Microbiology  [x]  Radiology  []  EKG/Telemetry   []  Cardiology/Vascular   []  Pathology  []  Old records  []  Other:      Medication Review:  I have reviewed the current MAR.  My annotations are noted in assessment and plan.    alteplase ((CATHFLO/ACTIVASE)) 25 mg in sodium chloride 0.9 % 50 mL INTRAPLEURAL syringe, 25 mg, Intrapleural, Daily  buPROPion XL, 150 mg, Oral, QAM  escitalopram, 20 mg, Oral, Daily  FLUoxetine, 40 mg, Oral, Daily  ipratropium-albuterol, 3 mL, Nebulization, Q6H While Awake - RT  piperacillin-tazobactam, 3.375 g, Intravenous, Q8H  potassium chloride ER, 40 mEq, Oral, Q4H  pravastatin, 20 mg, Oral, Daily  sucralfate, 1 g, Oral, 4x Daily AC & at Bedtime  zolpidem, 5 mg, Oral, Nightly           Lines, Drains & Airways       Active LDAs       Name Placement date Placement time Site Days    Peripheral IV 07/03/25 0829 20 G Anterior;Right;Upper Arm 07/03/25  0829  Arm  1    Chest Tube 1 Right Pleural 07/03/25  1404  Pleural  1                    PCCM Problems  Right loculated parapneumonic effusion  Thoracentesis (7/1)-1 L removed, exudative  Right upper lobe pulmonary nodule  Suspect sepsis secondary to pneumonia  Asthma  Elevated BNP  Near syncope  Frequent falls  Chronic pain  Migraines  Hyponatremia  Acute kidney injury   Leukocytosis  Dextroscoliosis of lumbar spine  History of tobacco abuse  Hypokalemia        Plan of Treatment    Right chest tube removed. Observe on abx. No fever.  No white count available.    Continue to monitor for now.  In view of parapneumonic effusion with loculation and very high procalcitonin, switched to Zosyn. Finish course.     Her chest pain is improved.  Pending CTA chest.    Right upper lobe pulmonary nodule 1.9 cm requires follow-up imaging.    Please consider adding DVT prophylaxis with subcu heparin or Lovenox. Message to RN.    Patient reports palpitations.  Premature complexes on the  EKG.  Note potassium is low at 2.9.  Defer to primary team to replace.  Magnesium is also borderline and may benefit from replacement.    Kelvin Falcon MD  07/09/25  08:25 EDT    Isolation status: No active isolations    Dietary Orders (From admission, onward)       Start     Ordered    07/05/25 0800  Dietary Nutrition Supplements Boost Plus (Ensure Enlive, Ensure Plus)  Daily With Breakfast, Lunch & Dinner      Question:  Select Supplement:  Answer:  Boost Plus (Ensure Enlive, Ensure Plus)    07/04/25 2306    07/03/25 1724  Diet: Regular/House; Fluid Consistency: Thin (IDDSI 0)  Diet Effective Now        References:    Diet Order Definitions   Question Answer Comment   Diets: Regular/House    Fluid Consistency: Thin (IDDSI 0)        07/03/25 1723                    Part of this note may be an electronic transcription/translation of spoken language to printed text using the Dragon Dictation System.      Electronically signed by Kelvin Falcon MD at 07/09/25 0829       Nisa Phelps MD at 07/09/25 0729            [facility] Follow Up    Chief Complaint: Reconsult for afib/vtach    Interval History: Reconsulted to evaluate the patient for possible atrial fibrillation and ventricular tachycardia.  The patient had episodes of elevated heart rate overnight which on my review appears to be sinus rhythm and sinus tachycardia with frequent PACs.  She also was noted to have intermittent premature ventricular tractions.  There was 1 prolonged episode of tachycardia with the same morphology as her sinus beats and appears to be a supraventricular tachycardia.  No ventricular tachycardia noted on my review.  The patient denied any symptoms with any of these episodes.  She continues to complain of right-sided chest pain which she has been having.  She denies any worsening shortness of breath.  Denies any lightheadedness, palpitations, near-syncope or syncope or any other chest discomfort.    Objective:  "    Objective:  Temp:  [98.1 °F (36.7 °C)-98.8 °F (37.1 °C)] 98.8 °F (37.1 °C)  Heart Rate:  [] 99  Resp:  [16-18] 18  BP: (130-154)/(82-95) 154/87     Intake/Output Summary (Last 24 hours) at 7/9/2025 1239  Last data filed at 7/9/2025 0658  Gross per 24 hour   Intake 480 ml   Output 800 ml   Net -320 ml     Body mass index is 26.55 kg/m².      07/05/25  0654 07/07/25  0500 07/08/25  0719   Weight: 83.3 kg (183 lb 10.3 oz) 82.3 kg (181 lb 7 oz) 79.2 kg (174 lb 9.7 oz)     Weight change:       Physical Exam:   General : Alert, cooperative, in no acute distress.  Neuro: Alert,cooperative and oriented.  Lungs: CTAB. Normal respiratory effort and rate.  CV: Regular rate and rhythm, normal S1 and S2, no murmurs, gallops or rubs.  ABD: Soft, nontender, nondistended. Positive bowel sounds.  Extr: No edema or cyanosis, moves all extremities.    Lab Review:   Results from last 7 days   Lab Units 07/09/25  0610 07/07/25  0539   SODIUM mmol/L 132* 133*   POTASSIUM mmol/L 2.9* 3.7   CHLORIDE mmol/L 97* 100   CO2 mmol/L 23.2 23.5   BUN mg/dL 5.0* 7.0*   CREATININE mg/dL 0.48* 0.56*   GLUCOSE mg/dL 133* 113*   CALCIUM mg/dL 8.2* 8.5*     Results from last 7 days   Lab Units 07/09/25  0747 07/09/25  0610   HSTROP T ng/L 18* 19*     Results from last 7 days   Lab Units 07/07/25  0539 07/06/25  0518   WBC 10*3/mm3 17.45* 19.16*   HEMOGLOBIN g/dL 11.1* 11.1*   HEMATOCRIT % 34.1 32.4*   PLATELETS 10*3/mm3 692* 655*         Results from last 7 days   Lab Units 07/09/25  0610   MAGNESIUM mg/dL 1.8           Invalid input(s): \"LDLCALC\"          I reviewed the patient's new clinical results.  I personally viewed and interpreted the patient's EKG  Current Medications:   Scheduled Meds:alteplase ((CATHFLO/ACTIVASE)) 25 mg in sodium chloride 0.9 % 50 mL INTRAPLEURAL syringe, 25 mg, Intrapleural, Daily  buPROPion XL, 150 mg, Oral, QAM  escitalopram, 20 mg, Oral, Daily  FLUoxetine, 40 mg, Oral, Daily  ipratropium-albuterol, 3 mL, " Nebulization, Q6H While Awake - RT  piperacillin-tazobactam, 3.375 g, Intravenous, Q8H  potassium chloride ER, 40 mEq, Oral, Q4H  pravastatin, 20 mg, Oral, Daily  sucralfate, 1 g, Oral, 4x Daily AC & at Bedtime  zolpidem, 5 mg, Oral, Nightly      Continuous Infusions:     Allergies:  No Known Allergies    Assessment/Plan:     1.  Tachycardia.  Reviewed her telemetry.  As above I do not see any definitive evidence of atrial fibrillation.  It appears that she has been in sinus with frequent premature atrial contractions.  She did have 1 episode that appears to be supraventricular tachycardia that was asymptomatic.  There was no evidence of ventricular tachycardia.  This morning she is in sinus rhythm.  2.  Hypokalemia.  Replacement underway.  3.  Right loculated parapneumonic effusion.  Status post chest tube placement and removal.  4.  Right upper lobe pulmonary nodule  5.  Pneumonia  6.  Asthma  7.  Hypertension.  Her blood pressures have been persistently elevated here.  Currently untreated.    -On my review of the telemetry I do not see any definitive evidence of atrial fibrillation or any evidence of ventricular tachycardia.  Episode of tachycardia last night appears to be a supraventricular tachycardia that was asymptomatic.  -Agree with replacement of potassium.  - No need for any further cardiac workup at this time.  - For her hypertension and relatively elevated heart rates with ectopy would recommend starting a beta-blocker.  Will start metoprolol tartrate 25 mg twice a day.        Nisa Phelps MD  07/09/25  12:39 EDT    Electronically signed by Nisa Phelps MD at 07/09/25 1242       Consult Notes (last 24 hours)  Notes from 07/08/25 1554 through 07/09/25 1554   No notes of this type exist for this encounter.

## 2025-07-09 NOTE — THERAPY TREATMENT NOTE
Patient Name: Yoanna Reed  : 1955    MRN: [id number]                              Today's Date: 2025       Admit Date: 2025    Visit Dx:     ICD-10-CM ICD-9-CM   1. Severe sepsis  A41.9 038.9    R65.20 995.92   2. Near syncope  R55 780.2   3. NSTEMI (non-ST elevated myocardial infarction)  I21.4 410.70   4. Pleural effusion  J90 511.9   5. Acute midline low back pain without sciatica  M54.50 724.2   6. Recurrent falls  R29.6 V15.88     Patient Active Problem List   Diagnosis    Encounter for screening for malignant neoplasm of colon    Acute pain of right shoulder    Pain of right humerus    Chronic pain    Flank pain    Acute cystitis with hematuria    Acute vaginitis    Dysuria    Hematuria    Mass of nipple    Severe sepsis    Pleural effusion    Near syncope    TUSHAR (acute kidney injury)    Elevated troponin     Past Medical History:   Diagnosis Date    Anxiety and depression     Asthma     Limited joint range of motion     NECK    Liver failure     DUE TO MEDICATION    Mass of nipple     RIGHT    Skin cancer     RIGHT CHEEK     Past Surgical History:   Procedure Laterality Date    ANTERIOR CERVICAL FUSION      ARTERY SURGERY Left     KNEE AGE 15 DUE TO CUT TO KNEE    BREAST BIOPSY Right 2024    Procedure: RIGHT NIPPLE LESION EXCISION;  Surgeon: Raven Aviles MD;  Location: [facility] OR [facility] [place], [place];  Service: General;  Laterality: Right;    CHOLECYSTECTOMY      COLONOSCOPY W/ POLYPECTOMY N/A 2024    Procedure: COLONOSCOPY to cecum with cold polypectomies;  Surgeon: Ankur Beebe MD;  Location: [facility] ENDOSCOPY;  Service: Gastroenterology;  Laterality: N/A;  PRE - screening  POST - polyps    HYSTERECTOMY      Partial    POSTERIOR CERVICAL FUSION        General Information       Row Name 25 1518          Physical Therapy Time and Intention    Document Type therapy note (daily note)  -SM     Mode of Treatment individual therapy;physical therapy  -SM       Row Name  07/09/25 1518          General Information    Patient Profile Reviewed yes  -     Existing Precautions/Restrictions fall  -SM       Row Name 07/09/25 1518          Cognition    Orientation Status (Cognition) oriented x 3  -       Row Name 07/09/25 1518          Safety Issues/Impairments Affecting Functional Mobility    Impairments Affecting Function (Mobility) endurance/activity tolerance;strength;pain;shortness of breath  -               User Key  (r) = Recorded By, (t) = Taken By, (c) = Cosigned By      Initials Name Provider Type     Carmen Wade PT Physical Therapist                   Mobility       Row Name 07/09/25 1518          Bed Mobility    Bed Mobility supine-sit;sit-supine  -     Supine-Sit [place] (Bed Mobility) standby assist  -     Sit-Supine [place] (Bed Mobility) standby assist  -     Assistive Device (Bed Mobility) head of bed elevated  -       Row Name 07/09/25 1518          Sit-Stand Transfer    Sit-Stand [place] (Transfers) contact guard  -       Row Name 07/09/25 1518          Gait/Stairs (Locomotion)    [place] Level (Gait) contact guard  -     Distance in Feet (Gait) 100  -     Deviations/Abnormal Patterns (Gait) stride length decreased;gait speed decreased;tangela decreased  -     Bilateral Gait Deviations forward flexed posture  -     Comment, (Gait/Stairs) Gait slow and gaurded with fwd flexed posture. Standing rest breaks needed 2/2 fatigue.  -               User Key  (r) = Recorded By, (t) = Taken By, (c) = Cosigned By      Initials Name Provider Type     Carmen Wade PT Physical Therapist                   Obj/Interventions       Row Name 07/09/25 1518          Balance    Balance Assessment sitting static balance;sitting dynamic balance;standing static balance;standing dynamic balance  -     Static Sitting Balance supervision  -     Dynamic Sitting Balance supervision  -     Position, Sitting Balance sitting edge of bed   -SM     Static Standing Balance standby assist  -SM     Dynamic Standing Balance contact guard  -SM     Balance Interventions sitting;standing;sit to stand;static;dynamic  -SM               User Key  (r) = Recorded By, (t) = Taken By, (c) = Cosigned By      Initials Name Provider Type    Carmen Best PT Physical Therapist                   Goals/Plan       Row Name 07/09/25 1521          Transfer Goal 1 (PT)    Activity/Assistive Device (Transfer Goal 1, PT) sit-to-stand/stand-to-sit;bed-to-chair/chair-to-bed  -SM     [place] Level/Cues Needed (Transfer Goal 1, PT) modified independence  -SM     Time Frame (Transfer Goal 1, PT) 1 week  -SM     Progress/Outcome (Transfer Goal 1, PT) goal revised this date;goal ongoing  -SM       Row Name 07/09/25 1521          Gait Training Goal 1 (PT)    Time Frame (Gait Training Goal 1, PT) 1 week  -SM     Progress/Outcome (Gait Training Goal 1, PT) goal ongoing;goal revised this date  -SM               User Key  (r) = Recorded By, (t) = Taken By, (c) = Cosigned By      Initials Name Provider Type    Carmen Best PT Physical Therapist                   Clinical Impression       Row Name 07/09/25 1519          Pain    Pain Location back  -SM     Pain Management Interventions exercise or physical activity utilized  -     Response to Pain Interventions activity participation with tolerable pain  -SM     Pre/Posttreatment Pain Comment Patient did not rate  -SM       [facility] Name 07/09/25 1519          Plan of Care Review    Plan of Care Reviewed With patient  -SM     Outcome Evaluation Patient seen for PT session this PM. Patient supine in bed upon arrival. Patient sat up to EOB with SBA. Patient stood from EOB with SBA. Patient ambulated 100ft with rwx and CGA. Gait slow and gaurded with fwd flexed posture. Standing rest breaks needed 2/2 fatigue and SOA. Patient declined sitting UIC and returned to bed at end of session. Encouraged patient to continue mobilizing  with nsg several times per day. Acute PT will continue to monitor.  -       Row Name 07/09/25 1519          Vital Signs    Pre Patient Position Supine  -     Intra Patient Position Standing  -SM     Post Patient Position Supine  -       Row Name 07/09/25 1519          Positioning and Restraints    Pre-Treatment Position in bed  -SM     Post Treatment Position bed  -SM     In Bed notified nsg;call light within reach;encouraged to call for assist;exit alarm on;fowlers  -               User Key  (r) = Recorded By, (t) = Taken By, (c) = Cosigned By      Initials Name Provider Type    Carmen Best, MERA Physical Therapist                   Outcome Measures       Row Name 07/09/25 1520 07/09/25 1442       How much help from another person do you currently need...    Turning from your back to your side while in flat bed without using bedrails? 4  -SM 4  -GG    Moving from lying on back to sitting on the side of a flat bed without bedrails? 4  - 4  -GG    Moving to and from a bed to a chair (including a wheelchair)? 3  -SM 3  -GG    Standing up from a chair using your arms (e.g., wheelchair, bedside chair)? 4  - 3  -GG    Climbing 3-5 steps with a railing? 2  -SM 2  -GG    To walk in hospital room? 3  -SM 3  -GG    AM-PAC 6 Clicks Score (PT) 20  -SM 19  -GG    Highest Level of Mobility Goal Walk 10 Steps or More-6  -SM Walk 10 Steps or More-6  -GG      Row Name 07/09/25 0811          How much help from another person do you currently need...    Turning from your back to your side while in flat bed without using bedrails? 4  -GG     Moving from lying on back to sitting on the side of a flat bed without bedrails? 4  -GG     Moving to and from a bed to a chair (including a wheelchair)? 3  -GG     Standing up from a chair using your arms (e.g., wheelchair, bedside chair)? 3  -GG     Climbing 3-5 steps with a railing? 2  -GG     To walk in hospital room? 3  -GG     AM-PAC 6 Clicks Score (PT) 19  -GG     Highest  Level of Mobility Goal Walk 10 Steps or More-6  -GG       Row Name 07/09/25 1520          Functional Assessment    Outcome Measure Options AM-PAC 6 Clicks Basic Mobility (PT)  -               User Key  (r) = Recorded By, (t) = Taken By, (c) = Cosigned By      Initials Name Provider Type     Carmen Wade, PT Physical Therapist    Paloma Montgomery, RN Registered Nurse                                 Physical Therapy Education       Title: PT OT SLP Therapies (Done)       Topic: Physical Therapy (Done)       Point: Mobility training (Done)       Learning Progress Summary            Patient Acceptance, E, VU by  at 7/9/2025 1521    Acceptance, E, VU,NR by  at 7/7/2025 1340    Acceptance, E, VU by  at 7/4/2025 1825    Acceptance, E, NR by  at 7/2/2025 1706                      Point: Home exercise program (Done)       Learning Progress Summary            Patient Acceptance, E, VU by  at 7/9/2025 1521    Acceptance, E, VU,NR by  at 7/7/2025 1340    Acceptance, E, VU by  at 7/4/2025 1825                      Point: Body mechanics (Done)       Learning Progress Summary            Patient Acceptance, E, VU by  at 7/9/2025 1521    Acceptance, E, VU,NR by  at 7/7/2025 1340    Acceptance, E, VU by  at 7/4/2025 1825                      Point: Precautions (Done)       Learning Progress Summary            Patient Acceptance, E, VU by  at 7/9/2025 1521    Acceptance, E, VU,NR by  at 7/7/2025 1340    Acceptance, E, VU by  at 7/4/2025 1825                                      User Key       Initials Effective Dates Name Provider Type Discipline     06/16/21 -  Krystyna De León PT Physical Therapist PT     06/12/24 -  Breezy Louis, RN Registered Nurse Nurse     05/02/22 -  Carmen Wade PT Physical Therapist PT                  PT Recommendation and Plan     Outcome Evaluation: Patient seen for PT session this PM. Patient supine in bed upon arrival. Patient sat up to EOB with SBA.  Patient stood from EOB with SBA. Patient ambulated 100ft with rwx and CGA. Gait slow and gaurded with fwd flexed posture. Standing rest breaks needed 2/2 fatigue and SOA. Patient declined sitting UIC and returned to bed at end of session. Encouraged patient to continue mobilizing with nsg several times per day. Acute PT will continue to monitor.     Time Calculation:         PT Charges       Row Name 07/09/25 1521             Time Calculation    Start Time 1336  -SM      Stop Time 1346  -SM      Time Calculation (min) 10 min  -SM      PT Received On 07/09/25  -      PT - Next Appointment 07/11/25  -      PT Goal Re-Cert Due Date 07/16/25  -         Time Calculation- PT    Total Timed Code Minutes- PT 10 minute(s)  -SM         Timed Charges    94972 - PT Therapeutic Activity Minutes 10  -SM         Total Minutes    Timed Charges Total Minutes 10  -SM       Total Minutes 10  -SM                User Key  (r) = Recorded By, (t) = Taken By, (c) = Cosigned By      Initials Name Provider Type     Carmen Wade PT Physical Therapist                  Therapy Charges for Today       Code Description Service Date Service Provider Modifiers Qty    [id number]  PT THERAPEUTIC ACT EA 15 MIN 7/9/2025 Carmen Wade, MERA GP 1            PT G-Codes  Outcome Measure Options: AM-PAC 6 Clicks Basic Mobility (PT)  AM-PAC 6 Clicks Score (PT): 20       Carmen Wade PT  7/9/2025

## 2025-07-10 ENCOUNTER — APPOINTMENT (OUTPATIENT)
Dept: CARDIOLOGY | Facility: HOSPITAL | Age: 70
DRG: 871 | End: 2025-07-10
Payer: MEDICARE

## 2025-07-10 ENCOUNTER — APPOINTMENT (OUTPATIENT)
Dept: GENERAL RADIOLOGY | Facility: HOSPITAL | Age: 70
DRG: 871 | End: 2025-07-10
Payer: MEDICARE

## 2025-07-10 LAB
ANION GAP SERPL CALCULATED.3IONS-SCNC: 13 MMOL/L (ref 5–15)
BH CV ECHO MEAS - EDV(CUBED): 87.4 ML
BH CV ECHO MEAS - EDV(MOD-SP2): 41 ML
BH CV ECHO MEAS - EDV(MOD-SP4): 54 ML
BH CV ECHO MEAS - EF(MOD-SP2): 63.4 %
BH CV ECHO MEAS - EF(MOD-SP4): 61.1 %
BH CV ECHO MEAS - ESV(CUBED): 27 ML
BH CV ECHO MEAS - ESV(MOD-SP2): 15 ML
BH CV ECHO MEAS - ESV(MOD-SP4): 21 ML
BH CV ECHO MEAS - FS: 32.4 %
BH CV ECHO MEAS - IVS/LVPW: 1.41 CM
BH CV ECHO MEAS - IVSD: 1.37 CM
BH CV ECHO MEAS - LAT PEAK E' VEL: 6.5 CM/SEC
BH CV ECHO MEAS - LV DIASTOLIC VOL/BSA (35-75): 28 CM2
BH CV ECHO MEAS - LV MASS(C)D: 187.5 GRAMS
BH CV ECHO MEAS - LV SYSTOLIC VOL/BSA (12-30): 10.9 CM2
BH CV ECHO MEAS - LVIDD: 4.4 CM
BH CV ECHO MEAS - LVIDS: 3 CM
BH CV ECHO MEAS - LVPWD: 0.97 CM
BH CV ECHO MEAS - MED PEAK E' VEL: 6.4 CM/SEC
BH CV ECHO MEAS - MV DEC SLOPE: 252.5 CM/SEC2
BH CV ECHO MEAS - MV MAX PG: 7.1 MMHG
BH CV ECHO MEAS - MV MEAN PG: 3 MMHG
BH CV ECHO MEAS - MV P1/2T: 53.5 MSEC
BH CV ECHO MEAS - MV V2 VTI: 19.3 CM
BH CV ECHO MEAS - MVA(P1/2T): 4.1 CM2
BH CV ECHO MEAS - SV(MOD-SP2): 26 ML
BH CV ECHO MEAS - SV(MOD-SP4): 33 ML
BH CV ECHO MEAS - SVI(MOD-SP2): 13.5 ML/M2
BH CV ECHO MEAS - SVI(MOD-SP4): 17.1 ML/M2
BUN SERPL-MCNC: 6 MG/DL (ref 8–23)
BUN/CREAT SERPL: 9.4 (ref 7–25)
CALCIUM SPEC-SCNC: 8.6 MG/DL (ref 8.6–10.5)
CHLORIDE SERPL-SCNC: 94 MMOL/L (ref 98–107)
CO2 SERPL-SCNC: 23 MMOL/L (ref 22–29)
CREAT SERPL-MCNC: 0.64 MG/DL (ref 0.57–1)
EGFRCR SERPLBLD CKD-EPI 2021: 95.8 ML/MIN/1.73
GLUCOSE BLDC GLUCOMTR-MCNC: 133 MG/DL (ref 70–130)
GLUCOSE SERPL-MCNC: 126 MG/DL (ref 65–99)
LV EF BIPLANE MOD: 62.1 %
MAGNESIUM SERPL-MCNC: 2.3 MG/DL (ref 1.6–2.4)
POTASSIUM SERPL-SCNC: 3.5 MMOL/L (ref 3.5–5.2)
QT INTERVAL: 410 MS
QT INTERVAL: 440 MS
QT INTERVAL: 467 MS
QT INTERVAL: 497 MS
QTC INTERVAL: 522 MS
QTC INTERVAL: 549 MS
QTC INTERVAL: 561 MS
QTC INTERVAL: 587 MS
SODIUM SERPL-SCNC: 130 MMOL/L (ref 136–145)

## 2025-07-10 PROCEDURE — 80048 BASIC METABOLIC PNL TOTAL CA: CPT | Performed by: INTERNAL MEDICINE

## 2025-07-10 PROCEDURE — 25010000002 POTASSIUM CHLORIDE 10 MEQ/100ML SOLUTION: Performed by: INTERNAL MEDICINE

## 2025-07-10 PROCEDURE — 99232 SBSQ HOSP IP/OBS MODERATE 35: CPT | Performed by: INTERNAL MEDICINE

## 2025-07-10 PROCEDURE — 25010000002 MAGNESIUM SULFATE 2 GM/50ML SOLUTION: Performed by: INTERNAL MEDICINE

## 2025-07-10 PROCEDURE — 25010000002 PIPERACILLIN SOD-TAZOBACTAM PER 1 G: Performed by: INTERNAL MEDICINE

## 2025-07-10 PROCEDURE — 94799 UNLISTED PULMONARY SVC/PX: CPT

## 2025-07-10 PROCEDURE — 99222 1ST HOSP IP/OBS MODERATE 55: CPT | Performed by: INTERNAL MEDICINE

## 2025-07-10 PROCEDURE — 63710000001 ONDANSETRON ODT 4 MG TABLET DISPERSIBLE: Performed by: INTERNAL MEDICINE

## 2025-07-10 PROCEDURE — 82948 REAGENT STRIP/BLOOD GLUCOSE: CPT

## 2025-07-10 PROCEDURE — 83735 ASSAY OF MAGNESIUM: CPT | Performed by: INTERNAL MEDICINE

## 2025-07-10 PROCEDURE — 93321 DOPPLER ECHO F-UP/LMTD STD: CPT | Performed by: INTERNAL MEDICINE

## 2025-07-10 PROCEDURE — 84132 ASSAY OF SERUM POTASSIUM: CPT | Performed by: INTERNAL MEDICINE

## 2025-07-10 PROCEDURE — 94664 DEMO&/EVAL PT USE INHALER: CPT

## 2025-07-10 PROCEDURE — 93325 DOPPLER ECHO COLOR FLOW MAPG: CPT | Performed by: INTERNAL MEDICINE

## 2025-07-10 PROCEDURE — 93005 ELECTROCARDIOGRAM TRACING: CPT | Performed by: INTERNAL MEDICINE

## 2025-07-10 PROCEDURE — 94761 N-INVAS EAR/PLS OXIMETRY MLT: CPT

## 2025-07-10 PROCEDURE — 93308 TTE F-UP OR LMTD: CPT | Performed by: INTERNAL MEDICINE

## 2025-07-10 PROCEDURE — 93321 DOPPLER ECHO F-UP/LMTD STD: CPT

## 2025-07-10 PROCEDURE — 93325 DOPPLER ECHO COLOR FLOW MAPG: CPT

## 2025-07-10 PROCEDURE — 93308 TTE F-UP OR LMTD: CPT

## 2025-07-10 PROCEDURE — 71045 X-RAY EXAM CHEST 1 VIEW: CPT

## 2025-07-10 RX ORDER — POTASSIUM CHLORIDE 7.45 MG/ML
10 INJECTION INTRAVENOUS
Status: COMPLETED | OUTPATIENT
Start: 2025-07-10 | End: 2025-07-10

## 2025-07-10 RX ORDER — MAGNESIUM SULFATE HEPTAHYDRATE 40 MG/ML
2 INJECTION, SOLUTION INTRAVENOUS ONCE
Status: COMPLETED | OUTPATIENT
Start: 2025-07-10 | End: 2025-07-10

## 2025-07-10 RX ADMIN — PIPERACILLIN AND TAZOBACTAM 3.38 G: 3; .375 INJECTION, POWDER, FOR SOLUTION INTRAVENOUS at 02:56

## 2025-07-10 RX ADMIN — POTASSIUM CHLORIDE 10 MEQ: 7.46 INJECTION, SOLUTION INTRAVENOUS at 19:46

## 2025-07-10 RX ADMIN — POTASSIUM CHLORIDE 10 MEQ: 7.46 INJECTION, SOLUTION INTRAVENOUS at 16:09

## 2025-07-10 RX ADMIN — POTASSIUM CHLORIDE 10 MEQ: 7.46 INJECTION, SOLUTION INTRAVENOUS at 05:38

## 2025-07-10 RX ADMIN — MAGNESIUM SULFATE HEPTAHYDRATE 2 G: 40 INJECTION, SOLUTION INTRAVENOUS at 16:09

## 2025-07-10 RX ADMIN — IPRATROPIUM BROMIDE AND ALBUTEROL SULFATE 3 ML: .5; 3 SOLUTION RESPIRATORY (INHALATION) at 12:36

## 2025-07-10 RX ADMIN — POTASSIUM CHLORIDE 10 MEQ: 7.46 INJECTION, SOLUTION INTRAVENOUS at 04:26

## 2025-07-10 RX ADMIN — ONDANSETRON 4 MG: 4 TABLET, ORALLY DISINTEGRATING ORAL at 12:51

## 2025-07-10 RX ADMIN — POTASSIUM CHLORIDE 10 MEQ: 7.46 INJECTION, SOLUTION INTRAVENOUS at 06:40

## 2025-07-10 RX ADMIN — SUCRALFATE 1 G: 1 TABLET ORAL at 20:37

## 2025-07-10 RX ADMIN — POTASSIUM CHLORIDE 10 MEQ: 7.46 INJECTION, SOLUTION INTRAVENOUS at 17:42

## 2025-07-10 RX ADMIN — IPRATROPIUM BROMIDE AND ALBUTEROL SULFATE 3 ML: .5; 3 SOLUTION RESPIRATORY (INHALATION) at 20:08

## 2025-07-10 RX ADMIN — POTASSIUM CHLORIDE 10 MEQ: 7.46 INJECTION, SOLUTION INTRAVENOUS at 03:33

## 2025-07-10 RX ADMIN — PIPERACILLIN AND TAZOBACTAM 3.38 G: 3; .375 INJECTION, POWDER, FOR SOLUTION INTRAVENOUS at 10:58

## 2025-07-10 RX ADMIN — LORAZEPAM 1 MG: 1 TABLET ORAL at 20:44

## 2025-07-10 RX ADMIN — SUCRALFATE 1 G: 1 TABLET ORAL at 06:40

## 2025-07-10 RX ADMIN — OXYCODONE AND ACETAMINOPHEN 2 TABLET: 5; 325 TABLET ORAL at 14:59

## 2025-07-10 RX ADMIN — SUCRALFATE 1 G: 1 TABLET ORAL at 17:43

## 2025-07-10 RX ADMIN — PRAVASTATIN SODIUM 20 MG: 20 TABLET ORAL at 08:59

## 2025-07-10 RX ADMIN — IPRATROPIUM BROMIDE AND ALBUTEROL SULFATE 3 ML: .5; 3 SOLUTION RESPIRATORY (INHALATION) at 06:47

## 2025-07-10 RX ADMIN — POTASSIUM CHLORIDE 10 MEQ: 7.46 INJECTION, SOLUTION INTRAVENOUS at 18:45

## 2025-07-10 NOTE — PROGRESS NOTES
[facility] INPATIENT PROGRESS NOTE         [address]    7/10/2025      PATIENT IDENTIFICATION:  Name: Yoanna Reed ADMIT: 2025   : 1955  PCP: Tom Muller MD    MRN: [id number] LOS: 10 days   AGE/SEX: 69 y.o. female  ROOM: [facility]                     LOS 10    Reason for visit: Right effusion      SUBJECTIVE:      Resting comfortably.  On 2 L supplemental oxygen with saturations 98% at time of visit.  Denies productive cough chest pain or wheezing.  Complains of persistent significant fatigue she says. I am seeing the patient for the first time today.  All patient problems are new to me.      Objective   OBJECTIVE:    Vital Sign Min/Max for last 24 hours  Temp  Min: 97.4 °F (36.3 °C)  Max: 98.3 °F (36.8 °C)   BP  Min: 97/55  Max: 156/96   Pulse  Min: 75  Max: 185   Resp  Min: 18  Max: 18   SpO2  Min: 84 %  Max: 97 %   No data recorded   No data recorded    Vitals:    07/10/25 0400 07/10/25 0647 07/10/25 0652 07/10/25 0700   BP: 136/74   121/79   BP Location:    Right arm   Patient Position:    Lying   Pulse: 78 76 75 81   Resp:  18 18 18   Temp:    97.7 °F (36.5 °C)   TempSrc:    Oral   SpO2: 95% 97%     Weight:       Height:                25  0654 25  0500 25  0719   Weight: 83.3 kg (183 lb 10.3 oz) 82.3 kg (181 lb 7 oz) 79.2 kg (174 lb 9.7 oz)       Body mass index is 26.55 kg/m².                    FiO2 (%): 21 %     Body mass index is 26.55 kg/m².    Intake/Output Summary (Last 24 hours) at 7/10/2025 0739  Last data filed at 2025  Gross per 24 hour   Intake 240 ml   Output --   Net 240 ml         Exam:  GEN:  No distress, appears stated age  EYES:   PERRL, anicteric sclerae  ENT:    External ears/nose normal, OP clear  NECK:  No adenopathy, midline trachea  LUNGS: Normal chest on inspection, palpation and auscultation  CV:  Normal S1S2, without murmur  ABD:  Nontender, nondistended, no hepatosplenomegaly, +BS  EXT:  No edema.  No cyanosis  "or clubbing.  No mottling and normal cap refill.    Assessment     Scheduled meds:  alteplase ((CATHFLO/ACTIVASE)) 25 mg in sodium chloride 0.9 % 50 mL INTRAPLEURAL syringe, 25 mg, Intrapleural, Daily  [Held by provider] buPROPion XL, 150 mg, Oral, QAM  [Held by provider] escitalopram, 20 mg, Oral, Daily  [Held by provider] FLUoxetine, 40 mg, Oral, Daily  ipratropium-albuterol, 3 mL, Nebulization, Q6H While Awake - RT  metoprolol tartrate, 25 mg, Oral, Q12H  piperacillin-tazobactam, 3.375 g, Intravenous, Q8H  potassium chloride, 10 mEq, Intravenous, Q1H  pravastatin, 20 mg, Oral, Daily  sucralfate, 1 g, Oral, 4x Daily AC & at Bedtime  [Held by provider] zolpidem, 5 mg, Oral, Nightly      IV meds:                         Data Review:  Results from last 7 days   Lab Units 07/09/25 2039 07/09/25  1821 07/09/25  0610 07/07/25  0539 07/06/25  0518 07/05/25  1929 07/05/25  0641   SODIUM mmol/L  --  132* 132* 133* 133*  --  132*   POTASSIUM mmol/L 3.2* 3.4* 2.9* 3.7 4.1   < > 3.4*   CHLORIDE mmol/L  --  97* 97* 100 98  --  96*   CO2 mmol/L  --  23.4 23.2 23.5 24.9  --  25.0   BUN mg/dL  --  5.0* 5.0* 7.0* 6.0*  --  5.0*   CREATININE mg/dL  --  0.63 0.48* 0.56* 0.51*  --  0.46*   GLUCOSE mg/dL  --  141* 133* 113* 124*  --  117*   CALCIUM mg/dL  --  8.7 8.2* 8.5* 8.4*  --  8.2*    < > = values in this interval not displayed.         Estimated Creatinine Clearance: 93.1 mL/min (by C-G formula based on SCr of 0.63 mg/dL).  Results from last 7 days   Lab Units 07/07/25  0539 07/06/25  0518 07/05/25  0641 07/04/25  0559   WBC 10*3/mm3 17.45* 19.16* 19.16* 19.32*   HEMOGLOBIN g/dL 11.1* 11.1* 10.9* 12.3   PLATELETS 10*3/mm3 692* 655* 677* 743*                 Results from last 7 days   Lab Units 07/05/25  0641   PROCALCITONIN ng/mL 38.40*         No results found for: \"HGBA1C\", \"POCGLU\"      Imaging reviewed  Rest x-ray 7/10 reviewed    CT chest 7/9 reviewed        Microbiology reviewed  NGTD          Active Hospital Problems    " Diagnosis  POA    Pleural effusion [J90]  Unknown    Near syncope [R55]  Unknown    TUSHAR (acute kidney injury) [N17.9]  Unknown    Elevated troponin [R79.89]  Unknown    Chronic pain [G89.29]  Yes      Resolved Hospital Problems   No resolved problems to display.         ASSESSMENT:  Right loculated parapneumonic effusion  Thoracentesis (7/1)-1 L removed, exudative  Right upper lobe pulmonary nodule  Suspect sepsis secondary to pneumonia  Asthma  Elevated BNP  Near syncope  Frequent falls  Chronic pain  Migraines  Hyponatremia  Acute kidney injury   Leukocytosis  Dextroscoliosis of lumbar spine  History of tobacco abuse  Hypokalemia      PLAN:  Chest tube is out.  Encourage pulmonary toilet.  Completing antibiotic course.  Will require repeat CT as outpatient for follow-up of 1.9 cm right upper lung nodule.  Will have her follow-up with us as an outpatient after discharge.  Recommend considering adding DVT prophylaxis with subcu heparin or Lovenox.    Max Phelps MD  Pulmonary and Critical Care Medicine  [facility], [facility]  7/10/2025    07:39 EDT

## 2025-07-10 NOTE — PLAN OF CARE
Problem: Adult Inpatient Plan of Care  Goal: Plan of Care Review  Outcome: Progressing  Goal: Patient-Specific Goal (Individualized)  Outcome: Progressing  Goal: Absence of Hospital-Acquired Illness or Injury  Outcome: Progressing  Intervention: Identify and Manage Fall Risk  Recent Flowsheet Documentation  Taken 7/10/2025 0400 by Dorita Acuña RN  Safety Promotion/Fall Prevention:   activity supervised   assistive device/personal items within reach   clutter free environment maintained   fall prevention program maintained   nonskid shoes/slippers when out of bed   room organization consistent   safety round/check completed  Taken 7/10/2025 0245 by Dorita Acuña RN  Safety Promotion/Fall Prevention:   activity supervised   assistive device/personal items within reach   clutter free environment maintained   fall prevention program maintained   nonskid shoes/slippers when out of bed   room organization consistent   safety round/check completed  Taken 7/10/2025 0000 by Dorita Acuña RN  Safety Promotion/Fall Prevention:   activity supervised   assistive device/personal items within reach   clutter free environment maintained   fall prevention program maintained   nonskid shoes/slippers when out of bed   room organization consistent   safety round/check completed  Taken 7/9/2025 2200 by Dorita Acuña RN  Safety Promotion/Fall Prevention:   activity supervised   assistive device/personal items within reach   clutter free environment maintained   fall prevention program maintained   nonskid shoes/slippers when out of bed   room organization consistent   safety round/check completed  Taken 7/9/2025 2000 by Dorita Acuña, RN  Safety Promotion/Fall Prevention:   activity supervised   assistive device/personal items within reach   clutter free environment maintained   fall prevention program maintained   nonskid shoes/slippers when out of bed   room organization consistent   safety round/check  completed  Intervention: Prevent Skin Injury  Recent Flowsheet Documentation  Taken 7/10/2025 0400 by Dorita Acuña RN  Body Position:   position changed independently   supine  Taken 7/10/2025 0245 by Dorita Acuña RN  Body Position:   position changed independently   right   side-lying  Taken 7/10/2025 0000 by Dorita Acuña RN  Body Position:   position changed independently   right   side-lying  Taken 7/9/2025 2200 by Dorita Acuña RN  Body Position: position changed independently  Taken 7/9/2025 2000 by Dorita Aucña RN  Body Position: position changed independently  Intervention: Prevent and Manage VTE (Venous Thromboembolism) Risk  Recent Flowsheet Documentation  Taken 7/9/2025 2000 by Dorita Acuña RN  VTE Prevention/Management:   bilateral   SCDs (sequential compression devices) off  Intervention: Prevent Infection  Recent Flowsheet Documentation  Taken 7/10/2025 0400 by Dorita Acuña RN  Infection Prevention:   cohorting utilized   environmental surveillance performed   hand hygiene promoted   rest/sleep promoted   single patient room provided  Taken 7/10/2025 0245 by Dorita Acuña RN  Infection Prevention:   cohorting utilized   environmental surveillance performed   hand hygiene promoted   rest/sleep promoted   single patient room provided  Taken 7/10/2025 0000 by Dorita Acuña RN  Infection Prevention:   cohorting utilized   environmental surveillance performed   hand hygiene promoted   rest/sleep promoted   single patient room provided  Taken 7/9/2025 2200 by Dorita Acuña RN  Infection Prevention:   cohorting utilized   environmental surveillance performed   hand hygiene promoted   rest/sleep promoted   single patient room provided  Taken 7/9/2025 2000 by Dorita Acuña RN  Infection Prevention:   cohorting utilized   environmental surveillance performed   hand hygiene promoted   rest/sleep promoted   single patient room provided  Goal: Optimal Comfort and  Wellbeing  Outcome: Progressing  Intervention: Monitor Pain and Promote Comfort  Recent Flowsheet Documentation  Taken 7/9/2025 2146 by Dorita Acuña RN  Pain Management Interventions:   pain medication given   pain management plan reviewed with patient/caregiver   position adjusted   pillow support provided  Intervention: Provide Person-Centered Care  Recent Flowsheet Documentation  Taken 7/9/2025 2000 by Dorita Acuña RN  Trust Relationship/Rapport:   care explained   choices provided   emotional support provided   empathic listening provided   questions answered   reassurance provided   questions encouraged   thoughts/feelings acknowledged  Goal: Readiness for Transition of Care  Outcome: Progressing     Problem: Fall Injury Risk  Goal: Absence of Fall and Fall-Related Injury  Outcome: Progressing  Intervention: Identify and Manage Contributors  Recent Flowsheet Documentation  Taken 7/10/2025 0400 by Dorita Acuña RN  Medication Review/Management: medications reviewed  Taken 7/10/2025 0245 by Dorita Acuña RN  Medication Review/Management: medications reviewed  Taken 7/10/2025 0000 by Dorita Acuña RN  Medication Review/Management: medications reviewed  Taken 7/9/2025 2200 by Dorita Acuña RN  Medication Review/Management: medications reviewed  Taken 7/9/2025 2000 by Dorita Acuña RN  Medication Review/Management: medications reviewed  Intervention: Promote Injury-Free Environment  Recent Flowsheet Documentation  Taken 7/10/2025 0400 by Dorita Acuña RN  Safety Promotion/Fall Prevention:   activity supervised   assistive device/personal items within reach   clutter free environment maintained   fall prevention program maintained   nonskid shoes/slippers when out of bed   room organization consistent   safety round/check completed  Taken 7/10/2025 0245 by Dorita Acuña RN  Safety Promotion/Fall Prevention:   activity supervised   assistive device/personal items within reach   clutter  free environment maintained   fall prevention program maintained   nonskid shoes/slippers when out of bed   room organization consistent   safety round/check completed  Taken 7/10/2025 0000 by Dorita Acuña RN  Safety Promotion/Fall Prevention:   activity supervised   assistive device/personal items within reach   clutter free environment maintained   fall prevention program maintained   nonskid shoes/slippers when out of bed   room organization consistent   safety round/check completed  Taken 7/9/2025 2200 by Dorita Acuña RN  Safety Promotion/Fall Prevention:   activity supervised   assistive device/personal items within reach   clutter free environment maintained   fall prevention program maintained   nonskid shoes/slippers when out of bed   room organization consistent   safety round/check completed  Taken 7/9/2025 2000 by Dorita Acuña RN  Safety Promotion/Fall Prevention:   activity supervised   assistive device/personal items within reach   clutter free environment maintained   fall prevention program maintained   nonskid shoes/slippers when out of bed   room organization consistent   safety round/check completed     Problem: Skin Injury Risk Increased  Goal: Skin Health and Integrity  Outcome: Progressing  Intervention: Optimize Skin Protection  Recent Flowsheet Documentation  Taken 7/10/2025 0400 by Dorita Acuña, RN  Activity Management: activity minimized  Head of Bed (HOB) Positioning: HOB at 30 degrees  Taken 7/10/2025 0245 by Dorita Acuña RN  Activity Management: activity minimized  Head of Bed (HOB) Positioning: HOB at 30 degrees  Taken 7/10/2025 0000 by Dorita Acuña RN  Activity Management: activity minimized  Head of Bed (HOB) Positioning: HOB at 30 degrees  Taken 7/9/2025 2200 by Dorita Acuña, RN  Activity Management:   ambulated to bathroom   back to bed  Taken 7/9/2025 2000 by Dorita Acuña, RN  Activity Management:   ambulated to bathroom   back to bed   Goal Outcome  Evaluation:

## 2025-07-10 NOTE — PROGRESS NOTES
[facility] Follow Up    Chief Complaint: Follow up torsades    Interval History: Events overnight noted.  Patient had several episodes of torsades.  Unclear if she had any associated symptoms.  Initially she denied any symptoms but then on further questioning she reported that she had an sensation like she was getting warm which is followed by feeling lightheaded.  However she also reports that she has had similar symptoms for years and possibly decades.  She has been experiencing loose stool throughout her hospitalization but this worsened yesterday.  She reports at least 5 episodes of diarrhea yesterday and ongoing issues with diarrhea today.  Due to the diarrhea she ended up receiving potassium supplementation via her IV.  She otherwise denies any chest pain or shortness of breath.    Objective:     Objective:  Temp:  [97.4 °F (36.3 °C)-98.3 °F (36.8 °C)] 97.7 °F (36.5 °C)  Heart Rate:  [] 95  Resp:  [18] 18  BP: ()/(51-96) 145/74     Intake/Output Summary (Last 24 hours) at 7/10/2025 0913  Last data filed at 7/9/2025 2000  Gross per 24 hour   Intake 240 ml   Output --   Net 240 ml     Body mass index is 26.55 kg/m².      07/05/25  0654 07/07/25  0500 07/08/25  0719   Weight: 83.3 kg (183 lb 10.3 oz) 82.3 kg (181 lb 7 oz) 79.2 kg (174 lb 9.7 oz)     Weight change:       Physical Exam:   General : Alert, cooperative, in no acute distress.  Neuro: Alert,cooperative and oriented.  Lungs: CTAB. Normal respiratory effort and rate.  CV: Regular rate and rhythm, normal S1 and S2, no murmurs, gallops or rubs.  ABD: Soft, nontender, nondistended. Positive bowel sounds.  Extr: No edema or cyanosis, moves all extremities.    Lab Review:   Results from last 7 days   Lab Units 07/09/25 2039 07/09/25  1821 07/09/25  0610   SODIUM mmol/L  --  132* 132*   POTASSIUM mmol/L 3.2* 3.4* 2.9*   CHLORIDE mmol/L  --  97* 97*   CO2 mmol/L  --  23.4 23.2   BUN mg/dL  --  5.0* 5.0*   CREATININE mg/dL  --   "0.63 0.48*   GLUCOSE mg/dL  --  141* 133*   CALCIUM mg/dL  --  8.7 8.2*     Results from last 7 days   Lab Units 07/09/25  1821 07/09/25  0747 07/09/25  0610   HSTROP T ng/L 21* 18* 19*     Results from last 7 days   Lab Units 07/07/25  0539 07/06/25  0518   WBC 10*3/mm3 17.45* 19.16*   HEMOGLOBIN g/dL 11.1* 11.1*   HEMATOCRIT % 34.1 32.4*   PLATELETS 10*3/mm3 692* 655*         Results from last 7 days   Lab Units 07/09/25  1821 07/09/25  0610   MAGNESIUM mg/dL 2.0 1.8           Invalid input(s): \"LDLCALC\"          I reviewed the patient's new clinical results.  I personally viewed and interpreted the patient's EKG  Current Medications:   Scheduled Meds:[Held by provider] buPROPion XL, 150 mg, Oral, QAM  [Held by provider] escitalopram, 20 mg, Oral, Daily  [Held by provider] FLUoxetine, 40 mg, Oral, Daily  ipratropium-albuterol, 3 mL, Nebulization, Q6H While Awake - RT  [Held by provider] metoprolol tartrate, 25 mg, Oral, Q12H  piperacillin-tazobactam, 3.375 g, Intravenous, Q8H  pravastatin, 20 mg, Oral, Daily  sucralfate, 1 g, Oral, 4x Daily AC & at Bedtime  [Held by provider] zolpidem, 5 mg, Oral, Nightly      Continuous Infusions:     Allergies:  No Known Allergies    Assessment/Plan:     1.  Torsades.  Recurrent episodes overnight.  Unclear if she was symptomatic.  Felt to be due to combination of QT prolonging medications as well as hypokalemia.  Troponins unremarkable.  2.  Prolonged QT.  Remains prolonged this morning.  May be due to a combination of antidepressants, Ambien, and hypokalemia.  3.  Hypokalemia.  Replacement underway.  3.  Right loculated parapneumonic effusion.  Status post chest tube placement and removal.  4.  Right upper lobe pulmonary nodule  5.  Pneumonia  6.  Asthma  7.  Hypertension.  Blood pressures improved following the initiation of metoprolol.  8.  Elevated troponin.  Noted to be elevated on admission.  No associated symptoms, EKG changes, or left ventricular dysfunction at that " time.  Repeat troponins following her episodes of torsades overnight have normalized.    - Discussed with Dr. Lundberg was actually a general call overnight and is aware of the patient's arrhythmias.  He feels that her episodes of torsades are due to a combination of QT prolonging medication and hypokalemia.  Thinks it is unlikely that there is any other cause of her arrhythmias including ischemic heart disease.  -Will continue to hold can potentially QT prolonging medication.  - Continue to replace potassium and magnesium as needed.  - Will check a repeat limited echocardiogram to ensure there is been no change in her LV function or wall motion.  If this looks okay we will hold off on any further cardiac workup.  - Will hold metoprolol for now since elevated heart rates can be protective against recurrent torsades.      Nisa Phelps MD  07/10/25  09:13 EDT

## 2025-07-10 NOTE — CONSULTS
Date of Hospital Visit: [unfilled]ENC@  Encounter Provider: Jonathon Lundberg MD  Place of Service: [facility] CARDIOLOGY  Patient Name: Yoanna Reed  :1955    Referral Provider:   Nisa Phelps    Chief complaint  TdP    History of Present Illness    This is a lady that was admitted for syncope with injury.    While admitted she has been noted to have episodes of torsades de points    It appears that she has a prolonged QT, likely an acquired long QT.    She had an episode yesterday evening, which she said is similar to the episode she has been having at home.  Symptoms correlated with an episode of torsades with associated syncope.    She was treated with magnesium, and QT prolonging agents were held.    Today she has continued to have some episodes, though thankfully shorter, and not with any associated symptoms that she is aware of.    Unfortunately she did get 1 dose of Zofran today    It has been decided to transfer her to the ICU for closer monitoring.    She has also been with diarrhea while in the hospital, and has had hypocalcemia.    She had episodes of syncope that began a couple of months ago      Past Medical History:   Diagnosis Date    Anxiety and depression     Asthma     Limited joint range of motion     NECK    Liver failure     DUE TO MEDICATION    Mass of nipple     RIGHT    Skin cancer     RIGHT CHEEK       Past Surgical History:   Procedure Laterality Date    ANTERIOR CERVICAL FUSION      ARTERY SURGERY Left     KNEE AGE 15 DUE TO CUT TO KNEE    BREAST BIOPSY Right 2024    Procedure: RIGHT NIPPLE LESION EXCISION;  Surgeon: Raven Aviles MD;  [place] [facility] [place] [facility] [place]  Service: General;  Laterality: Right;    CHOLECYSTECTOMY      COLONOSCOPY W/ POLYPECTOMY N/A 2024    Procedure: COLONOSCOPY to cecum with cold polypectomies;  Surgeon: Ankur Beebe MD;  Location: [facility] ENDOSCOPY;  Service: Gastroenterology;  Laterality:  N/A;  PRE - screening  POST - polyps    HYSTERECTOMY      Partial    POSTERIOR CERVICAL FUSION         Medications Prior to Admission   Medication Sig Dispense Refill Last Dose/Taking    albuterol sulfate  (90 Base) MCG/ACT inhaler INHALE 2 PUFFS BY MOUTH EVERY 4 HOURS AS NEEDED FOR WHEEZE 18 g 3 Taking As Needed    buPROPion XL (Wellbutrin XL) 150 MG 24 hr tablet Take 1 tablet by mouth Every Morning. 90 tablet 3 Taking    escitalopram (LEXAPRO) 20 MG tablet TAKE 1 TABLET BY MOUTH EVERY DAY 90 tablet 1 Taking    FLUoxetine (PROzac) 40 MG capsule Take 1 capsule by mouth Daily. 90 capsule 3 Taking    HYDROcodone-acetaminophen (Norco) 7.5-325 MG per tablet Take 1 tablet by mouth Every 6 (Six) Hours As Needed for Moderate Pain. 90 tablet 0 Taking As Needed    ketoconazole (NIZORAL) 2 % shampoo Apply 1 Application topically to the appropriate area as directed 2 (Two) Times a Week.   Taking    LORazepam (Ativan) 1 MG tablet Take 1 tablet by mouth Every 6 (Six) Hours As Needed for Anxiety. 240 tablet 1 Taking As Needed    meloxicam (MOBIC) 15 MG tablet TAKE 1 TABLET BY MOUTH EVERY DAY 60 tablet 3 Taking    ondansetron ODT (ZOFRAN-ODT) 4 MG disintegrating tablet Place 1 tablet on the tongue Every 8 (Eight) Hours As Needed for Nausea or Vomiting. 30 tablet 1 Taking As Needed    pimecrolimus (ELIDEL) 1 % cream Apply 1 Application topically to the appropriate area as directed 2 (Two) Times a Day.   Taking    pravastatin (PRAVACHOL) 20 MG tablet Take 1 tablet by mouth Daily. 90 tablet 3 Taking    tiZANidine (Zanaflex) 4 MG tablet Take 1 tablet by mouth Every 8 (Eight) Hours As Needed for Muscle Spasms. 42 tablet 1 Taking As Needed    ubrogepant (Ubrelvy) 100 MG tablet Take 1 tablet by mouth 1 (One) Time As Needed (migraine). 16 tablet 11 Taking As Needed    zolpidem (AMBIEN) 10 MG tablet Take 1 tablet by mouth Every Night. 90 tablet 1 Taking       Current Meds  Scheduled Meds:[Held by provider] buPROPion XL, 150 mg,  Oral, QAM  [Held by provider] escitalopram, 20 mg, Oral, Daily  [Held by provider] FLUoxetine, 40 mg, Oral, Daily  ipratropium-albuterol, 3 mL, Nebulization, Q6H While Awake - RT  magnesium sulfate, 2 g, Intravenous, Once  [Held by provider] metoprolol tartrate, 25 mg, Oral, Q12H  potassium chloride, 10 mEq, Intravenous, Q1H  pravastatin, 20 mg, Oral, Daily  sucralfate, 1 g, Oral, 4x Daily AC & at Bedtime  [Held by provider] zolpidem, 5 mg, Oral, Nightly      Continuous Infusions:   PRN Meds:.  acetaminophen    albuterol    senna-docusate sodium **AND** polyethylene glycol **AND** bisacodyl **AND** bisacodyl    Calcium Replacement - Follow Nurse / BPA Driven Protocol    HYDROmorphone    LORazepam    Magnesium Cardiology Dose Replacement - Follow Nurse / BPA Driven Protocol    nitroglycerin    oxyCODONE-acetaminophen    Phosphorus Replacement - Follow Nurse / BPA Driven Protocol    Potassium Replacement - Follow Nurse / BPA Driven Protocol    Allergies as of 06/30/2025    (No Known Allergies)       Social History     Socioeconomic History    Marital status: Single   Tobacco Use    Smoking status: Some Days     Current packs/day: 0.25     Average packs/day: 0.2 packs/day for 47.5 years (11.9 ttl pk-yrs)     Types: Cigarettes     Start date: 1978     Passive exposure: Never    Smokeless tobacco: Never   Vaping Use    Vaping status: Never Used   Substance and Sexual Activity    Alcohol use: Yes     Comment: social    Drug use: Never    Sexual activity: Defer       Family History   Problem Relation Age of Onset    Hypertension Mother     Ovarian cancer Mother     Malig Hyperthermia Neg Hx        REVIEW OF SYSTEMS:   12 point ROS was performed and is negative except as outlined in HPI          Objective:   Temp:  [97.4 °F (36.3 °C)-98.6 °F (37 °C)] 98.6 °F (37 °C)  Heart Rate:  [] 106  Resp:  [17-18] 17  BP: ()/(51-87) 138/66  Body mass index is 26.46 kg/m².  Flowsheet Rows      Flowsheet Row First Filed Value  "  Admission Height 172.7 cm (68\") Documented at 06/30/2025 1008   Admission Weight 76.7 kg (169 lb) Documented at 06/30/2025 1008          Vitals:    07/10/25 1507   BP: 138/66   Pulse: 106   Resp: 17   Temp: 98.6 °F (37 °C)   SpO2:        She is alert and cooperative laying in bed.  No acute distress.  I see no obvious injury upon inspection of her skin and back  Regular rate and rhythm currently  Lungs she has steady respirations, no distress  She is alert and oriented  There is no edema              Lab Review:      Results from last 7 days   Lab Units 07/10/25  1017   SODIUM mmol/L 130*   POTASSIUM mmol/L 3.5  3.5   CHLORIDE mmol/L 94*   CO2 mmol/L 23.0   BUN mg/dL 6.0*   CREATININE mg/dL 0.64   CALCIUM mg/dL 8.6   GLUCOSE mg/dL 126*     Results from last 7 days   Lab Units 07/09/25  1821 07/09/25  0747 07/09/25  0610   HSTROP T ng/L 21* 18* 19*     @LABRCNTbnp@  Results from last 7 days   Lab Units 07/07/25  0539 07/06/25  0518 07/05/25  0641   WBC 10*3/mm3 17.45* 19.16* 19.16*   HEMOGLOBIN g/dL 11.1* 11.1* 10.9*   HEMATOCRIT % 34.1 32.4* 31.2*   PLATELETS 10*3/mm3 692* 655* 677*         Results from last 7 days   Lab Units 07/10/25  1017   MAGNESIUM mg/dL 2.3     @LABRCNTIP(chol,trig,hdl,ldl)    I personally viewed and interpreted the patient's EKG/Telemetry data  )  Chronic pain    Pleural effusion    Near syncope    TUSHAR (acute kidney injury)    Elevated troponin    Assessment and Plan:  1.  Prolonged QT, suspected acquired long QT  2.  Thrombocytosis      I suspect she has been having episodes of torsades at home, which have led to her syncope.    I strongly suspect this is due to acquired long QT syndrome due to use of QT prolonging medications, she has additional risk factors of female sex, and an unclear history of hepatic dysfunction.    I will give an additional 2 g of magnesium, we can consider a magnesium infusion    We could consider an Isopril infusion, though her baseline heart rate is quite high " at this time.    She describes no symptoms which suggest ischemia, I think this is less likely cause.    We must avoid QT prolonging medications, I would not give any more Zofran.  I would avoid giving amiodarone as well.    She is going to moved to the ICU, I suspect she will continue to have a few more episodes, but as long as they are self terminating I think we can continue with this plan    Jonathon Lundberg MD  07/10/25  15:49 EDT.  Time spent in reviewing chart, discussion and examination:

## 2025-07-10 NOTE — PROGRESS NOTES
Name: Yoanna Reed ADMIT: 2025   : 1955  PCP: Tom Muller MD    MRN: [id number] LOS: 10 days   AGE/SEX: 69 y.o. female  ROOM: [facility]     Subjective   Subjective   .  Patient is lying on the bed and is complaining of pain.  Denied nausea, vomiting, chest pain.  Has been having episodes of torsades.       Objective   Objective   Vital Signs  Temp:  [97.4 °F (36.3 °C)-98.6 °F (37 °C)] 98.6 °F (37 °C)  Heart Rate:  [] 106  Resp:  [17-18] 17  BP: ()/(51-87) 138/66  SpO2:  [84 %-98 %] 98 %  on  Flow (L/min) (Oxygen Therapy):  [1-2] 1;   Device (Oxygen Therapy): nasal cannula  Body mass index is 26.46 kg/m².  Physical Exam      HEENT: PERRLA, extraocular movements intact, Scleras no icterus  Neck: Supple, no JVD  Cardiovascular: Regular rate and rhythm with normal S1 and S2  Respiratory: Diminished breath sounds on the right and left is fairly clear.  GI: Soft, nontender, bowel sounds present  Extremities: No edema, palpable pedal pulses  Neurologic: Grossly nonfocal, no facial asymmetry        Results Review     I reviewed the patient's new clinical results.  Results from last 7 days   Lab Units 25  0539 25  0518 25  0641 25  0559   WBC 10*3/mm3 17.45* 19.16* 19.16* 19.32*   HEMOGLOBIN g/dL 11.1* 11.1* 10.9* 12.3   PLATELETS 10*3/mm3 692* 655* 677* 743*     Results from last 7 days   Lab Units 07/10/25  1017 25  2039 25  1821 25  0610 25  0539   SODIUM mmol/L 130*  --  132* 132* 133*   POTASSIUM mmol/L 3.5  3.5 3.2* 3.4* 2.9* 3.7   CHLORIDE mmol/L 94*  --  97* 97* 100   CO2 mmol/L 23.0  --  23.4 23.2 23.5   BUN mg/dL 6.0*  --  5.0* 5.0* 7.0*   CREATININE mg/dL 0.64  --  0.63 0.48* 0.56*   GLUCOSE mg/dL 126*  --  141* 133* 113*   EGFR mL/min/1.73 95.8  --  96.2 102.7 98.9           Results from last 7 days   Lab Units 07/10/25  1017 25  1821 25  0610 25  0539   CALCIUM mg/dL 8.6 8.7 8.2* 8.5*   MAGNESIUM mg/dL 2.3  "2.0 1.8  --      Results from last 7 days   Lab Units 07/05/25  0641   PROCALCITONIN ng/mL 38.40*     No results found for: \"HGBA1C\", \"POCGLU\"    XR Chest 1 View  Result Date: 7/9/2025  1. Small right pleural effusion appears slightly increased 2. Increased patchy density in the left upper lung suspicious for developing pneumonia. Follow-up to clearing recommended    This report was finalized on 7/9/2025 6:36 AM by Dr. Jayme Arnold M.D on Workstation: [id number]          I have personally reviewed all medications:  Scheduled Medications  [Held by provider] buPROPion XL, 150 mg, Oral, QAM  [Held by provider] escitalopram, 20 mg, Oral, Daily  [Held by provider] FLUoxetine, 40 mg, Oral, Daily  ipratropium-albuterol, 3 mL, Nebulization, Q6H While Awake - RT  magnesium sulfate, 2 g, Intravenous, Once  [Held by provider] metoprolol tartrate, 25 mg, Oral, Q12H  potassium chloride, 10 mEq, Intravenous, Q1H  pravastatin, 20 mg, Oral, Daily  sucralfate, 1 g, Oral, 4x Daily AC & at Bedtime  [Held by provider] zolpidem, 5 mg, Oral, Nightly    Infusions     Diet  Diet: Regular/House; Fluid Consistency: Thin (IDDSI 0)    I have personally reviewed:  [x]  Laboratory   [x]  Microbiology   [x]  Radiology   [x]  EKG/Telemetry  [x]  Cardiology/Vascular   []  Pathology    []  Records       Assessment/Plan     Active Hospital Problems    Diagnosis  POA    Pleural effusion [J90]  Unknown    Near syncope [R55]  Unknown    TUSHAR (acute kidney injury) [N17.9]  Unknown    Elevated troponin [R79.89]  Unknown    Chronic pain [G89.29]  Yes      Resolved Hospital Problems   No resolved problems to display.       69 y.o. female admitted with <principal problem not specified>.      1. Dyspnea/pleural effusion with underlying nodule/mass/leukocytosis, underlying pneumonia cannot be completely ruled out and therefore IV Rocephin was initiated and later transitioned to IV Zosyn and completed a course.  Status post thoracentesis On 7/1 with liver of " 1 L and it was noted to be exudative.  Given presence of loculated effusion underwent IR guided chest tube placement on 7/3/2025.  tPA was attempted on 7/5 but could not be administered given possible malpositioning tube.  Chest tube output has tapered therefore it has been discontinued on 7/6/2025 and repeat chest x-ray revealed vague opacity in the right middle and lower lung zone consistent with combination of airspace disease and small amount of pleural effusion.  Repeat CT on 7/9 revealed diminished right-sided loculated effusion and 1.9 cm right upper lobe lung nodule.  New large groundglass infiltrate loaded in the left upper lobe.  Defer reinitiation of antibiotics to pulmonary.    2.  Elevated troponin, not secondary to MI and cardiology did evaluate.  2D echo has been done and revealed normal ejection fraction.    3.  Chronic pain syndrome, continue with analgesics.    4.  History of asthma, on albuterol nebs as needed and currently not in any exacerbation.    5.  Acute kidney injury, was gently volume resuscitated with which creatinine has improved from 1.46>0.51>0.48.      6.  History of hep C, treated according to patient.    7.  Right upper lobe lung nodule, management per pulmonary.    8.  Hyponatremia, sodium level noted to be 130 and likely secondary to SIADH.  Currently asymptomatic.    9.  Torsades with prolonged QT, cardiology following along and recommends avoiding QT prolonging agents and.  Echo is being obtained.    10.  On SCDs for DVT prophylaxis.    11.  CODE STATUS is full code.    Copy text on This note has been reviewed by me on 7-10-25          Chinedu Blandon MD  [facility]  07/10/25  15:51 EDT

## 2025-07-10 NOTE — PLAN OF CARE
Goal Outcome Evaluation:         Arrived at 1809 situated in Room 3001, with Potassium IV currently infusing, NSR on monitor, VS Stable on continuous monitoring, no complaints. Strict bedrest agreeable, pleasant and cooperative. No complaints of pain or discomfort.

## 2025-07-11 ENCOUNTER — APPOINTMENT (OUTPATIENT)
Dept: GENERAL RADIOLOGY | Facility: HOSPITAL | Age: 70
DRG: 871 | End: 2025-07-11
Payer: MEDICARE

## 2025-07-11 LAB
ANION GAP SERPL CALCULATED.3IONS-SCNC: 13.5 MMOL/L (ref 5–15)
BUN SERPL-MCNC: 5 MG/DL (ref 8–23)
BUN/CREAT SERPL: 11.9 (ref 7–25)
CALCIUM SPEC-SCNC: 8.1 MG/DL (ref 8.6–10.5)
CHLORIDE SERPL-SCNC: 98 MMOL/L (ref 98–107)
CO2 SERPL-SCNC: 20.5 MMOL/L (ref 22–29)
CREAT SERPL-MCNC: 0.42 MG/DL (ref 0.57–1)
EGFRCR SERPLBLD CKD-EPI 2021: 106 ML/MIN/1.73
GLUCOSE SERPL-MCNC: 115 MG/DL (ref 65–99)
MAGNESIUM SERPL-MCNC: 2.2 MG/DL (ref 1.6–2.4)
POTASSIUM SERPL-SCNC: 3.6 MMOL/L (ref 3.5–5.2)
POTASSIUM SERPL-SCNC: 3.8 MMOL/L (ref 3.5–5.2)
QT INTERVAL: 430 MS
QTC INTERVAL: 551 MS
SODIUM SERPL-SCNC: 132 MMOL/L (ref 136–145)

## 2025-07-11 PROCEDURE — 93005 ELECTROCARDIOGRAM TRACING: CPT | Performed by: INTERNAL MEDICINE

## 2025-07-11 PROCEDURE — 93010 ELECTROCARDIOGRAM REPORT: CPT | Performed by: INTERNAL MEDICINE

## 2025-07-11 PROCEDURE — 83735 ASSAY OF MAGNESIUM: CPT | Performed by: INTERNAL MEDICINE

## 2025-07-11 PROCEDURE — 99232 SBSQ HOSP IP/OBS MODERATE 35: CPT | Performed by: INTERNAL MEDICINE

## 2025-07-11 PROCEDURE — 94799 UNLISTED PULMONARY SVC/PX: CPT

## 2025-07-11 PROCEDURE — 94761 N-INVAS EAR/PLS OXIMETRY MLT: CPT

## 2025-07-11 PROCEDURE — 84132 ASSAY OF SERUM POTASSIUM: CPT

## 2025-07-11 PROCEDURE — 80048 BASIC METABOLIC PNL TOTAL CA: CPT | Performed by: INTERNAL MEDICINE

## 2025-07-11 PROCEDURE — 71045 X-RAY EXAM CHEST 1 VIEW: CPT

## 2025-07-11 PROCEDURE — 94664 DEMO&/EVAL PT USE INHALER: CPT

## 2025-07-11 PROCEDURE — 25010000002 POTASSIUM CHLORIDE 10 MEQ/100ML SOLUTION

## 2025-07-11 PROCEDURE — 25010000002 POTASSIUM CHLORIDE 10 MEQ/100ML SOLUTION: Performed by: INTERNAL MEDICINE

## 2025-07-11 PROCEDURE — 99232 SBSQ HOSP IP/OBS MODERATE 35: CPT | Performed by: NURSE PRACTITIONER

## 2025-07-11 PROCEDURE — 97530 THERAPEUTIC ACTIVITIES: CPT

## 2025-07-11 PROCEDURE — 25010000002 PROCHLORPERAZINE 10 MG/2ML SOLUTION: Performed by: INTERNAL MEDICINE

## 2025-07-11 RX ORDER — ADENOSINE 3 MG/ML
6 INJECTION, SOLUTION INTRAVENOUS ONCE
Status: DISCONTINUED | OUTPATIENT
Start: 2025-07-11 | End: 2025-07-13 | Stop reason: HOSPADM

## 2025-07-11 RX ORDER — PROCHLORPERAZINE 25 MG
25 SUPPOSITORY, RECTAL RECTAL EVERY 12 HOURS PRN
Status: DISCONTINUED | OUTPATIENT
Start: 2025-07-11 | End: 2025-07-13 | Stop reason: HOSPADM

## 2025-07-11 RX ORDER — HYDROCODONE BITARTRATE AND ACETAMINOPHEN 5; 325 MG/1; MG/1
1 TABLET ORAL EVERY 6 HOURS PRN
Status: DISCONTINUED | OUTPATIENT
Start: 2025-07-11 | End: 2025-07-13 | Stop reason: HOSPADM

## 2025-07-11 RX ORDER — LOPERAMIDE HYDROCHLORIDE 2 MG/1
4 CAPSULE ORAL 4 TIMES DAILY PRN
Status: DISCONTINUED | OUTPATIENT
Start: 2025-07-11 | End: 2025-07-11

## 2025-07-11 RX ORDER — ADENOSINE 3 MG/ML
INJECTION, SOLUTION INTRAVENOUS
Status: ACTIVE
Start: 2025-07-11 | End: 2025-07-12

## 2025-07-11 RX ORDER — DIPHENOXYLATE HYDROCHLORIDE AND ATROPINE SULFATE 2.5; .025 MG/1; MG/1
2 TABLET ORAL 4 TIMES DAILY PRN
Status: DISCONTINUED | OUTPATIENT
Start: 2025-07-11 | End: 2025-07-13 | Stop reason: HOSPADM

## 2025-07-11 RX ORDER — POTASSIUM CHLORIDE 7.45 MG/ML
10 INJECTION INTRAVENOUS
Status: COMPLETED | OUTPATIENT
Start: 2025-07-11 | End: 2025-07-11

## 2025-07-11 RX ORDER — PROCHLORPERAZINE MALEATE 5 MG/1
5 TABLET ORAL EVERY 6 HOURS PRN
Status: DISCONTINUED | OUTPATIENT
Start: 2025-07-11 | End: 2025-07-13 | Stop reason: HOSPADM

## 2025-07-11 RX ORDER — POTASSIUM CHLORIDE 7.45 MG/ML
10 INJECTION INTRAVENOUS
Status: COMPLETED | OUTPATIENT
Start: 2025-07-11 | End: 2025-07-12

## 2025-07-11 RX ORDER — PROCHLORPERAZINE EDISYLATE 5 MG/ML
5 INJECTION INTRAMUSCULAR; INTRAVENOUS EVERY 6 HOURS PRN
Status: DISCONTINUED | OUTPATIENT
Start: 2025-07-11 | End: 2025-07-13 | Stop reason: HOSPADM

## 2025-07-11 RX ADMIN — IPRATROPIUM BROMIDE AND ALBUTEROL SULFATE 3 ML: .5; 3 SOLUTION RESPIRATORY (INHALATION) at 14:12

## 2025-07-11 RX ADMIN — POTASSIUM CHLORIDE 10 MEQ: 7.46 INJECTION, SOLUTION INTRAVENOUS at 22:10

## 2025-07-11 RX ADMIN — LOPERAMIDE HYDROCHLORIDE 4 MG: 2 CAPSULE ORAL at 09:05

## 2025-07-11 RX ADMIN — LORAZEPAM 1 MG: 1 TABLET ORAL at 20:52

## 2025-07-11 RX ADMIN — HYDROCODONE BITARTRATE AND ACETAMINOPHEN 1 TABLET: 5; 325 TABLET ORAL at 19:04

## 2025-07-11 RX ADMIN — PROCHLORPERAZINE EDISYLATE 5 MG: 5 INJECTION INTRAMUSCULAR; INTRAVENOUS at 11:46

## 2025-07-11 RX ADMIN — POTASSIUM CHLORIDE 10 MEQ: 7.46 INJECTION, SOLUTION INTRAVENOUS at 14:12

## 2025-07-11 RX ADMIN — SUCRALFATE 1 G: 1 TABLET ORAL at 11:46

## 2025-07-11 RX ADMIN — HYDROCODONE BITARTRATE AND ACETAMINOPHEN 1 TABLET: 5; 325 TABLET ORAL at 13:04

## 2025-07-11 RX ADMIN — IPRATROPIUM BROMIDE AND ALBUTEROL SULFATE 3 ML: .5; 3 SOLUTION RESPIRATORY (INHALATION) at 20:19

## 2025-07-11 RX ADMIN — POTASSIUM CHLORIDE 10 MEQ: 7.46 INJECTION, SOLUTION INTRAVENOUS at 23:14

## 2025-07-11 RX ADMIN — POTASSIUM CHLORIDE 10 MEQ: 7.46 INJECTION, SOLUTION INTRAVENOUS at 15:28

## 2025-07-11 RX ADMIN — SUCRALFATE 1 G: 1 TABLET ORAL at 06:30

## 2025-07-11 RX ADMIN — SUCRALFATE 1 G: 1 TABLET ORAL at 20:52

## 2025-07-11 RX ADMIN — IPRATROPIUM BROMIDE AND ALBUTEROL SULFATE 3 ML: .5; 3 SOLUTION RESPIRATORY (INHALATION) at 07:34

## 2025-07-11 RX ADMIN — LORAZEPAM 1 MG: 1 TABLET ORAL at 09:06

## 2025-07-11 RX ADMIN — PRAVASTATIN SODIUM 20 MG: 20 TABLET ORAL at 09:05

## 2025-07-11 RX ADMIN — POTASSIUM CHLORIDE 10 MEQ: 7.46 INJECTION, SOLUTION INTRAVENOUS at 20:52

## 2025-07-11 RX ADMIN — HYDROCODONE BITARTRATE AND ACETAMINOPHEN 1 TABLET: 5; 325 TABLET ORAL at 02:52

## 2025-07-11 RX ADMIN — SUCRALFATE 1 G: 1 TABLET ORAL at 17:15

## 2025-07-11 NOTE — THERAPY TREATMENT NOTE
Patient Name: Yoanna Reed  : 1955    MRN: [id number]                              Today's Date: 2025       Admit Date: 2025    Visit Dx:     ICD-10-CM ICD-9-CM   1. Severe sepsis  A41.9 038.9    R65.20 995.92   2. Near syncope  R55 780.2   3. NSTEMI (non-ST elevated myocardial infarction)  I21.4 410.70   4. Pleural effusion  J90 511.9   5. Acute midline low back pain without sciatica  M54.50 724.2   6. Recurrent falls  R29.6 V15.88     Patient Active Problem List   Diagnosis    Encounter for screening for malignant neoplasm of colon    Acute pain of right shoulder    Pain of right humerus    Chronic pain    Flank pain    Acute cystitis with hematuria    Acute vaginitis    Dysuria    Hematuria    Mass of nipple    Severe sepsis    Pleural effusion    Near syncope    TUSHAR (acute kidney injury)    Elevated troponin     Past Medical History:   Diagnosis Date    Anxiety and depression     Asthma     Limited joint range of motion     NECK    Liver failure     DUE TO MEDICATION    Mass of nipple     RIGHT    Skin cancer     RIGHT CHEEK     Past Surgical History:   Procedure Laterality Date    ANTERIOR CERVICAL FUSION      ARTERY SURGERY Left     KNEE AGE 15 DUE TO CUT TO KNEE    BREAST BIOPSY Right 2024    Procedure: RIGHT NIPPLE LESION EXCISION;  Surgeon: Raven Aviles MD;  Location: [facility] OR [facility] [place];  Service: General;  Laterality: Right;    CHOLECYSTECTOMY      COLONOSCOPY W/ POLYPECTOMY N/A 2024    Procedure: COLONOSCOPY to cecum with cold polypectomies;  Surgeon: Ankur Beebe MD;  Location: [facility] ENDOSCOPY;  Service: Gastroenterology;  Laterality: N/A;  PRE - screening  POST - polyps    HYSTERECTOMY      Partial    POSTERIOR CERVICAL FUSION        General Information       Row Name 25 1049          Physical Therapy Time and Intention    Document Type therapy note (daily note)  -ER     Mode of Treatment individual therapy;physical therapy  -ER       Row Name  07/11/25 1049          General Information    Patient Profile Reviewed yes  -ER     Existing Precautions/Restrictions fall  -ER       Row Name 07/11/25 1049          Cognition    Orientation Status (Cognition) oriented x 3  -ER       Row Name 07/11/25 1049          Safety Issues/Impairments Affecting Functional Mobility    Impairments Affecting Function (Mobility) endurance/activity tolerance;strength;pain;shortness of breath  -ER     Comment, Safety Issues/Impairments (Mobility) Non skid socks and gait belt donned for safety  -ER               User Key  (r) = Recorded By, (t) = Taken By, (c) = Cosigned By      Initials Name Provider Type    ER Beckie Jennings PT Physical Therapist                   Mobility       Row Name 07/11/25 1049          Bed Mobility    Comment, (Bed Mobility) UIC at arrival  -ER       Row Name 07/11/25 1049          Sit-Stand Transfer    Sit-Stand [place] (Transfers) supervision  -ER       Row Name 07/11/25 1049          Gait/Stairs (Locomotion)    [place] Level (Gait) supervision  -ER     Patient was able to Ambulate yes  -ER     Distance in Feet (Gait) 30  -ER     Deviations/Abnormal Patterns (Gait) stride length decreased;gait speed decreased;tangela decreased  -ER     Bilateral Gait Deviations forward flexed posture  -ER               User Key  (r) = Recorded By, (t) = Taken By, (c) = Cosigned By      Initials Name Provider Type    ER Beckie Jennings PT Physical Therapist                   Obj/Interventions       Row Name 07/11/25 1050          Balance    Balance Assessment sitting dynamic balance;standing static balance;standing dynamic balance;sitting static balance  -ER     Static Sitting Balance supervision  -ER     Dynamic Sitting Balance supervision  -ER     Position, Sitting Balance sitting in chair  -ER     Static Standing Balance supervision  -ER     Dynamic Standing Balance supervision  -ER     Position/Device Used, Standing Balance unsupported  -ER     Balance  Interventions sitting;standing;sit to stand;supported;static;dynamic  -ER               User Key  (r) = Recorded By, (t) = Taken By, (c) = Cosigned By      Initials Name Provider Type    ER Beckie Jennings PT Physical Therapist                   Goals/Plan    No documentation.                  Clinical Impression       Row Name 07/11/25 1050          Pain    Pre/Posttreatment Pain Comment Does not report pain, notes that she has discomfort in her stomach due to freq BM  -ER       Row Name 07/11/25 1050          Plan of Care Review    Plan of Care Reviewed With patient  -ER     Outcome Evaluation Pt. seen for PT treatment session this AM, seated in bedside chair at arrival. She performs STS with Supervision A and ambulates 30 ft. with supervision. She returns to bedside chair and performs x10 LAQ, x20 seated HR. She is left with all  needs met and within reach. PT rec HH.  -ER       Row Name 07/11/25 1050          Therapy Assessment/Plan (PT)    Rehab Potential (PT) fair  -ER     Criteria for Skilled Interventions Met (PT) yes;skilled treatment is necessary  -ER     Therapy Frequency (PT) 3 times/wk  -ER       Row Name 07/11/25 1050          Positioning and Restraints    Pre-Treatment Position sitting in chair/recliner  -ER     Post Treatment Position chair  -ER     In Chair notified nsg;call light within reach;encouraged to call for assist;exit alarm on  -ER               User Key  (r) = Recorded By, (t) = Taken By, (c) = Cosigned By      Initials Name Provider Type    ER Beckie Jennings, PT Physical Therapist                   Outcome Measures       Row Name 07/11/25 1052          How much help from another person do you currently need...    Turning from your back to your side while in flat bed without using bedrails? 3  -ER     Moving from lying on back to sitting on the side of a flat bed without bedrails? 3  -ER     Moving to and from a bed to a chair (including a wheelchair)? 3  -ER     Standing up from a chair using  your arms (e.g., wheelchair, bedside chair)? 3  -ER     Climbing 3-5 steps with a railing? 3  -ER     To walk in hospital room? 3  -ER     AM-PAC 6 Clicks Score (PT) 18  -ER     Highest Level of Mobility Goal Walk 10 Steps or More-6  -ER               User Key  (r) = Recorded By, (t) = Taken By, (c) = Cosigned By      Initials Name Provider Type    ER Beckie Jennings, PT Physical Therapist                                 Physical Therapy Education       Title: PT OT SLP Therapies (Done)       Topic: Physical Therapy (Done)       Point: Mobility training (Done)       Learning Progress Summary            Patient Acceptance, E, VU by ER at 7/11/2025 1053    Acceptance, E, VU by  at 7/9/2025 1521    Acceptance, E, VU,NR by  at 7/7/2025 1340    Acceptance, E, VU by  at 7/4/2025 1825    Acceptance, E, NR by  at 7/2/2025 1706                      Point: Home exercise program (Done)       Learning Progress Summary            Patient Acceptance, E, VU by ER at 7/11/2025 1053    Acceptance, E, VU by  at 7/9/2025 1521    Acceptance, E, VU,NR by  at 7/7/2025 1340    Acceptance, E, VU by  at 7/4/2025 1825                      Point: Body mechanics (Done)       Learning Progress Summary            Patient Acceptance, E, VU by ER at 7/11/2025 1053    Acceptance, E, VU by  at 7/9/2025 1521    Acceptance, E, VU,NR by  at 7/7/2025 1340    Acceptance, E, VU by  at 7/4/2025 1825                      Point: Precautions (Done)       Learning Progress Summary            Patient Acceptance, E, VU by ER at 7/11/2025 1053    Acceptance, E, VU by  at 7/9/2025 1521    Acceptance, E, VU,NR by  at 7/7/2025 1340    Acceptance, E, VU by  at 7/4/2025 1825                                      User Key       Initials Effective Dates Name Provider Type Discipline    EM 06/16/21 -  Krystyna De León PT Physical Therapist PT     06/12/24 -  Breezy Louis, RN Registered Nurse Nurse     05/02/22 -  Carmen Wade PT  Physical Therapist PT    ER 10/15/23 -  Beckie Jennings PT Physical Therapist PT                  PT Recommendation and Plan     Outcome Evaluation: Pt. seen for PT treatment session this AM, seated in bedside chair at arrival. She performs STS with Supervision A and ambulates 30 ft. with supervision. She returns to bedside chair and performs x10 LAQ, x20 seated HR. She is left with all  needs met and within reach. PT rec HH.     Time Calculation:         PT Charges       Row Name 07/11/25 1053             Time Calculation    Start Time 0836  -ER      Stop Time 0854  -ER      Time Calculation (min) 18 min  -ER      PT Received On 07/11/25  -ER      PT - Next Appointment 07/14/25  -ER         Time Calculation- PT    Total Timed Code Minutes- PT 18 minute(s)  -ER         Timed Charges    52693 - PT Therapeutic Activity Minutes 18  -ER         Total Minutes    Timed Charges Total Minutes 18  -ER       Total Minutes 18  -ER                User Key  (r) = Recorded By, (t) = Taken By, (c) = Cosigned By      Initials Name Provider Type    ER Beckie Jennings PT Physical Therapist                  Therapy Charges for Today       Code Description Service Date Service Provider Modifiers Qty    [id number]  PT THERAPEUTIC ACT EA 15 MIN 7/11/2025 Beckie Jennings, PT GP 1            PT G-Codes  Outcome Measure Options: AM-PAC 6 Clicks Basic Mobility (PT)  AM-PAC 6 Clicks Score (PT): 18  PT Discharge Summary  Anticipated Discharge Disposition (PT): home with home health, home    Beckie Jennings PT  7/11/2025

## 2025-07-11 NOTE — PROGRESS NOTES
Name: Yoanna Reed ADMIT: 2025   : 1955  PCP: Tom Muller MD    MRN: [id number] LOS: 11 days   AGE/SEX: 69 y.o. female  ROOM: [facility]     Subjective   Subjective   .  Patient is lying on the bed and is complaining of pain.  Denied nausea, vomiting, chest pain.         Objective   Objective   Vital Signs  Temp:  [98.1 °F (36.7 °C)-98.7 °F (37.1 °C)] 98.7 °F (37.1 °C)  Heart Rate:  [] 95  Resp:  [17-26] 20  BP: (113-152)/(49-97) 137/77  SpO2:  [92 %-98 %] 98 %  on  Flow (L/min) (Oxygen Therapy):  [1] 1;   Device (Oxygen Therapy): nasal cannula  Body mass index is 25.88 kg/m².  Physical Exam      HEENT: PERRLA, extraocular movements intact, Scleras no icterus  Neck: Supple, no JVD  Cardiovascular: Regular rate and rhythm with normal S1 and S2  Respiratory: Diminished breath sounds on the right and left is fairly clear.  GI: Soft, nontender, bowel sounds present  Extremities: No edema, palpable pedal pulses  Neurologic: Grossly nonfocal, no facial asymmetry        Results Review     I reviewed the patient's new clinical results.  Results from last 7 days   Lab Units 25  0539 25  0518 25  0641   WBC 10*3/mm3 17.45* 19.16* 19.16*   HEMOGLOBIN g/dL 11.1* 11.1* 10.9*   PLATELETS 10*3/mm3 692* 655* 677*     Results from last 7 days   Lab Units 25  0757 07/10/25  1706 07/10/25  1017 25  2039 25  1821 25  0610   SODIUM mmol/L 132*  --  130*  --  132* 132*   POTASSIUM mmol/L 3.8 3.5 3.5  3.5 3.2* 3.4* 2.9*   CHLORIDE mmol/L 98  --  94*  --  97* 97*   CO2 mmol/L 20.5*  --  23.0  --  23.4 23.2   BUN mg/dL 5.0*  --  6.0*  --  5.0* 5.0*   CREATININE mg/dL 0.42*  --  0.64  --  0.63 0.48*   GLUCOSE mg/dL 115*  --  126*  --  141* 133*   EGFR mL/min/1.73 106.0  --  95.8  --  96.2 102.7           Results from last 7 days   Lab Units 25  0757 07/10/25  1017 25  1821 25  0610   CALCIUM mg/dL 8.1* 8.6 8.7 8.2*   MAGNESIUM mg/dL  --  2.3 2.0  1.8     Results from last 7 days   Lab Units 07/05/25  0641   PROCALCITONIN ng/mL 38.40*     Glucose   Date/Time Value Ref Range Status   07/10/2025 1818 133 (H) 70 - 130 mg/dL Final       No radiology results for the last day        I have personally reviewed all medications:  Scheduled Medications  [Held by provider] buPROPion XL, 150 mg, Oral, QAM  [Held by provider] escitalopram, 20 mg, Oral, Daily  [Held by provider] FLUoxetine, 40 mg, Oral, Daily  ipratropium-albuterol, 3 mL, Nebulization, Q6H While Awake - RT  [Held by provider] metoprolol tartrate, 25 mg, Oral, Q12H  pravastatin, 20 mg, Oral, Daily  sucralfate, 1 g, Oral, 4x Daily AC & at Bedtime  [Held by provider] zolpidem, 5 mg, Oral, Nightly    Infusions     Diet  Diet: Regular/House; Fluid Consistency: Thin (IDDSI 0)    I have personally reviewed:  [x]  Laboratory   [x]  Microbiology   [x]  Radiology   [x]  EKG/Telemetry  [x]  Cardiology/Vascular   []  Pathology    []  Records       Assessment/Plan     Active Hospital Problems    Diagnosis  POA    Pleural effusion [J90]  Unknown    Near syncope [R55]  Unknown    TUSHAR (acute kidney injury) [N17.9]  Unknown    Elevated troponin [R79.89]  Unknown    Chronic pain [G89.29]  Yes      Resolved Hospital Problems   No resolved problems to display.       69 y.o. female admitted with <principal problem not specified>.      1. Dyspnea/pleural effusion with underlying nodule/mass/leukocytosis, underlying pneumonia cannot be completely ruled out and therefore IV Rocephin was initiated and later transitioned to IV Zosyn and completed a course.  Status post thoracentesis On 7/1 with liver of 1 L and it was noted to be exudative.  Given presence of loculated effusion underwent IR guided chest tube placement on 7/3/2025.  tPA was attempted on 7/5 but could not be administered given possible malpositioning tube.  Chest tube output has tapered therefore it has been discontinued on 7/6/2025 and repeat chest x-ray revealed  vague opacity in the right middle and lower lung zone consistent with combination of airspace disease and small amount of pleural effusion.  Repeat CT on 7/9 revealed diminished right-sided loculated effusion and 1.9 cm right upper lobe lung nodule.  New large groundglass infiltrate loaded in the left upper lobe and WBC is 17.4, however afebrile and stable clinically.  Continue to observe off antibiotics and pulmonary is following along as well.    2.  Elevated troponin, not secondary to MI and cardiology did evaluate.  2D echo has been done and revealed normal ejection fraction.    3.  Chronic pain syndrome, continue with analgesics.    4.  History of asthma, on albuterol nebs as needed and currently not in any exacerbation.    5.  Acute kidney injury, was gently volume resuscitated with which creatinine has improved from 1.46>0.51>0.48.      6.  History of hep C, treated according to patient.    7.  Right upper lobe lung nodule, management per pulmonary.    8.  Hyponatremia, sodium level noted to be 132 and likely secondary to SIADH.  Currently asymptomatic.    9.  Torsades with prolonged QT, cardiology following along and recommends avoiding QT prolonging agents and she was on 3 different antidepressants along with hypokalemia which could potentially cause QT prolongation and have been held.  Will need follow-up with psychiatry as an outpatient basis.  Limited echocardiogram was done during this hospital stay which revealed preserved ejection fraction with no wall motion abnormalities.    10.  On SCDs for DVT prophylaxis.    11.  CODE STATUS is full code.    Copy text on This note has been reviewed by me on 7-11-25          Chinedu Blandon MD  [facility]  07/11/25  09:56 EDT

## 2025-07-11 NOTE — PROGRESS NOTES
"  PROGRESS NOTE  Patient Name: Yoanna Reed  Age/Sex: 69 y.o. female  : 1955  MRN: [id number]    Date of Admission: 2025  Date of Encounter Visit: 25   LOS: 11 days   Patient Care Team:  Tom Muller MD as PCP - General (Family Medicine)    Chief Complaint: Torsades    Hospital course: Patient was transferred to the CICU for closer observation for the torsade  Everybody seems to believe that this is caused by the electrolyte disturbances because of the vomiting and the diarrhea  I received a dose of antidiarrhea medication this morning and she noticed significant improvement so far no bowel movement for several hours.  She is afebrile, she denies any respiratory issues        REVIEW OF SYSTEMS:   CONSTITUTIONAL: no fever or chills  CARDIOVASCULAR: No chest pain, chest pressure or chest discomfort. No palpitations or edema.   RESPIRATORY: No shortness of breath, on room air.   GASTROINTESTINAL: Positive for diarrhea, improved after her first dose of antidiarrhea medication. No abdominal pain or blood.   HEMATOLOGIC: No bleeding or bruising.     Ventilator/Non-Invasive Ventilation Settings (From admission, onward)      None              Vital Signs  Temp:  [98.1 °F (36.7 °C)-98.7 °F (37.1 °C)] 98.7 °F (37.1 °C)  Heart Rate:  [] 95  Resp:  [17-26] 20  BP: (113-152)/(49-97) 137/77  SpO2:  [92 %-98 %] 98 %  on  Flow (L/min) (Oxygen Therapy):  [1] 1 Device (Oxygen Therapy): nasal cannula    Intake/Output Summary (Last 24 hours) at 2025 1144  Last data filed at 2025 0500  Gross per 24 hour   Intake 275 ml   Output 700 ml   Net -425 ml     Flowsheet Rows      Flowsheet Row First Filed Value   Admission Height 172.7 cm (68\") Documented at 2025 1008   Admission Weight 76.7 kg (169 lb) Documented at 2025 1008          Body mass index is 25.88 kg/m².      25  0719 07/10/25  1507 07/10/25  1814   Weight: 79.2 kg (174 lb 9.7 oz) 78.9 kg (174 lb) 77.2 kg (170 lb " 3.1 oz)       Physical Exam:  GEN:  No acute distress, alert, cooperative, well developed   EYES:   Sclerae clear. No icterus. PERRL. Normal EOM  ENT:   External ears/nose normal, no oral lesions, no thrush, mucous membranes moist  NECK:  Supple, midline trachea, no JVD  LUNGS: Normal chest on inspection, CTAB, no wheezes. No rhonchi. No crackles. Respirations regular, even and unlabored.   CV:  Regular rhythm and rate. Normal S1/S2. No murmurs, gallops, or rubs noted.  ABD:  Soft, nontender and nondistended. Normal bowel sounds. No guarding  EXT:  Moves all extremities well. No cyanosis. No redness. No edema.   Skin: Dry, intact, no bleeding    Results Review:    Results From Last 14 Days   Lab Units 06/30/25  1140 06/30/25  1002   D DIMER QUANT MCGFEU/mL  --  4.44*   LACTATE mmol/L 1.6  --      Results from last 7 days   Lab Units 07/11/25  0757 07/10/25  1706 07/10/25  1017 07/09/25  2039 07/09/25  1821 07/09/25  0610 07/07/25  0539 07/06/25  0518 07/05/25  1929 07/05/25  0641   SODIUM mmol/L 132*  --  130*  --  132* 132* 133* 133*  --  132*   POTASSIUM mmol/L 3.8 3.5 3.5  3.5 3.2* 3.4* 2.9* 3.7 4.1   < > 3.4*   CHLORIDE mmol/L 98  --  94*  --  97* 97* 100 98  --  96*   CO2 mmol/L 20.5*  --  23.0  --  23.4 23.2 23.5 24.9  --  25.0   BUN mg/dL 5.0*  --  6.0*  --  5.0* 5.0* 7.0* 6.0*  --  5.0*   CREATININE mg/dL 0.42*  --  0.64  --  0.63 0.48* 0.56* 0.51*  --  0.46*   CALCIUM mg/dL 8.1*  --  8.6  --  8.7 8.2* 8.5* 8.4*  --  8.2*   ANION GAP mmol/L 13.5  --  13.0  --  11.6 11.8 9.5 10.1  --  11.0    < > = values in this interval not displayed.     Results from last 7 days   Lab Units 07/09/25  1821 07/09/25  0747 07/09/25  0610   HSTROP T ng/L 21* 18* 19*             Results from last 7 days   Lab Units 07/07/25  0539 07/06/25  0518 07/05/25  0641   WBC 10*3/mm3 17.45* 19.16* 19.16*   HEMOGLOBIN g/dL 11.1* 11.1* 10.9*   HEMATOCRIT % 34.1 32.4* 31.2*   PLATELETS 10*3/mm3 692* 655* 677*   MCV fL 99.4* 95.9 93.7  "  NEUTROPHIL % % 77.4* 82.3* 82.4*   LYMPHOCYTE % % 13.7* 10.4* 9.6*   MONOCYTES % % 6.0 5.4 6.1   EOSINOPHIL % % 1.9 0.6 0.7   BASOPHIL % % 0.3 0.3 0.3   IMM GRAN % % 0.7* 1.0* 0.9*         Results from last 7 days   Lab Units 07/10/25  1017 07/09/25  1821 07/09/25  0610   MAGNESIUM mg/dL 2.3 2.0 1.8           Invalid input(s): \"LDLCALC\"          Glucose   Date/Time Value Ref Range Status   07/10/2025 1818 133 (H) 70 - 130 mg/dL Final     Results from last 7 days   Lab Units 07/05/25  0641   PROCALCITONIN ng/mL 38.40*         Results from last 7 days   Lab Units 07/05/25  0349   NITRITE UA  Negative                   Imaging:   Imaging Results (All)               I reviewed the patient's new clinical results.  I personally viewed and interpreted the patient's imaging results:        Medication Review:   [Held by provider] buPROPion XL, 150 mg, Oral, QAM  [Held by provider] escitalopram, 20 mg, Oral, Daily  [Held by provider] FLUoxetine, 40 mg, Oral, Daily  ipratropium-albuterol, 3 mL, Nebulization, Q6H While Awake - RT  [Held by provider] metoprolol tartrate, 25 mg, Oral, Q12H  pravastatin, 20 mg, Oral, Daily  sucralfate, 1 g, Oral, 4x Daily AC & at Bedtime  [Held by provider] zolpidem, 5 mg, Oral, Nightly             ASSESSMENT:   Right loculated parapneumonic effusion  Thoracentesis (7/1)-1 L removed, exudative  Right upper lobe pulmonary nodule  Suspect sepsis secondary to pneumonia  Asthma  Elevated BNP  Near syncope  Frequent falls  Chronic pain  Migraines  Hyponatremia  Acute kidney injury   Leukocytosis  Dextroscoliosis of lumbar spine  History of tobacco abuse  Hypokalemia    PLAN:  Patient was cleared by the cardiology for telemetry  Torsade or any other episodes  Will continue with medication to control the diarrhea which is likely trigger of the above incidents  Continue with the pulmonary hygiene measures  Depending on her need, we will decide if she needs to have scheduled antidiarrhea medication or " just as needed  Her lung has not caused any significant problem, no wheezing and no oxygen requirements.  Will follow-up as needed for the remainder of the hospital stay, please call with any question or concern with her respiratory status or hemodynamics    Discussed with patient and with the CICU rounding team  Labs/Notes/films were independently reviewed and pertinent results are summarized above  The copied texts in this note were reviewed and they are accurate as of 07/11/25    Disposition: Stepdown unit    Kevin Jiménez MD  07/11/25  11:44 EDT          Dictated utilizing Dragon dictation

## 2025-07-11 NOTE — PLAN OF CARE
Goal Outcome Evaluation:              Outcome Evaluation: SVT today for around 45 minutes. HR 190s, MD at bedside. Unable to give adenosine or cardizem r/t QT prolongation, defib pads placed on patient, no shocks delivered, patient self converted out. Vitals stable throughout.

## 2025-07-11 NOTE — PROGRESS NOTES
Electrophysiology Follow-Up Note      Patient Name: Yoanna Reed  Age/Sex: 69 y.o. female  : 1955  MRN: [id number]      Day of Service: 25       Chief Complaint/Follow-up: TdP      S: Patient sitting in the chair at time of exam this morning.  Her diarrhea is slightly better.  She is feeling okay.  She denies any episodes of hot flashes, dizziness, palpitations, near-syncope/syncope.      Temp:  [98.1 °F (36.7 °C)-98.7 °F (37.1 °C)] 98.7 °F (37.1 °C)  Heart Rate:  [] 95  Resp:  [17-26] 20  BP: (113-152)/(49-97) 137/77     PHYSICAL EXAM:  Vitals reviewed.   Constitutional:       Appearance: Healthy appearance. Not in distress.   Pulmonary:      Effort: Pulmonary effort is normal.   Cardiovascular:      Normal rate.   Edema:     Peripheral edema absent.   Skin:     General: Skin is warm and dry.   Neurological:      General: No focal deficit present.      Mental Status: Alert and oriented to person, place and time.            ECG/TELE:       Results from last 7 days   Lab Units 25  0757 07/10/25  1706 07/10/25  1017 25  2039 25  1821   SODIUM mmol/L 132*  --  130*  --  132*   POTASSIUM mmol/L 3.8 3.5 3.5  3.5   < > 3.4*   CHLORIDE mmol/L 98  --  94*  --  97*   CO2 mmol/L 20.5*  --  23.0  --  23.4   BUN mg/dL 5.0*  --  6.0*  --  5.0*   CREATININE mg/dL 0.42*  --  0.64  --  0.63   GLUCOSE mg/dL 115*  --  126*  --  141*   CALCIUM mg/dL 8.1*  --  8.6  --  8.7    < > = values in this interval not displayed.     Results from last 7 days   Lab Units 25  0539 25  0518 25  0641   WBC 10*3/mm3 17.45* 19.16* 19.16*   HEMOGLOBIN g/dL 11.1* 11.1* 10.9*   HEMATOCRIT % 34.1 32.4* 31.2*   PLATELETS 10*3/mm3 692* 655* 677*         Results from last 7 days   Lab Units 25  1821 25  0747 25  0610   HSTROP T ng/L 21* 18* 19*               Current Medications:   Scheduled Meds:[Held by provider] buPROPion XL, 150 mg, Oral, QAM  [Held by provider]  escitalopram, 20 mg, Oral, Daily  [Held by provider] FLUoxetine, 40 mg, Oral, Daily  ipratropium-albuterol, 3 mL, Nebulization, Q6H While Awake - RT  [Held by provider] metoprolol tartrate, 25 mg, Oral, Q12H  pravastatin, 20 mg, Oral, Daily  sucralfate, 1 g, Oral, 4x Daily AC & at Bedtime  [Held by provider] zolpidem, 5 mg, Oral, Nightly            Chronic pain    Pleural effusion    Near syncope    TUSHAR (acute kidney injury)    Elevated troponin       Plan:   #Torsades  -- Patient previously on several potential QT prolonging medications, these have since been held and/or discontinued  --Continue to hold QT prolonging medications such as Zofran, amiodarone, antidepressants, tizanidine  --Continue with electrolyte replacement and management  -- No events overnight.  Would likely be okay to move out of ICU to telemetry  --Once diarrhea is controlled and she has gone 48 hours without any TdP events, discharge could be reasonable  -- She will need a 2-week Zio monitor at discharge and follow-up in our office    OSIEL Ladd  07/11/25  11:58 EDT

## 2025-07-11 NOTE — PROGRESS NOTES
[facility] Follow Up    Chief Complaint: Follow up torsades    Interval History: Transferred to CICU for closer observation yesterday.  No further episodes of torsades.  She reports that she was able to sleep last night and feels better this morning.  Denies any chest pain or shortness of breath.  She continues to struggle with diarrhea.    Objective:     Objective:  Temp:  [98.1 °F (36.7 °C)-98.7 °F (37.1 °C)] 98.7 °F (37.1 °C)  Heart Rate:  [] 95  Resp:  [17-26] 20  BP: (113-152)/(49-97) 137/77     Intake/Output Summary (Last 24 hours) at 7/11/2025 0924  Last data filed at 7/11/2025 0500  Gross per 24 hour   Intake 275 ml   Output 700 ml   Net -425 ml     Body mass index is 25.88 kg/m².      07/08/25  0719 07/10/25  1507 07/10/25  1814   Weight: 79.2 kg (174 lb 9.7 oz) 78.9 kg (174 lb) 77.2 kg (170 lb 3.1 oz)     Weight change:       Physical Exam:   General : Alert, cooperative, in no acute distress.  Neuro: Alert,cooperative and oriented.  Lungs: CTAB. Normal respiratory effort and rate.  CV: Regular rate and rhythm, normal S1 and S2, no murmurs, gallops or rubs.  ABD: Soft, nontender, nondistended. Positive bowel sounds.  Extr: No edema or cyanosis, moves all extremities.    Lab Review:   Results from last 7 days   Lab Units 07/11/25  0757 07/10/25  1706 07/10/25  1017   SODIUM mmol/L 132*  --  130*   POTASSIUM mmol/L 3.8 3.5 3.5  3.5   CHLORIDE mmol/L 98  --  94*   CO2 mmol/L 20.5*  --  23.0   BUN mg/dL 5.0*  --  6.0*   CREATININE mg/dL 0.42*  --  0.64   GLUCOSE mg/dL 115*  --  126*   CALCIUM mg/dL 8.1*  --  8.6     Results from last 7 days   Lab Units 07/09/25  1821 07/09/25  0747 07/09/25  0610   HSTROP T ng/L 21* 18* 19*     Results from last 7 days   Lab Units 07/07/25  0539 07/06/25  0518   WBC 10*3/mm3 17.45* 19.16*   HEMOGLOBIN g/dL 11.1* 11.1*   HEMATOCRIT % 34.1 32.4*   PLATELETS 10*3/mm3 692* 655*         Results from last 7 days   Lab Units 07/10/25  1017 07/09/25  1822  "  MAGNESIUM mg/dL 2.3 2.0           Invalid input(s): \"LDLCALC\"          I reviewed the patient's new clinical results.  I personally viewed and interpreted the patient's EKG  Current Medications:   Scheduled Meds:[Held by provider] buPROPion XL, 150 mg, Oral, QAM  [Held by provider] escitalopram, 20 mg, Oral, Daily  [Held by provider] FLUoxetine, 40 mg, Oral, Daily  ipratropium-albuterol, 3 mL, Nebulization, Q6H While Awake - RT  [Held by provider] metoprolol tartrate, 25 mg, Oral, Q12H  pravastatin, 20 mg, Oral, Daily  sucralfate, 1 g, Oral, 4x Daily AC & at Bedtime  [Held by provider] zolpidem, 5 mg, Oral, Nightly      Continuous Infusions:     Allergies:  No Known Allergies    Assessment/Plan:     1.  Torsades.  Evaluated by Dr. Lundberg.  [place] to be due to accommodation of QT prolonging medications (including 3 antidepressant medications) as well as hypokalemia.  The patient did have symptoms associated with the episodes of torsades.  On further questioning it sounds like the patient has been having similar episodes even prior to this admission for the last couple of months.  Her episodes here started the afternoon of 7/9 and intermittent episodes until the afternoon of 7/10.  She was on 3 antidepressants which have all been placed on hold.  Potassium has been aggressively replaced.  Recurrent episodes yesterday appeared to start after she received a dose of Zofran.  Troponins unremarkable.  Echocardiogram with preserved left ventricular function and no wall motion abnormalities.    2.  Prolonged QT.  Prolonged but improved.  Remains prolonged this morning.  Likely due to a combination of antidepressants, Ambien, Zofran, and hypokalemia.  3.  Hypokalemia.  Replacement underway.  Exacerbated by ongoing diarrhea.  4.  Right loculated parapneumonic effusion.  Status post chest tube placement and removal.  5.  Right upper lobe pulmonary nodule  6.  Pneumonia  7.  Asthma  8.  Hypertension.  Currently well-controlled " despite stopping the metoprolol.  9.  Elevated troponin.  Noted to be elevated on admission.  No associated symptoms, EKG changes, or left ventricular dysfunction at that time.  Repeat troponins following her episodes of torsades overnight have normalized.  Bunny echocardiogram on 7/10 continue to show preserved left ventricular function and wall motion.  10.  Diarrhea.  Ongoing.  Makes replacing a potassium difficult.    - Evaluated by Dr. Lundberg with electrophysiology.  Appreciate his assistance.  Will continue to hold QT prolonging medications including her home regimen of antidepressants, Ambien and Zofran.  - Continue to monitor potassium closely and replace as needed.  - Suspect we need better control of her diarrhea to aid with keeping her potassium normal.  - Will consult psychiatry to assist with managing her depression without a QT prolonging medication if possible.  - If no further episodes of torsades this morning and be transferred back to telemetry.    Nisa Phelps MD  07/11/25  09:24 EDT

## 2025-07-11 NOTE — CASE MANAGEMENT/SOCIAL WORK
Continued Stay Note  [facility]     Patient Name: Yoanna Reed  MRN: [id number]  Today's Date: 7/11/2025    Admit Date: 6/30/2025    Plan: Home w/ HH (pending)   Discharge Plan       Row Name 07/11/25 0839       Plan    Plan Home w/ HH (pending)    Plan Comments Patient transferred to the CICU. Plan remains home with HH (pending). CCP will monitor for updated dc needs pending clinical course. MARTHA EDWARDS                   Discharge Codes    No documentation.                 Expected Discharge Date and Time       Expected Discharge Date Expected Discharge Time    Jul 12, 2025               MARTHA Corral

## 2025-07-11 NOTE — PROGRESS NOTES
Patient had another episode of arrhythmia, this time it was an obvious supraventricular tachycardia with narrow complex  Patient was completely asymptomatic, there was 1 low blood pressure reading however that was probably an artifact because repeat blood pressure check has been consistently showing systolic in the 140s-150s  On initial assessment, we elected to hold on the adenosine given history of asthma and the potential QT prolonging side effect of it  We were considering the Cardizem but also it has been reported to prolong the QT segment  In this case and given the patient's specific situation, we elected to wait and we had a stat call for the cardiology team  Patient remained hemodynamically stable and completely asymptomatic throughout the episode.  As long as she remains stable, we will have the cardiology manage the arrhythmia  Cardiology nurse was at the bedside, she is reaching out to the cardiology attending for further recommendation.  We will hold on the transfer given the above and we will continue to monitor in the CICU

## 2025-07-11 NOTE — PLAN OF CARE
Goal Outcome Evaluation:  Plan of Care Reviewed With: patient           Outcome Evaluation: Pt. seen for PT treatment session this AM, seated in bedside chair at arrival. She performs STS with Supervision A and ambulates 30 ft. with supervision. She returns to bedside chair and performs x10 LAQ, x20 seated HR. She is left with all  needs met and within reach. PT rec HH.    Anticipated Discharge Disposition (PT): home with home health, home

## 2025-07-12 ENCOUNTER — APPOINTMENT (OUTPATIENT)
Dept: GENERAL RADIOLOGY | Facility: HOSPITAL | Age: 70
DRG: 871 | End: 2025-07-12
Payer: MEDICARE

## 2025-07-12 LAB
ANION GAP SERPL CALCULATED.3IONS-SCNC: 11.6 MMOL/L (ref 5–15)
BUN SERPL-MCNC: 5 MG/DL (ref 8–23)
BUN/CREAT SERPL: 11.1 (ref 7–25)
CALCIUM SPEC-SCNC: 8.3 MG/DL (ref 8.6–10.5)
CHLORIDE SERPL-SCNC: 102 MMOL/L (ref 98–107)
CO2 SERPL-SCNC: 21.4 MMOL/L (ref 22–29)
CREAT SERPL-MCNC: 0.45 MG/DL (ref 0.57–1)
DEPRECATED RDW RBC AUTO: 44.9 FL (ref 37–54)
EGFRCR SERPLBLD CKD-EPI 2021: 104.3 ML/MIN/1.73
ERYTHROCYTE [DISTWIDTH] IN BLOOD BY AUTOMATED COUNT: 12.5 % (ref 12.3–15.4)
GLUCOSE BLDC GLUCOMTR-MCNC: 106 MG/DL (ref 70–130)
GLUCOSE SERPL-MCNC: 104 MG/DL (ref 65–99)
HCT VFR BLD AUTO: 32.7 % (ref 34–46.6)
HGB BLD-MCNC: 10.7 G/DL (ref 12–15.9)
MAGNESIUM SERPL-MCNC: 2 MG/DL (ref 1.6–2.4)
MCH RBC QN AUTO: 31.9 PG (ref 26.6–33)
MCHC RBC AUTO-ENTMCNC: 32.7 G/DL (ref 31.5–35.7)
MCV RBC AUTO: 97.6 FL (ref 79–97)
PLATELET # BLD AUTO: 755 10*3/MM3 (ref 140–450)
PMV BLD AUTO: 7.8 FL (ref 6–12)
POTASSIUM SERPL-SCNC: 4 MMOL/L (ref 3.5–5.2)
POTASSIUM SERPL-SCNC: 4 MMOL/L (ref 3.5–5.2)
QT INTERVAL: 289 MS
QTC INTERVAL: 502 MS
RBC # BLD AUTO: 3.35 10*6/MM3 (ref 3.77–5.28)
SODIUM SERPL-SCNC: 135 MMOL/L (ref 136–145)
WBC NRBC COR # BLD AUTO: 11.75 10*3/MM3 (ref 3.4–10.8)

## 2025-07-12 PROCEDURE — 83735 ASSAY OF MAGNESIUM: CPT | Performed by: INTERNAL MEDICINE

## 2025-07-12 PROCEDURE — 99232 SBSQ HOSP IP/OBS MODERATE 35: CPT | Performed by: INTERNAL MEDICINE

## 2025-07-12 PROCEDURE — 85027 COMPLETE CBC AUTOMATED: CPT

## 2025-07-12 PROCEDURE — 94761 N-INVAS EAR/PLS OXIMETRY MLT: CPT

## 2025-07-12 PROCEDURE — 71045 X-RAY EXAM CHEST 1 VIEW: CPT

## 2025-07-12 PROCEDURE — 94799 UNLISTED PULMONARY SVC/PX: CPT

## 2025-07-12 PROCEDURE — 25010000002 POTASSIUM CHLORIDE 10 MEQ/100ML SOLUTION

## 2025-07-12 PROCEDURE — 80048 BASIC METABOLIC PNL TOTAL CA: CPT | Performed by: INTERNAL MEDICINE

## 2025-07-12 PROCEDURE — 25010000002 PROCHLORPERAZINE 10 MG/2ML SOLUTION: Performed by: INTERNAL MEDICINE

## 2025-07-12 PROCEDURE — 84132 ASSAY OF SERUM POTASSIUM: CPT

## 2025-07-12 PROCEDURE — 82948 REAGENT STRIP/BLOOD GLUCOSE: CPT

## 2025-07-12 PROCEDURE — 94664 DEMO&/EVAL PT USE INHALER: CPT

## 2025-07-12 RX ADMIN — SUCRALFATE 1 G: 1 TABLET ORAL at 07:51

## 2025-07-12 RX ADMIN — IPRATROPIUM BROMIDE AND ALBUTEROL SULFATE 3 ML: .5; 3 SOLUTION RESPIRATORY (INHALATION) at 07:42

## 2025-07-12 RX ADMIN — LORAZEPAM 1 MG: 1 TABLET ORAL at 12:12

## 2025-07-12 RX ADMIN — HYDROCODONE BITARTRATE AND ACETAMINOPHEN 1 TABLET: 5; 325 TABLET ORAL at 01:59

## 2025-07-12 RX ADMIN — SUCRALFATE 1 G: 1 TABLET ORAL at 12:09

## 2025-07-12 RX ADMIN — SUCRALFATE 1 G: 1 TABLET ORAL at 20:15

## 2025-07-12 RX ADMIN — METOPROLOL TARTRATE 25 MG: 25 TABLET, FILM COATED ORAL at 20:15

## 2025-07-12 RX ADMIN — SUCRALFATE 1 G: 1 TABLET ORAL at 17:18

## 2025-07-12 RX ADMIN — HYDROCODONE BITARTRATE AND ACETAMINOPHEN 1 TABLET: 5; 325 TABLET ORAL at 20:15

## 2025-07-12 RX ADMIN — POTASSIUM CHLORIDE 10 MEQ: 7.46 INJECTION, SOLUTION INTRAVENOUS at 00:14

## 2025-07-12 RX ADMIN — PROCHLORPERAZINE EDISYLATE 5 MG: 5 INJECTION INTRAMUSCULAR; INTRAVENOUS at 08:16

## 2025-07-12 RX ADMIN — IPRATROPIUM BROMIDE AND ALBUTEROL SULFATE 3 ML: .5; 3 SOLUTION RESPIRATORY (INHALATION) at 12:22

## 2025-07-12 RX ADMIN — IPRATROPIUM BROMIDE AND ALBUTEROL SULFATE 3 ML: .5; 3 SOLUTION RESPIRATORY (INHALATION) at 20:43

## 2025-07-12 RX ADMIN — PRAVASTATIN SODIUM 20 MG: 20 TABLET ORAL at 09:02

## 2025-07-12 RX ADMIN — HYDROCODONE BITARTRATE AND ACETAMINOPHEN 1 TABLET: 5; 325 TABLET ORAL at 13:32

## 2025-07-12 NOTE — CONSULTS
IDENTIFYING INFORMATION: The patient is a 69-year-old white female admitted with complaints of dizziness and falls.  She is seen by psychiatry related to concerns about use of antidepressant medication given her elevated QTc interval.    CHIEF COMPLAINT: None given    INFORMANT: Patient and chart    RELIABILITY: Good    HISTORY OF PRESENT ILLNESS: The patient is a 69-year-old  white female admitted on 6/30/2025 with complaints of dizziness and falls.  She was found to be suffering from severe sepsis, pleural effusion and myocardial infarction.  During hospitalization and has been discovered that the patient has an elevated QTc interval which has been measured as high as 561 ms during this hospitalization.  There has been concern that the patient's prescribed psychotropic medications may be contributing to this condition.  When seen today the patient states that she does not wish to continue antidepressant medication stating that she plans to consult with a homeopathic provider regarding symptoms of depression.  She denies current suicidal or homicidal ideation or any psychotic symptoms.  She denies recent changes in sleep or appetite.    PAST PSYCHIATRIC HISTORY: According to the chart the patient had been on Wellbutrin Lexapro, Prozac Ativan and Ambien.  Apart from Ativan, all are currently being held.    PAST MEDICAL HISTORY: See above    MEDICATIONS:   Current Facility-Administered Medications   Medication Dose Route Frequency Provider Last Rate Last Admin    acetaminophen (TYLENOL) tablet 650 mg  650 mg Oral Q4H PRN Chinedu Blandon MD   325 mg at 07/05/25 0054    adenosine (ADENOCARD) injection 6 mg  6 mg Intravenous Once Kevin Jiménez MD        albuterol (PROVENTIL) nebulizer solution 0.083% 2.5 mg/3mL  2.5 mg Nebulization Q6H PRN Chinedu Blandon MD        sennosides-docusate (PERICOLACE) 8.6-50 MG per tablet 2 tablet  2 tablet Oral BID PRN Chinedu Blandon MD        And     polyethylene glycol (MIRALAX) packet 17 g  17 g Oral Daily PRN Chinedu Blandon MD        And    bisacodyl (DULCOLAX) EC tablet 5 mg  5 mg Oral Daily PRN Chinedu Blandon MD        And    bisacodyl (DULCOLAX) suppository 10 mg  10 mg Rectal Daily PRN Chinedu Blandon MD        Calcium Replacement - Follow Nurse / BPA Driven Protocol   Not Applicable PRN Nisa Phelps MD        diphenoxylate-atropine (LOMOTIL) 2.5-0.025 MG per tablet 2 tablet  2 tablet Oral 4x Daily PRN Kevin Jiménez MD        HYDROcodone-acetaminophen (NORCO) 5-325 MG per tablet 1 tablet  1 tablet Oral Q6H PRN Stephanie Velasco APRN   1 tablet at 07/12/25 1332    ipratropium-albuterol (DUO-NEB) nebulizer solution 3 mL  3 mL Nebulization Q6H While Awake - RT Александр Marion MD   3 mL at 07/12/25 1222    LORazepam (ATIVAN) tablet 1 mg  1 mg Oral Q6H PRN Chinedu Blandon MD   1 mg at 07/12/25 1212    Magnesium Cardiology Dose Replacement - Follow Nurse / BPA Driven Protocol   Not Applicable PRN Nisa Phelps MD        metoprolol tartrate (LOPRESSOR) tablet 25 mg  25 mg Oral Q12H Fortunato Romano MD   25 mg at 07/09/25 2000    nitroglycerin (NITROSTAT) SL tablet 0.4 mg  0.4 mg Sublingual Q5 Min PRN Chinedu Blandon MD        Phosphorus Replacement - Follow Nurse / BPA Driven Protocol   Not Applicable PRN Nisa Phelps MD        Potassium Replacement - Follow Nurse / BPA Driven Protocol   Not Applicable PRN Nisa Phelps MD        pravastatin (PRAVACHOL) tablet 20 mg  20 mg Oral Daily Chinedu Blandon MD   20 mg at 07/12/25 0902    prochlorperazine (COMPAZINE) injection 5 mg  5 mg Intravenous Q6H PRN Kevin Jiménez MD   5 mg at 07/12/25 0816    Or    prochlorperazine (COMPAZINE) tablet 5 mg  5 mg Oral Q6H PRN Kevin Jiménez MD        Or    prochlorperazine (COMPAZINE) suppository 25 mg  25 mg Rectal Q12H PRN Kevin Jiménez MD        sucralfate (CARAFATE) tablet 1 g  1 g Oral 4x Daily AC & at Bedtime  Kelvin Falcon MD   1 g at 07/12/25 1209    [Held by provider] zolpidem (AMBIEN) tablet 5 mg  5 mg Oral Nightly Chinedu Blandon MD   5 mg at 07/08/25 2110         ALLERGIES: None    FAMILY HISTORY: Noncontributory    SOCIAL HISTORY: The patient is  and lives with her cat.  She denies use of any psychoactive substances.    MENTAL STATUS EXAM: The patient is a thin white female appearing her stated age.  She is dressed in hospital garb.  She is awake alert and oriented all spheres.  Her mood is euthymic her affect congruent.  Speech is relevant and coherent.  There are no deficits memory or cognition noted.  Intelligence is judged to be in the average range based on fund of knowledge, the patient is cooperative with interview.  She denies current suicidal or homicidal ideation or psychotic features.  Judgement and insight are intact.    ASSETS/LIABILITIES: Motivation for change/health issues    DIAGNOSTIC IMPRESSION: Major depressive disorder in partial remission, medical problems as noted previously    PLAN: The patient is offered a trial of sertraline which, among antidepressants, is the least likely to cause prolongation of QTc interval.  She declines this stating that she does not wish to continue any antidepressant medication and will instead seek homeopathic treatment for her symptoms of depression.  I have warned her that homeopathic agents could potentially cause issues with her prolonged QTc and she acknowledges this.  Little else to add, I would recommend continuation of all psychotropic medications with the exception of as needed Ativan at discharge.

## 2025-07-12 NOTE — PROGRESS NOTES
"[facility] Cardiology [facility] Follow Up    Chief Complaint: Follow up torsades    Interval History:   Mildly tachycardic today in sinus.  Occasional PACs and short atrial runs.    Objective:     Objective:  Temp:  [98.1 °F (36.7 °C)-99.2 °F (37.3 °C)] 98.1 °F (36.7 °C)  Heart Rate:  [104-132] 115  Resp:  [16-20] 18  BP: ()/(44-98) 131/86     Intake/Output Summary (Last 24 hours) at 7/12/2025 1226  Last data filed at 7/12/2025 0800  Gross per 24 hour   Intake 600 ml   Output 1385 ml   Net -785 ml     Body mass index is 24.37 kg/m².      07/10/25  1507 07/10/25  1814 07/12/25  0600   Weight: 78.9 kg (174 lb) 77.2 kg (170 lb 3.1 oz) 72.7 kg (160 lb 4.4 oz)     Weight change: -6.226 kg (-13 lb 11.6 oz)      Physical Exam:   General : Alert, cooperative, in no acute distress.  Neuro: Alert,cooperative and oriented.  Lungs: CTAB. Normal respiratory effort and rate.  CV: Regular rate and rhythm, normal S1 and S2, no murmurs, gallops or rubs.  ABD: Soft, nontender, nondistended. Positive bowel sounds.  Extr: No edema or cyanosis, moves all extremities.    Lab Review:   Results from last 7 days   Lab Units 07/12/25  0650 07/11/25 2001 07/11/25  0757   SODIUM mmol/L 135*  --  132*   POTASSIUM mmol/L 4.0  4.0 3.6 3.8   CHLORIDE mmol/L 102  --  98   CO2 mmol/L 21.4*  --  20.5*   BUN mg/dL 5.0*  --  5.0*   CREATININE mg/dL 0.45*  --  0.42*   GLUCOSE mg/dL 104*  --  115*   CALCIUM mg/dL 8.3*  --  8.1*     Results from last 7 days   Lab Units 07/09/25  1821 07/09/25  0747 07/09/25  0610   HSTROP T ng/L 21* 18* 19*     Results from last 7 days   Lab Units 07/12/25  0650 07/07/25  0539   WBC 10*3/mm3 11.75* 17.45*   HEMOGLOBIN g/dL 10.7* 11.1*   HEMATOCRIT % 32.7* 34.1   PLATELETS 10*3/mm3 755* 692*         Results from last 7 days   Lab Units 07/12/25  0650 07/11/25  1338   MAGNESIUM mg/dL 2.0 2.2           Invalid input(s): \"LDLCALC\"          I reviewed the patient's new clinical results.  I personally viewed and " interpreted the patient's EKG  Current Medications:   Scheduled Meds:adenosine, 6 mg, Intravenous, Once  [Held by provider] buPROPion XL, 150 mg, Oral, QAM  [Held by provider] escitalopram, 20 mg, Oral, Daily  [Held by provider] FLUoxetine, 40 mg, Oral, Daily  ipratropium-albuterol, 3 mL, Nebulization, Q6H While Awake - RT  [Held by provider] metoprolol tartrate, 25 mg, Oral, Q12H  pravastatin, 20 mg, Oral, Daily  sucralfate, 1 g, Oral, 4x Daily AC & at Bedtime  [Held by provider] zolpidem, 5 mg, Oral, Nightly      Continuous Infusions:     Allergies:  No Known Allergies    Assessment/Plan:     1.  Torsades.  Evaluated by Dr. Lundberg.  [place] to be due to accommodation of QT prolonging medications (including 3 antidepressant medications) as well as hypokalemia.    2.  Prolonged QT.  Prolonged but improved. Likely due to a combination of antidepressants, Ambien, Zofran, and hypokalemia.  3.  Hypokalemia.  Potassium 4 today  4.  Right loculated parapneumonic effusion.  Status post chest tube placement and removal.  5.  Right upper lobe pulmonary nodule  6.  Pneumonia  7.  Asthma  8.  Hypertension.   9.   Diarrhea.  Ongoing.  Makes replacing a potassium difficult.    - Evaluated by Dr. Lundberg with electrophysiology.  Appreciate his assistance.  Will continue to hold QT prolonging medications including her home regimen of antidepressants, Ambien and Zofran.  - Continue to monitor potassium closely and replace as needed.  -Metoprolol has been held and I will restart that.  She would like to stay off of all antidepressants which would be helpful from our standpoint.    Fortunato Romano MD  07/12/25  12:26 EDT

## 2025-07-12 NOTE — PLAN OF CARE
Goal Outcome Evaluation:           Progress: improvingmetoprolol will be restarted tonight.  Has remained sinus tach today with no SVT.  Little to no appetite.

## 2025-07-12 NOTE — PROGRESS NOTES
"      [facility] [place] [facility]         Dr Donaldson Sayied   LOS: 12 days   Patient Care Team:  Tom Muller MD as PCP - General (Family Medicine)    Chief Complaint: Right loculated parapneumonic effusion status post thoracentesis with ongoing SVT other issues as listed below    Interval History: Resting comfortably on room air.  She wants to go home.  Nursing staff reports ongoing issues with SVT noted.    REVIEW OF SYSTEMS:   CARDIOVASCULAR: No chest pain, chest pressure or chest discomfort. No palpitations or edema.   RESPIRATORY: No shortness of breath, cough or sputum.   GASTROINTESTINAL: No anorexia, nausea, vomiting or diarrhea. No abdominal pain or blood.   HEMATOLOGIC: No bleeding or bruising.     Ventilator/Non-Invasive Ventilation Settings (From admission, onward)      None              Vital Signs  Temp:  [98.1 °F (36.7 °C)-99.2 °F (37.3 °C)] 98.3 °F (36.8 °C)  Heart Rate:  [] 112  Resp:  [16-20] 20  BP: ()/(44-98) 110/63    Intake/Output Summary (Last 24 hours) at 7/12/2025 1035  Last data filed at 7/12/2025 0800  Gross per 24 hour   Intake 600 ml   Output 1385 ml   Net -785 ml     Flowsheet Rows      Flowsheet Row First Filed Value   Admission Height 172.7 cm (68\") Documented at 06/30/2025 1008   Admission Weight 76.7 kg (169 lb) Documented at 06/30/2025 1008            Physical Exam:  Patient is examined using the personal protective equipment as per guidelines from infection control for this particular patient as enacted.  Hand hygiene was performed before and after patient interaction.   General Appearance:    Alert, cooperative, in no acute distress.  Following simple commands  ENT Mallampati between 3 and 4 no nasal congestion  Neck midline trachea, no thyromegaly   Lungs:   Diminished breath sounds bilaterally    Heart:    Regular rhythm and normal rate, normal S1 and S2, no            murmur, no gallop, no rub, no click   Chest Wall:    No abnormalities observed   Abdomen:     " Normal bowel sounds, no masses, no organomegaly, soft        nontender, nondistended, no guarding, no rebound                tenderness   Extremities:   Moves all extremities well, no edema, no cyanosis, no             redness  CNS no focal neurological deficits normal sensory exam  Skin no rashes no nodules  Musculoskeletal no cyanosis no clubbing normal range of motion     Results Review:        Results from last 7 days   Lab Units 07/12/25  0650 07/11/25 2001 07/11/25  0757 07/10/25  1706 07/10/25  1017   SODIUM mmol/L 135*  --  132*  --  130*   POTASSIUM mmol/L 4.0  4.0 3.6 3.8   < > 3.5  3.5   CHLORIDE mmol/L 102  --  98  --  94*   CO2 mmol/L 21.4*  --  20.5*  --  23.0   BUN mg/dL 5.0*  --  5.0*  --  6.0*   CREATININE mg/dL 0.45*  --  0.42*  --  0.64   GLUCOSE mg/dL 104*  --  115*  --  126*   CALCIUM mg/dL 8.3*  --  8.1*  --  8.6    < > = values in this interval not displayed.     Results from last 7 days   Lab Units 07/09/25  1821 07/09/25  0747 07/09/25  0610   HSTROP T ng/L 21* 18* 19*     Results from last 7 days   Lab Units 07/12/25  0650 07/07/25  0539 07/06/25  0518   WBC 10*3/mm3 11.75* 17.45* 19.16*   HEMOGLOBIN g/dL 10.7* 11.1* 11.1*   HEMATOCRIT % 32.7* 34.1 32.4*   PLATELETS 10*3/mm3 755* 692* 655*             Results from last 7 days   Lab Units 07/12/25  0650   MAGNESIUM mg/dL 2.0               I reviewed the patient's new clinical results.  I personally viewed and interpreted the patient's chest x-ray.        Medication Review:   adenosine, 6 mg, Intravenous, Once  [Held by provider] buPROPion XL, 150 mg, Oral, QAM  [Held by provider] escitalopram, 20 mg, Oral, Daily  [Held by provider] FLUoxetine, 40 mg, Oral, Daily  ipratropium-albuterol, 3 mL, Nebulization, Q6H While Awake - RT  [Held by provider] metoprolol tartrate, 25 mg, Oral, Q12H  pravastatin, 20 mg, Oral, Daily  sucralfate, 1 g, Oral, 4x Daily AC & at Bedtime  [Held by provider] zolpidem, 5 mg, Oral, Nightly             ASSESSMENT:    Right loculated parapneumonic effusion  SVT/VT  Thoracentesis (7/1)-1 L removed, exudative  Right upper lobe pulmonary nodule  Suspect sepsis secondary to pneumonia  Asthma  Elevated BNP  Near syncope  Frequent falls  Chronic pain  Migraines  Hyponatremia  Acute kidney injury   Leukocytosis  Dextroscoliosis of lumbar spine  History of tobacco abuse  Hypokalemia    PLAN:  Events noted chart reviewed  Heart rate still fluctuating mostly SVT  Clinically stable and improving  Diarrhea better controlled  Thoracentesis 1 L removed exudative  Bronchodilators for asthma  Transfer patient out of the ICU once cleared by cardiology  Hospitalist following on the floor      Anika Villalba MD  07/12/25  10:35 EDT

## 2025-07-12 NOTE — PLAN OF CARE
Goal Outcome Evaluation:   Pt remained in CICU overnight. Pt heart rate was 100-114 at rest and 120's when getting up to bedside commode. Potassium replaced per protocol. Will continue to monitor.

## 2025-07-13 ENCOUNTER — APPOINTMENT (OUTPATIENT)
Dept: GENERAL RADIOLOGY | Facility: HOSPITAL | Age: 70
DRG: 871 | End: 2025-07-13
Payer: MEDICARE

## 2025-07-13 VITALS
RESPIRATION RATE: 27 BRPM | WEIGHT: 160.27 LBS | HEIGHT: 68 IN | SYSTOLIC BLOOD PRESSURE: 119 MMHG | BODY MASS INDEX: 24.29 KG/M2 | HEART RATE: 71 BPM | DIASTOLIC BLOOD PRESSURE: 97 MMHG | OXYGEN SATURATION: 95 % | TEMPERATURE: 97.8 F

## 2025-07-13 LAB
ANION GAP SERPL CALCULATED.3IONS-SCNC: 12 MMOL/L (ref 5–15)
BUN SERPL-MCNC: 6 MG/DL (ref 8–23)
BUN/CREAT SERPL: 12 (ref 7–25)
CALCIUM SPEC-SCNC: 8.4 MG/DL (ref 8.6–10.5)
CHLORIDE SERPL-SCNC: 97 MMOL/L (ref 98–107)
CO2 SERPL-SCNC: 21 MMOL/L (ref 22–29)
CREAT SERPL-MCNC: 0.5 MG/DL (ref 0.57–1)
EGFRCR SERPLBLD CKD-EPI 2021: 101.7 ML/MIN/1.73
GLUCOSE SERPL-MCNC: 114 MG/DL (ref 65–99)
MAGNESIUM SERPL-MCNC: 2 MG/DL (ref 1.6–2.4)
POTASSIUM SERPL-SCNC: 3.4 MMOL/L (ref 3.5–5.2)
QT INTERVAL: 507 MS
QTC INTERVAL: 556 MS
SODIUM SERPL-SCNC: 130 MMOL/L (ref 136–145)

## 2025-07-13 PROCEDURE — 99232 SBSQ HOSP IP/OBS MODERATE 35: CPT | Performed by: NURSE PRACTITIONER

## 2025-07-13 PROCEDURE — 80048 BASIC METABOLIC PNL TOTAL CA: CPT | Performed by: INTERNAL MEDICINE

## 2025-07-13 PROCEDURE — 94761 N-INVAS EAR/PLS OXIMETRY MLT: CPT

## 2025-07-13 PROCEDURE — 94664 DEMO&/EVAL PT USE INHALER: CPT

## 2025-07-13 PROCEDURE — 83735 ASSAY OF MAGNESIUM: CPT | Performed by: INTERNAL MEDICINE

## 2025-07-13 PROCEDURE — 94799 UNLISTED PULMONARY SVC/PX: CPT

## 2025-07-13 PROCEDURE — 71045 X-RAY EXAM CHEST 1 VIEW: CPT

## 2025-07-13 PROCEDURE — 93005 ELECTROCARDIOGRAM TRACING: CPT | Performed by: NURSE PRACTITIONER

## 2025-07-13 PROCEDURE — 25010000002 POTASSIUM CHLORIDE 10 MEQ/100ML SOLUTION: Performed by: HOSPITALIST

## 2025-07-13 PROCEDURE — 93010 ELECTROCARDIOGRAM REPORT: CPT | Performed by: INTERNAL MEDICINE

## 2025-07-13 RX ORDER — POTASSIUM CHLORIDE 7.45 MG/ML
10 INJECTION INTRAVENOUS
Status: DISPENSED | OUTPATIENT
Start: 2025-07-13 | End: 2025-07-13

## 2025-07-13 RX ADMIN — SUCRALFATE 1 G: 1 TABLET ORAL at 07:43

## 2025-07-13 RX ADMIN — POTASSIUM CHLORIDE 10 MEQ: 7.46 INJECTION, SOLUTION INTRAVENOUS at 10:25

## 2025-07-13 RX ADMIN — LORAZEPAM 1 MG: 1 TABLET ORAL at 01:19

## 2025-07-13 RX ADMIN — POTASSIUM CHLORIDE 10 MEQ: 7.46 INJECTION, SOLUTION INTRAVENOUS at 14:08

## 2025-07-13 RX ADMIN — PRAVASTATIN SODIUM 20 MG: 20 TABLET ORAL at 08:34

## 2025-07-13 RX ADMIN — METOPROLOL TARTRATE 25 MG: 25 TABLET, FILM COATED ORAL at 08:35

## 2025-07-13 RX ADMIN — IPRATROPIUM BROMIDE AND ALBUTEROL SULFATE 3 ML: .5; 3 SOLUTION RESPIRATORY (INHALATION) at 10:50

## 2025-07-13 RX ADMIN — HYDROCODONE BITARTRATE AND ACETAMINOPHEN 1 TABLET: 5; 325 TABLET ORAL at 10:05

## 2025-07-13 RX ADMIN — POTASSIUM CHLORIDE 10 MEQ: 7.46 INJECTION, SOLUTION INTRAVENOUS at 12:24

## 2025-07-13 RX ADMIN — IPRATROPIUM BROMIDE AND ALBUTEROL SULFATE 3 ML: .5; 3 SOLUTION RESPIRATORY (INHALATION) at 06:44

## 2025-07-13 RX ADMIN — SUCRALFATE 1 G: 1 TABLET ORAL at 11:59

## 2025-07-13 NOTE — DISCHARGE SUMMARY
[facility]  [phone]    DISCHARGE SUMMARY  [facility]    Patient Identification:  Name: Yoanna Reed  Age: 69 y.o.  Sex: female  :  1955  MRN: [id number]  Primary Care Physician: Tom Muller MD    Admit date: 2025  Discharge date and time:      Discharge Diagnoses:  Chronic pain    Pleural effusion    Near syncope    TUSHAR (acute kidney injury)    Elevated troponin       Hospital Course:   The patient was admitted to the hospital for loculated pleural effusion and underwent chest tube placement and removal.  She was seen in consultation by cardiothoracic surgery.  She was given antibiotics during her hospital stay.  She had been having episodes of torsades and cardiology is following along.  Cardiology was wanting electrolytes to be normalized prior to discharge.  Potassium was still low this morning.    Please see progress note from today.    Patient leaving AMA    I explained my saw the patient earlier today the QTc still prolonged potassium still low still at risk for arrhythmia.  She needs to stay off her psychiatry medications.  Patient left before I was able to talk to her again but nurse did explain risks to the patient.      The patient was seen and examined on the day of discharge.    Consults:   Consults       Date and Time Order Name Status Description    2025  9:24 AM Inpatient Psychiatrist Consult Completed     7/10/2025  3:48 PM Cardiac Electrophysiologist Inpatient Consult Completed     2025  5:44 AM Inpatient Cardiology Consult      2025  6:16 PM Inpatient Cardiology Consult Completed     2025  4:49 PM Inpatient Pulmonology Consult Completed     2025 12:12 PM [facility] (on-call MD unless specified) Details              Results from last 7 days   Lab Units 25  0650   WBC 10*3/mm3 11.75*   HEMOGLOBIN g/dL 10.7*   HEMATOCRIT % 32.7*   PLATELETS 10*3/mm3 755*       Results from last 7 days   Lab  "Units 07/13/25  0851   SODIUM mmol/L 130*   POTASSIUM mmol/L 3.4*   CHLORIDE mmol/L 97*   CO2 mmol/L 21.0*   BUN mg/dL 6.0*   CREATININE mg/dL 0.50*   GLUCOSE mg/dL 114*   CALCIUM mg/dL 8.4*       Significant Diagnostic Studies:   WBC   Date Value Ref Range Status   07/12/2025 11.75 (H) 3.40 - 10.80 10*3/mm3 Final     Hemoglobin   Date Value Ref Range Status   07/12/2025 10.7 (L) 12.0 - 15.9 g/dL Final     Hematocrit   Date Value Ref Range Status   07/12/2025 32.7 (L) 34.0 - 46.6 % Final     Platelets   Date Value Ref Range Status   07/12/2025 755 (H) 140 - 450 10*3/mm3 Final     Sodium   Date Value Ref Range Status   07/13/2025 130 (L) 136 - 145 mmol/L Final     Potassium   Date Value Ref Range Status   07/13/2025 3.4 (L) 3.5 - 5.2 mmol/L Final     Chloride   Date Value Ref Range Status   07/13/2025 97 (L) 98 - 107 mmol/L Final     CO2   Date Value Ref Range Status   07/13/2025 21.0 (L) 22.0 - 29.0 mmol/L Final     BUN   Date Value Ref Range Status   07/13/2025 6.0 (L) 8.0 - 23.0 mg/dL Final     Creatinine   Date Value Ref Range Status   07/13/2025 0.50 (L) 0.57 - 1.00 mg/dL Final     Glucose   Date Value Ref Range Status   07/13/2025 114 (H) 65 - 99 mg/dL Final     Calcium   Date Value Ref Range Status   07/13/2025 8.4 (L) 8.6 - 10.5 mg/dL Final     Magnesium   Date Value Ref Range Status   07/13/2025 2.0 1.6 - 2.4 mg/dL Final     No results found for: \"AST\", \"ALT\", \"ALKPHOS\"  No results found for: \"APTT\", \"INR\"  No results found for: \"COLORU\", \"CLARITYU\", \"SPECGRAV\", \"PHUR\", \"PROTEINUR\", \"GLUCOSEU\", \"KETONESU\", \"BLOODU\", \"NITRITE\", \"LEUKOCYTESUR\", \"BILIRUBINUR\", \"UROBILINOGEN\", \"RBCUA\", \"WBCUA\", \"BACTERIA\", \"UACOMMENT\"  No results found for: \"TROPONINT\", \"TROPONINI\", \"BNP\"  No components found for: \"HGBA1C;2\"  No components found for: \"TSH;2\"    Imaging Results (All)       Procedure Component Value Units Date/Time    XR Chest 1 View [[id number]] Collected: 07/13/25 0654     Updated: 07/13/25 0659    Narrative:   "    XR CHEST 1 VW-     HISTORY: Female who is 69 years-old, right pleural effusion     TECHNIQUE: Frontal view of the chest     COMPARISON: 7/12/2025     FINDINGS: The heart size is normal. Pulmonary vasculature is  unremarkable. Previous pulmonary opacities appear mildly increased on  the right. Small right pleural effusion is apparent, similar to prior  exam. No pneumothorax is seen, but the right apex is slightly obscured  by the chin. No acute osseous process.       Impression:      Mildly increased pulmonary opacity in the right. Otherwise, no  significant change.     This report was finalized on 7/13/2025 6:56 AM by Dr. Huan Robert M.D on Workstation: [id number]       XR Chest 1 View [[id number]] Collected: 07/12/25 0726     Updated: 07/12/25 0734    Narrative:      XR CHEST 1 VW-     HISTORY: Female who is 69 years-old, right pleural effusion     TECHNIQUE: Frontal view of the chest     COMPARISON: 7/11/2025     FINDINGS: The patient is rotated towards the right. The heart size is  normal. Pulmonary vasculature is unremarkable. Previous pulmonary  opacities appear decreased. Small right pleural effusion is apparent,  similar to prior exam. No pneumothorax is seen, but the apices are  partly obscured by the chin. Skinfolds are noted. No acute osseous  process.       Impression:      As described.     This report was finalized on 7/12/2025 7:31 AM by Dr. Huan Robert M.D on Workstation: [id number]       XR Chest 1 View [[id number]] Collected: 07/11/25 0521     Updated: 07/11/25 0528    Narrative:      ONE-VIEW PORTABLE CHEST AT 2:15 A.M.     HISTORY: Right pleural effusion. Pneumonia.     FINDINGS: The lungs are well expanded with some patchy pneumonia in the  left upper lobe unchanged from yesterday. There is also slight vascular  congestion associated with a small right pleural effusion that extends  into the minor fissure and is unchanged. No new abnormality is seen.     This report was finalized  on 7/11/2025 5:25 AM by Dr. Juan Tmo M.D  on Workstation: [id number]       XR Chest 1 View [[id number]] Collected: 07/10/25 0602     Updated: 07/10/25 0609    Narrative:      XR CHEST 1 VW-     Clinical: Right-sided pleural effusion     COMPARISON chest radiograph 7/9/2025 and CT chest 7/9/2025     FINDINGS: Groundglass infiltrate throughout the left upper lobe is  reidentified and unchanged in appearance. Opacity right mid and lower  lung zone corresponds to the pleural fluid and atelectasis. Nodular  density along the lateral pleural surface of the right lower lobe is  again demonstrated.     No vascular congestion. The cardiomediastinal silhouette is stable. The  remainder is unremarkable.     CONCLUSION: Stable imaging of the chest     This report was finalized on 7/10/2025 6:06 AM by Dr. Daniel Schultz M.D  on Workstation: [id number]       CT Angiogram Chest [[id number]] Collected: 07/09/25 1619     Updated: 07/09/25 1632    Narrative:      CT ANGIOGRAM CHEST-     Radiation dose reduction techniques were utilized, including automated  exposure control and exposure modulation based on body size.     Clinical: Right-sided chest pain     CT scan of the chest performed with intravenous contrast, pulmonary  embolus protocol to include coronal, sagittal and three-dimensional  reconstruction.     COMPARISON 6/30/2025     FINDINGS:  1. Cardiac enlargement similar to the previous examination, no  pericardial effusion. No pulmonary embolus aortic aneurysm or  dissection. The aorta is tortuous. The esophagus is satisfactory in  course and caliber. Enlarged 17 mm right hilar lymph node. Enlarged  infracarinal lymph node, 12 mm short axis. No enlarged left hilar nodes  seen.     2. Right-sided pleural effusion has diminished in size within the  interim, loculated at the costophrenic sulcus. There is subpleural  atelectasis at the base of the right lower lobe.     3. Along the pleural surface of the right lower lobe,  seen best on image  #70 is a 15 x 11 mm slightly spiculated nodule. This could be  infectious/inflammatory or neoplastic. Also possible rounded  atelectasis. Short-term follow-up advised.     4. New large area of groundglass infiltrate has developed within the  left upper lobe. There are 2 smaller focal areas of groundglass  infiltrate within the left lower lobe, see image #89 and 110. Suspect  new pneumonia within the left lung.     5. Limited images through the upper abdomen are unremarkable.              This report was finalized on 7/9/2025 4:29 PM by Dr. Daniel Schultz M.D  on Workstation: [id number]       XR Chest 1 View [[id number]] Collected: 07/09/25 0634     Updated: 07/09/25 0639    Narrative:      AP CHEST     HISTORY: Follow-up pleural effusion     COMPARISON: 7/8/2025     FINDINGS: Small right pleural effusion appears slightly increased. There  is some increased overlying atelectasis/consolidation. There is some  increased patchy density in the left upper lung suspicious for  developing pneumonia. Heart size stable. Postop changes cervical spine       Impression:      1. Small right pleural effusion appears slightly increased  2. Increased patchy density in the left upper lung suspicious for  developing pneumonia. Follow-up to clearing recommended           This report was finalized on 7/9/2025 6:36 AM by Dr. Jayme Arnold M.D  on Workstation: [id number]       XR Chest 1 View [[id number]] Collected: 07/08/25 0605     Updated: 07/08/25 0611    Narrative:      XR CHEST 1 VW-     Clinical: Right-sided pleural effusion     COMPARISON examination dated 7/7/2025     FINDINGS: Cardiac size within normal limits. No mediastinal or hilar  abnormality has developed. Left lung is clear. Vague right lung opacity  similar to the previous examination, blunting of the costophrenic angle  likely small amount of pleural fluid. No pneumothorax.     CONCLUSION: Stable imaging of the chest     This report was finalized  on 7/8/2025 6:07 AM by Dr. Daniel Schultz M.D  on Workstation: [id number]       XR Chest 1 View [[id number]] Collected: 07/07/25 0623     Updated: 07/07/25 0628    Narrative:      XR CHEST 1 VW-     Clinical: Right-sided pleural effusion     COMPARISON examination dated 7/6/2025     FINDINGS: The right-sided chest tube has been removed in the interim, no  pneumothorax. Vague opacity right mid and lower lung zone consistent  with a combination of airspace disease and small amount of pleural fluid  along the costophrenic sulcus, as before.     Heart size within normal limits, no mediastinal abnormality and the left  lung remains clear.     CONCLUSION: Right-sided chest tube removal in the interim, chest  otherwise similar to 7/6/2025.     This report was finalized on 7/7/2025 6:25 AM by Dr. Daniel Schultz M.D  on Workstation: [id number]       XR Chest 1 View [[id number]] Collected: 07/06/25 0632     Updated: 07/06/25 0638    Narrative:      ONE-VIEW PORTABLE CHEST AT 5:42 A.M.     HISTORY: Follow-up of right pleural effusion. Pleural drain.     FINDINGS: A pigtail pleural drain is present in the lower right chest  laterally with further decrease in size of a loculated right pleural  effusion noted on yesterday's exam. There is no pneumothorax. There  remains some minimal atelectasis at the right base as well as some vague  haziness in the upper left lung suspicious for pneumonia. The heart  remains slightly enlarged.     This report was finalized on 7/6/2025 6:35 AM by Dr. Juan Tom M.D  on Workstation: [id number]       XR Chest 1 View [[id number]] Collected: 07/05/25 0807     Updated: 07/05/25 0813    Narrative:      XR CHEST 1 VW-        INDICATION: Right effusion     COMPARISON: Chest radiograph July 3, 2025     TECHNIQUE: 1 view chest     FINDINGS:      Small ill-defined right lower lung opacity. Blunting right costophrenic  angle. Stable mediastinum. Right pigtail pleural drain. Cholecystectomy  clips.  Anterior and posterior cervical fusion.       Impression:         1. Decrease in size of mild loculated right pleural effusion.  2. Stable small ill-defined right lung base opacity     This report was finalized on 7/5/2025 8:10 AM by Dr. Bao Wilson M.D  on Workstation: [id number]       IR Pleural Drain [facility] [[id number]] Collected: 07/03/25 1911     Updated: 07/03/25 1921    Narrative:      IMAGE GUIDED CHEST TUBE     HISTORY:  loculated effusion     COMPARISON: Preceding chest x-rays and CT     PROCEDURE: After informed consent was obtained and documented, the  patient was placed on the angiography table in supine position. A verbal  time out was performed with appropriate identifiers confirmed. A nurse  was continuously present for monitoring and moderate sedation under  physician supervision from 1345 to 1412 hours utilizing 100 mcg fentanyl  and 2 mg Versed. Preliminary ultrasound of the right chest was performed  and a route was planned for catheter placement with an appropriate skin  site chosen. This site was then prepped and draped in the usual sterile  manner and the soft tissues anesthetized with 1% lidocaine down to the  pleura. A micropuncture needle was placed into the loculated right  pleural effusion under ultrasound guidance. An 018 wire was advanced  through the needle which was then removed. After serial dilation and  upsizing to 035 wire, a 12 Israeli skater drainage catheter was placed  into the effusion under fluoroscopy, coiled, and locked with final  position documented on spot radiograph. The catheter was secured with  Dermabond reinforced suture and stay-fix dressing and attached to an  atrium device. The patient was stable throughout, there were no  immediate complications. 48 mGfrantz Milton          Impression:      Successful 12 Israeli chest tube placement into right pleural effusion  under ultrasound and fluoroscopic guidance.        This report was finalized on 7/3/2025 7:18 PM by   Ovidio Ndiaye M.D  on Workstation: [id number]       XR Chest 1 View [[id number]] Collected: 07/03/25 1641     Updated: 07/03/25 1646    Narrative:      XR CHEST 1 VW-     HISTORY: Female who is 69 years-old, chest tube placement     TECHNIQUE: Frontal views of the chest     COMPARISON: 7/2/2025     FINDINGS: Right chest tube extends to the peripheral right lower  hemithorax. The heart is enlarged. Aorta is tortuous. Pulmonary  vasculature is unremarkable. Persistent loculated right pleural  effusion, similar to prior exam, as well as atelectasis/infiltrate in  the right lung. Previous CT demonstrated pulmonary nodule is not well  seen radiographically. No pneumothorax. Gaseous distention of bowel is  partly included. No acute osseous process.       Impression:      As described.     This report was finalized on 7/3/2025 4:43 PM by Dr. Huan Robert M.D on Workstation: [id number]       XR Chest 1 View [[id number]] Collected: 07/02/25 0823     Updated: 07/02/25 0828    Narrative:      XR CHEST 1 VW-     HISTORY: Female who is 69 years-old, status post thoracentesis     TECHNIQUE: Frontal views of the chest     COMPARISON: 3/22/2023,  correlated with CT from 6/30/2025     FINDINGS: Heart, mediastinum and pulmonary vasculature are unremarkable.  Moderate residual loculated right pleural effusion is seen, with  atelectasis or infiltrate in the right mid to lower lung, continued  follow-up advised. No pneumothorax. Chronic suspected enchondroma at the  proximal humerus. No acute osseous process.       Impression:      As described.     This report was finalized on 7/2/2025 8:25 AM by Dr. Huan Robert M.D on Workstation: [id number]       US Thoracentesis [[id number]] Collected: 07/01/25 1718    Specimen: Body Fluid Updated: 07/01/25 1722    Narrative:      ULTRASOUND-GUIDED RIGHT THORACENTESIS. 7/1/2025     HISTORY: Pleural effusion.     After signed informed consent was obtained the patient was prepped and  draped in  the upright sitting position. Lidocaine was used for local  anesthesia.     Ultrasound guidance was used to place the thoracentesis catheter into  the right pleural fluid collection. 1000 cc was removed.     Confirmatory images were obtained.     Patient tolerated the procedure well with no complications.       Impression:      Ultrasound-guided right thoracentesis as described.           This report was finalized on 7/1/2025 5:19 PM by Dr. Baron Chung M.D on Workstation: [id number]       CT Angiogram Chest Pulmonary Embolism [[id number]] Collected: 06/30/25 1150     Updated: 06/30/25 1206    Narrative:      CT ANGIOGRAM CHEST PULMONARY EMBOLISM-     DATE OF EXAM: 6/30/2025 10:59 AM     INDICATION: Near syncope, elevated cardiac enzymes, positive D-dimer,  rule out PE.     COMPARISON: Breast MRI 10/29/2024. Right shoulder and humerus  radiographs 7/11/2024. Chest radiographs 3/2/2023.     TECHNIQUE: Multiple contiguous axial images were acquired through the  chest following the intravenous administration of 95 mL of Isovue-370.  Reformatted coronal, sagittal, and 3D reconstruction images were also  reviewed. Radiation dose reduction techniques were utilized, including  automated exposure control and exposure modulation based on body size.     FINDINGS:  Imaging was performed with the arms at the side of the patient's body,  limiting evaluation.     No evidence of a pulmonary arterial filling defect to suggest pulmonary  embolism. Mild margin of the main pulmonary arteries could reflect  pulmonary arterial hypertension. The heart is normal size. Moderate to  severe calcified coronary artery disease. Trace pericardial fluid.  Normal thoracic aortic arch anatomy without evidence of aneurysm. No  pathologically enlarged intrathoracic lymph nodes are identified. Tiny  hiatal hernia.     Large right pleural effusion with associated compressive atelectasis of  the right lung. Partially obscured suspected 2 cm x  1.8 cm partially  cavitary subpleural nodule in the posterior right upper lobe (axial  series 6 image 35). Consolidation in the base of the right lower lobe  and right middle lobe could reflect superimposed pneumonia. Trace left  pleural fluid. Dependent and basilar groundglass opacities in the left  lung could represent atelectasis or atypical pneumonia. Patchy  subpleural groundglass opacity in the anterior left upper lobe, which  could also represent atelectasis or could be infectious/inflammatory.  The central airways are widely patent. No pneumothorax.     Limited imaging of the upper abdomen partially includes postoperative  changes from cholecystectomy. Partially imaged incompletely evaluated  low-density lesions in the liver, statistically representing benign  cysts and/or hemangiomas.     Diffuse osteopenia. Postoperative changes from ACDF from C4-C7 and  posterior spinal fusion at C4-C5 without evidence of hardware  complications. Solid bony bridging across the disc spaces and facets.  Mild multilevel thoracic spondylosis and exaggerated thoracic kyphosis.  Irregular 2 cm sclerotic bone lesion in the proximal right humerus,  probable benign enchondroma. No acute osseous abnormality or concerning  osseous lesion.       Impression:         1. The study is negative for pulmonary embolism.  2. Large right pleural effusion with associated compressive atelectasis  of the right lung.  3. Suspected partially obscured partially cavitary subpleural nodule in  the posterior right upper lobe measuring 1.9 cm in average diameter.  4. Trace left pleural fluid with multifocal groundglass opacities in the  left lung that could represent atelectasis or could be  infectious/inflammatory.  5. Mild enlargement of the main pulmonary arteries could reflect  pulmonary arterial hypertension.     This report was finalized on 6/30/2025 12:03 PM by Ridge Ragland MD on  Workstation: [id number]       CT Head Without Contrast  [[id number]] Collected: 06/30/25 1131     Updated: 06/30/25 1150    Narrative:      CT HEAD WO CONTRAST-, CT CERVICAL SPINE WO CONTRAST-, CT LUMBAR SPINE WO  CONTRAST-     HISTORY:  falls, near syncope     COMPARISON: MRI lumbar spine 9/15/2024     CT HEAD WITHOUT CONTRAST: The brain ventricles are symmetrical. There is  no acute hemorrhage, hydrocephalus or of abnormal extra-axial fluid. No  focal area of decreased attenuation to suggest acute infarction is  identified. Bone windows show no evidence of a calvarial fracture.     CT CERVICAL SPINE WITHOUT CONTRAST: The study is significantly degraded  by patient motion. The patient is undergone anterior cervical fusion  from C4-C5 with anterior plate, screws and interbody graft material.  Interbody graft material is also present at C5-6 and C6-7. The patient  is undergone posterior fusion from C4-C5. The alignment of the cervical  spine is within normal limits. No gross or obvious fracture is  appreciated. A large pleural fluid collection is noted on the right,  only partially visualized. Further evaluation could be performed with a  MRI examination of the cervical spine as indicated.     CT LUMBAR SPINE WITHOUT CONTRAST: The study is degraded by patient  motion. Dextroscoliosis of the lumbar spine is appreciated with apex at  the level of L3. There is grade 1 retrolisthesis of L5 on S1. There is  no evidence of disc herniation. A mild central disc bulge is present at  L4-L5 and L5-S1 resulting in mild flattening of the ventral surface of  the thecal sac. Further evaluation could be performed with MRI  examination of the lumbar spine as indicated.           Radiation dose reduction techniques were utilized, including automated  exposure modulation based on body size.     This report was finalized on 6/30/2025 11:47 AM by Dr. Jerson Viera M.D on Workstation: [id number]       CT Cervical Spine Without Contrast [[id number]] Collected: 06/30/25 1131     Updated:  06/30/25 1150    Narrative:      CT HEAD WO CONTRAST-, CT CERVICAL SPINE WO CONTRAST-, CT LUMBAR SPINE WO  CONTRAST-     HISTORY:  falls, near syncope     COMPARISON: MRI lumbar spine 9/15/2024     CT HEAD WITHOUT CONTRAST: The brain ventricles are symmetrical. There is  no acute hemorrhage, hydrocephalus or of abnormal extra-axial fluid. No  focal area of decreased attenuation to suggest acute infarction is  identified. Bone windows show no evidence of a calvarial fracture.     CT CERVICAL SPINE WITHOUT CONTRAST: The study is significantly degraded  by patient motion. The patient is undergone anterior cervical fusion  from C4-C5 with anterior plate, screws and interbody graft material.  Interbody graft material is also present at C5-6 and C6-7. The patient  is undergone posterior fusion from C4-C5. The alignment of the cervical  spine is within normal limits. No gross or obvious fracture is  appreciated. A large pleural fluid collection is noted on the right,  only partially visualized. Further evaluation could be performed with a  MRI examination of the cervical spine as indicated.     CT LUMBAR SPINE WITHOUT CONTRAST: The study is degraded by patient  motion. Dextroscoliosis of the lumbar spine is appreciated with apex at  the level of L3. There is grade 1 retrolisthesis of L5 on S1. There is  no evidence of disc herniation. A mild central disc bulge is present at  L4-L5 and L5-S1 resulting in mild flattening of the ventral surface of  the thecal sac. Further evaluation could be performed with MRI  examination of the lumbar spine as indicated.           Radiation dose reduction techniques were utilized, including automated  exposure modulation based on body size.     This report was finalized on 6/30/2025 11:47 AM by Dr. Jerson Viera M.D on Workstation: [id number]       CT Lumbar Spine Without Contrast [[id number]] Collected: 06/30/25 1131     Updated: 06/30/25 1150    Narrative:      CT HEAD WO CONTRAST-,  "CT CERVICAL SPINE WO CONTRAST-, CT LUMBAR SPINE WO  CONTRAST-     HISTORY:  falls, near syncope     COMPARISON: MRI lumbar spine 9/15/2024     CT HEAD WITHOUT CONTRAST: The brain ventricles are symmetrical. There is  no acute hemorrhage, hydrocephalus or of abnormal extra-axial fluid. No  focal area of decreased attenuation to suggest acute infarction is  identified. Bone windows show no evidence of a calvarial fracture.     CT CERVICAL SPINE WITHOUT CONTRAST: The study is significantly degraded  by patient motion. The patient is undergone anterior cervical fusion  from C4-C5 with anterior plate, screws and interbody graft material.  Interbody graft material is also present at C5-6 and C6-7. The patient  is undergone posterior fusion from C4-C5. The alignment of the cervical  spine is within normal limits. No gross or obvious fracture is  appreciated. A large pleural fluid collection is noted on the right,  only partially visualized. Further evaluation could be performed with a  MRI examination of the cervical spine as indicated.     CT LUMBAR SPINE WITHOUT CONTRAST: The study is degraded by patient  motion. Dextroscoliosis of the lumbar spine is appreciated with apex at  the level of L3. There is grade 1 retrolisthesis of L5 on S1. There is  no evidence of disc herniation. A mild central disc bulge is present at  L4-L5 and L5-S1 resulting in mild flattening of the ventral surface of  the thecal sac. Further evaluation could be performed with MRI  examination of the lumbar spine as indicated.           Radiation dose reduction techniques were utilized, including automated  exposure modulation based on body size.     This report was finalized on 6/30/2025 11:47 AM by Dr. Jerson Viera M.D on Workstation: [id number]           No results found for: \"SITE\", \"ALLENTEST\", \"PHART\", \"QOB8PJU\", \"PO2ART\", \"COM9UCG\", \"BASEEXCESS\", \"F0GTHPDO\", \"HGBBG\", \"HCTABG\", \"OXYHEMOGLOBI\", \"METHHGBN\", \"CARBOXYHGB\", \"CO2CT\", " "\"BAROMETRIC\", \"MODALITY\", \"FIO2\"       Discharge Medications        ASK your doctor about these medications        Instructions Start Date   albuterol sulfate  (90 Base) MCG/ACT inhaler  Commonly known as: PROVENTIL HFA;VENTOLIN HFA;PROAIR HFA   2 puffs, Inhalation, Every 4 Hours PRN      buPROPion  MG 24 hr tablet  Commonly known as: Wellbutrin XL   150 mg, Oral, Every Morning      escitalopram 20 MG tablet  Commonly known as: LEXAPRO   20 mg, Oral, Daily      FLUoxetine 40 MG capsule  Commonly known as: PROzac   40 mg, Oral, Daily      HYDROcodone-acetaminophen 7.5-325 MG per tablet  Commonly known as: Norco   1 tablet, Oral, Every 6 Hours PRN      ketoconazole 2 % shampoo  Commonly known as: NIZORAL   Apply 1 Application topically to the appropriate area as directed 2 (Two) Times a Week.      LORazepam 1 MG tablet  Commonly known as: Ativan   1 mg, Oral, Every 6 Hours PRN      meloxicam 15 MG tablet  Commonly known as: MOBIC   15 mg, Oral, Daily      ondansetron ODT 4 MG disintegrating tablet  Commonly known as: ZOFRAN-ODT   4 mg, Translingual, Every 8 Hours PRN      pimecrolimus 1 % cream  Commonly known as: ELIDEL   Apply 1 Application topically to the appropriate area as directed 2 (Two) Times a Day.      pravastatin 20 MG tablet  Commonly known as: PRAVACHOL   20 mg, Oral, Daily      tiZANidine 4 MG tablet  Commonly known as: Zanaflex   4 mg, Oral, Every 8 Hours PRN      ubrogepant 100 MG tablet  Commonly known as: Ubrelvy   100 mg, Oral, Once As Needed      zolpidem 10 MG tablet  Commonly known as: AMBIEN   10 mg, Oral, Nightly                 Patient Instructions:       Future Appointments   Date Time Provider Department Center   8/5/2025  1:00 PM Tom Muller MD [facility]   9/9/2025  1:30 PM Tom Muller MD [facility]   10/13/2025 12:30 PM Tom Muller MD [facility]         Follow-up Information       Gema Cazares, OSIEL Follow up in 1 week(s).  "   Specialty: Nurse Practitioner  Contact information:  Shruthi QUARLES  [address] [address] [phone]                             Discharge Order (From admission, onward)      None            Diet Order   Procedures    Diet: Regular/House; Fluid Consistency: Thin (IDDSI 0)       TEST  RESULTS PENDING AT DISCHARGE  Pending Labs       Order Current Status    AFB Culture - Body Fluid, Pleural Cavity Preliminary result    Fungus Culture - Body Fluid, Pleural Cavity Preliminary result              Discharge instructions:  Follow up with your primary care provider in 1-2 weeks with a cbc and cmp         Total time spent discharging patient including evaluation, post hospitalization follow up,  medication and post hospitalization instructions and education, total time exceeds 30 minutes.    Signed:  Jerson Pulido MD  7/13/2025  16:29 EDT

## 2025-07-13 NOTE — PLAN OF CARE
Goal Outcome Evaluation:  Plan of Care Reviewed With: patient        Progress: improving  Outcome Evaluation: Pt alert and oriented;RA: SR/ST; Up with assist to the bathroom; PRN pain and anxiety meds given; WCTM

## 2025-07-13 NOTE — PROGRESS NOTES
Hospital Follow Up    LOS:  LOS: 13 days   Patient Name: Yoanna Reed  Age/Sex: 69 y.o. female  : 1955  MRN: [id number]    Day of Service: 25   Length of Stay: 13  Encounter Provider: OSIEL Spivey  Place of Service: [facility] CARDIOLOGY  Patient Care Team:  Tom Muller MD as PCP - General (Family Medicine)    Subjective:     Chief Complaint: Torsade    Interval History: Patient agitated today.  She wants to go home.  Sinus rhythm with periodic PVCs.  EKG today shows prolonged QT of 556    Objective:     Objective:  Temp:  [97.8 °F (36.6 °C)-98.4 °F (36.9 °C)] 97.8 °F (36.6 °C)  Heart Rate:  [] 71  Resp:  [16-27] 27  BP: (100-151)/(53-97) 119/97     Intake/Output Summary (Last 24 hours) at 2025 1308  Last data filed at 2025 0800  Gross per 24 hour   Intake 300 ml   Output --   Net 300 ml     Body mass index is 24.37 kg/m².      07/10/25  1507 07/10/25  1814 25  0600   Weight: 78.9 kg (174 lb) 77.2 kg (170 lb 3.1 oz) 72.7 kg (160 lb 4.4 oz)     Weight change:     Physical Exam:   General Appearance:    Awake alert and oriented in no acute distress.   Color:  Skin:  Neuro:  HEENT:    Lungs:     Pink  Warm and dry  No focal, motor or sensory deficits  Neck supple, pupils equal, round and reactive. No JVD, No Bruit  Clear to auscultation,respirations regular, even and                  unlabored    Heart:    Regular rate and rhythm, S1 and S2, no murmur, no gallop, no rub. No edema, DP/PT pulses are 2+   Chest Wall:    No abnormalities observed   Abdomen:     Normal bowel sounds, no masses, no organomegaly, soft        non-tender, non-distended, no guarding, no ascites noted   Extremities:   Moves all extremities well, no edema, no cyanosis, no redness       Lab Review:   Results from last 7 days   Lab Units 25  0851 25  0650   SODIUM mmol/L 130* 135*   POTASSIUM mmol/L 3.4* 4.0  4.0   CHLORIDE mmol/L 97* 102   CO2  "mmol/L 21.0* 21.4*   BUN mg/dL 6.0* 5.0*   CREATININE mg/dL 0.50* 0.45*   GLUCOSE mg/dL 114* 104*   CALCIUM mg/dL 8.4* 8.3*     Results from last 7 days   Lab Units 07/09/25  1821 07/09/25  0747 07/09/25  0610   HSTROP T ng/L 21* 18* 19*     Results from last 7 days   Lab Units 07/12/25  0650 07/07/25  0539   WBC 10*3/mm3 11.75* 17.45*   HEMOGLOBIN g/dL 10.7* 11.1*   HEMATOCRIT % 32.7* 34.1   PLATELETS 10*3/mm3 755* 692*         Results from last 7 days   Lab Units 07/13/25  0851 07/12/25  0650   MAGNESIUM mg/dL 2.0 2.0           Invalid input(s): \"LDLCALC\"          I reviewed the patient's new clinical results.  I personally viewed and interpreted the patient's EKG  Current Medications:   Scheduled Meds:adenosine, 6 mg, Intravenous, Once  ipratropium-albuterol, 3 mL, Nebulization, Q6H While Awake - RT  metoprolol tartrate, 25 mg, Oral, Q12H  potassium chloride, 10 mEq, Intravenous, Q1H  pravastatin, 20 mg, Oral, Daily  sucralfate, 1 g, Oral, 4x Daily AC & at Bedtime  [Held by provider] zolpidem, 5 mg, Oral, Nightly      Continuous Infusions:     Allergies:  No Known Allergies    Assessment/Plan:      1.  Torsades-secondary to QT prolonging antidepressants x 3 and hypokalemia.  2.  Prolonged QT-remains prolonged not having any arrhythmias, potassium is low today getting replaced  3.  Hypokalemia-replace, poor oral intake.  Likely contributingAs well as diarrhea  4.  Loculated right parapneumonic effusion  5.  Right upper lobe nodule  6.  Pneumonia  7.  Asthma  8.  Hypertension    Currently off all QT prolonging agents.  Continue to monitor electrolytes.        OSIEL Spivey  07/13/25  13:08 EDT  Electronically signed by OSIEL Spivey, 07/13/25, 1:08 PM EDT.     "

## 2025-07-13 NOTE — PROGRESS NOTES
[facility]     LOS: 13 days     Subjective:    CC:Dizziness    DIET:  Diet Order   Procedures    Diet: Regular/House; Fluid Consistency: Thin (IDDSI 0)       Objective:    Vital Signs:  Temp:  [97.8 °F (36.6 °C)-98.4 °F (36.9 °C)] 97.8 °F (36.6 °C)  Heart Rate:  [] 71  Resp:  [16-27] 27  BP: (100-151)/(53-97) 119/97    SpO2:  [94 %-100 %] 95 %  on  Flow (L/min) (Oxygen Therapy):  [1] 1;   Device (Oxygen Therapy): room air  Body mass index is 24.37 kg/m².    Physical Exam  Constitutional:       General: She is not in acute distress.  Cardiovascular:      Rate and Rhythm: Normal rate and regular rhythm.   Pulmonary:      Effort: Pulmonary effort is normal.      Breath sounds: Normal breath sounds.   Abdominal:      General: There is no distension.      Palpations: Abdomen is soft.      Tenderness: There is no abdominal tenderness.   Skin:     General: Skin is warm and dry.   Neurological:      General: No focal deficit present.      Mental Status: She is alert.   Psychiatric:         Mood and Affect: Mood normal.         Behavior: Behavior normal.         Results Review:    Glucose   Date Value Ref Range Status   07/13/2025 114 (H) 65 - 99 mg/dL Final   07/12/2025 104 (H) 65 - 99 mg/dL Final   07/11/2025 115 (H) 65 - 99 mg/dL Final     Results from last 7 days   Lab Units 07/12/25  0650   WBC 10*3/mm3 11.75*   HEMOGLOBIN g/dL 10.7*   HEMATOCRIT % 32.7*   PLATELETS 10*3/mm3 755*     Results from last 7 days   Lab Units 07/13/25  0851   SODIUM mmol/L 130*   POTASSIUM mmol/L 3.4*   CHLORIDE mmol/L 97*   CO2 mmol/L 21.0*   BUN mg/dL 6.0*   CREATININE mg/dL 0.50*   CALCIUM mg/dL 8.4*   GLUCOSE mg/dL 114*         Results from last 7 days   Lab Units 07/13/25  0851   MAGNESIUM mg/dL 2.0     Results from last 7 days   Lab Units 07/09/25  1821 07/09/25  0747 07/09/25  0610   HSTROP T ng/L 21* 18* 19*     Cultures:  No results found for: \"BLOODCX\", \"URINECX\", \"WOUNDCX\", \"MRSACX\", \"RESPCX\", " "\"STOOLCX\"    I have reviewed daily medications and changes in CPOE    Scheduled meds  adenosine, 6 mg, Intravenous, Once  ipratropium-albuterol, 3 mL, Nebulization, Q6H While Awake - RT  metoprolol tartrate, 25 mg, Oral, Q12H  potassium chloride, 10 mEq, Intravenous, Q1H  pravastatin, 20 mg, Oral, Daily  sucralfate, 1 g, Oral, 4x Daily AC & at Bedtime  [Held by provider] zolpidem, 5 mg, Oral, Nightly           PRN meds    acetaminophen    albuterol    senna-docusate sodium **AND** polyethylene glycol **AND** bisacodyl **AND** bisacodyl    Calcium Replacement - Follow Nurse / BPA Driven Protocol    diphenoxylate-atropine    HYDROcodone-acetaminophen    LORazepam    Magnesium Cardiology Dose Replacement - Follow Nurse / BPA Driven Protocol    nitroglycerin    Phosphorus Replacement - Follow Nurse / BPA Driven Protocol    Potassium Replacement - Follow Nurse / BPA Driven Protocol    prochlorperazine **OR** prochlorperazine **OR** prochlorperazine        Chronic pain    Pleural effusion    Near syncope    TUSHAR (acute kidney injury)    Elevated troponin        Assessment/Plan:         69 y.o. female admitted with <principal problem not specified>.       1. Dyspnea/pleural effusion with underlying nodule/mass/leukocytosis, underlying pneumonia cannot be completely ruled out and therefore IV Rocephin was initiated and later transitioned to IV Zosyn and completed a course.   - Status post thoracentesis On 7/1   - Given presence of loculated effusion underwent IR guided chest tube placement on 7/3/2025.  tPA was attempted on 7/5 but could not be administered given possible malpositioning tube.    -Repeat CT on 7/9 revealed diminished right-sided loculated effusion and 1.9 cm right upper lobe lung nodule.    -New large groundglass infiltrate loaded in the left upper lobe and WBC is 17.4, however afebrile and stable clinically.    -Continue to observe off antibiotics and pulmonary is following along as well.     2.  Elevated " troponin, not secondary to MI and cardiology did evaluate.  2D echo has been done and revealed normal ejection fraction.     3.  Chronic pain syndrome, continue with analgesics.     4.  History of asthma, on albuterol nebs as needed and currently not in any exacerbation.     5.  Acute kidney injury, was gently volume resuscitated with which creatinine has improved from 1.46>0.51>0.48.       6.  History of hep C, treated according to patient.     7.  Right upper lobe lung nodule, management per pulmonary. Will need careful follow up.     8.  Hyponatremia, sodium level noted to be 132 and likely secondary to SIADH.  Currently asymptomatic.     9.  Torsades with prolonged QT, cardiology following along and recommends avoiding QT prolonging agents and she was on 3 different antidepressants along with hypokalemia which could potentially cause QT prolongation and have been held.  Will need follow-up with psychiatry as an outpatient basis.  Limited echocardiogram was done during this hospital stay which revealed preserved ejection fraction with no wall motion abnormalities.     10.  On SCDs for DVT prophylaxis.     11.  CODE STATUS is full code.      Jerson Pulido MD  07/13/25  12:06 EDT

## 2025-07-13 NOTE — PROGRESS NOTES
"        [facility]         Dr Donaldson Sayied   LOS: 13 days   Patient Care Team:  Tom Muller MD as PCP - General (Family Medicine)    Chief Complaint: Right loculated parapneumonic effusion status post thoracentesis with ongoing SVT other issues as listed below    Interval History: Resting comfortably on room air.  No issues reported    REVIEW OF SYSTEMS:   CARDIOVASCULAR: No chest pain, chest pressure or chest discomfort. No palpitations or edema.   RESPIRATORY: No shortness of breath, cough or sputum.   GASTROINTESTINAL: No anorexia, nausea, vomiting or diarrhea. No abdominal pain or blood.   HEMATOLOGIC: No bleeding or bruising.     Ventilator/Non-Invasive Ventilation Settings (From admission, onward)      None              Vital Signs  Temp:  [97.8 °F (36.6 °C)-98.4 °F (36.9 °C)] 98 °F (36.7 °C)  Heart Rate:  [] 97  Resp:  [16-26] 26  BP: (100-151)/() 129/79  No intake or output data in the 24 hours ending 07/13/25 0933    Flowsheet Rows      Flowsheet Row First Filed Value   Admission Height 172.7 cm (68\") Documented at 06/30/2025 1008   Admission Weight 76.7 kg (169 lb) Documented at 06/30/2025 1008            Physical Exam:  Patient is examined using the personal protective equipment as per guidelines from infection control for this particular patient as enacted.  Hand hygiene was performed before and after patient interaction.   General Appearance:    Alert, cooperative, in no acute distress.  Following simple commands  ENT Mallampati between 3 and 4 no nasal congestion  Neck midline trachea, no thyromegaly   Lungs:   Diminished breath sounds bilaterally    Heart:    Regular rhythm and normal rate, normal S1 and S2, no            murmur, no gallop, no rub, no click   Chest Wall:    No abnormalities observed   Abdomen:     Normal bowel sounds, no masses, no organomegaly, soft        nontender, nondistended, no guarding, no rebound                tenderness   Extremities:   " Moves all extremities well, no edema, no cyanosis, no             redness  CNS no focal neurological deficits normal sensory exam  Skin no rashes no nodules  Musculoskeletal no cyanosis no clubbing normal range of motion     Results Review:        Results from last 7 days   Lab Units 07/12/25  0650 07/11/25 2001 07/11/25  0757 07/10/25  1706 07/10/25  1017   SODIUM mmol/L 135*  --  132*  --  130*   POTASSIUM mmol/L 4.0  4.0 3.6 3.8   < > 3.5  3.5   CHLORIDE mmol/L 102  --  98  --  94*   CO2 mmol/L 21.4*  --  20.5*  --  23.0   BUN mg/dL 5.0*  --  5.0*  --  6.0*   CREATININE mg/dL 0.45*  --  0.42*  --  0.64   GLUCOSE mg/dL 104*  --  115*  --  126*   CALCIUM mg/dL 8.3*  --  8.1*  --  8.6    < > = values in this interval not displayed.     Results from last 7 days   Lab Units 07/09/25  1821 07/09/25  0747 07/09/25  0610   HSTROP T ng/L 21* 18* 19*     Results from last 7 days   Lab Units 07/12/25  0650 07/07/25  0539   WBC 10*3/mm3 11.75* 17.45*   HEMOGLOBIN g/dL 10.7* 11.1*   HEMATOCRIT % 32.7* 34.1   PLATELETS 10*3/mm3 755* 692*             Results from last 7 days   Lab Units 07/12/25  0650   MAGNESIUM mg/dL 2.0               I reviewed the patient's new clinical results.  I personally viewed and interpreted the patient's chest x-ray.        Medication Review:   adenosine, 6 mg, Intravenous, Once  ipratropium-albuterol, 3 mL, Nebulization, Q6H While Awake - RT  metoprolol tartrate, 25 mg, Oral, Q12H  pravastatin, 20 mg, Oral, Daily  sucralfate, 1 g, Oral, 4x Daily AC & at Bedtime  [Held by provider] zolpidem, 5 mg, Oral, Nightly             ASSESSMENT:   Right loculated parapneumonic effusion  SVT/VT  Thoracentesis (7/1)-1 L removed, exudative  Right upper lobe pulmonary nodule  Suspect sepsis secondary to pneumonia  Asthma  Elevated BNP  Near syncope  Frequent falls  Chronic pain  Migraines  Hyponatremia  Acute kidney injury   Leukocytosis  Dextroscoliosis of lumbar spine  History of tobacco  abuse  Hypokalemia    PLAN:  Heart rate stable with no SVT reported overnight.  Clinically stable and improving  Diarrhea better controlled  Thoracentesis 1 L removed exudative.  Cultures from pleural fluid negative.  Cytology negative for cancer cells.  Right upper lobe pulmonary nodule needs follow-up CT chest in 3 months.  She can follow-up with pulmonary in 3 months.  Bronchodilators for asthma  Patient now telemetry  Hospitalist managing on the floor  Nothing further to add we will sign off      Anika Villalba MD  07/13/25  09:33 EDT

## 2025-07-13 NOTE — PLAN OF CARE
Problem: Adult Inpatient Plan of Care  Goal: Absence of Hospital-Acquired Illness or Injury  Intervention: Identify and Manage Fall Risk  Recent Flowsheet Documentation  Taken 7/13/2025 1400 by Viviana Kaur RN  Safety Promotion/Fall Prevention:   activity supervised   fall prevention program maintained   nonskid shoes/slippers when out of bed   room organization consistent   safety round/check completed   clutter free environment maintained  Taken 7/13/2025 1200 by Viviana Kaur RN  Safety Promotion/Fall Prevention:   activity supervised   fall prevention program maintained   nonskid shoes/slippers when out of bed   room organization consistent   safety round/check completed   clutter free environment maintained  Taken 7/13/2025 1000 by Viviana Kaur RN  Safety Promotion/Fall Prevention:   activity supervised   fall prevention program maintained   nonskid shoes/slippers when out of bed   room organization consistent   safety round/check completed   clutter free environment maintained  Taken 7/13/2025 0800 by Viviana Kaur RN  Safety Promotion/Fall Prevention:   activity supervised   fall prevention program maintained   nonskid shoes/slippers when out of bed   room organization consistent   safety round/check completed   clutter free environment maintained  Intervention: Prevent Skin Injury  Recent Flowsheet Documentation  Taken 7/13/2025 1400 by Viviana Kaur RN  Body Position: position changed independently  Skin Protection:   incontinence pads utilized   transparent dressing maintained  Taken 7/13/2025 1200 by Viviana Kaur RN  Body Position: position changed independently  Taken 7/13/2025 1000 by Viviana Kaur RN  Body Position: position changed independently  Taken 7/13/2025 0800 by Viviana Kaur RN  Body Position: position changed independently  Skin Protection:   incontinence pads utilized   transparent dressing maintained  Intervention: Prevent and Manage VTE  (Venous Thromboembolism) Risk  Recent Flowsheet Documentation  Taken 7/13/2025 1400 by Viviana Kaur RN  VTE Prevention/Management:   bilateral   SCDs (sequential compression devices) on  Intervention: Prevent Infection  Recent Flowsheet Documentation  Taken 7/13/2025 1400 by Viviana Kaur RN  Infection Prevention: single patient room provided  Taken 7/13/2025 1200 by Viviana Kaur RN  Infection Prevention: single patient room provided  Taken 7/13/2025 1000 by Viviana Kaur RN  Infection Prevention: single patient room provided  Taken 7/13/2025 0800 by Viviana Kaur RN  Infection Prevention: single patient room provided  Goal: Optimal Comfort and Wellbeing  Intervention: Provide Person-Centered Care  Recent Flowsheet Documentation  Taken 7/13/2025 1400 by Viviana Kaur RN  Trust Relationship/Rapport:   care explained   choices provided   emotional support provided   empathic listening provided   questions answered   reassurance provided   questions encouraged   thoughts/feelings acknowledged  Taken 7/13/2025 0800 by Viviana Kaur RN  Trust Relationship/Rapport:   care explained   choices provided   emotional support provided   empathic listening provided   questions answered   reassurance provided   questions encouraged   thoughts/feelings acknowledged     Problem: Fall Injury Risk  Goal: Absence of Fall and Fall-Related Injury  Intervention: Identify and Manage Contributors  Recent Flowsheet Documentation  Taken 7/13/2025 1400 by Viviana Kaur RN  Medication Review/Management: medications reviewed  Self-Care Promotion: independence encouraged  Taken 7/13/2025 1200 by Viviana Kaur RN  Medication Review/Management: medications reviewed  Self-Care Promotion: independence encouraged  Taken 7/13/2025 1000 by Viviana Kaur RN  Medication Review/Management: medications reviewed  Self-Care Promotion: independence encouraged  Taken 7/13/2025 0800 by Natasha  POP Michelle  Medication Review/Management: medications reviewed  Self-Care Promotion: independence encouraged  Intervention: Promote Injury-Free Environment  Recent Flowsheet Documentation  Taken 7/13/2025 1400 by Viviana Kaur RN  Safety Promotion/Fall Prevention:   activity supervised   fall prevention program maintained   nonskid shoes/slippers when out of bed   room organization consistent   safety round/check completed   clutter free environment maintained  Taken 7/13/2025 1200 by Viviana Kaur RN  Safety Promotion/Fall Prevention:   activity supervised   fall prevention program maintained   nonskid shoes/slippers when out of bed   room organization consistent   safety round/check completed   clutter free environment maintained  Taken 7/13/2025 1000 by Viviana Kaur RN  Safety Promotion/Fall Prevention:   activity supervised   fall prevention program maintained   nonskid shoes/slippers when out of bed   room organization consistent   safety round/check completed   clutter free environment maintained  Taken 7/13/2025 0800 by Viviana Kaur RN  Safety Promotion/Fall Prevention:   activity supervised   fall prevention program maintained   nonskid shoes/slippers when out of bed   room organization consistent   safety round/check completed   clutter free environment maintained     Problem: Skin Injury Risk Increased  Goal: Skin Health and Integrity  Intervention: Optimize Skin Protection  Recent Flowsheet Documentation  Taken 7/13/2025 1400 by Viviana Kaur RN  Activity Management: activity encouraged  Pressure Reduction Techniques:   frequent weight shift encouraged   weight shift assistance provided  Head of Bed (HOB) Positioning: HOB elevated  Skin Protection:   incontinence pads utilized   transparent dressing maintained  Taken 7/13/2025 1200 by Viviana Kaur RN  Activity Management: activity encouraged  Head of Bed (HOB) Positioning: HOB elevated  Taken 7/13/2025 1000 by  Viviana Kaur, RN  Activity Management: activity encouraged  Head of Bed (HOB) Positioning: HOB elevated  Taken 7/13/2025 0800 by Viviana Kaur RN  Activity Management: activity encouraged  Pressure Reduction Techniques:   frequent weight shift encouraged   weight shift assistance provided  Head of Bed (HOB) Positioning: HOB elevated  Skin Protection:   incontinence pads utilized   transparent dressing maintained  Intervention: Promote and Optimize Oral Intake  Recent Flowsheet Documentation  Taken 7/13/2025 1400 by Viviana Kaur RN  Nutrition Interventions: supplemental drinks provided   Goal Outcome Evaluation:      Patient Left against AMA. She was educated on the implications in a clear terms, she said she understood. MD notified. She signed the AMA form and left.

## 2025-07-15 LAB
FUNGUS WND CULT: NORMAL
MYCOBACTERIUM SPEC CULT: NORMAL
NIGHT BLUE STAIN TISS: NORMAL

## 2025-07-16 NOTE — CASE MANAGEMENT/SOCIAL WORK
Case Management Discharge Note      Final Note: AMA         Selected Continued Care - Discharged on 7/13/2025 Admission date: 6/30/2025 - Discharge disposition: Left Against Medical Advice      Destination    No services have been selected for the patient.                Durable Medical Equipment    No services have been selected for the patient.                Dialysis/Infusion    No services have been selected for the patient.                Home Medical Care    No services have been selected for the patient.                Therapy    No services have been selected for the patient.                Community Resources    No services have been selected for the patient.                Community & DME    No services have been selected for the patient.                         Final Discharge Disposition Code: 07 - left AMA

## 2025-07-22 ENCOUNTER — TELEPHONE (OUTPATIENT)
Dept: INTERNAL MEDICINE | Facility: CLINIC | Age: 70
End: 2025-07-22
Payer: MEDICARE

## 2025-07-22 LAB
FUNGUS WND CULT: NORMAL
MYCOBACTERIUM SPEC CULT: NORMAL
NIGHT BLUE STAIN TISS: NORMAL

## 2025-07-22 NOTE — TELEPHONE ENCOUNTER
Caller: [place], Yoanna M    Relationship: Self    Best call back number: [phone]     Requested Prescriptions: DIFLUCAN  Requested Prescriptions      No prescriptions requested or ordered in this encounter        Pharmacy where request should be sent:    [facility]/pharmacy #[id number] [place], [place] - [address] [address]. AT [facility] - [phone]  - [phone]  [phone]     Last office visit with prescribing clinician: 6/25/2025   Last telemedicine visit with prescribing clinician: Visit date not found   Next office visit with prescribing clinician: 8/5/2025     Additional details provided by patient: PATIENT IS CALLING TO STATE SHE HAS BEEN ON ANTIBIOTICS FOR 13 DAYS AND NOW HAS A YEAST INFECTION.  SHE IS WANTING TO KNOW IF DR CHAMBERLAIN WILL SEND IN MEDICATION FOR THE YEAST INFECTION.  SHE STATES SHE HAS AN APPOINTMENT COMING UP BUT CAN NOT WAIT.    Does the patient have less than a 3 day supply:  [x] Yes  [] No    Would you like a call back once the refill request has been completed: [] Yes [] No    If the office needs to give you a call back, can they leave a voicemail: [] Yes [] No    Edgard Gates Rep   07/22/25 10:26 EDT     PLEASE ADVISE.

## 2025-07-22 NOTE — TELEPHONE ENCOUNTER
Per Dr Yohana herron to call in Diflucan 150 mg #1 no refill MA called medication in to [facility] pharmacy and also spoke with pt

## 2025-07-24 ENCOUNTER — TELEPHONE (OUTPATIENT)
Dept: INTERNAL MEDICINE | Facility: CLINIC | Age: 70
End: 2025-07-24

## 2025-07-28 DIAGNOSIS — F51.01 PRIMARY INSOMNIA: ICD-10-CM

## 2025-07-28 DIAGNOSIS — G89.29 OTHER CHRONIC PAIN: ICD-10-CM

## 2025-07-28 NOTE — TELEPHONE ENCOUNTER
Caller: Yoanna Reed    Relationship: Self    Best call back number: [phone]    Requested Prescriptions:   Requested Prescriptions     Pending Prescriptions Disp Refills    zolpidem (AMBIEN) 10 MG tablet 90 tablet 1     Sig: Take 1 tablet by mouth Every Night.    HYDROcodone-acetaminophen (Norco) 7.5-325 MG per tablet 90 tablet 0     Sig: Take 1 tablet by mouth Every 6 (Six) Hours As Needed for Moderate Pain.        Pharmacy where request should be sent: [facility]/PHARMACY #[id number] - [facility] [address] AT [facility] [phone] [facility] [phone] FX     Last office visit with prescribing clinician: 6/25/2025   Last telemedicine visit with prescribing clinician: Visit date not found   Next office visit with prescribing clinician: 8/5/2025     Additional details provided by patient: STATED THAT THEY HAVE 2 DAYS WORTH OF THE HYDROCODONE AND ARE OUT OF THE AMBIEN    Does the patient have less than a 3 day supply:  [x] Yes  [] No    Would you like a call back once the refill request has been completed: [] Yes [x] No    If the office needs to give you a call back, can they leave a voicemail: [] Yes [x] No    Edgard Mancuso   07/28/25 11:48 EDT

## 2025-07-29 LAB
FUNGUS WND CULT: NORMAL
MYCOBACTERIUM SPEC CULT: NORMAL
NIGHT BLUE STAIN TISS: NORMAL

## 2025-07-30 ENCOUNTER — APPOINTMENT (OUTPATIENT)
Dept: CT IMAGING | Facility: HOSPITAL | Age: 70
End: 2025-07-30
Payer: MEDICARE

## 2025-07-30 ENCOUNTER — APPOINTMENT (OUTPATIENT)
Dept: GENERAL RADIOLOGY | Facility: HOSPITAL | Age: 70
End: 2025-07-30
Payer: MEDICARE

## 2025-07-30 ENCOUNTER — HOSPITAL ENCOUNTER (EMERGENCY)
Facility: HOSPITAL | Age: 70
Discharge: HOME | End: 2025-07-30
Attending: EMERGENCY MEDICINE | Admitting: EMERGENCY MEDICINE
Payer: MEDICARE

## 2025-07-30 VITALS
OXYGEN SATURATION: 97 % | HEART RATE: 86 BPM | WEIGHT: 160.27 LBS | SYSTOLIC BLOOD PRESSURE: 130 MMHG | DIASTOLIC BLOOD PRESSURE: 75 MMHG | BODY MASS INDEX: 24.29 KG/M2 | TEMPERATURE: 98.6 F | RESPIRATION RATE: 16 BRPM | HEIGHT: 68 IN

## 2025-07-30 DIAGNOSIS — S09.90XA INJURY OF HEAD, INITIAL ENCOUNTER: ICD-10-CM

## 2025-07-30 DIAGNOSIS — G89.29 OTHER CHRONIC PAIN: ICD-10-CM

## 2025-07-30 DIAGNOSIS — S42.342A CLOSED DISPLACED SPIRAL FRACTURE OF SHAFT OF LEFT HUMERUS, INITIAL ENCOUNTER: ICD-10-CM
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
## 2025-07-30 LAB
ANION GAP SERPL CALCULATED.3IONS-SCNC: 11 MMOL/L (ref 5–15)
BASOPHILS # BLD AUTO: 0.04 10*3/MM3 (ref 0–0.2)
BASOPHILS NFR BLD AUTO: 0.3 % (ref 0–1.5)
BUN SERPL-MCNC: 12 MG/DL (ref 8–23)
BUN/CREAT SERPL: 17.4 (ref 7–25)
CALCIUM SPEC-SCNC: 8.2 MG/DL (ref 8.6–10.5)
CHLORIDE SERPL-SCNC: 104 MMOL/L (ref 98–107)
CO2 SERPL-SCNC: 20 MMOL/L (ref 22–29)
CREAT SERPL-MCNC: 0.69 MG/DL (ref 0.57–1)
DEPRECATED RDW RBC AUTO: 43.7 FL (ref 37–54)
EGFRCR SERPLBLD CKD-EPI 2021: 94.1 ML/MIN/1.73
EOSINOPHIL # BLD AUTO: 0.02 10*3/MM3 (ref 0–0.4)
EOSINOPHIL NFR BLD AUTO: 0.1 % (ref 0.3–6.2)
ERYTHROCYTE [DISTWIDTH] IN BLOOD BY AUTOMATED COUNT: 12.9 % (ref 12.3–15.4)
GLUCOSE SERPL-MCNC: 128 MG/DL (ref 65–99)
HCT VFR BLD AUTO: 28.3 % (ref 34–46.6)
HGB BLD-MCNC: 9.5 G/DL (ref 12–15.9)
IMM GRANULOCYTES # BLD AUTO: 0.04 10*3/MM3 (ref 0–0.05)
IMM GRANULOCYTES NFR BLD AUTO: 0.3 % (ref 0–0.5)
LYMPHOCYTES # BLD AUTO: 0.94 10*3/MM3 (ref 0.7–3.1)
LYMPHOCYTES NFR BLD AUTO: 7 % (ref 19.6–45.3)
MCH RBC QN AUTO: 31.1 PG (ref 26.6–33)
MCHC RBC AUTO-ENTMCNC: 33.6 G/DL (ref 31.5–35.7)
MCV RBC AUTO: 92.8 FL (ref 79–97)
MONOCYTES # BLD AUTO: 0.7 10*3/MM3 (ref 0.1–0.9)
MONOCYTES NFR BLD AUTO: 5.2 % (ref 5–12)
NEUTROPHILS NFR BLD AUTO: 11.6 10*3/MM3 (ref 1.7–7)
NEUTROPHILS NFR BLD AUTO: 87.1 % (ref 42.7–76)
NRBC BLD AUTO-RTO: 0 /100 WBC (ref 0–0.2)
PLATELET # BLD AUTO: 475 10*3/MM3 (ref 140–450)
PMV BLD AUTO: 8.3 FL (ref 6–12)
POTASSIUM SERPL-SCNC: 3.4 MMOL/L (ref 3.5–5.2)
RBC # BLD AUTO: 3.05 10*6/MM3 (ref 3.77–5.28)
SODIUM SERPL-SCNC: 135 MMOL/L (ref 136–145)
WBC NRBC COR # BLD AUTO: 13.34 10*3/MM3 (ref 3.4–10.8)

## 2025-07-30 PROCEDURE — 96374 THER/PROPH/DIAG INJ IV PUSH: CPT

## 2025-07-30 PROCEDURE — 72125 CT NECK SPINE W/O DYE: CPT

## 2025-07-30 PROCEDURE — 80048 BASIC METABOLIC PNL TOTAL CA: CPT | Performed by: EMERGENCY MEDICINE

## 2025-07-30 PROCEDURE — 99284 EMERGENCY DEPT VISIT MOD MDM: CPT

## 2025-07-30 PROCEDURE — 25010000002 HYDROMORPHONE PER 4 MG: Performed by: EMERGENCY MEDICINE

## 2025-07-30 PROCEDURE — 85025 COMPLETE CBC W/AUTO DIFF WBC: CPT | Performed by: EMERGENCY MEDICINE

## 2025-07-30 PROCEDURE — 71045 X-RAY EXAM CHEST 1 VIEW: CPT

## 2025-07-30 PROCEDURE — 70450 CT HEAD/BRAIN W/O DYE: CPT

## 2025-07-30 PROCEDURE — 73060 X-RAY EXAM OF HUMERUS: CPT

## 2025-07-30 PROCEDURE — 36415 COLL VENOUS BLD VENIPUNCTURE: CPT

## 2025-07-30 RX ORDER — HYDROMORPHONE HYDROCHLORIDE 1 MG/ML
0.5 INJECTION, SOLUTION INTRAMUSCULAR; INTRAVENOUS; SUBCUTANEOUS ONCE
Refills: 0 | Status: COMPLETED | OUTPATIENT
Start: 2025-07-30 | End: 2025-07-30

## 2025-07-30 RX ORDER — HYDROCODONE BITARTRATE AND ACETAMINOPHEN 5; 325 MG/1; MG/1
1 TABLET ORAL ONCE
Refills: 0 | Status: COMPLETED | OUTPATIENT
Start: 2025-07-30 | End: 2025-07-30

## 2025-07-30 RX ADMIN — HYDROMORPHONE HYDROCHLORIDE 0.5 MG: 1 INJECTION, SOLUTION INTRAMUSCULAR; INTRAVENOUS; SUBCUTANEOUS at 17:12

## 2025-07-30 RX ADMIN — HYDROCODONE BITARTRATE AND ACETAMINOPHEN 1 TABLET: 5; 325 TABLET ORAL at 18:48

## 2025-07-30 NOTE — CONSULTS
Orthopaedic Consult    Lyla De La Garza MD      Patient: Yoanna Reed    Date of Admission: 7/30/2025  3:33 PM    YOB: 1955    Medical Record Number: [id number]    Attending Physician: Elvin Arnold MD  Consulting Physician: Lyla De La Garza MD      Chief Complaints: No admission diagnoses are documented for this encounter.      History of Present Illness: 69 y.o. female admitted to [facility] with No admission diagnoses are documented for this encounter.. I was consulted for further evaluation and treatment. Onset of symptoms was today the patient tripped and fell down several stairs landing on her arm.  She was brought to [facility] by EMS.  She is complaining of isolated left arm pain.  She does receive chronic pain medicines.  She is not smoking and she has not smoked for several years.  She is here with her friend.  She is right-hand dominant.  She does live at home alone.    Allergies: No Known Allergies    Medications:   Home Medications:  (Not in a hospital admission)      Current Medications:  Scheduled Meds:  Continuous Infusions:No current facility-administered medications for this encounter.    PRN Meds:.    Past Medical History:   Diagnosis Date    Anxiety and depression     Asthma     Limited joint range of motion     NECK    Liver failure 2002    DUE TO MEDICATION    Mass of nipple     RIGHT    Skin cancer     RIGHT CHEEK     Past Surgical History:   Procedure Laterality Date    ANTERIOR CERVICAL FUSION      ARTERY SURGERY Left     KNEE AGE 15 DUE TO CUT TO KNEE    BREAST BIOPSY Right 11/22/2024    Procedure: RIGHT NIPPLE LESION EXCISION;  Surgeon: Raven Aviles MD;  Location: [facility] OR [facility];  Service: General;  Laterality: Right;    CHOLECYSTECTOMY      COLONOSCOPY W/ POLYPECTOMY N/A 05/23/2024    Procedure: COLONOSCOPY to cecum with cold polypectomies;  Surgeon: Ankur Beebe MD;  Location: [facility] ENDOSCOPY;  Service: Gastroenterology;  Laterality: N/A;   "PRE - screening  POST - polyps    HYSTERECTOMY      Partial    POSTERIOR CERVICAL FUSION       Social History     Occupational History    Not on file   Tobacco Use    Smoking status: Some Days     Current packs/day: 0.25     Average packs/day: 0.3 packs/day for 47.6 years (11.9 ttl pk-yrs)     Types: Cigarettes     Start date: 1978     Passive exposure: Never    Smokeless tobacco: Never   Vaping Use    Vaping status: Never Used   Substance and Sexual Activity    Alcohol use: Yes     Comment: social    Drug use: Never    Sexual activity: Defer      Social History     Social History Narrative    Not on file     Family History   Problem Relation Age of Onset    Hypertension Mother     Ovarian cancer Mother     Malig Hyperthermia Neg Hx          Review of Systems:   Constitutional: Negative for fatigue, fever, or weight loss  HEENT: Patient denies any headaches, vision changes, sore throat. Admits to wearing glasses  Pulmonary: Patient denies any cough, congestion, SOA, or wheezing  Cardiovascular: Patient denies any chest pain, palpitations, weakness,  Gastrointestinal:  Patient denies nausea, vomiting, diarrhea, constipation  Genital/Urinary: Patient denies dysuria, urinary frequency, urgency, incontinence, retention  Musculoskeletal: Positive for left arm pain. Negative for muscle cramps  Neurological: Patient denies dizziness, paresthesia, loss of sensation, seizures, syncope  Endocrine system: Patient denies tremors, cold or heat intolerance  Psychological: Patient denies thoughts/plans or harming self or other; hallucinations,  insomnia  Skin: Patient denies any bruising, rashes, lesions, or ulcers   Hematopoietic: Patient denies history of MRSA or slow wound healing,  spontaneous or excessive bleeding    Physical Exam: 69 y.o. female  Vitals:    07/30/25 1531 07/30/25 1608   BP: 119/68    Pulse: 91    Resp: 16    Temp:  98.6 °F (37 °C)   SpO2: 95%    Weight: 72.7 kg (160 lb 4.4 oz)    Height: 172.7 cm (68\")  "                            General Appearance:  Alert, cooperative, in no acute distress    HEENT:    Normocephalic,  Atraumatic, Pupils are equal   Neck:   No adenopathy, supple, trachea midline, no thyromegaly       Lungs:     Clear to auscultation, equal expansion bilaterally, respirations regular, even and               unlabored    Heart:    Regular rhythm and normal rate, normal S1 and S2, no murmur, no gallops   Chest Wall:    Within normal limits   Abdomen:     Normal bowel sounds, no masses,  soft non-tender, non-distended,       Rectal:  Musculoskeletal:     Deferred    Patient is in a splint placed by EMS she is neurovascular intact distally able to flex extend her fingers give a thumbs up.   Extremities:   No clubbing, no edema, no cyanosis   Pulses  Neurovascular:   Pulses palpable and equal bilaterally in all 4 extremities    Patient was alert and oriented x 3 spheres   Skin:   No lesions, ulcers or rashes   Neurologic:   Cranial nerves 2 - 12 grossly intact, sensation intact            Diagnostic Tests:      X-rays of the humerus taken today demonstrate that she has a minimally displaced humeral shaft fracture    Assessment:    * No active hospital problems. *            Plan:      Discussed with patient this would be amenable to nonoperative management in a Lisa brace especially if she is a non-smoker.  I can see her back in my office in a week for repeat x-ray that she hates that we can move forward with surgical intervention in 2 weeks.  Typically these can be treated with a Lisa brace x 6 weeks if it still moving at the fracture site we do recommend operative fixation at that time.  The patient can change her mind and get surgical intervention earlier.  From orthopedic standpoint if pain is controlled she is okay for discharge if she cannot go home she may to be admitted for pain control discussed with her patient that that humerus fracture would likely not be fixed during this hospital  stay if she elected for her to get fixed.  Questions encouraged and answered no guarantees given    Date: 7/30/2025  Lyla De La Garza MD  Orthopaedic Surgeon    [facility]  [phone]  [email or web address]

## 2025-07-30 NOTE — ED TRIAGE NOTES
Patient to ed from home via ems with complaints of a mechanical fall. Patient did hit head no LOC or blood thinners. Patient has deformity to left upper arm. Received 100 mcg fentanyl in route.

## 2025-07-30 NOTE — ED PROVIDER NOTES
EMERGENCY DEPARTMENT ENCOUNTER  Room Number:  25/25  PCP: Tom Muller MD  Independent Historians: Patient      HPI:  Chief Complaint: Fall with injuries    A complete HPI/ROS/PMH/PSH/SH/FH are unobtainable due to: EM_Unobtainable History : None    Chronic or social conditions impacting patient care (Social Determinants of Health): None      Context: Yoanna Reed is a 69 y.o. female with a medical history of chronic pain, migraines, and hepatitis C who presents to the ED c/o acute injury sustained from mechanical fall shortly prior to arrival.  Patient reports she missed a step and fell.  She reports majority of her pain is in her left proximal humerus but also notes that she hit her head.  She denies any injury to the lower extremities and has not noted any back pain, chest pain or abdominal pain with this.  No loss of consciousness.  Headache is mild.      Review of prior external notes (non-ED) -and- Review of prior external test results outside of this encounter:  Hospital discharge summary 7/13/2025 reviewed: Patient was hospitalized for loculated pleural effusion and underwent chest tube placement.      PAST MEDICAL HISTORY  Active Ambulatory Problems     Diagnosis Date Noted    Encounter for screening for malignant neoplasm of colon 02/04/2024    Acute pain of right shoulder 07/11/2024    Pain of right humerus 07/11/2024    Chronic pain 07/23/2024    Flank pain 08/01/2024    Acute cystitis with hematuria 08/01/2024    Acute vaginitis 08/15/2024    Dysuria 08/15/2024    Hematuria 08/29/2024    Mass of nipple 11/04/2024    Severe sepsis 06/30/2025    Pleural effusion 06/30/2025    Near syncope 06/30/2025    TUSHAR (acute kidney injury) 06/30/2025    Elevated troponin 06/30/2025     Resolved Ambulatory Problems     Diagnosis Date Noted    No Resolved Ambulatory Problems     Past Medical History:   Diagnosis Date    Anxiety and depression     Asthma     Limited joint range of motion     Liver failure  2002    Skin cancer          PAST SURGICAL HISTORY  Past Surgical History:   Procedure Laterality Date    ANTERIOR CERVICAL FUSION      ARTERY SURGERY Left     KNEE AGE 15 DUE TO CUT TO KNEE    BREAST BIOPSY Right 11/22/2024    Procedure: RIGHT NIPPLE LESION EXCISION;  Surgeon: Raven Aviles MD;  [place]  [facility] [place]  Service: General;  Laterality: Right;    CHOLECYSTECTOMY      COLONOSCOPY W/ POLYPECTOMY N/A 05/23/2024    Procedure: COLONOSCOPY to cecum with cold polypectomies;  Surgeon: Ankur Beebe MD;  Location: [facility] ENDOSCOPY;  Service: Gastroenterology;  Laterality: N/A;  PRE - screening  POST - polyps    HYSTERECTOMY      Partial    POSTERIOR CERVICAL FUSION           FAMILY HISTORY  Family History   Problem Relation Age of Onset    Hypertension Mother     Ovarian cancer Mother     Malig Hyperthermia Neg Hx          SOCIAL HISTORY  Social History     Socioeconomic History    Marital status: Single   Tobacco Use    Smoking status: Some Days     Current packs/day: 0.25     Average packs/day: 0.3 packs/day for 47.6 years (11.9 ttl pk-yrs)     Types: Cigarettes     Start date: 1978     Passive exposure: Never    Smokeless tobacco: Never   Vaping Use    Vaping status: Never Used   Substance and Sexual Activity    Alcohol use: Yes     Comment: social    Drug use: Never    Sexual activity: Defer         ALLERGIES  Patient has no known allergies.      REVIEW OF SYSTEMS  Review of Systems  Included in HPI  All systems reviewed and negative except for those discussed in HPI.      PHYSICAL EXAM    I have reviewed the triage vital signs and nursing notes.    ED Triage Vitals [07/30/25 1531]   Temp Heart Rate Resp BP SpO2   -- 91 16 119/68 95 %      Temp src Heart Rate Source Patient Position BP Location FiO2 (%)   -- -- -- -- --       Physical Exam    Physical Exam   Constitutional: No distress.  Nontoxic  HENT:  Head: Normocephalic.  Abrasion without associated bony deformity left  forehead.  Oropharynx: Mucous membranes are moist.    Eyes: . No scleral icterus. No conjunctival pallor.PERRL, EOMI  Neck: Normal range of motion. Neck supple.   Cardiovascular: Pink warm and well perfused throughout.    Pulmonary/Chest: No respiratory distress.  No tachypnea or increased work of breathing appreciated.  Chest wall  Abdominal: Soft. There is no tenderness. There is no rebound and no guarding.  Benign abdominal exam  Musculoskeletal: EMS splint of the left humerus in place.  Full range of motion left elbow forearm wrist and hand without deformity.  Strong  strength and finger extension bilateral hands.  C5-8 motor and sensory grossly intact bilaterally.  Neurological: Alert and oriented.  No acute focal deficit appreciated.  Skin: Skin is pink, warm, and dry.   Psychiatric: Mood and affect normal.   Nursing note and vitals reviewed.             LAB RESULTS  Recent Results (from the past 24 hours)   Basic Metabolic Panel    Collection Time: 07/30/25  4:07 PM    Specimen: Blood   Result Value Ref Range    Glucose 128 (H) 65 - 99 mg/dL    BUN 12.0 8.0 - 23.0 mg/dL    Creatinine 0.69 0.57 - 1.00 mg/dL    Sodium 135 (L) 136 - 145 mmol/L    Potassium 3.4 (L) 3.5 - 5.2 mmol/L    Chloride 104 98 - 107 mmol/L    CO2 20.0 (L) 22.0 - 29.0 mmol/L    Calcium 8.2 (L) 8.6 - 10.5 mg/dL    BUN/Creatinine Ratio 17.4 7.0 - 25.0    Anion Gap 11.0 5.0 - 15.0 mmol/L    eGFR 94.1 >60.0 mL/min/1.73   CBC Auto Differential    Collection Time: 07/30/25  4:07 PM    Specimen: Blood   Result Value Ref Range    WBC 13.34 (H) 3.40 - 10.80 10*3/mm3    RBC 3.05 (L) 3.77 - 5.28 10*6/mm3    Hemoglobin 9.5 (L) 12.0 - 15.9 g/dL    Hematocrit 28.3 (L) 34.0 - 46.6 %    MCV 92.8 79.0 - 97.0 fL    MCH 31.1 26.6 - 33.0 pg    MCHC 33.6 31.5 - 35.7 g/dL    RDW 12.9 12.3 - 15.4 %    RDW-SD 43.7 37.0 - 54.0 fl    MPV 8.3 6.0 - 12.0 fL    Platelets 475 (H) 140 - 450 10*3/mm3    Neutrophil % 87.1 (H) 42.7 - 76.0 %    Lymphocyte % 7.0 (L) 19.6  - 45.3 %    Monocyte % 5.2 5.0 - 12.0 %    Eosinophil % 0.1 (L) 0.3 - 6.2 %    Basophil % 0.3 0.0 - 1.5 %    Immature Grans % 0.3 0.0 - 0.5 %    Neutrophils, Absolute 11.60 (H) 1.70 - 7.00 10*3/mm3    Lymphocytes, Absolute 0.94 0.70 - 3.10 10*3/mm3    Monocytes, Absolute 0.70 0.10 - 0.90 10*3/mm3    Eosinophils, Absolute 0.02 0.00 - 0.40 10*3/mm3    Basophils, Absolute 0.04 0.00 - 0.20 10*3/mm3    Immature Grans, Absolute 0.04 0.00 - 0.05 10*3/mm3    nRBC 0.0 0.0 - 0.2 /100 WBC         RADIOLOGY  CT Head Without Contrast  CT Head Without Contrast, CT Cervical Spine Without Contrast  Result Date: 7/30/2025  NON-CONTRAST CT HEAD & CT CERVICAL SPINE  REASON:  The patient is being seen in the ED for trauma and is determined to have a high energy mechanism and/or high risk for missed injuries due to presence of associated injuries or distracting pain. The patient will need imaging of the head per the trauma protocol given diagnoses such as intracranial hemorrhage cannot be excluded.    The patient will need imaging of the cervical spine given diagnoses such as cervical spine fracture cannot be excluded.  The diagnostic information gained in this study exceeds the estimated risk of radiation induced injury at the time of order placement. Mechanism of injury: Trauma  COMPARISON STUDIES: Head CT 6/30/2025.  TECHNIQUE:  Axial images were acquired from the skull base to vertex without contrast, including multiplanar reformats, per standard departmental protocol.   Routine cervical spine CT without contrast. Multiplanar reformats were created. Radiation dose reduction techniques were utilized, including automated exposure control, and exposure modulation based on body size.  FINDINGS:  Head CT: There is no CT evidence of acute intracranial hemorrhage, mass, or infarct. There is volume loss, but there is no evidence of hydrocephalus or extra-axial fluid collection.  Brain parenchymal density is within normal limits.  Skull base  and calvarium show no acute abnormality, and the extracranial soft tissues show no acute abnormality.  C-spine CT: There is no evidence of acute alignment abnormality.  There are postoperative and degenerative changes, but there is no acute bony abnormality.  The paraspinous soft tissues show no acute abnormality.       Negative unenhanced head CT, no acute abnormality.  Negative cervical spine CT, degenerative and postoperative changes without evidence of acute abnormality.    This report was finalized on 7/30/2025 6:05 PM by Dr. Alireza Luna M.D on Workstation: [id number]      XR Humerus Left  XR Humerus Left, XR Chest 1 View  Result Date: 7/30/2025   XR CHEST 1 VW-, XR HUMERUS LEFT-  HISTORY: Fall with injuries  COMPARISON: AP chest 0713/2025, 07/12/2025, CT angiogram chest 07/09/2025  FINDINGS: AP CHEST: Heart size is enlarged. Thoracic aorta is tortuous. Patient is rotated to the right. There is abnormal hazy right perihilar and basilar opacity due to combination of right pleural effusion and adjacent right atelectasis and potential infiltrate. Opacity in the right mid lung base is increased compared to the examination 17 days ago. The left lung appears clear.  LEFT HUMERUS: Oblique fracture of the mid left humeral shaft with mild displacement and medial angulation. Fracture is contiguous with a spiral type fracture that extends proximally into the proximal humeral shaft without significant displacement of the proximal humeral shaft fracture. Soft tissue swelling.      1. Spiral type fracture of the proximal left humeral shaft extends to a mildly displaced oblique transverse fracture through the mid left humeral shaft. 2. Abnormal right perihilar and basilar opacity with increased compared to exam 07/13/2025. This represents combination of right pleural fluid with adjacent right atelectasis or infiltrate. The left lung appears clear.           MEDICATIONS GIVEN IN ER  Medications    HYDROcodone-acetaminophen (NORCO) 5-325 MG per tablet 1 tablet (has no administration in time range)   HYDROmorphone (DILAUDID) injection 0.5 mg (0.5 mg Intravenous Given 7/30/25 1712)         ORDERS PLACED DURING THIS VISIT:  Orders Placed This Encounter   Procedures    Splint Cast Strapping    [facility]. Over the Shoulder Humeral FX (Lisa) Brace (); Left    CT Head Without Contrast    CT Cervical Spine Without Contrast    XR Humerus Left    XR Chest 1 View    Basic Metabolic Panel    CBC Auto Differential    Monitor Blood Pressure    Continuous Pulse Oximetry    Obtain & Apply The Following-    Irrigate wound    Wound Dressing    Obtain & Apply The Following- Upper extremity; Sling    Ortho (on-call MD unless specified)    CBC & Differential         OUTPATIENT MEDICATION MANAGEMENT:  Current Facility-Administered Medications Ordered in Epic   Medication Dose Route Frequency Provider Last Rate Last Admin    HYDROcodone-acetaminophen (NORCO) 5-325 MG per tablet 1 tablet  1 tablet Oral Once Elvin Arnold MD         Current Outpatient Medications Ordered in Epic   Medication Sig Dispense Refill    albuterol sulfate  (90 Base) MCG/ACT inhaler INHALE 2 PUFFS BY MOUTH EVERY 4 HOURS AS NEEDED FOR WHEEZE 18 g 3    buPROPion XL (Wellbutrin XL) 150 MG 24 hr tablet Take 1 tablet by mouth Every Morning. 90 tablet 3    escitalopram (LEXAPRO) 20 MG tablet TAKE 1 TABLET BY MOUTH EVERY DAY 90 tablet 1    FLUoxetine (PROzac) 40 MG capsule Take 1 capsule by mouth Daily. 90 capsule 3    HYDROcodone-acetaminophen (Norco) 7.5-325 MG per tablet Take 1 tablet by mouth Every 6 (Six) Hours As Needed for Moderate Pain. 90 tablet 0    ketoconazole (NIZORAL) 2 % shampoo Apply 1 Application topically to the appropriate area as directed 2 (Two) Times a Week.      LORazepam (Ativan) 1 MG tablet Take 1 tablet by mouth Every 6 (Six) Hours As Needed for Anxiety. 240 tablet 1    meloxicam (MOBIC) 15 MG tablet TAKE 1  TABLET BY MOUTH EVERY DAY 60 tablet 3    ondansetron ODT (ZOFRAN-ODT) 4 MG disintegrating tablet Place 1 tablet on the tongue Every 8 (Eight) Hours As Needed for Nausea or Vomiting. 30 tablet 1    pimecrolimus (ELIDEL) 1 % cream Apply 1 Application topically to the appropriate area as directed 2 (Two) Times a Day.      pravastatin (PRAVACHOL) 20 MG tablet Take 1 tablet by mouth Daily. 90 tablet 3    tiZANidine (Zanaflex) 4 MG tablet Take 1 tablet by mouth Every 8 (Eight) Hours As Needed for Muscle Spasms. 42 tablet 1    ubrogepant (Ubrelvy) 100 MG tablet Take 1 tablet by mouth 1 (One) Time As Needed (migraine). 16 tablet 11    zolpidem (AMBIEN) 10 MG tablet Take 1 tablet by mouth Every Night. 90 tablet 1         PROCEDURES  Splint - Cast - Strapping    Date/Time: 7/30/2025 6:37 PM    Performed by: Elvin Arnold MD  Authorized by: Elvin Arnold MD    Consent:     Consent obtained:  Verbal    Consent given by:  Patient    Risks discussed:  Discoloration, numbness, pain and swelling    Alternatives discussed:  No treatment and delayed treatment  Universal protocol:     Procedure explained and questions answered to patient or proxy's satisfaction: yes      Patient identity confirmed:  Verbally with patient  Pre-procedure details:     Distal neurologic exam:  Normal    Distal perfusion: distal pulses strong and brisk capillary refill    Procedure details:     Location:  Arm    Arm location:  L upper arm    Upper extremity splint type: Lisa.    Supplies:  Prefabricated splint    Attestation: Splint applied and adjusted personally by me    Post-procedure details:     Distal neurologic exam:  Normal    Distal perfusion: distal pulses strong and brisk capillary refill      Procedure completion:  Tolerated well, no immediate complications              PROGRESS, DATA ANALYSIS, CONSULTS, AND MEDICAL DECISION MAKING  All labs have been independently interpreted by me.  All radiology studies have been reviewed by me. All  EKGs have been independently viewed and interpreted by me.  Discussion below represents my analysis of pertinent findings related to patient's condition, differential diagnosis, treatment plan and final disposition.    Differential diagnosis:   My differential diagnosis includes but is not limited to cerebral contusion, cervical strain, concussion with LOC, concussion without LOC, contusion, fracture of the skull, orbits or mandible, hematoma, intracranial hemorrhage including subdural, epidural, subarachnoid and intracerebral, laceration and postconcussion syndrome      Clinical Scores:                  ED Course as of 07/30/25 1839 Wed Jul 30, 2025   1557 Patient received fentanyl by EMS prior to arrival while they were splinting her.  She reports her pain is reasonably controlled at this time. [RS]   1704 Glucose(!): 128 [RS]   1704 BUN: 12.0 [RS]   1704 Creatinine: 0.69 [RS]   1704 Sodium(!): 135 [RS]   1704 Potassium(!): 3.4 [RS]   1704 WBC(!): 13.34 [RS]   1704 Hemoglobin(!): 9.5 [RS]   1704 Platelets(!): 475 [RS]   1704 RADIOLOGY      Study: Left humerus  Findings: Midshaft spiral humerus fracture with angulation and displacement  I independently viewed and interpreted these images contemporaneously with treatment.    [RS]   1705 This elderly female lives by herself and has chronic pain at baseline for which she is on chronic narcotics thus pain  control is going to be a significant challenge as well as taking care of her ADLs with this new injury.  Recommend consideration for admission.  Patient agreeable. [RS]   1719 CONSULT        Provider: Dr. De La Garza - Ortho/Hand    Discussion: Recommend Lisa brace applied by bracing company.  No indication for immediate surgery.    Agreeable c treatment and planned disposition.         [RS]   1835 I reviewed all findings and recommendations from orthopedics with the patient.  Her preference is to try to be discharged home.  Will apply the Lisa splint and a  sling and take her for a walk and see how she tolerates this.  Patient agreeable with this plan.  Patient has narcotic pain medication at home and family is willing to go home with her and help. [RS]      ED Course User Index  [RS] Elvin Arnold MD         Prescription drug monitoring program review:     AS OF 18:39 EDT VITALS:    BP - 130/75  HR - 86  TEMP - 98.6 °F (37 °C)  O2 SATS - 97%    COMPLEXITY OF CARE  Admission was considered but after careful review of the patient's presentation, physical examination, diagnostic results, and response to treatment the patient may be safely discharged with outpatient follow-up.      DIAGNOSIS  Final diagnoses:   Fall in home, initial encounter   Closed displaced spiral fracture of shaft of left humerus, initial encounter   Other chronic pain   Injury of head, initial encounter         DISPOSITION  ED Disposition       ED Disposition   Discharge    Condition   Stable    Comment   --                  DISCHARGE    Patient discharged in stable condition.    Reviewed implications of results, diagnosis, meds, responsibility to follow up, warning signs and symptoms of possible worsening, potential complications and reasons to return to ER.    Patient/Family voiced understanding of above instructions.    Discussed plan for discharge, as there is no emergent indication for admission. Patient referred to primary care provider for regular health maintenance. Pt/family is agreeable and understands need for follow up and possible repeat testing.  Pt is aware that discharge does not mean that nothing is wrong but it indicates no emergency is present that requires admission and they must continue care with follow-up as given below or physician of their choice.     FOLLOW-UP  Lyla De La Garza MD  [address] [facility] [address] [phone]    Schedule an appointment as soon as possible for a visit in 1 week(s)      Tom Muller MD  [address] [facility]  [address] [id number]  [phone]    Schedule an appointment as soon as possible for a visit in 3 days  For wound re-check    Lyla De La Garza MD  [address] [id number] [phone]    Schedule an appointment as soon as possible for a visit in 1 week           Medication List      No changes were made to your prescriptions during this visit.           Please note that portions of this document were completed with a voice recognition program.    Note Disclaimer: At [facility], we believe that sharing information builds trust and better relationships. You are receiving this note because you recently visited [facility]. It is possible you will see health information before a provider has talked with you about it. This kind of information can be easy to misunderstand. To help you fully understand what it means for your health, we urge you to discuss this note with your provider.         Elvin Arnold MD  07/30/25 0302

## 2025-08-01 ENCOUNTER — HOSPITAL ENCOUNTER (EMERGENCY)
Facility: HOSPITAL | Age: 70
Discharge: HOME | End: 2025-08-01
Attending: EMERGENCY MEDICINE
Payer: MEDICARE

## 2025-08-01 ENCOUNTER — APPOINTMENT (OUTPATIENT)
Dept: CT IMAGING | Facility: HOSPITAL | Age: 70
End: 2025-08-01
Payer: MEDICARE

## 2025-08-01 ENCOUNTER — NURSE TRIAGE (OUTPATIENT)
Dept: CALL CENTER | Facility: HOSPITAL | Age: 70
End: 2025-08-01
Payer: MEDICARE

## 2025-08-01 ENCOUNTER — APPOINTMENT (OUTPATIENT)
Dept: GENERAL RADIOLOGY | Facility: HOSPITAL | Age: 70
End: 2025-08-01
Payer: MEDICARE

## 2025-08-01 VITALS
TEMPERATURE: 98.7 F | DIASTOLIC BLOOD PRESSURE: 81 MMHG | OXYGEN SATURATION: 97 % | RESPIRATION RATE: 18 BRPM | HEART RATE: 84 BPM | SYSTOLIC BLOOD PRESSURE: 147 MMHG

## 2025-08-01 DIAGNOSIS — S42.302A CLOSED FRACTURE OF SHAFT OF LEFT HUMERUS, UNSPECIFIED FRACTURE MORPHOLOGY, INITIAL ENCOUNTER: ICD-10-CM
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
## 2025-08-01 DIAGNOSIS — S00.03XA HEMATOMA OF SCALP, INITIAL ENCOUNTER: Primary | ICD-10-CM

## 2025-08-01 DIAGNOSIS — S39.012A ACUTE MYOFASCIAL STRAIN OF LUMBAR REGION, INITIAL ENCOUNTER: ICD-10-CM

## 2025-08-01 DIAGNOSIS — S16.1XXA ACUTE CERVICAL MYOFASCIAL STRAIN, INITIAL ENCOUNTER: ICD-10-CM

## 2025-08-01 PROCEDURE — 73030 X-RAY EXAM OF SHOULDER: CPT

## 2025-08-01 PROCEDURE — 96376 TX/PRO/DX INJ SAME DRUG ADON: CPT

## 2025-08-01 PROCEDURE — 25010000002 HYDROMORPHONE PER 4 MG: Performed by: EMERGENCY MEDICINE

## 2025-08-01 PROCEDURE — 99284 EMERGENCY DEPT VISIT MOD MDM: CPT

## 2025-08-01 PROCEDURE — 72125 CT NECK SPINE W/O DYE: CPT

## 2025-08-01 PROCEDURE — 70450 CT HEAD/BRAIN W/O DYE: CPT

## 2025-08-01 PROCEDURE — 72131 CT LUMBAR SPINE W/O DYE: CPT

## 2025-08-01 PROCEDURE — 96374 THER/PROPH/DIAG INJ IV PUSH: CPT

## 2025-08-01 RX ORDER — HYDROMORPHONE HYDROCHLORIDE 1 MG/ML
0.5 INJECTION, SOLUTION INTRAMUSCULAR; INTRAVENOUS; SUBCUTANEOUS ONCE
Refills: 0 | Status: COMPLETED | OUTPATIENT
Start: 2025-08-01 | End: 2025-08-01

## 2025-08-01 RX ORDER — HYDROCODONE BITARTRATE AND ACETAMINOPHEN 7.5; 325 MG/1; MG/1
1 TABLET ORAL ONCE
Refills: 0 | Status: COMPLETED | OUTPATIENT
Start: 2025-08-01 | End: 2025-08-01

## 2025-08-01 RX ORDER — ZOLPIDEM TARTRATE 10 MG/1
10 TABLET ORAL NIGHTLY
Qty: 90 TABLET | Refills: 1 | Status: ON HOLD | OUTPATIENT
Start: 2025-08-01

## 2025-08-01 RX ORDER — HYDROCODONE BITARTRATE AND ACETAMINOPHEN 7.5; 325 MG/1; MG/1
1 TABLET ORAL EVERY 6 HOURS PRN
Qty: 90 TABLET | Refills: 0 | Status: ON HOLD | OUTPATIENT
Start: 2025-08-01

## 2025-08-01 RX ADMIN — HYDROCODONE BITARTRATE AND ACETAMINOPHEN 1 TABLET: 7.5; 325 TABLET ORAL at 11:40

## 2025-08-01 RX ADMIN — HYDROMORPHONE HYDROCHLORIDE 0.5 MG: 1 INJECTION, SOLUTION INTRAMUSCULAR; INTRAVENOUS; SUBCUTANEOUS at 12:25

## 2025-08-01 RX ADMIN — HYDROMORPHONE HYDROCHLORIDE 0.5 MG: 1 INJECTION, SOLUTION INTRAMUSCULAR; INTRAVENOUS; SUBCUTANEOUS at 15:53

## 2025-08-01 NOTE — DISCHARGE PLACEMENT REQUEST
"Yoanna Reed (69 y.o. Female)       Date of Birth   1955    Social Security Number       Address   [address] [place] [address]    Home Phone   [phone]    MRN   [id number]       [place]   Spiritism    Marital Status   Single                            Admission Date   8/1/2025    Admission Type   Emergency    Admitting Provider       Attending Provider   Rocco Arias MD    Department, Room/Bed   [facility] EMERGENCY DEPARTMENT, 22/22       Discharge Date       Discharge Disposition       Discharge Destination                                 Attending Provider: Rocco Arias MD    Allergies: No Known Allergies    Isolation: None   Infection: None   Code Status: Prior    Ht: 172.7 cm (68\")   Wt: 72.7 kg (160 lb 4.4 oz)    Admission Cmt: None   Principal Problem: None                  Active Insurance as of 8/1/2025       Primary Coverage       Payor Plan Insurance Group Employer/Plan Group    ANTHEM MEDICARE REPLACEMENT ANTH MEDICARE ADVANTAGE HMO [id number]       Payor Plan Address Payor Plan Phone Number Payor Plan Fax Number Effective Dates     [id number] [phone]  1/1/2025 - None Entered    [facility] [id number]         Subscriber Name Subscriber Birth Date Member ID       YOANNA REED 1955 [id number]                     Emergency Contacts        (Rel.) Home Phone Work Phone Mobile Phone    AMY MAHONEY (Friend) [phone] -- [phone]    doris tapia (Friend) -- -- [phone]                "

## 2025-08-01 NOTE — CASE MANAGEMENT/SOCIAL WORK
"Discharge Planning Assessment  [facility]     Patient Name: Yoanna Reed  MRN: [id number]  Today's Date: 8/1/2025    Admit Date: 8/1/2025        Discharge Needs Assessment    No documentation.                  Discharge Plan       Row Name 08/01/25 8160       Plan    Plan Comments Spoke with patient at bedside, per ER provider request, to discuss discharge planning. Patient was just seen in the ER several days ago after a fall at home which resulted in humeral fracture. She was discharged home in a Lisa brace at that time. She returns to the ER today after sustaining another fall at home. I expressed to patient that she would likely benefit from rehab stay prior to returning home, given that she continues to fall and has limited support in the home. Patient lives alone. She is adamantly opposed to rehab and states, \"I will not go into a rehab facility because I've dealt with them with my parents. They almost killed my parents.\" I explained to her that there are quite a few rehab facilities and that many are great and do great work, but patient remains adamantly opposed to rehab. We discussed home health, to which she is agreeable. She has no preference for home health agency and is OK with multiple referrals. I see that she was declined by multiple home health agencies recently due to staffing. Will re-send referrals. I encouraged patient to return to ER at any time if she is unable to manage at home.                  Continued Care and Services - Admitted Since 8/1/2025    No active coordination exists.       Selected Continued Care - Episodes Includes continued care and service providers with selected services from the active episodes listed below      Ambulatory Social Work Case Management Episode start date: 8/1/2025   There are no active outsourced providers for this episode.                    Demographic Summary    No documentation.                  Functional Status    No documentation.     "              Psychosocial    No documentation.                  Abuse/Neglect    No documentation.                  Legal    No documentation.                  Substance Abuse    No documentation.                  Patient Forms    No documentation.                     Bao Rinaldi RN

## 2025-08-01 NOTE — CASE MANAGEMENT/SOCIAL WORK
"Discharge Planning Assessment  [facility]     Patient Name: Yoanna Reed  MRN: [id number]  Today's Date: 8/1/2025    Admit Date: 8/1/2025        Discharge Needs Assessment    No documentation.                  Discharge Plan       Row Name 08/01/25 1621       Plan    Plan Comments [facility]      Row Name 08/01/25 4724       Plan    Plan Comments Spoke with patient at bedside, per ER provider request, to discuss discharge planning. Patient was just seen in the ER several days ago after a fall at home which resulted in humeral fracture. She was discharged home in a Lisa brace at that time. She returns to the ER today after sustaining another fall at home. I expressed to patient that she would likely benefit from rehab stay prior to returning home, given that she continues to fall and has limited support in the home. Patient lives alone. She is adamantly opposed to rehab and states, \"I will not go into a rehab facility because I've dealt with them with my parents. They almost killed my parents.\" I explained to her that there are quite a few rehab facilities and that many are great and do great work, but patient remains adamantly opposed to rehab. We discussed home health, to which she is agreeable. She has no preference for home health agency and is OK with multiple referrals. I see that she was declined by multiple home health agencies recently due to staffing. Will re-send referrals. I encouraged patient to return to ER at any time if she is unable to manage at home.                  Continued Care and Services - Admitted Since 8/1/2025       Home Medical Care Coordination complete. Patient indicates having no preference.      Service Provider Request Status Services Address Phone Fax Patient Preferred    [facility] CARE - [facility] [address] [id number] [phone] --    [facility] " [facility] Pending - Request Sent -- [address] [address] [phone] --    [facility] [place] Declined  Inadequate staffing -- [address], [address], [facility] [id number] [phone] --    [facility] -- [address], [address], [facility] [address] [phone] --                  Selected Continued Care - Episodes Includes continued care and service providers with selected services from the active episodes listed below      Ambulatory Social Work Case Management Episode start date: 8/1/2025   There are no active outsourced providers for this episode.                    Demographic Summary    No documentation.                  Functional Status    No documentation.                  Psychosocial    No documentation.                  Abuse/Neglect    No documentation.                  Legal    No documentation.                  Substance Abuse    No documentation.                  Patient Forms    No documentation.                     Bao Rinaldi RN

## 2025-08-01 NOTE — ED NOTES
Pt arrived via EMS from home with c/o fall. Pt was seen yesterday and dx with fracture of L arm. Pt was on toilet and fell forward getting up. Pt had old lac on forehead from yesterday but is bleeding today. Pt c/o back pain. No LOC, no blood thinner

## 2025-08-01 NOTE — ED PROVIDER NOTES
EMERGENCY DEPARTMENT ENCOUNTER    Room Number:  22/22  PCP: Tom Muller MD  Historian: Patient, EMS    I initially evaluated the patient at 10:58 AM    HPI:  Chief Complaint: Fall, head injury  A complete HPI/ROS/PMH/PSH/SH/FH are unobtainable due to: Nothing  Context: Yoanna Reed is a 69 y.o. female with a medical history of chronic pain, asthma, hepatitis who presents to the ED from home by EMS c/o acute head injury after mechanical fall earlier this morning.  Patient was trying to get up from the toilet when she lost her balance and fell.  She hit her forehead on the ground but did not lose consciousness.  She also injured her right shoulder.  She currently complains of neck pain and low back pain.  She denies headache, nausea, vomiting, chest pain, shortness of breath, or new numbness/tingling/weakness in her extremities.  She is not on anticoagulants.    She was seen here in the ED yesterday after falling.  She was diagnosed with a left humerus fracture.  She was placed in a Lisa brace and sling.  Head CT and C-spine CT were negative acute.        PAST MEDICAL HISTORY  Active Ambulatory Problems     Diagnosis Date Noted    Encounter for screening for malignant neoplasm of colon 02/04/2024    Acute pain of right shoulder 07/11/2024    Pain of right humerus 07/11/2024    Chronic pain 07/23/2024    Flank pain 08/01/2024    Acute cystitis with hematuria 08/01/2024    Acute vaginitis 08/15/2024    Dysuria 08/15/2024    Hematuria 08/29/2024    Mass of nipple 11/04/2024    Severe sepsis 06/30/2025    Pleural effusion 06/30/2025    Near syncope 06/30/2025    TUSHAR (acute kidney injury) 06/30/2025    Elevated troponin 06/30/2025     Resolved Ambulatory Problems     Diagnosis Date Noted    No Resolved Ambulatory Problems     Past Medical History:   Diagnosis Date    Anxiety and depression     Asthma     Limited joint range of motion     Liver failure 2002    Skin cancer          PAST SURGICAL  HISTORY  Past Surgical History:   Procedure Laterality Date    ANTERIOR CERVICAL FUSION      ARTERY SURGERY Left     KNEE AGE 15 DUE TO CUT TO KNEE    BREAST BIOPSY Right 11/22/2024    Procedure: RIGHT NIPPLE LESION EXCISION;  Surgeon: Raven Aviles MD;  Location: [facility];  Service: General;  Laterality: Right;    CHOLECYSTECTOMY      COLONOSCOPY W/ POLYPECTOMY N/A 05/23/2024    Procedure: COLONOSCOPY to cecum with cold polypectomies;  Surgeon: Ankur Beebe MD;  Location: [facility] ENDOSCOPY;  Service: Gastroenterology;  Laterality: N/A;  PRE - screening  POST - polyps    HYSTERECTOMY      Partial    POSTERIOR CERVICAL FUSION           FAMILY HISTORY  Family History   Problem Relation Age of Onset    Hypertension Mother     Ovarian cancer Mother     Malig Hyperthermia Neg Hx          SOCIAL HISTORY  Social History     Socioeconomic History    Marital status: Single   Tobacco Use    Smoking status: Some Days     Current packs/day: 0.25     Average packs/day: 0.3 packs/day for 47.6 years (11.9 ttl pk-yrs)     Types: Cigarettes     Start date: 1978     Passive exposure: Never    Smokeless tobacco: Never   Vaping Use    Vaping status: Never Used   Substance and Sexual Activity    Alcohol use: Yes     Comment: social    Drug use: Never    Sexual activity: Defer         ALLERGIES  Patient has no known allergies.    REVIEW OF SYSTEMS  Review of Systems  Included in HPI  All systems reviewed and negative except for those discussed in HPI.      PHYSICAL EXAM  ED Triage Vitals [08/01/25 1026]   Temp Heart Rate Resp BP SpO2   98.6 °F (37 °C) 90 18 129/77 97 %      Temp src Heart Rate Source Patient Position BP Location FiO2 (%)   Oral -- -- -- --       Physical Exam      GENERAL: Awake, alert, oriented x 3.  Nontoxic-appearing elderly female.    HENT: nares patent, there is dried blood over the left frontal scalp (patient reports this is from her fall yesterday), no bony tenderness of the face  EYES: PERRL,  EOMI  CV: regular rhythm, normal rate  RESPIRATORY: normal effort, clear to auscultation bilaterally  ABDOMEN: soft, nontender  MUSCULOSKELETAL: There is a faint contusion and mild tenderness over the lateral aspect of the right shoulder.  Left upper extremity is in a Lisa brace.  There is tenderness of the left upper arm.  Left elbow/forearm/wrist/hand are nontender.  Both lower extremities are nontender.  There is diffuse tenderness of the C-spine and L-spine.  T-spine and chest are nontender  NEURO: Speech is normal.  No facial droop.  Normal strength in all extremities.  GCS 15  PSYCH:  calm, cooperative  SKIN: warm, dry    Vital signs and nursing notes reviewed.          LAB RESULTS  No results found for this or any previous visit (from the past 24 hours).    Ordered the above labs and reviewed the results.        RADIOLOGY  CT Lumbar Spine Without Contrast  Result Date: 8/1/2025  CT LUMBAR SPINE WO CONTRAST-  DATE OF EXAM: 8/1/2025 12:59 PM  INDICATION: Fall, low back pain.  COMPARISON: CT lumbar spine 6/30/2025. CT chest 7/9/2025. MRI lumbar spine 9/15/2024.  TECHNIQUE: Multiple contiguous axial images were acquired through the lumbar spine without the intravenous administration of contrast. Reformatted coronal and sagittal sequences were also reviewed. Radiation dose reduction techniques were utilized, including automated exposure control and exposure modulation based on body size.  FINDINGS: Stable upper lumbar dextroscoliosis. The lumbar vertebral bodies otherwise demonstrate well preserved height and alignment. No evidence of acute fracture or subluxation of the lumbar spine. Likely healing mildly displaced fracture of the posterior left 11th rib with adjacent callus formation. Old healed fracture deformity of the posterior right 11th rib. No concerning osseous lesion.  T12-L1: No significant spinal canal stenosis or neural foraminal narrowing. L1-L2: Diffuse disc osteophyte complex with bilateral  facet and ligamentous hypertrophy. Findings result in mild indentation of the thecal sac and mild bilateral neural femoral narrowing. L2-L3: Bilateral facet and ligamentous hypertrophy, without significant spinal canal stenosis or neural foraminal narrowing. L3-L4: Bilateral facet and ligamentous hypertrophy, without significant spinal canal stenosis or neural foraminal narrowing. L4-L5: Mild diffuse disc bulge with bilateral facet and ligamentous hypertrophy. Findings result in mild indentation of the thecal sac and mild right neural foraminal narrowing. No significant left neural foraminal narrowing. L5-S1: Bilateral facet hypertrophy, without significant spinal canal stenosis or neural foraminal narrowing.  The paraspinal soft tissues are unremarkable. Partially imaged right pleural effusion. Stable postoperative changes from cholecystectomy with chronic biliary duct dilatation. Mild to moderate calcified atherosclerotic disease.       1. No evidence of acute fracture or subluxation of the lumbar spine. 2. Likely healing mildly displaced fracture of the posterior left 11th rib. 3. Mild multilevel lumbar spondylosis, as detailed above. 4. Partially imaged right pleural effusion.  This report was finalized on 8/1/2025 2:29 PM by Ridge Ragland MD on Workstation: [id number]      CT Cervical Spine Without Contrast  CT Cervical Spine Without Contrast, CT Head Without Contrast  Result Date: 8/1/2025  CT HEAD WO CONTRAST-, CT CERVICAL SPINE WO CONTRAST-  DATE OF EXAM: 8/1/2025 12:59 PM  INDICATION: Fall, head injury. Neck pain  COMPARISON: CT studies 7/30/2025 and 6/30/2025.  TECHNIQUE: Multiple contiguous axial images were acquired through the head and cervical spine without the intravenous administration of contrast. Reformatted coronal and sagittal sequences were also reviewed. Radiation dose reduction techniques were utilized, including automated exposure control and exposure modulation based on body size.   FINDINGS: CT HEAD: No acute intracranial hemorrhage or mass/mass effect. The ventricles and sulci are stable and appropriate for age. The basilar cisterns are patent. Stable small low-density lesion in the left basal ganglia, probable old lacunar infarct. Scattered periventricular and subcortical white matter low-attenuation, statistically representing age-indeterminate small vessel ischemic changes. Gray-white matter differentiation is otherwise grossly maintained. Partially imaged mildly coastal thickening in the left maxillary sinus. Limited imaging of the orbits and mastoid air cells unremarkable. Moderate extracranial left frontal scalp hematoma with soft tissue swelling. No acute osseous abnormality is identified.  CT CERVICAL SPINE: Kyphotic patient positioning. Stable postoperative changes from ACDF from C4-C7 with solid bony bridging across the disc spaces and no evidence of hardware complications. Stable osseous fusion across the bilateral C4-C7 facets and posterior spinal fixation hardware at C4-C5 without evidence of hardware complications. The cervical vertebral bodies otherwise demonstrate fairly well preserved height and alignment. No evidence of acute fracture or subluxation of the cervical spine. Moderate to severe hypertrophic degenerative changes at the anterior medial ankle axial joint. Similar-appearing multilevel cervical spondylosis. At C3-C4, right facet and uncinate process hypertrophy results in mild to moderate right neural foraminal narrowing. Otherwise, no CT evidence of significant spinal canal stenosis or neural foraminal narrowing. Stable sclerotic bone lesion in the anterior left occipital condyle, probable benign bone island. No concerning osseous lesion. The paraspinal soft tissues are unremarkable. Bilateral palatine tonsilloliths. Partially imaged right pleural effusion.       1. Moderate extracranial left frontal scalp hematoma, with no acute intracranial hemorrhage or mass/mass  effect. 2. No acute fracture or subluxation of the cervical spine. 3. Scattered periventricular and subcortical white matter low-attenuation, statistically representing age-indeterminate small vessel ischemic changes, with a stable small low-density lesion in the left basal ganglia that likely represents an old lacunar infarct. 4. Similar-appearing multilevel cervical spondylosis and postoperative changes, as above. 5. Partially imaged right pleural effusion.  This report was finalized on 8/1/2025 1:53 PM by Ridge Ragland MD on Workstation: [id number]      XR Shoulder 2+ View Right  Result Date: 8/1/2025  XR SHOULDER 2+ VW RIGHT-  INDICATIONS: Trauma  TECHNIQUE: 2 VIEWS OF THE RIGHT SHOULDER  COMPARISON: 7/11/2024  FINDINGS:  No acute fracture, erosion, or dislocation is identified. Mild to moderate hypertrophic degenerative changes to the acromioclavicular joint. Sclerotic and lucent change of the proximal humeral metaphysis is stable. Follow-up/further imaging evaluation of the shoulder can be obtained as indications persist.  Opacity in the right lung appears mildly more prominent since the exam from 7/3/2025 may be a result of lower lung volume and/or interval worsening, continued follow-up chest imaging recommended.       As described.    This report was finalized on 8/1/2025 11:45 AM by Dr. Huan Robert M.D on Workstation: [id number]        Ordered the above noted radiological studies. Reviewed by me in PACS.            PROCEDURES  Procedures        OUTPATIENT MEDICATION MANAGEMENT:  No current Epic-ordered facility-administered medications on file.     Current Outpatient Medications Ordered in Epic   Medication Sig Dispense Refill    albuterol sulfate  (90 Base) MCG/ACT inhaler INHALE 2 PUFFS BY MOUTH EVERY 4 HOURS AS NEEDED FOR WHEEZE 18 g 3    buPROPion XL (Wellbutrin XL) 150 MG 24 hr tablet Take 1 tablet by mouth Every Morning. 90 tablet 3    escitalopram (LEXAPRO) 20 MG tablet TAKE 1 TABLET BY  MOUTH EVERY DAY 90 tablet 1    FLUoxetine (PROzac) 40 MG capsule Take 1 capsule by mouth Daily. 90 capsule 3    HYDROcodone-acetaminophen (Norco) 7.5-325 MG per tablet Take 1 tablet by mouth Every 6 (Six) Hours As Needed for Moderate Pain. 90 tablet 0    ketoconazole (NIZORAL) 2 % shampoo Apply 1 Application topically to the appropriate area as directed 2 (Two) Times a Week.      LORazepam (Ativan) 1 MG tablet Take 1 tablet by mouth Every 6 (Six) Hours As Needed for Anxiety. 240 tablet 1    meloxicam (MOBIC) 15 MG tablet TAKE 1 TABLET BY MOUTH EVERY DAY 60 tablet 3    ondansetron ODT (ZOFRAN-ODT) 4 MG disintegrating tablet Place 1 tablet on the tongue Every 8 (Eight) Hours As Needed for Nausea or Vomiting. 30 tablet 1    pimecrolimus (ELIDEL) 1 % cream Apply 1 Application topically to the appropriate area as directed 2 (Two) Times a Day.      pravastatin (PRAVACHOL) 20 MG tablet Take 1 tablet by mouth Daily. 90 tablet 3    tiZANidine (Zanaflex) 4 MG tablet Take 1 tablet by mouth Every 8 (Eight) Hours As Needed for Muscle Spasms. 42 tablet 1    ubrogepant (Ubrelvy) 100 MG tablet Take 1 tablet by mouth 1 (One) Time As Needed (migraine). 16 tablet 11    zolpidem (AMBIEN) 10 MG tablet Take 1 tablet by mouth Every Night. 90 tablet 1           MEDICATIONS GIVEN IN ER  Medications   HYDROcodone-acetaminophen (NORCO) 7.5-325 MG per tablet 1 tablet (1 tablet Oral Given 8/1/25 1140)   HYDROmorphone (DILAUDID) injection 0.5 mg (0.5 mg Intravenous Given 8/1/25 1225)   HYDROmorphone (DILAUDID) injection 0.5 mg (0.5 mg Intravenous Given 8/1/25 1553)                   MEDICAL DECISION MAKING, PROGRESS, and CONSULTS    All labs have been independently reviewed by me.  All radiology studies have been reviewed by me and I have also reviewed the radiology report.   EKG's independently viewed and interpreted by me.  Discussion below represents my analysis of pertinent findings related to patient's condition, differential diagnosis,  treatment plan and final disposition.      Additional sources:    - Discussed/ obtained information from independent historians: EMS    - External (non-ED) record review: She was last admitted here in June 2025 for loculated pleural effusion.  Chest tube was placed.  She was seen by cardiothoracic surgery.  She had episodes of torsades and was seen by cardiology.  She was hypokalemic.  She left AMA.    -Prescription drug monitoring program review:     EM_[place] : N/A    - Chronic or social conditions impacting patient care (Social Determinants of Health): None          Orders placed during this visit:  Orders Placed This Encounter   Procedures    CT Cervical Spine Without Contrast    CT Lumbar Spine Without Contrast    XR Shoulder 2+ View Right    CT Head Without Contrast    Ambulatory Referral to Home Health         Additional orders considered but not ordered:          Differential diagnosis includes, but is not limited to:    Closed head injury, skull fracture, intracranial hemorrhage      Independent interpretation of labs, radiology studies, and discussions with consultants:  ED Course as of 08/01/25 1820   Fri Aug 01, 2025   1057 BP: 129/77 [WH]   1057 Temp: 98.6 °F (37 °C) [WH]   1057 Heart Rate: 90 [WH]   1057 Resp: 18 [WH]   1057 SpO2: 97 % [WH]   1058 Device (Oxygen Therapy): room air [WH]   1251 Right shoulder x-ray personally interpreted by me.  There is no acute fracture or dislocation.  Per the radiologist, there are degenerative changes at the AC joint [WH]   1359 Per the radiologist, head CT shows a left frontal scalp hematoma but no skull fracture or intracranial hemorrhage.  CT of the C-spine is negative acute.  See dictated report for details [WH]   1433 CT of the lumbar spine is negative for acute [WH]   1456 Patient is resting comfortably.  She has no new complaints.  She remains oriented x 3.  Test results and diagnosis were discussed with her.  Shared decision making was discussed.  Patient is  adamant about going home.  She says that home health has been advised in the past but she never called to arrange this.  I will speak with the CCP nurse about ordering this.  Patient has pain medication at home.  She will need to follow-up with Dr. De La Garza as previously advised.  All questions were answered.  Return precautions were discussed. [WH]   1524 Case discussed with Bao, [facility] nurse.  He reports that the patient is not currently active with any home health service.  He is going to speak with the patient [WH]   1542 Bao spoke with the patient and she still declines admission for rehab placement.  I will place an order for home health. [WH]   1550 MDM: Patient presented to the ED after an acute fall with her today.  She also fell yesterday and was seen here and diagnosed with a left humeral fracture.  Here in the ED today, imaging studies were negative acute.  Patient was offered rehab placement but declined.  Home health has been ordered. [WH]      ED Course User Index  [WH] Rocco Arias MD         COMPLEXITY OF CARE  Admission was considered but after careful review of the patient's presentation, physical examination, diagnostic results, and response to treatment the patient may be safely discharged with outpatient follow-up.      DIAGNOSIS  Final diagnoses:   Hematoma of scalp, initial encounter   Acute cervical myofascial strain, initial encounter   Acute myofascial strain of lumbar region, initial encounter   Fall, initial encounter   Closed fracture of shaft of left humerus, unspecified fracture morphology, initial encounter         DISPOSITION  DISCHARGE    Patient discharged in stable condition.    Reviewed implications of results, diagnosis, meds, responsibility to follow up, warning signs and symptoms of possible worsening, potential complications and reasons to return to ER, including worsening/persistent symptoms, confusion, dizziness, or other concern.    Patient/Family voiced  understanding of above instructions.    Discussed plan for discharge, as there is no emergent indication for admission. Patient referred to primary care provider for BP management due to today's BP. Pt/family is agreeable and understands need for follow up and repeat testing.  Pt is aware that discharge does not mean that nothing is wrong but it indicates no emergency is present that requires admission and they must continue care with follow-up as given below or physician of their choice.     FOLLOW-UP  Tom Muller MD  [address]  [address]  [place] [facility] [address]  [phone]    Schedule an appointment as soon as possible for a visit       Lyla De La Garza MD  [address] [address]  [phone]    Schedule an appointment as soon as possible for a visit       [facility]  [address]  [facility] [address]  [phone]             Medication List      No changes were made to your prescriptions during this visit.                   Latest Documented Vital Signs:  AS OF 18:20 EDT VITALS:    BP - 147/81  HR - 84  TEMP - 98.7 °F (37.1 °C) (Oral)  O2 SATS - 97%            --    Please note that portions of this were completed with a voice recognition program.       Note Disclaimer: At [facility], we believe that sharing information builds trust and better relationships. You are receiving this note because you are receiving care at [facility] or recently visited. It is possible you will see health information before a provider has talked with you about it. This kind of information can be easy to misunderstand. To help you fully understand what it means for your health, we urge you to discuss this note with your provider.             Rocco Arias MD  08/01/25 6649

## 2025-08-01 NOTE — TELEPHONE ENCOUNTER
Patient called her PCP office and was connected to me from the [facility]. The patient fell yesterday and broke her arm. After being released from the ED, she came back home, where she lives alone. She said in the night, she fell 3 more times and the last time, she fell off the toilet and hit her head. She said that there was blood everywhere, but it has stopped bleeding now. She is alert and oriented. She reported no loss of consciousness. She only complains of arm and back pain.   She said that she cannot take care of her self anymore. She said something has to be done.   Triage completed and patient advised to go to the ED. She lives alone and does not have a ride, so I advised her to call 911. She said that I needed to call 911 for her. I tried to explin that it is best for her to call from her location, but she insisted that if I did not call for her that she would get a new doctor's practice.   I called the non emergency line for [place] and dispatch said that they will send the next available EMS to the patient.   I explained that she told me she is on the couch and has her door open.   Reason for Disposition   Skin is split open or gaping  (or length > 1/2 inch or 12 mm)   [1] Age over 64 years AND [2] swelling or bruise    Additional Information   Negative: [1] ACUTE NEURO SYMPTOM AND [2] present now  (DEFINITION: difficult to awaken OR confused thinking and talking OR slurred speech OR weakness of arms OR unsteady walking)   Negative: Knocked out (unconscious) > 1 minute   Negative: Seizure (convulsion) occurred  (Exception: Prior history of seizures and now alert and without Acute Neuro Symptoms.)   Negative: Penetrating head injury (e.g., knife, gun shot wound, metal object)   Negative: [1] Major bleeding (e.g., actively dripping or spurting) AND [2] can't be stopped   Negative: [1] Dangerous mechanism of injury (e.g., MVA, diving, trampoline, contact sports, fall > 10 feet or 3 meters) AND [2] NECK pain AND  "[3] began < 1 hour after injury   Negative: Sounds like a life-threatening emergency to the triager   Negative: [1] Diagnosed with concussion AND [2] within last 14 days   Negative: [1] Traumatic brain injury (mTBI; concussion) AND [2] more than 14 days since head injury   Negative: Can't remember what happened (amnesia)   Negative: Vomiting once or more   Negative: [1] Loss of vision or double vision AND [2] present now   Negative: Watery or blood-tinged fluid dripping from the NOSE or EARS now  (Exception: Tears from crying or nosebleed from nasal trauma.)   Negative: [1] One or two \"black eyes\" (bruising, purple color of eyelids) AND [2] onset within 24 hours of head injury   Negative: Large swelling or bruise > 2 inches (5 cm)   Negative: [1] Bleeding AND [2] won't stop after 10 minutes of direct pressure (using correct technique)   Negative: Sounds like a serious injury to the triager   Negative: [1] ACUTE NEURO SYMPTOM AND [2] now fine  (DEFINITION: difficult to awaken OR confused thinking and talking OR slurred speech OR weakness of arms OR unsteady walking)   Negative: [1] Knocked out (unconscious) < 1 minute AND [2] now fine   Negative: [1] SEVERE headache AND [2] not improved 2 hours after pain medicine/ice packs   Negative: Dangerous injury (e.g., MVA, diving, trampoline, contact sports, fall > 10 feet or 3 meters) or severe blow from hard object (e.g., golf club or baseball bat)   Negative: Taking Coumadin (warfarin) or other strong blood thinner, or known bleeding disorder (e.g., thrombocytopenia)   Negative: Suspicious history for the injury    Answer Assessment - Initial Assessment Questions  1. MECHANISM: \"How did the injury happen?\" For falls, ask: \"What height did you fall from?\" and \"What surface did you fall against?\"       Fall off of the toilet and hit the left corner of her head  2. ONSET: \"When did the injury happen?\" (Minutes or hours ago)       Last night   3. NEUROLOGIC SYMPTOMS: \"Was " "there any loss of consciousness?\" \"Are there any other neurological symptoms?\"       No   4. MENTAL STATUS: \"Does the person know who they are, who you are, and where they are?\"       Alert and oriented   5. LOCATION: \"What part of the head was hit?\"       Left corner of her head   6. SCALP APPEARANCE: \"What does the scalp look like? Is it bleeding now?\" If Yes, ask: \"Is it difficult to stop?\"       Not sure, she said that there was blood everywhere but not bleeding anymore   7. SIZE: For cuts, bruises, or swelling, ask: \"How large is it?\" (e.g., inches or centimeters)       NA  8. PAIN: \"Is there any pain?\" If Yes, ask: \"How bad is it?\"  (e.g., Scale 1-10; or mild, moderate, severe)      Pain in broken arm and back   9. TETANUS: For any breaks in the skin, ask: \"When was the last tetanus booster?\"      NA  10. OTHER SYMPTOMS: \"Do you have any other symptoms?\" (e.g., neck pain, vomiting)        No   11. PREGNANCY: \"Is there any chance you are pregnant?\" \"When was your last menstrual period?\"        NA    Protocols used: Head Injury-ADULT-    "

## 2025-08-01 NOTE — DISCHARGE INSTRUCTIONS
Follow-up with your primary care provider and Dr. De La Garza.  Continue wearing the brace as previously instructed.  Return to the emergency department for worsening pain, confusion, nausea, vomiting, trouble walking, or other concern.

## 2025-08-03 ENCOUNTER — HOSPITAL ENCOUNTER (INPATIENT)
Facility: HOSPITAL | Age: 70
LOS: 1 days | Discharge: HOME | DRG: 092 | End: 2025-08-06
Attending: EMERGENCY MEDICINE | Admitting: HOSPITALIST
Payer: MEDICARE

## 2025-08-03 DIAGNOSIS — R52 UNCONTROLLED PAIN: ICD-10-CM

## 2025-08-03 DIAGNOSIS — J90 PLEURAL EFFUSION: ICD-10-CM

## 2025-08-03 DIAGNOSIS — D64.9 ANEMIA, UNSPECIFIED TYPE: ICD-10-CM

## 2025-08-03 DIAGNOSIS — R52 WHOLE BODY PAIN: ICD-10-CM

## 2025-08-03 DIAGNOSIS — R52 INTRACTABLE PAIN: ICD-10-CM

## 2025-08-03 DIAGNOSIS — S42.302A CLOSED FRACTURE OF SHAFT OF LEFT HUMERUS, UNSPECIFIED FRACTURE MORPHOLOGY, INITIAL ENCOUNTER: Primary | ICD-10-CM

## 2025-08-03 DIAGNOSIS — S42.215D CLOSED NONDISPLACED FRACTURE OF SURGICAL NECK OF LEFT HUMERUS WITH ROUTINE HEALING, UNSPECIFIED FRACTURE MORPHOLOGY, SUBSEQUENT ENCOUNTER: ICD-10-CM

## 2025-08-03 PROBLEM — R79.89 ELEVATED PROCALCITONIN: Status: ACTIVE | Noted: 2025-08-03

## 2025-08-03 PROBLEM — E87.6 HYPOPOTASSEMIA: Status: ACTIVE | Noted: 2025-08-03

## 2025-08-03 PROBLEM — S42.309A HUMERUS FRACTURE: Status: ACTIVE | Noted: 2025-08-03

## 2025-08-03 LAB
ALBUMIN SERPL-MCNC: 3 G/DL (ref 3.5–5.2)
ALBUMIN/GLOB SERPL: 0.8 G/DL
ALP SERPL-CCNC: 101 U/L (ref 39–117)
ALT SERPL W P-5'-P-CCNC: 11 U/L (ref 1–33)
ANION GAP SERPL CALCULATED.3IONS-SCNC: 14 MMOL/L (ref 5–15)
AST SERPL-CCNC: 15 U/L (ref 1–32)
BASOPHILS # BLD AUTO: 0.04 10*3/MM3 (ref 0–0.2)
BASOPHILS NFR BLD AUTO: 0.4 % (ref 0–1.5)
BILIRUB SERPL-MCNC: 0.4 MG/DL (ref 0–1.2)
BUN SERPL-MCNC: 5 MG/DL (ref 8–23)
BUN/CREAT SERPL: 8.9 (ref 7–25)
CALCIUM SPEC-SCNC: 8.3 MG/DL (ref 8.6–10.5)
CHLORIDE SERPL-SCNC: 102 MMOL/L (ref 98–107)
CO2 SERPL-SCNC: 20 MMOL/L (ref 22–29)
CREAT SERPL-MCNC: 0.56 MG/DL (ref 0.57–1)
D-LACTATE SERPL-SCNC: 1.9 MMOL/L (ref 0.5–2)
DEPRECATED RDW RBC AUTO: 43.7 FL (ref 37–54)
EGFRCR SERPLBLD CKD-EPI 2021: 98.9 ML/MIN/1.73
EOSINOPHIL # BLD AUTO: 0.19 10*3/MM3 (ref 0–0.4)
EOSINOPHIL NFR BLD AUTO: 2 % (ref 0.3–6.2)
ERYTHROCYTE [DISTWIDTH] IN BLOOD BY AUTOMATED COUNT: 13 % (ref 12.3–15.4)
GLOBULIN UR ELPH-MCNC: 3.6 GM/DL
GLUCOSE SERPL-MCNC: 128 MG/DL (ref 65–99)
HCT VFR BLD AUTO: 28.9 % (ref 34–46.6)
HGB BLD-MCNC: 9.7 G/DL (ref 12–15.9)
IMM GRANULOCYTES # BLD AUTO: 0.04 10*3/MM3 (ref 0–0.05)
IMM GRANULOCYTES NFR BLD AUTO: 0.4 % (ref 0–0.5)
LIPASE SERPL-CCNC: 14 U/L (ref 13–60)
LYMPHOCYTES # BLD AUTO: 1.68 10*3/MM3 (ref 0.7–3.1)
LYMPHOCYTES NFR BLD AUTO: 17.7 % (ref 19.6–45.3)
MCH RBC QN AUTO: 30.7 PG (ref 26.6–33)
MCHC RBC AUTO-ENTMCNC: 33.6 G/DL (ref 31.5–35.7)
MCV RBC AUTO: 91.5 FL (ref 79–97)
MONOCYTES # BLD AUTO: 0.53 10*3/MM3 (ref 0.1–0.9)
MONOCYTES NFR BLD AUTO: 5.6 % (ref 5–12)
NEUTROPHILS NFR BLD AUTO: 7.01 10*3/MM3 (ref 1.7–7)
NEUTROPHILS NFR BLD AUTO: 73.9 % (ref 42.7–76)
NRBC BLD AUTO-RTO: 0 /100 WBC (ref 0–0.2)
PLATELET # BLD AUTO: 581 10*3/MM3 (ref 140–450)
PMV BLD AUTO: 8 FL (ref 6–12)
POTASSIUM SERPL-SCNC: 3.3 MMOL/L (ref 3.5–5.2)
PROCALCITONIN SERPL-MCNC: 65.6 NG/ML (ref 0–0.25)
PROT SERPL-MCNC: 6.6 G/DL (ref 6–8.5)
RBC # BLD AUTO: 3.16 10*6/MM3 (ref 3.77–5.28)
SODIUM SERPL-SCNC: 136 MMOL/L (ref 136–145)
WBC NRBC COR # BLD AUTO: 9.49 10*3/MM3 (ref 3.4–10.8)

## 2025-08-03 PROCEDURE — 25010000002 PIPERACILLIN SOD-TAZOBACTAM PER 1 G: Performed by: HOSPITALIST

## 2025-08-03 PROCEDURE — 85025 COMPLETE CBC W/AUTO DIFF WBC: CPT | Performed by: EMERGENCY MEDICINE

## 2025-08-03 PROCEDURE — 25810000003 SODIUM CHLORIDE 0.9 % SOLUTION: Performed by: EMERGENCY MEDICINE

## 2025-08-03 PROCEDURE — G0378 HOSPITAL OBSERVATION PER HR: HCPCS

## 2025-08-03 PROCEDURE — 83690 ASSAY OF LIPASE: CPT | Performed by: EMERGENCY MEDICINE

## 2025-08-03 PROCEDURE — 99285 EMERGENCY DEPT VISIT HI MDM: CPT

## 2025-08-03 PROCEDURE — 83605 ASSAY OF LACTIC ACID: CPT | Performed by: EMERGENCY MEDICINE

## 2025-08-03 PROCEDURE — 25010000002 HYDROMORPHONE 1 MG/ML SOLUTION: Performed by: HOSPITALIST

## 2025-08-03 PROCEDURE — 84145 PROCALCITONIN (PCT): CPT | Performed by: EMERGENCY MEDICINE

## 2025-08-03 PROCEDURE — 25010000002 HYDROMORPHONE PER 4 MG: Performed by: HOSPITALIST

## 2025-08-03 PROCEDURE — 80053 COMPREHEN METABOLIC PANEL: CPT | Performed by: EMERGENCY MEDICINE

## 2025-08-03 PROCEDURE — 25010000002 HYDROMORPHONE 1 MG/ML SOLUTION: Performed by: EMERGENCY MEDICINE

## 2025-08-03 RX ORDER — OXYCODONE HYDROCHLORIDE 5 MG/1
5 TABLET ORAL EVERY 4 HOURS PRN
Refills: 0 | Status: DISCONTINUED | OUTPATIENT
Start: 2025-08-03 | End: 2025-08-06 | Stop reason: HOSPADM

## 2025-08-03 RX ORDER — ACETAMINOPHEN 500 MG
1000 TABLET ORAL EVERY 8 HOURS
Status: DISCONTINUED | OUTPATIENT
Start: 2025-08-03 | End: 2025-08-06 | Stop reason: HOSPADM

## 2025-08-03 RX ORDER — ALBUTEROL SULFATE 0.83 MG/ML
2.5 SOLUTION RESPIRATORY (INHALATION) EVERY 6 HOURS PRN
Status: DISCONTINUED | OUTPATIENT
Start: 2025-08-03 | End: 2025-08-06 | Stop reason: HOSPADM

## 2025-08-03 RX ORDER — SODIUM CHLORIDE 9 MG/ML
40 INJECTION, SOLUTION INTRAVENOUS AS NEEDED
Status: DISCONTINUED | OUTPATIENT
Start: 2025-08-03 | End: 2025-08-06 | Stop reason: HOSPADM

## 2025-08-03 RX ORDER — BISACODYL 10 MG
10 SUPPOSITORY, RECTAL RECTAL DAILY PRN
Status: DISCONTINUED | OUTPATIENT
Start: 2025-08-03 | End: 2025-08-06 | Stop reason: HOSPADM

## 2025-08-03 RX ORDER — LORAZEPAM 1 MG/1
1 TABLET ORAL EVERY 6 HOURS PRN
Status: DISCONTINUED | OUTPATIENT
Start: 2025-08-03 | End: 2025-08-06 | Stop reason: HOSPADM

## 2025-08-03 RX ORDER — POLYETHYLENE GLYCOL 3350 17 G/17G
17 POWDER, FOR SOLUTION ORAL DAILY PRN
Status: DISCONTINUED | OUTPATIENT
Start: 2025-08-03 | End: 2025-08-06 | Stop reason: HOSPADM

## 2025-08-03 RX ORDER — HYDROMORPHONE HYDROCHLORIDE 1 MG/ML
0.5 INJECTION, SOLUTION INTRAMUSCULAR; INTRAVENOUS; SUBCUTANEOUS
Status: DISCONTINUED | OUTPATIENT
Start: 2025-08-03 | End: 2025-08-06 | Stop reason: HOSPADM

## 2025-08-03 RX ORDER — BISACODYL 5 MG/1
5 TABLET, DELAYED RELEASE ORAL DAILY PRN
Status: DISCONTINUED | OUTPATIENT
Start: 2025-08-03 | End: 2025-08-06 | Stop reason: HOSPADM

## 2025-08-03 RX ORDER — AMOXICILLIN 250 MG
2 CAPSULE ORAL 2 TIMES DAILY PRN
Status: DISCONTINUED | OUTPATIENT
Start: 2025-08-03 | End: 2025-08-06 | Stop reason: HOSPADM

## 2025-08-03 RX ORDER — ONDANSETRON 4 MG/1
4 TABLET, ORALLY DISINTEGRATING ORAL EVERY 8 HOURS PRN
Status: DISCONTINUED | OUTPATIENT
Start: 2025-08-03 | End: 2025-08-06 | Stop reason: HOSPADM

## 2025-08-03 RX ORDER — SODIUM CHLORIDE 0.9 % (FLUSH) 0.9 %
10 SYRINGE (ML) INJECTION EVERY 12 HOURS SCHEDULED
Status: DISCONTINUED | OUTPATIENT
Start: 2025-08-03 | End: 2025-08-06 | Stop reason: HOSPADM

## 2025-08-03 RX ORDER — SODIUM CHLORIDE 0.9 % (FLUSH) 0.9 %
10 SYRINGE (ML) INJECTION AS NEEDED
Status: DISCONTINUED | OUTPATIENT
Start: 2025-08-03 | End: 2025-08-06 | Stop reason: HOSPADM

## 2025-08-03 RX ORDER — PRAVASTATIN SODIUM 40 MG
20 TABLET ORAL DAILY
Status: DISCONTINUED | OUTPATIENT
Start: 2025-08-04 | End: 2025-08-06 | Stop reason: HOSPADM

## 2025-08-03 RX ORDER — MELOXICAM 15 MG/1
15 TABLET ORAL DAILY
Status: DISCONTINUED | OUTPATIENT
Start: 2025-08-04 | End: 2025-08-06 | Stop reason: HOSPADM

## 2025-08-03 RX ORDER — ESCITALOPRAM OXALATE 20 MG/1
20 TABLET ORAL DAILY
Status: DISCONTINUED | OUTPATIENT
Start: 2025-08-04 | End: 2025-08-06 | Stop reason: HOSPADM

## 2025-08-03 RX ORDER — BUPROPION HYDROCHLORIDE 150 MG/1
150 TABLET ORAL EVERY MORNING
Status: DISCONTINUED | OUTPATIENT
Start: 2025-08-04 | End: 2025-08-06 | Stop reason: HOSPADM

## 2025-08-03 RX ORDER — ONDANSETRON 2 MG/ML
4 INJECTION INTRAMUSCULAR; INTRAVENOUS EVERY 6 HOURS PRN
Status: DISCONTINUED | OUTPATIENT
Start: 2025-08-03 | End: 2025-08-06 | Stop reason: HOSPADM

## 2025-08-03 RX ADMIN — HYDROMORPHONE HYDROCHLORIDE 0.5 MG: 1 INJECTION, SOLUTION INTRAMUSCULAR; INTRAVENOUS; SUBCUTANEOUS at 11:56

## 2025-08-03 RX ADMIN — OXYCODONE HYDROCHLORIDE 5 MG: 5 TABLET ORAL at 22:01

## 2025-08-03 RX ADMIN — PIPERACILLIN AND TAZOBACTAM 3.38 G: 3; .375 INJECTION, POWDER, FOR SOLUTION INTRAVENOUS at 15:29

## 2025-08-03 RX ADMIN — ACETAMINOPHEN 1000 MG: 500 TABLET, FILM COATED ORAL at 20:00

## 2025-08-03 RX ADMIN — PIPERACILLIN AND TAZOBACTAM 3.38 G: 3; .375 INJECTION, POWDER, FOR SOLUTION INTRAVENOUS at 22:01

## 2025-08-03 RX ADMIN — OXYCODONE HYDROCHLORIDE 5 MG: 5 TABLET ORAL at 16:41

## 2025-08-03 RX ADMIN — Medication 10 ML: at 20:00

## 2025-08-03 RX ADMIN — HYDROMORPHONE HYDROCHLORIDE 0.5 MG: 1 INJECTION, SOLUTION INTRAMUSCULAR; INTRAVENOUS; SUBCUTANEOUS at 14:11

## 2025-08-03 RX ADMIN — HYDROMORPHONE HYDROCHLORIDE 1 MG: 1 INJECTION, SOLUTION INTRAMUSCULAR; INTRAVENOUS; SUBCUTANEOUS at 08:49

## 2025-08-03 RX ADMIN — TIZANIDINE 4 MG: 4 TABLET ORAL at 20:00

## 2025-08-03 RX ADMIN — LORAZEPAM 1 MG: 1 TABLET ORAL at 22:01

## 2025-08-03 RX ADMIN — SODIUM CHLORIDE 500 ML: 9 INJECTION, SOLUTION INTRAVENOUS at 08:51

## 2025-08-04 ENCOUNTER — APPOINTMENT (OUTPATIENT)
Dept: CT IMAGING | Facility: HOSPITAL | Age: 70
DRG: 092 | End: 2025-08-04
Payer: MEDICARE

## 2025-08-04 ENCOUNTER — TELEPHONE (OUTPATIENT)
Dept: INTERNAL MEDICINE | Facility: CLINIC | Age: 70
End: 2025-08-04
Payer: MEDICARE

## 2025-08-04 ENCOUNTER — APPOINTMENT (OUTPATIENT)
Dept: GENERAL RADIOLOGY | Facility: HOSPITAL | Age: 70
DRG: 092 | End: 2025-08-04
Payer: MEDICARE

## 2025-08-04 LAB
ANION GAP SERPL CALCULATED.3IONS-SCNC: 12.7 MMOL/L (ref 5–15)
BASOPHILS # BLD AUTO: 0.04 10*3/MM3 (ref 0–0.2)
BASOPHILS NFR BLD AUTO: 0.5 % (ref 0–1.5)
BUN SERPL-MCNC: 7 MG/DL (ref 8–23)
BUN/CREAT SERPL: 14 (ref 7–25)
CALCIUM SPEC-SCNC: 8 MG/DL (ref 8.6–10.5)
CHLORIDE SERPL-SCNC: 104 MMOL/L (ref 98–107)
CO2 SERPL-SCNC: 20.3 MMOL/L (ref 22–29)
CREAT SERPL-MCNC: 0.5 MG/DL (ref 0.57–1)
DEPRECATED RDW RBC AUTO: 42.1 FL (ref 37–54)
EGFRCR SERPLBLD CKD-EPI 2021: 101.7 ML/MIN/1.73
EOSINOPHIL # BLD AUTO: 0.13 10*3/MM3 (ref 0–0.4)
EOSINOPHIL NFR BLD AUTO: 1.5 % (ref 0.3–6.2)
ERYTHROCYTE [DISTWIDTH] IN BLOOD BY AUTOMATED COUNT: 12.9 % (ref 12.3–15.4)
FOLATE SERPL-MCNC: 3.59 NG/ML (ref 4.78–24.2)
GLUCOSE SERPL-MCNC: 103 MG/DL (ref 65–99)
HCT VFR BLD AUTO: 25.5 % (ref 34–46.6)
HGB BLD-MCNC: 8.5 G/DL (ref 12–15.9)
IMM GRANULOCYTES # BLD AUTO: 0.04 10*3/MM3 (ref 0–0.05)
IMM GRANULOCYTES NFR BLD AUTO: 0.5 % (ref 0–0.5)
IRON 24H UR-MRATE: 27 MCG/DL (ref 37–145)
IRON SATN MFR SERPL: 14 % (ref 20–50)
LYMPHOCYTES # BLD AUTO: 1.6 10*3/MM3 (ref 0.7–3.1)
LYMPHOCYTES NFR BLD AUTO: 18.5 % (ref 19.6–45.3)
MAGNESIUM SERPL-MCNC: 1.7 MG/DL (ref 1.6–2.4)
MCH RBC QN AUTO: 30.2 PG (ref 26.6–33)
MCHC RBC AUTO-ENTMCNC: 33.3 G/DL (ref 31.5–35.7)
MCV RBC AUTO: 90.7 FL (ref 79–97)
MONOCYTES # BLD AUTO: 0.5 10*3/MM3 (ref 0.1–0.9)
MONOCYTES NFR BLD AUTO: 5.8 % (ref 5–12)
NEUTROPHILS NFR BLD AUTO: 6.34 10*3/MM3 (ref 1.7–7)
NEUTROPHILS NFR BLD AUTO: 73.2 % (ref 42.7–76)
NRBC BLD AUTO-RTO: 0 /100 WBC (ref 0–0.2)
PLATELET # BLD AUTO: 560 10*3/MM3 (ref 140–450)
PMV BLD AUTO: 8.2 FL (ref 6–12)
POTASSIUM SERPL-SCNC: 2.7 MMOL/L (ref 3.5–5.2)
POTASSIUM SERPL-SCNC: 3 MMOL/L (ref 3.5–5.2)
PROCALCITONIN SERPL-MCNC: 28.7 NG/ML (ref 0–0.25)
RBC # BLD AUTO: 2.81 10*6/MM3 (ref 3.77–5.28)
SODIUM SERPL-SCNC: 137 MMOL/L (ref 136–145)
TIBC SERPL-MCNC: 198 MCG/DL (ref 298–536)
TRANSFERRIN SERPL-MCNC: 133 MG/DL (ref 200–360)
VIT B12 BLD-MCNC: 640 PG/ML (ref 211–946)
WBC NRBC COR # BLD AUTO: 8.65 10*3/MM3 (ref 3.4–10.8)

## 2025-08-04 PROCEDURE — G0378 HOSPITAL OBSERVATION PER HR: HCPCS

## 2025-08-04 PROCEDURE — 85025 COMPLETE CBC W/AUTO DIFF WBC: CPT | Performed by: HOSPITALIST

## 2025-08-04 PROCEDURE — 25010000002 PIPERACILLIN SOD-TAZOBACTAM PER 1 G: Performed by: HOSPITALIST

## 2025-08-04 PROCEDURE — 83540 ASSAY OF IRON: CPT | Performed by: HOSPITALIST

## 2025-08-04 PROCEDURE — 71250 CT THORAX DX C-: CPT

## 2025-08-04 PROCEDURE — 63710000001 ONDANSETRON ODT 4 MG TABLET DISPERSIBLE: Performed by: HOSPITALIST

## 2025-08-04 PROCEDURE — 84466 ASSAY OF TRANSFERRIN: CPT | Performed by: HOSPITALIST

## 2025-08-04 PROCEDURE — 73060 X-RAY EXAM OF HUMERUS: CPT

## 2025-08-04 PROCEDURE — 83735 ASSAY OF MAGNESIUM: CPT | Performed by: STUDENT IN AN ORGANIZED HEALTH CARE EDUCATION/TRAINING PROGRAM

## 2025-08-04 PROCEDURE — 84145 PROCALCITONIN (PCT): CPT | Performed by: STUDENT IN AN ORGANIZED HEALTH CARE EDUCATION/TRAINING PROGRAM

## 2025-08-04 PROCEDURE — 84132 ASSAY OF SERUM POTASSIUM: CPT | Performed by: STUDENT IN AN ORGANIZED HEALTH CARE EDUCATION/TRAINING PROGRAM

## 2025-08-04 PROCEDURE — 82746 ASSAY OF FOLIC ACID SERUM: CPT | Performed by: HOSPITALIST

## 2025-08-04 PROCEDURE — 82607 VITAMIN B-12: CPT | Performed by: HOSPITALIST

## 2025-08-04 PROCEDURE — 25010000002 POTASSIUM CHLORIDE 10 MEQ/100ML SOLUTION: Performed by: STUDENT IN AN ORGANIZED HEALTH CARE EDUCATION/TRAINING PROGRAM

## 2025-08-04 PROCEDURE — 80048 BASIC METABOLIC PNL TOTAL CA: CPT | Performed by: HOSPITALIST

## 2025-08-04 PROCEDURE — 87040 BLOOD CULTURE FOR BACTERIA: CPT | Performed by: STUDENT IN AN ORGANIZED HEALTH CARE EDUCATION/TRAINING PROGRAM

## 2025-08-04 RX ORDER — POTASSIUM CHLORIDE 7.45 MG/ML
10 INJECTION INTRAVENOUS
Status: COMPLETED | OUTPATIENT
Start: 2025-08-04 | End: 2025-08-04

## 2025-08-04 RX ORDER — FOLIC ACID 1 MG/1
1 TABLET ORAL DAILY
Status: DISCONTINUED | OUTPATIENT
Start: 2025-08-04 | End: 2025-08-06 | Stop reason: HOSPADM

## 2025-08-04 RX ADMIN — LORAZEPAM 1 MG: 1 TABLET ORAL at 21:18

## 2025-08-04 RX ADMIN — POTASSIUM CHLORIDE 10 MEQ: 10 INJECTION, SOLUTION INTRAVENOUS at 14:44

## 2025-08-04 RX ADMIN — ONDANSETRON 4 MG: 4 TABLET, ORALLY DISINTEGRATING ORAL at 19:20

## 2025-08-04 RX ADMIN — ESCITALOPRAM 20 MG: 20 TABLET, FILM COATED ORAL at 09:08

## 2025-08-04 RX ADMIN — ONDANSETRON 4 MG: 4 TABLET, ORALLY DISINTEGRATING ORAL at 06:03

## 2025-08-04 RX ADMIN — OXYCODONE HYDROCHLORIDE 5 MG: 5 TABLET ORAL at 21:18

## 2025-08-04 RX ADMIN — TIZANIDINE 4 MG: 4 TABLET ORAL at 21:18

## 2025-08-04 RX ADMIN — ACETAMINOPHEN 1000 MG: 500 TABLET, FILM COATED ORAL at 06:07

## 2025-08-04 RX ADMIN — PRAVASTATIN SODIUM 20 MG: 40 TABLET ORAL at 09:08

## 2025-08-04 RX ADMIN — OXYCODONE HYDROCHLORIDE 5 MG: 5 TABLET ORAL at 04:02

## 2025-08-04 RX ADMIN — POTASSIUM CHLORIDE 10 MEQ: 10 INJECTION, SOLUTION INTRAVENOUS at 09:51

## 2025-08-04 RX ADMIN — POTASSIUM CHLORIDE 10 MEQ: 10 INJECTION, SOLUTION INTRAVENOUS at 13:18

## 2025-08-04 RX ADMIN — BUPROPION HYDROCHLORIDE 150 MG: 150 TABLET, EXTENDED RELEASE ORAL at 06:03

## 2025-08-04 RX ADMIN — OXYCODONE HYDROCHLORIDE 5 MG: 5 TABLET ORAL at 13:17

## 2025-08-04 RX ADMIN — OXYCODONE HYDROCHLORIDE 5 MG: 5 TABLET ORAL at 17:11

## 2025-08-04 RX ADMIN — OXYCODONE HYDROCHLORIDE 5 MG: 5 TABLET ORAL at 09:08

## 2025-08-04 RX ADMIN — PIPERACILLIN AND TAZOBACTAM 3.38 G: 3; .375 INJECTION, POWDER, FOR SOLUTION INTRAVENOUS at 06:10

## 2025-08-04 RX ADMIN — FOLIC ACID 1 MG: 1 TABLET ORAL at 11:12

## 2025-08-04 RX ADMIN — POTASSIUM CHLORIDE 10 MEQ: 10 INJECTION, SOLUTION INTRAVENOUS at 11:05

## 2025-08-04 RX ADMIN — MELOXICAM 15 MG: 15 TABLET ORAL at 09:08

## 2025-08-04 RX ADMIN — Medication 10 ML: at 21:18

## 2025-08-04 RX ADMIN — FLUOXETINE 40 MG: 20 CAPSULE ORAL at 09:08

## 2025-08-04 RX ADMIN — POTASSIUM CHLORIDE 10 MEQ: 10 INJECTION, SOLUTION INTRAVENOUS at 15:45

## 2025-08-04 RX ADMIN — ACETAMINOPHEN 1000 MG: 500 TABLET, FILM COATED ORAL at 14:44

## 2025-08-04 RX ADMIN — POTASSIUM CHLORIDE 10 MEQ: 10 INJECTION, SOLUTION INTRAVENOUS at 12:15

## 2025-08-05 ENCOUNTER — PATIENT OUTREACH (OUTPATIENT)
Age: 70
End: 2025-08-05
Payer: MEDICARE

## 2025-08-05 LAB
ADV 40+41 DNA STL QL NAA+NON-PROBE: NOT DETECTED
ANION GAP SERPL CALCULATED.3IONS-SCNC: 12 MMOL/L (ref 5–15)
ASTRO TYP 1-8 RNA STL QL NAA+NON-PROBE: NOT DETECTED
BACTERIA UR QL AUTO: NORMAL /HPF
BASOPHILS # BLD AUTO: 0.04 10*3/MM3 (ref 0–0.2)
BASOPHILS NFR BLD AUTO: 0.5 % (ref 0–1.5)
BILIRUB UR QL STRIP: NEGATIVE
BUN SERPL-MCNC: 8 MG/DL (ref 8–23)
BUN/CREAT SERPL: 17.8 (ref 7–25)
C CAYETANENSIS DNA STL QL NAA+NON-PROBE: NOT DETECTED
C COLI+JEJ+UPSA DNA STL QL NAA+NON-PROBE: NOT DETECTED
C DIFF TOX GENS STL QL NAA+PROBE: NEGATIVE
CALCIUM SPEC-SCNC: 8.2 MG/DL (ref 8.6–10.5)
CHLORIDE SERPL-SCNC: 103 MMOL/L (ref 98–107)
CLARITY UR: CLEAR
CO2 SERPL-SCNC: 18 MMOL/L (ref 22–29)
COLOR UR: YELLOW
CREAT SERPL-MCNC: 0.45 MG/DL (ref 0.57–1)
CRYPTOSP DNA STL QL NAA+NON-PROBE: NOT DETECTED
DEPRECATED RDW RBC AUTO: 44.8 FL (ref 37–54)
E HISTOLYT DNA STL QL NAA+NON-PROBE: NOT DETECTED
EAEC PAA PLAS AGGR+AATA ST NAA+NON-PRB: NOT DETECTED
EC STX1+STX2 GENES STL QL NAA+NON-PROBE: NOT DETECTED
EGFRCR SERPLBLD CKD-EPI 2021: 104.3 ML/MIN/1.73
EOSINOPHIL # BLD AUTO: 0.15 10*3/MM3 (ref 0–0.4)
EOSINOPHIL NFR BLD AUTO: 1.9 % (ref 0.3–6.2)
EPEC EAE GENE STL QL NAA+NON-PROBE: NOT DETECTED
ERYTHROCYTE [DISTWIDTH] IN BLOOD BY AUTOMATED COUNT: 13.2 % (ref 12.3–15.4)
ETEC LTA+ST1A+ST1B TOX ST NAA+NON-PROBE: NOT DETECTED
G LAMBLIA DNA STL QL NAA+NON-PROBE: NOT DETECTED
GLUCOSE SERPL-MCNC: 109 MG/DL (ref 65–99)
GLUCOSE UR STRIP-MCNC: NEGATIVE MG/DL
HCT VFR BLD AUTO: 28.4 % (ref 34–46.6)
HGB BLD-MCNC: 9.2 G/DL (ref 12–15.9)
HGB UR QL STRIP.AUTO: ABNORMAL
HYALINE CASTS UR QL AUTO: NORMAL /LPF
IMM GRANULOCYTES # BLD AUTO: 0.03 10*3/MM3 (ref 0–0.05)
IMM GRANULOCYTES NFR BLD AUTO: 0.4 % (ref 0–0.5)
KETONES UR QL STRIP: ABNORMAL
LEUKOCYTE ESTERASE UR QL STRIP.AUTO: NEGATIVE
LYMPHOCYTES # BLD AUTO: 1.96 10*3/MM3 (ref 0.7–3.1)
LYMPHOCYTES NFR BLD AUTO: 25.3 % (ref 19.6–45.3)
MAGNESIUM SERPL-MCNC: 1.7 MG/DL (ref 1.6–2.4)
MCH RBC QN AUTO: 30.1 PG (ref 26.6–33)
MCHC RBC AUTO-ENTMCNC: 32.4 G/DL (ref 31.5–35.7)
MCV RBC AUTO: 92.8 FL (ref 79–97)
MONOCYTES # BLD AUTO: 0.45 10*3/MM3 (ref 0.1–0.9)
MONOCYTES NFR BLD AUTO: 5.8 % (ref 5–12)
MYCOBACTERIUM SPEC CULT: NORMAL
NEUTROPHILS NFR BLD AUTO: 5.12 10*3/MM3 (ref 1.7–7)
NEUTROPHILS NFR BLD AUTO: 66.1 % (ref 42.7–76)
NIGHT BLUE STAIN TISS: NORMAL
NITRITE UR QL STRIP: NEGATIVE
NOROVIRUS GI+II RNA STL QL NAA+NON-PROBE: NOT DETECTED
NRBC BLD AUTO-RTO: 0 /100 WBC (ref 0–0.2)
P SHIGELLOIDES DNA STL QL NAA+NON-PROBE: NOT DETECTED
PH UR STRIP.AUTO: 5.5 [PH] (ref 5–8)
PLATELET # BLD AUTO: 549 10*3/MM3 (ref 140–450)
PMV BLD AUTO: 8 FL (ref 6–12)
POTASSIUM SERPL-SCNC: 2.9 MMOL/L (ref 3.5–5.2)
POTASSIUM SERPL-SCNC: 3.4 MMOL/L (ref 3.5–5.2)
PROCALCITONIN SERPL-MCNC: 12.1 NG/ML (ref 0–0.25)
PROT UR QL STRIP: NEGATIVE
RBC # BLD AUTO: 3.06 10*6/MM3 (ref 3.77–5.28)
RBC # UR STRIP: NORMAL /HPF
REF LAB TEST METHOD: NORMAL
RVA RNA STL QL NAA+NON-PROBE: NOT DETECTED
S ENT+BONG DNA STL QL NAA+NON-PROBE: NOT DETECTED
SAPO I+II+IV+V RNA STL QL NAA+NON-PROBE: NOT DETECTED
SHIGELLA SP+EIEC IPAH ST NAA+NON-PROBE: NOT DETECTED
SODIUM SERPL-SCNC: 133 MMOL/L (ref 136–145)
SP GR UR STRIP: 1.01 (ref 1–1.03)
SQUAMOUS #/AREA URNS HPF: NORMAL /HPF
UROBILINOGEN UR QL STRIP: ABNORMAL
V CHOL+PARA+VUL DNA STL QL NAA+NON-PROBE: NOT DETECTED
V CHOLERAE DNA STL QL NAA+NON-PROBE: NOT DETECTED
WBC # UR STRIP: NORMAL /HPF
WBC NRBC COR # BLD AUTO: 7.75 10*3/MM3 (ref 3.4–10.8)
Y ENTEROCOL DNA STL QL NAA+NON-PROBE: NOT DETECTED

## 2025-08-05 PROCEDURE — 25010000002 MAGNESIUM SULFATE 4 GM/100ML SOLUTION: Performed by: STUDENT IN AN ORGANIZED HEALTH CARE EDUCATION/TRAINING PROGRAM

## 2025-08-05 PROCEDURE — 84145 PROCALCITONIN (PCT): CPT | Performed by: STUDENT IN AN ORGANIZED HEALTH CARE EDUCATION/TRAINING PROGRAM

## 2025-08-05 PROCEDURE — 87493 C DIFF AMPLIFIED PROBE: CPT | Performed by: STUDENT IN AN ORGANIZED HEALTH CARE EDUCATION/TRAINING PROGRAM

## 2025-08-05 PROCEDURE — 97162 PT EVAL MOD COMPLEX 30 MIN: CPT

## 2025-08-05 PROCEDURE — 80048 BASIC METABOLIC PNL TOTAL CA: CPT | Performed by: HOSPITALIST

## 2025-08-05 PROCEDURE — 25010000002 POTASSIUM CHLORIDE 10 MEQ/100ML SOLUTION: Performed by: STUDENT IN AN ORGANIZED HEALTH CARE EDUCATION/TRAINING PROGRAM

## 2025-08-05 PROCEDURE — 87507 IADNA-DNA/RNA PROBE TQ 12-25: CPT | Performed by: STUDENT IN AN ORGANIZED HEALTH CARE EDUCATION/TRAINING PROGRAM

## 2025-08-05 PROCEDURE — 84132 ASSAY OF SERUM POTASSIUM: CPT | Performed by: STUDENT IN AN ORGANIZED HEALTH CARE EDUCATION/TRAINING PROGRAM

## 2025-08-05 PROCEDURE — 85025 COMPLETE CBC W/AUTO DIFF WBC: CPT | Performed by: HOSPITALIST

## 2025-08-05 PROCEDURE — 81001 URINALYSIS AUTO W/SCOPE: CPT | Performed by: EMERGENCY MEDICINE

## 2025-08-05 PROCEDURE — 83735 ASSAY OF MAGNESIUM: CPT | Performed by: STUDENT IN AN ORGANIZED HEALTH CARE EDUCATION/TRAINING PROGRAM

## 2025-08-05 PROCEDURE — 97530 THERAPEUTIC ACTIVITIES: CPT

## 2025-08-05 PROCEDURE — 63710000001 ONDANSETRON ODT 4 MG TABLET DISPERSIBLE: Performed by: HOSPITALIST

## 2025-08-05 RX ORDER — LOPERAMIDE HYDROCHLORIDE 2 MG/1
2 CAPSULE ORAL 4 TIMES DAILY PRN
Status: DISCONTINUED | OUTPATIENT
Start: 2025-08-05 | End: 2025-08-06 | Stop reason: HOSPADM

## 2025-08-05 RX ORDER — POTASSIUM CHLORIDE 1.5 G/1.58G
40 POWDER, FOR SOLUTION ORAL EVERY 4 HOURS
Status: DISCONTINUED | OUTPATIENT
Start: 2025-08-05 | End: 2025-08-05

## 2025-08-05 RX ORDER — MAGNESIUM SULFATE HEPTAHYDRATE 40 MG/ML
4 INJECTION, SOLUTION INTRAVENOUS ONCE
Status: COMPLETED | OUTPATIENT
Start: 2025-08-05 | End: 2025-08-05

## 2025-08-05 RX ORDER — POTASSIUM CHLORIDE 7.45 MG/ML
10 INJECTION INTRAVENOUS
Status: COMPLETED | OUTPATIENT
Start: 2025-08-05 | End: 2025-08-05

## 2025-08-05 RX ADMIN — POTASSIUM CHLORIDE 10 MEQ: 10 INJECTION, SOLUTION INTRAVENOUS at 14:26

## 2025-08-05 RX ADMIN — OXYCODONE HYDROCHLORIDE 5 MG: 5 TABLET ORAL at 02:10

## 2025-08-05 RX ADMIN — LOPERAMIDE HYDROCHLORIDE 2 MG: 2 CAPSULE ORAL at 14:24

## 2025-08-05 RX ADMIN — LOPERAMIDE HYDROCHLORIDE 2 MG: 2 CAPSULE ORAL at 20:55

## 2025-08-05 RX ADMIN — ACETAMINOPHEN 1000 MG: 500 TABLET, FILM COATED ORAL at 05:46

## 2025-08-05 RX ADMIN — ACETAMINOPHEN 1000 MG: 500 TABLET, FILM COATED ORAL at 22:49

## 2025-08-05 RX ADMIN — POTASSIUM CHLORIDE 10 MEQ: 10 INJECTION, SOLUTION INTRAVENOUS at 10:36

## 2025-08-05 RX ADMIN — MELOXICAM 15 MG: 15 TABLET ORAL at 08:52

## 2025-08-05 RX ADMIN — ESCITALOPRAM 20 MG: 20 TABLET, FILM COATED ORAL at 08:52

## 2025-08-05 RX ADMIN — ONDANSETRON 4 MG: 4 TABLET, ORALLY DISINTEGRATING ORAL at 05:47

## 2025-08-05 RX ADMIN — MAGNESIUM SULFATE HEPTAHYDRATE 4 G: 40 INJECTION, SOLUTION INTRAVENOUS at 09:36

## 2025-08-05 RX ADMIN — FOLIC ACID 1 MG: 1 TABLET ORAL at 08:52

## 2025-08-05 RX ADMIN — POTASSIUM CHLORIDE 10 MEQ: 10 INJECTION, SOLUTION INTRAVENOUS at 13:45

## 2025-08-05 RX ADMIN — BUPROPION HYDROCHLORIDE 150 MG: 150 TABLET, EXTENDED RELEASE ORAL at 05:46

## 2025-08-05 RX ADMIN — TIZANIDINE 4 MG: 4 TABLET ORAL at 20:55

## 2025-08-05 RX ADMIN — POTASSIUM CHLORIDE 10 MEQ: 10 INJECTION, SOLUTION INTRAVENOUS at 11:25

## 2025-08-05 RX ADMIN — ONDANSETRON 4 MG: 4 TABLET, ORALLY DISINTEGRATING ORAL at 15:20

## 2025-08-05 RX ADMIN — Medication 10 ML: at 08:52

## 2025-08-05 RX ADMIN — POTASSIUM CHLORIDE 10 MEQ: 10 INJECTION, SOLUTION INTRAVENOUS at 16:16

## 2025-08-05 RX ADMIN — POTASSIUM CHLORIDE 10 MEQ: 10 INJECTION, SOLUTION INTRAVENOUS at 15:18

## 2025-08-05 RX ADMIN — Medication 10 ML: at 20:55

## 2025-08-05 RX ADMIN — PRAVASTATIN SODIUM 20 MG: 40 TABLET ORAL at 08:51

## 2025-08-05 RX ADMIN — OXYCODONE HYDROCHLORIDE 5 MG: 5 TABLET ORAL at 08:52

## 2025-08-05 RX ADMIN — ACETAMINOPHEN 1000 MG: 500 TABLET, FILM COATED ORAL at 14:24

## 2025-08-05 RX ADMIN — LORAZEPAM 1 MG: 1 TABLET ORAL at 20:55

## 2025-08-05 RX ADMIN — LOPERAMIDE HYDROCHLORIDE 2 MG: 2 CAPSULE ORAL at 08:51

## 2025-08-05 RX ADMIN — OXYCODONE HYDROCHLORIDE 5 MG: 5 TABLET ORAL at 14:24

## 2025-08-05 RX ADMIN — OXYCODONE HYDROCHLORIDE 5 MG: 5 TABLET ORAL at 18:31

## 2025-08-05 RX ADMIN — POTASSIUM CHLORIDE 40 MEQ: 1.5 POWDER, FOR SOLUTION ORAL at 05:47

## 2025-08-05 RX ADMIN — FLUOXETINE 40 MG: 20 CAPSULE ORAL at 08:52

## 2025-08-05 RX ADMIN — OXYCODONE HYDROCHLORIDE 5 MG: 5 TABLET ORAL at 22:49

## 2025-08-05 RX ADMIN — LORAZEPAM 1 MG: 1 TABLET ORAL at 05:45

## 2025-08-06 ENCOUNTER — READMISSION MANAGEMENT (OUTPATIENT)
Dept: CALL CENTER | Facility: HOSPITAL | Age: 70
End: 2025-08-06
Payer: MEDICARE

## 2025-08-06 VITALS
SYSTOLIC BLOOD PRESSURE: 170 MMHG | HEIGHT: 68 IN | TEMPERATURE: 98.6 F | BODY MASS INDEX: 24.25 KG/M2 | OXYGEN SATURATION: 98 % | RESPIRATION RATE: 18 BRPM | HEART RATE: 80 BPM | WEIGHT: 160 LBS | DIASTOLIC BLOOD PRESSURE: 82 MMHG

## 2025-08-06 LAB
ANION GAP SERPL CALCULATED.3IONS-SCNC: 14 MMOL/L (ref 5–15)
BASOPHILS # BLD AUTO: 0.03 10*3/MM3 (ref 0–0.2)
BASOPHILS NFR BLD AUTO: 0.3 % (ref 0–1.5)
BUN SERPL-MCNC: 5 MG/DL (ref 8–23)
BUN/CREAT SERPL: 11.6 (ref 7–25)
CALCIUM SPEC-SCNC: 7.9 MG/DL (ref 8.6–10.5)
CHLORIDE SERPL-SCNC: 100 MMOL/L (ref 98–107)
CO2 SERPL-SCNC: 17 MMOL/L (ref 22–29)
CREAT SERPL-MCNC: 0.43 MG/DL (ref 0.57–1)
DEPRECATED RDW RBC AUTO: 46.6 FL (ref 37–54)
EGFRCR SERPLBLD CKD-EPI 2021: 105.4 ML/MIN/1.73
EOSINOPHIL # BLD AUTO: 0.17 10*3/MM3 (ref 0–0.4)
EOSINOPHIL NFR BLD AUTO: 1.7 % (ref 0.3–6.2)
ERYTHROCYTE [DISTWIDTH] IN BLOOD BY AUTOMATED COUNT: 13.1 % (ref 12.3–15.4)
GLUCOSE SERPL-MCNC: 101 MG/DL (ref 65–99)
HCT VFR BLD AUTO: 33 % (ref 34–46.6)
HGB BLD-MCNC: 10 G/DL (ref 12–15.9)
IMM GRANULOCYTES # BLD AUTO: 0.04 10*3/MM3 (ref 0–0.05)
IMM GRANULOCYTES NFR BLD AUTO: 0.4 % (ref 0–0.5)
LYMPHOCYTES # BLD AUTO: 1.88 10*3/MM3 (ref 0.7–3.1)
LYMPHOCYTES NFR BLD AUTO: 19.2 % (ref 19.6–45.3)
MAGNESIUM SERPL-MCNC: 2 MG/DL (ref 1.6–2.4)
MCH RBC QN AUTO: 29.7 PG (ref 26.6–33)
MCHC RBC AUTO-ENTMCNC: 30.3 G/DL (ref 31.5–35.7)
MCV RBC AUTO: 97.9 FL (ref 79–97)
MONOCYTES # BLD AUTO: 0.62 10*3/MM3 (ref 0.1–0.9)
MONOCYTES NFR BLD AUTO: 6.3 % (ref 5–12)
NEUTROPHILS NFR BLD AUTO: 7.03 10*3/MM3 (ref 1.7–7)
NEUTROPHILS NFR BLD AUTO: 72.1 % (ref 42.7–76)
NRBC BLD AUTO-RTO: 0 /100 WBC (ref 0–0.2)
PLATELET # BLD AUTO: 579 10*3/MM3 (ref 140–450)
PMV BLD AUTO: 8 FL (ref 6–12)
POTASSIUM SERPL-SCNC: 3.5 MMOL/L (ref 3.5–5.2)
PROCALCITONIN SERPL-MCNC: 5.27 NG/ML (ref 0–0.25)
RBC # BLD AUTO: 3.37 10*6/MM3 (ref 3.77–5.28)
SODIUM SERPL-SCNC: 131 MMOL/L (ref 136–145)
WBC NRBC COR # BLD AUTO: 9.77 10*3/MM3 (ref 3.4–10.8)

## 2025-08-06 PROCEDURE — 84145 PROCALCITONIN (PCT): CPT | Performed by: STUDENT IN AN ORGANIZED HEALTH CARE EDUCATION/TRAINING PROGRAM

## 2025-08-06 PROCEDURE — 80048 BASIC METABOLIC PNL TOTAL CA: CPT | Performed by: HOSPITALIST

## 2025-08-06 PROCEDURE — 25010000002 POTASSIUM CHLORIDE 10 MEQ/100ML SOLUTION: Performed by: STUDENT IN AN ORGANIZED HEALTH CARE EDUCATION/TRAINING PROGRAM

## 2025-08-06 PROCEDURE — 83735 ASSAY OF MAGNESIUM: CPT | Performed by: STUDENT IN AN ORGANIZED HEALTH CARE EDUCATION/TRAINING PROGRAM

## 2025-08-06 PROCEDURE — 97166 OT EVAL MOD COMPLEX 45 MIN: CPT

## 2025-08-06 PROCEDURE — 85025 COMPLETE CBC W/AUTO DIFF WBC: CPT | Performed by: HOSPITALIST

## 2025-08-06 RX ORDER — POTASSIUM CHLORIDE 7.45 MG/ML
10 INJECTION INTRAVENOUS
Status: COMPLETED | OUTPATIENT
Start: 2025-08-06 | End: 2025-08-06

## 2025-08-06 RX ORDER — FERROUS SULFATE 325(65) MG
325 TABLET ORAL
Qty: 30 TABLET | Refills: 0 | Status: SHIPPED | OUTPATIENT
Start: 2025-08-06

## 2025-08-06 RX ORDER — FOLIC ACID 1 MG/1
1 TABLET ORAL DAILY
Qty: 30 TABLET | Refills: 0 | Status: SHIPPED | OUTPATIENT
Start: 2025-08-06

## 2025-08-06 RX ORDER — OXYCODONE HYDROCHLORIDE 5 MG/1
5 TABLET ORAL EVERY 6 HOURS PRN
Qty: 12 TABLET | Refills: 0 | Status: SHIPPED | OUTPATIENT
Start: 2025-08-06

## 2025-08-06 RX ADMIN — ACETAMINOPHEN 1000 MG: 500 TABLET, FILM COATED ORAL at 05:25

## 2025-08-06 RX ADMIN — MELOXICAM 15 MG: 15 TABLET ORAL at 09:15

## 2025-08-06 RX ADMIN — POTASSIUM CHLORIDE 10 MEQ: 10 INJECTION, SOLUTION INTRAVENOUS at 05:26

## 2025-08-06 RX ADMIN — POTASSIUM CHLORIDE 10 MEQ: 10 INJECTION, SOLUTION INTRAVENOUS at 03:35

## 2025-08-06 RX ADMIN — LOPERAMIDE HYDROCHLORIDE 2 MG: 2 CAPSULE ORAL at 03:35

## 2025-08-06 RX ADMIN — OXYCODONE HYDROCHLORIDE 5 MG: 5 TABLET ORAL at 12:15

## 2025-08-06 RX ADMIN — FOLIC ACID 1 MG: 1 TABLET ORAL at 09:15

## 2025-08-06 RX ADMIN — POTASSIUM CHLORIDE 10 MEQ: 10 INJECTION, SOLUTION INTRAVENOUS at 04:29

## 2025-08-06 RX ADMIN — PRAVASTATIN SODIUM 20 MG: 40 TABLET ORAL at 09:15

## 2025-08-06 RX ADMIN — LOPERAMIDE HYDROCHLORIDE 2 MG: 2 CAPSULE ORAL at 09:15

## 2025-08-06 RX ADMIN — Medication 10 ML: at 09:15

## 2025-08-06 RX ADMIN — POTASSIUM CHLORIDE 10 MEQ: 10 INJECTION, SOLUTION INTRAVENOUS at 02:16

## 2025-08-07 ENCOUNTER — TRANSITIONAL CARE MANAGEMENT TELEPHONE ENCOUNTER (OUTPATIENT)
Dept: CALL CENTER | Facility: HOSPITAL | Age: 70
End: 2025-08-07
Payer: MEDICARE

## 2025-08-07 DIAGNOSIS — F41.9 ANXIETY: ICD-10-CM

## 2025-08-07 DIAGNOSIS — F51.01 PRIMARY INSOMNIA: ICD-10-CM

## 2025-08-08 RX ORDER — ZOLPIDEM TARTRATE 10 MG/1
10 TABLET ORAL NIGHTLY
Qty: 90 TABLET | Refills: 1 | Status: SHIPPED | OUTPATIENT
Start: 2025-08-08

## 2025-08-08 RX ORDER — LORAZEPAM 1 MG/1
1 TABLET ORAL EVERY 6 HOURS PRN
Qty: 240 TABLET | Refills: 1 | Status: SHIPPED | OUTPATIENT
Start: 2025-08-08

## 2025-08-09 LAB
BACTERIA SPEC AEROBE CULT: NORMAL
BACTERIA SPEC AEROBE CULT: NORMAL

## 2025-08-11 ENCOUNTER — TELEPHONE (OUTPATIENT)
Dept: INTERNAL MEDICINE | Facility: CLINIC | Age: 70
End: 2025-08-11
Payer: MEDICARE

## 2025-08-11 ENCOUNTER — PATIENT OUTREACH (OUTPATIENT)
Age: 70
End: 2025-08-11
Payer: MEDICARE

## 2025-08-12 LAB
MYCOBACTERIUM SPEC CULT: NORMAL
NIGHT BLUE STAIN TISS: NORMAL

## 2025-08-14 DIAGNOSIS — F41.9 ANXIETY: ICD-10-CM

## 2025-08-14 RX ORDER — LORAZEPAM 1 MG/1
1 TABLET ORAL EVERY 6 HOURS PRN
Qty: 240 TABLET | Refills: 1 | Status: SHIPPED | OUTPATIENT
Start: 2025-08-14

## 2025-08-19 ENCOUNTER — TELEPHONE (OUTPATIENT)
Dept: INTERNAL MEDICINE | Facility: CLINIC | Age: 70
End: 2025-08-19
Payer: MEDICARE

## 2025-08-19 ENCOUNTER — APPOINTMENT (OUTPATIENT)
Dept: GENERAL RADIOLOGY | Facility: HOSPITAL | Age: 70
End: 2025-08-19
Payer: MEDICARE

## 2025-08-19 ENCOUNTER — HOSPITAL ENCOUNTER (EMERGENCY)
Facility: HOSPITAL | Age: 70
Discharge: HOME | End: 2025-08-19
Attending: EMERGENCY MEDICINE | Admitting: EMERGENCY MEDICINE
Payer: MEDICARE

## 2025-08-19 VITALS
SYSTOLIC BLOOD PRESSURE: 124 MMHG | HEART RATE: 72 BPM | RESPIRATION RATE: 18 BRPM | TEMPERATURE: 98.4 F | DIASTOLIC BLOOD PRESSURE: 70 MMHG | OXYGEN SATURATION: 94 %

## 2025-08-19 DIAGNOSIS — R00.0 TACHYCARDIA: Primary | ICD-10-CM

## 2025-08-19 DIAGNOSIS — D64.9 CHRONIC ANEMIA: ICD-10-CM

## 2025-08-19 DIAGNOSIS — R73.9 HYPERGLYCEMIA: ICD-10-CM

## 2025-08-19 DIAGNOSIS — E87.6 HYPOKALEMIA: ICD-10-CM

## 2025-08-19 LAB
ALBUMIN SERPL-MCNC: 3.4 G/DL (ref 3.5–5.2)
ALBUMIN/GLOB SERPL: 0.8 G/DL
ALP SERPL-CCNC: 108 U/L (ref 39–117)
ALT SERPL W P-5'-P-CCNC: 7 U/L (ref 1–33)
ANION GAP SERPL CALCULATED.3IONS-SCNC: 14.3 MMOL/L (ref 5–15)
APTT PPP: 30.7 SECONDS (ref 22.7–35.4)
AST SERPL-CCNC: 9 U/L (ref 1–32)
BASOPHILS # BLD AUTO: 0.05 10*3/MM3 (ref 0–0.2)
BASOPHILS NFR BLD AUTO: 0.5 % (ref 0–1.5)
BILIRUB SERPL-MCNC: 0.4 MG/DL (ref 0–1.2)
BUN SERPL-MCNC: 9 MG/DL (ref 8–23)
BUN/CREAT SERPL: 15 (ref 7–25)
CALCIUM SPEC-SCNC: 9.2 MG/DL (ref 8.6–10.5)
CHLORIDE SERPL-SCNC: 102 MMOL/L (ref 98–107)
CO2 SERPL-SCNC: 19.7 MMOL/L (ref 22–29)
CREAT SERPL-MCNC: 0.6 MG/DL (ref 0.57–1)
DEPRECATED RDW RBC AUTO: 46.6 FL (ref 37–54)
EGFRCR SERPLBLD CKD-EPI 2021: 97.3 ML/MIN/1.73
EOSINOPHIL # BLD AUTO: 0.17 10*3/MM3 (ref 0–0.4)
EOSINOPHIL NFR BLD AUTO: 1.6 % (ref 0.3–6.2)
ERYTHROCYTE [DISTWIDTH] IN BLOOD BY AUTOMATED COUNT: 13.8 % (ref 12.3–15.4)
GEN 5 1HR TROPONIN T REFLEX: 25 NG/L
GLOBULIN UR ELPH-MCNC: 4.4 GM/DL
GLUCOSE SERPL-MCNC: 110 MG/DL (ref 65–99)
HCT VFR BLD AUTO: 30.6 % (ref 34–46.6)
HGB BLD-MCNC: 9.5 G/DL (ref 12–15.9)
HOLD SPECIMEN: NORMAL
HOLD SPECIMEN: NORMAL
IMM GRANULOCYTES # BLD AUTO: 0.05 10*3/MM3 (ref 0–0.05)
IMM GRANULOCYTES NFR BLD AUTO: 0.5 % (ref 0–0.5)
INR PPP: 1.21 (ref 0.9–1.1)
LYMPHOCYTES # BLD AUTO: 1.48 10*3/MM3 (ref 0.7–3.1)
LYMPHOCYTES NFR BLD AUTO: 13.7 % (ref 19.6–45.3)
MAGNESIUM SERPL-MCNC: 1.8 MG/DL (ref 1.6–2.4)
MCH RBC QN AUTO: 28.6 PG (ref 26.6–33)
MCHC RBC AUTO-ENTMCNC: 31 G/DL (ref 31.5–35.7)
MCV RBC AUTO: 92.2 FL (ref 79–97)
MONOCYTES # BLD AUTO: 0.65 10*3/MM3 (ref 0.1–0.9)
MONOCYTES NFR BLD AUTO: 6 % (ref 5–12)
NEUTROPHILS NFR BLD AUTO: 77.7 % (ref 42.7–76)
NEUTROPHILS NFR BLD AUTO: 8.38 10*3/MM3 (ref 1.7–7)
NRBC BLD AUTO-RTO: 0 /100 WBC (ref 0–0.2)
PLATELET # BLD AUTO: 697 10*3/MM3 (ref 140–450)
PMV BLD AUTO: 8 FL (ref 6–12)
POTASSIUM SERPL-SCNC: 3.2 MMOL/L (ref 3.5–5.2)
PROT SERPL-MCNC: 7.8 G/DL (ref 6–8.5)
PROTHROMBIN TIME: 15.3 SECONDS (ref 11.7–14.2)
QT INTERVAL: 318 MS
QTC INTERVAL: 425 MS
RBC # BLD AUTO: 3.32 10*6/MM3 (ref 3.77–5.28)
SODIUM SERPL-SCNC: 136 MMOL/L (ref 136–145)
TROPONIN T % DELTA: -17
TROPONIN T NUMERIC DELTA: -5 NG/L
TROPONIN T SERPL HS-MCNC: 30 NG/L
TSH SERPL DL<=0.05 MIU/L-ACNC: 0.82 UIU/ML (ref 0.27–4.2)
WBC NRBC COR # BLD AUTO: 10.78 10*3/MM3 (ref 3.4–10.8)
WHOLE BLOOD HOLD COAG: NORMAL
WHOLE BLOOD HOLD SPECIMEN: NORMAL

## 2025-08-19 PROCEDURE — 96374 THER/PROPH/DIAG INJ IV PUSH: CPT

## 2025-08-19 PROCEDURE — 85025 COMPLETE CBC W/AUTO DIFF WBC: CPT | Performed by: EMERGENCY MEDICINE

## 2025-08-19 PROCEDURE — 83735 ASSAY OF MAGNESIUM: CPT | Performed by: EMERGENCY MEDICINE

## 2025-08-19 PROCEDURE — 84443 ASSAY THYROID STIM HORMONE: CPT | Performed by: EMERGENCY MEDICINE

## 2025-08-19 PROCEDURE — 84484 ASSAY OF TROPONIN QUANT: CPT | Performed by: EMERGENCY MEDICINE

## 2025-08-19 PROCEDURE — 71045 X-RAY EXAM CHEST 1 VIEW: CPT

## 2025-08-19 PROCEDURE — 80053 COMPREHEN METABOLIC PANEL: CPT | Performed by: EMERGENCY MEDICINE

## 2025-08-19 PROCEDURE — 99284 EMERGENCY DEPT VISIT MOD MDM: CPT

## 2025-08-19 PROCEDURE — 85610 PROTHROMBIN TIME: CPT | Performed by: EMERGENCY MEDICINE

## 2025-08-19 PROCEDURE — 25010000002 METOPROLOL TARTRATE: Performed by: EMERGENCY MEDICINE

## 2025-08-19 PROCEDURE — 85730 THROMBOPLASTIN TIME PARTIAL: CPT | Performed by: EMERGENCY MEDICINE

## 2025-08-19 PROCEDURE — 93005 ELECTROCARDIOGRAM TRACING: CPT | Performed by: EMERGENCY MEDICINE

## 2025-08-19 PROCEDURE — 93005 ELECTROCARDIOGRAM TRACING: CPT

## 2025-08-19 PROCEDURE — 36415 COLL VENOUS BLD VENIPUNCTURE: CPT

## 2025-08-19 RX ORDER — SODIUM CHLORIDE 0.9 % (FLUSH) 0.9 %
10 SYRINGE (ML) INJECTION AS NEEDED
Status: DISCONTINUED | OUTPATIENT
Start: 2025-08-19 | End: 2025-08-19 | Stop reason: HOSPADM

## 2025-08-19 RX ORDER — POTASSIUM CHLORIDE 1500 MG/1
40 TABLET, EXTENDED RELEASE ORAL ONCE
Status: COMPLETED | OUTPATIENT
Start: 2025-08-19 | End: 2025-08-19

## 2025-08-19 RX ADMIN — POTASSIUM CHLORIDE 40 MEQ: 1500 TABLET, EXTENDED RELEASE ORAL at 13:27

## 2025-08-19 RX ADMIN — METOPROLOL TARTRATE 5 MG: 5 INJECTION INTRAVENOUS at 12:08

## 2025-08-20 LAB
QT INTERVAL: 445 MS
QTC INTERVAL: 474 MS

## (undated) DEVICE — CANN O2 ETCO2 FITS ALL CONN CO2 SMPL A/ 7IN DISP LF

## (undated) DEVICE — ADHS SKIN SURG TISS VISC PREMIERPRO EXOFIN HI/VISC FAST/DRY

## (undated) DEVICE — SINGLE-USE BIOPSY FORCEPS: Brand: RADIAL JAW 4

## (undated) DEVICE — TUBING, SUCTION, 1/4" X 10', STRAIGHT: Brand: MEDLINE

## (undated) DEVICE — APPL CHLORAPREP HI/LITE 26ML ORNG

## (undated) DEVICE — THE SINGLE USE ETRAP – POLYP TRAP IS USED FOR SUCTION RETRIEVAL OF ENDOSCOPICALLY REMOVED POLYPS.: Brand: ETRAP

## (undated) DEVICE — PK PROC MINOR TOWER 40

## (undated) DEVICE — SUT MNCRYL PLS ANTIB UD 4/0 PS2 18IN

## (undated) DEVICE — GLV SURG BIOGEL LTX PF 6 1/2

## (undated) DEVICE — PENCL SMOKE/EVAC MEGADYNE TELESCP 10FT

## (undated) DEVICE — SENSR O2 OXIMAX FNGR A/ 18IN NONSTR

## (undated) DEVICE — NDL HYPO PRECISIONGLIDE REG 25G 1 1/2

## (undated) DEVICE — SNAR POLYP CAPTIVATOR RND STFF 2.4 240CM 10MM 1P/U

## (undated) DEVICE — PATIENT RETURN ELECTRODE, SINGLE-USE, CONTACT QUALITY MONITORING, ADULT, WITH 9FT CORD, FOR PATIENTS WEIGING OVER 33LBS. (15KG): Brand: MEGADYNE

## (undated) DEVICE — KT ORCA ORCAPOD DISP STRL

## (undated) DEVICE — LN SMPL CO2 SHTRM SD STREAM W/M LUER

## (undated) DEVICE — SPONGE,LAP,18"X18",DLX,XR,ST,5/PK,40/PK: Brand: MEDLINE

## (undated) DEVICE — TRAP FLD MINIVAC MEGADYNE 100ML

## (undated) DEVICE — ADAPT CLN BIOGUARD AIR/H2O DISP

## (undated) DEVICE — SUT SILK 2/0 FS BLK 18IN 685G

## (undated) DEVICE — ELECTRD BLD EZ CLN MOD 2.5IN

## (undated) DEVICE — ANTIBACTERIAL UNDYED BRAIDED (POLYGLACTIN 910), SYNTHETIC ABSORBABLE SUTURE: Brand: COATED VICRYL